# Patient Record
Sex: MALE | Race: BLACK OR AFRICAN AMERICAN | NOT HISPANIC OR LATINO | Employment: OTHER | URBAN - METROPOLITAN AREA
[De-identification: names, ages, dates, MRNs, and addresses within clinical notes are randomized per-mention and may not be internally consistent; named-entity substitution may affect disease eponyms.]

---

## 2017-01-03 ENCOUNTER — OFFICE VISIT (OUTPATIENT)
Dept: ORTHOPEDIC SURGERY | Facility: CLINIC | Age: 62
End: 2017-01-03

## 2017-01-03 DIAGNOSIS — Z98.1 S/P LUMBAR SPINAL FUSION: Primary | ICD-10-CM

## 2017-01-03 PROCEDURE — 72100 X-RAY EXAM L-S SPINE 2/3 VWS: CPT | Performed by: ORTHOPAEDIC SURGERY

## 2017-01-03 PROCEDURE — 99024 POSTOP FOLLOW-UP VISIT: CPT | Performed by: ORTHOPAEDIC SURGERY

## 2017-01-03 RX ORDER — CLOPIDOGREL BISULFATE 75 MG/1
75 TABLET ORAL EVERY MORNING
COMMUNITY
Start: 2017-01-02

## 2017-01-03 RX ORDER — OXYCODONE HYDROCHLORIDE 10 MG/1
10 TABLET ORAL EVERY 4 HOURS PRN
Qty: 70 TABLET | Refills: 0 | Status: SHIPPED | OUTPATIENT
Start: 2017-01-03 | End: 2017-01-13

## 2017-01-03 NOTE — MR AVS SNAPSHOT
Kian Mcdaniels   1/3/2017 9:35 AM   Office Visit    Dept Phone:  [phone]   Encounter #:  [id number]    Provider:  Rowdy Spencer MD   Department:  [facility]                Your Full Care Plan              Where to Get Your Medications      You can get these medications from any pharmacy     Bring a paper prescription for each of these medications     oxyCODONE 10 MG tablet            Your Updated Medication List          This list is accurate as of: 1/3/17 11:17 AM.  Always use your most recent med list.                acetone (urine) test strip       amLODIPine 10 MG tablet   Commonly known as:  NORVASC       aspirin 81 MG EC tablet       atorvastatin 40 MG tablet   Commonly known as:  LIPITOR       carvedilol 6.25 MG tablet   Commonly known as:  COREG       cetirizine 10 MG tablet   Commonly known as:  zyrTEC       clopidogrel 75 MG tablet   Commonly known as:  PLAVIX       docusate sodium 100 MG capsule   Commonly known as:  COLACE       FREESTYLE FREEDOM LITE W/DEVICE kit       freestyle lancets       FREESTYLE LITE test strip   Generic drug:  glucose blood       furosemide 40 MG tablet   Commonly known as:  LASIX       gabapentin 800 MG tablet   Commonly known as:  NEURONTIN       * HUMULIN R 500 UNIT/ML CONCENTRATED injection   Generic drug:  insulin regular       * insulin regular 500 UNIT/ML CONCENTRATED injection   Commonly known as:  humuLIN R       isosorbide mononitrate 60 MG 24 hr tablet   Commonly known as:  IMDUR       losartan 50 MG tablet   Commonly known as:  COZAAR       Multiple Vitamin tablet       oxyCODONE 10 MG tablet   Commonly known as:  ROXICODONE   Take 1 tablet by mouth Every 4 (Four) Hours As Needed for moderate pain (4-6) for up to 10 days.       pantoprazole 40 MG EC tablet   Commonly known as:  PROTONIX       potassium chloride 10 MEQ CR tablet   Commonly known as:  K-DUR       T:SLIM INSULIN  PUMP device       Testosterone Cypionate 200 MG/ML kit       tiZANidine 4 MG tablet   Commonly known as:  ZANAFLEX   Take 1 tablet by mouth Every Morning. And 1.5 tablet by mouth every evening       VENTOLIN  (90 BASE) MCG/ACT inhaler   Generic drug:  albuterol       VITAMIN C PO       vitamin D 61776 UNITS capsule capsule   Commonly known as:  ERGOCALCIFEROL       * Notice:  This list has 2 medication(s) that are the same as other medications prescribed for you. Read the directions carefully, and ask your doctor or other care provider to review them with you.            We Performed the Following     XR Spine Lumbar AP & Lateral       You Were Diagnosed With        Codes Comments    S/P lumbar spinal fusion    -  Primary ICD-10-CM: Z98.1  ICD-9-CM: V45.4       Instructions     None    Patient Instructions History      Upcoming Appointments     Visit Type Date Time Department    POST-OP 1/3/2017  9:35 AM [facility]    POST-OP 1/12/2017 12:00 PM [facility]    FOLLOW UP 2/14/2017  2:15 PM [facility]      BlazeM[employer] Signup     Our records indicate that you have an active [employer] account.    You can view your After Visit Summary by going to [employer] and logging in with your [employer]aze[employer] username and password.  If you don't have a [employer]azeM[employer] username and password but a parent or guardian has access to your record, the parent or guardian should login with their own [employer] username and password and access your record to view the After Visit Summary.    If you have questions, you can email [email or web address] or call [phone] to talk to our [employer]azeM[employer] staff.  Remember, BlazeMeter is NOT to be used for urgent needs.  For medical emergencies, dial 911.               Other Info from Your Visit           Your Appointments     Jan 12, 2017 12:00 PM EST   Post-Op with Lisa Banerjee PA-C   [facility] (--)    [address]  [place]  [place] [address]   [phone]            Feb 14, 2017  2:15 PM EST   Follow Up with Rowdy Spencer MD   [facility] (--)    [address] [id number] [phone]           Arrive 15 minutes prior to appointment.              Allergies     Erythromycin  Nausea Only    Metformin  Nausea And Vomiting      Reason for Visit     Lumbar Spine - Follow-up           Vital Signs     Smoking Status                   Former Smoker           Problems and Diagnoses Noted     Status post lumbar spinal fusion    -  Primary

## 2017-01-03 NOTE — PROGRESS NOTES
Postoperatively the patient expresses normal incisional pain.  There is little or no radiating pain.  Good strength on exam.  The wound is intact.  2 view x-rays of the lumbar spine obtained to evaluate hardware position and fusion bone show good position thereof and are unchanged from intraoperative images.  Instructions given.  We'll need lumbar x-rays on return visit.

## 2017-01-12 ENCOUNTER — OFFICE VISIT (OUTPATIENT)
Dept: NEUROSURGERY | Facility: CLINIC | Age: 62
End: 2017-01-12

## 2017-01-12 VITALS — HEART RATE: 66 BPM | SYSTOLIC BLOOD PRESSURE: 152 MMHG | DIASTOLIC BLOOD PRESSURE: 69 MMHG

## 2017-01-12 DIAGNOSIS — Z09 SURGERY FOLLOW-UP EXAMINATION: Primary | ICD-10-CM

## 2017-01-12 PROCEDURE — 99024 POSTOP FOLLOW-UP VISIT: CPT | Performed by: PHYSICIAN ASSISTANT

## 2017-01-12 NOTE — PROGRESS NOTES
Subjective   Patient ID: Kian Mcdaniels is a 61 y.o. male is here today for follow-up.  He is 1 month out from an repeat L4-5 laminectomy with fusion by Dr. Gutierres and Dr. Spencer 12/12/16.  He takes Gabapentin 800 mg TID, Tizanidine 4 mg BID and Oxycodone 10 mg QID for pain.     Back Pain   This is a chronic problem. The current episode started more than 1 year ago. The problem occurs intermittently. The problem is unchanged. The pain is present in the lumbar spine. The quality of the pain is described as aching and stabbing. The pain radiates to the left thigh and right thigh. The pain is at a severity of 8/10. The pain is the same all the time. Stiffness is present in the morning. Associated symptoms include abdominal pain, leg pain, numbness, paresthesias and tingling. Risk factors include obesity and sedentary lifestyle.       The following portions of the patient's history were reviewed and updated as appropriate: allergies, current medications, past family history, past medical history, past social history, past surgical history and problem list.    Review of Systems   Gastrointestinal: Positive for abdominal pain.   Musculoskeletal: Positive for back pain.   Neurological: Positive for tingling, numbness and paresthesias.       Objective   Physical Exam  Neurologic Exam    Assessment/Plan   Independent Review of Radiographic Studies:      Medical Decision Making:    Mr. Mcdaniels is doing well. He is one month out from the L4-5 rpt lami/fusion.  He has some leg pain but mostly LBP.  He admits that he is not walking much at all. His wound has healed well.  He is using a walker and using his brace. He is taking pain medication q 6 hrs.      I encouraged him to walk much more. He and his wife voiced understanding.     RTO 6wks.   Kian was seen today for post-op.    Diagnoses and all orders for this visit:    Surgery follow-up examination    Return in about 6 weeks (around 2/23/2017).

## 2017-01-12 NOTE — MR AVS SNAPSHOT
Kian Mcdaniels   1/12/2017 12:00 PM   Office Visit    Dept Phone:  [phone]   Encounter #:  [id number]    Provider:  Lisa Banerjee PA-C   Department:  [facility]                Your Full Care Plan              Your Updated Medication List          This list is accurate as of: 1/12/17 12:30 PM.  Always use your most recent med list.                acetone (urine) test strip       amLODIPine 10 MG tablet   Commonly known as:  NORVASC       aspirin 81 MG EC tablet       atorvastatin 40 MG tablet   Commonly known as:  LIPITOR       carvedilol 6.25 MG tablet   Commonly known as:  COREG       cetirizine 10 MG tablet   Commonly known as:  zyrTEC       clopidogrel 75 MG tablet   Commonly known as:  PLAVIX       docusate sodium 100 MG capsule   Commonly known as:  COLACE       FREESTYLE FREEDOM LITE W/DEVICE kit       freestyle lancets       FREESTYLE LITE test strip   Generic drug:  glucose blood       furosemide 40 MG tablet   Commonly known as:  LASIX       gabapentin 800 MG tablet   Commonly known as:  NEURONTIN       * HUMULIN R 500 UNIT/ML CONCENTRATED injection   Generic drug:  insulin regular       * insulin regular 500 UNIT/ML CONCENTRATED injection   Commonly known as:  humuLIN R       isosorbide mononitrate 60 MG 24 hr tablet   Commonly known as:  IMDUR       losartan 50 MG tablet   Commonly known as:  COZAAR       Multiple Vitamin tablet       oxyCODONE 10 MG tablet   Commonly known as:  ROXICODONE   Take 1 tablet by mouth Every 4 (Four) Hours As Needed for moderate pain (4-6) for up to 10 days.       pantoprazole 40 MG EC tablet   Commonly known as:  PROTONIX       potassium chloride 10 MEQ CR tablet   Commonly known as:  K-DUR       T:SLIM INSULIN PUMP device       Testosterone Cypionate 200 MG/ML kit       tiZANidine 4 MG tablet   Commonly known as:  ZANAFLEX   Take 1 tablet by mouth Every Morning. And 1.5 tablet by mouth every evening       VENTOLIN  (90 BASE) MCG/ACT inhaler   Generic drug:  albuterol       VITAMIN C PO       vitamin D 76396 UNITS capsule capsule   Commonly known as:  ERGOCALCIFEROL       * Notice:  This list has 2 medication(s) that are the same as other medications prescribed for you. Read the directions carefully, and ask your doctor or other care provider to review them with you.            Instructions     None    Patient Instructions History      Upcoming Appointments     Visit Type Date Time Department    POST-OP 1/12/2017 12:00 PM [facility]    FOLLOW UP 2/14/2017  2:15 PM [facility]    POST-OP 2/23/2017  4:00 PM [facility]      MyChart Signup     Our records indicate that you have an active [employer] account.    You can view your After Visit Summary by going to [employer] and logging in with your [employer] username and password.  If you don't have a [employer] username and password but a parent or guardian has access to your record, the parent or guardian should login with their own [employer] username and password and access your record to view the After Visit Summary.    If you have questions, you can email [email or web address] or call [phone] to talk to our [employer] staff.  Remember, [employer] is NOT to be used for urgent needs.  For medical emergencies, dial 911.               Other Info from Your Visit           Your Appointments     Feb 14, 2017  2:15 PM EST   Follow Up with Rowdy Spencer MD   [facility] (--)    [address] [facility] [address]  [place] [id number]   [phone]           Arrive 15 minutes prior to appointment.            Feb 23, 2017  4:00 PM EST   Post-Op with Tim Gutierres MD   [facility] (--)    [address]  [facility] [address] [phone]              Allergies     Erythromycin  Nausea Only    Lisinopril  Other (See Comments)    7/2016  possibly caused pancreatitis per Dr Quinonez    Metformin  Nausea And Vomiting      Reason for Visit     Post-op           Vital Signs     Blood Pressure Pulse Smoking Status             152/69 66 Former Smoker

## 2017-01-18 RX ORDER — OXYCODONE HYDROCHLORIDE 10 MG/1
10 TABLET ORAL EVERY 4 HOURS PRN
Qty: 40 TABLET | Refills: 0 | Status: SHIPPED | OUTPATIENT
Start: 2017-01-18 | End: 2017-01-30 | Stop reason: SDUPTHER

## 2017-01-31 RX ORDER — OXYCODONE HYDROCHLORIDE 10 MG/1
10 TABLET ORAL EVERY 4 HOURS PRN
Qty: 40 TABLET | Refills: 0 | Status: SHIPPED | OUTPATIENT
Start: 2017-01-31 | End: 2017-02-10 | Stop reason: SDUPTHER

## 2017-02-12 RX ORDER — OXYCODONE HYDROCHLORIDE 10 MG/1
10 TABLET ORAL EVERY 4 HOURS PRN
Qty: 40 TABLET | Refills: 0 | Status: SHIPPED | OUTPATIENT
Start: 2017-02-12 | End: 2017-02-14 | Stop reason: SDUPTHER

## 2017-02-14 ENCOUNTER — OFFICE VISIT (OUTPATIENT)
Dept: ORTHOPEDIC SURGERY | Facility: CLINIC | Age: 62
End: 2017-02-14

## 2017-02-14 VITALS — WEIGHT: 280 LBS | TEMPERATURE: 98.1 F | BODY MASS INDEX: 37.93 KG/M2 | HEIGHT: 72 IN

## 2017-02-14 DIAGNOSIS — Z98.1 S/P LUMBAR SPINAL FUSION: Primary | ICD-10-CM

## 2017-02-14 PROCEDURE — 72100 X-RAY EXAM L-S SPINE 2/3 VWS: CPT | Performed by: ORTHOPAEDIC SURGERY

## 2017-02-14 PROCEDURE — 99024 POSTOP FOLLOW-UP VISIT: CPT | Performed by: ORTHOPAEDIC SURGERY

## 2017-02-14 RX ORDER — OXYCODONE HYDROCHLORIDE 10 MG/1
10 TABLET ORAL EVERY 4 HOURS PRN
Qty: 60 TABLET | Refills: 0 | Status: SHIPPED | OUTPATIENT
Start: 2017-02-14 | End: 2017-02-27 | Stop reason: SDUPTHER

## 2017-02-23 ENCOUNTER — OFFICE VISIT (OUTPATIENT)
Dept: NEUROSURGERY | Facility: CLINIC | Age: 62
End: 2017-02-23

## 2017-02-23 VITALS
HEIGHT: 72 IN | WEIGHT: 280 LBS | SYSTOLIC BLOOD PRESSURE: 132 MMHG | BODY MASS INDEX: 37.93 KG/M2 | DIASTOLIC BLOOD PRESSURE: 76 MMHG | HEART RATE: 83 BPM

## 2017-02-23 DIAGNOSIS — Z09 SURGERY FOLLOW-UP EXAMINATION: Primary | ICD-10-CM

## 2017-02-23 PROCEDURE — 99024 POSTOP FOLLOW-UP VISIT: CPT | Performed by: NEUROLOGICAL SURGERY

## 2017-02-23 NOTE — PATIENT INSTRUCTIONS

## 2017-02-23 NOTE — PROGRESS NOTES
Subjective   Patient ID: Kian Mcdaniels is a 61 y.o. male is here today for follow-up. He is 2 months out from an repeat L4-5 laminectomy with fusion done 12/12/2016. He has low back pain.    History of Present Illness    This patient is doing very well.  He still has some pain in his lower back but it is not severe.  It is not nearly as bad as it was preoperatively.    The following portions of the patient's history were reviewed and updated as appropriate: allergies, current medications, past family history, past medical history, past social history, past surgical history and problem list.    Review of Systems   Respiratory: Negative for chest tightness and shortness of breath.    Cardiovascular: Negative for chest pain.   Musculoskeletal: Positive for back pain and gait problem.   Neurological: Positive for numbness.        Tingling- bilateral lower extremity's   All other systems reviewed and are negative.      Objective   Physical Exam   Constitutional: He appears well-developed and well-nourished.     Neurologic Exam    Assessment/Plan   Independent Review of Radiographic Studies:      Medical Decision Making:      I told the patient that he should stay on his exercise program.  He is to call me if any problems develop otherwise I'll check him again in a couple of months.    I counseled this patient with regard to weight loss.  We gave him a handout on exercise for weight loss and I told the patient to talk to their family doctor about a proper diet.    Kian was seen today for post-op, numbness and back pain.    Diagnoses and all orders for this visit:    Surgery follow-up examination    Return in about 2 months (around 4/23/2017).

## 2017-02-27 DIAGNOSIS — Z98.1 S/P LUMBAR SPINAL FUSION: ICD-10-CM

## 2017-02-28 RX ORDER — OXYCODONE HYDROCHLORIDE 10 MG/1
10 TABLET ORAL EVERY 4 HOURS PRN
Qty: 60 TABLET | Refills: 0 | Status: SHIPPED | OUTPATIENT
Start: 2017-02-28 | End: 2017-03-13 | Stop reason: SDUPTHER

## 2017-03-13 DIAGNOSIS — Z98.1 S/P LUMBAR SPINAL FUSION: ICD-10-CM

## 2017-03-13 RX ORDER — OXYCODONE HYDROCHLORIDE 10 MG/1
10 TABLET ORAL EVERY 4 HOURS PRN
Qty: 60 TABLET | Refills: 0 | Status: SHIPPED | OUTPATIENT
Start: 2017-03-13 | End: 2017-03-27 | Stop reason: SDUPTHER

## 2017-03-27 DIAGNOSIS — Z98.1 S/P LUMBAR SPINAL FUSION: ICD-10-CM

## 2017-03-27 RX ORDER — OXYCODONE HYDROCHLORIDE 10 MG/1
10 TABLET ORAL EVERY 4 HOURS PRN
Qty: 60 TABLET | Refills: 0 | Status: SHIPPED | OUTPATIENT
Start: 2017-03-27 | End: 2017-04-10 | Stop reason: SDUPTHER

## 2017-04-07 DIAGNOSIS — Z98.1 S/P LUMBAR SPINAL FUSION: ICD-10-CM

## 2017-04-10 RX ORDER — OXYCODONE HYDROCHLORIDE 10 MG/1
10 TABLET ORAL EVERY 4 HOURS PRN
Qty: 60 TABLET | Refills: 0 | OUTPATIENT
Start: 2017-04-10

## 2017-04-10 NOTE — TELEPHONE ENCOUNTER
LEFT DETAILED MESSAGE FOR PT ON CELL VM THAT HER WILL HAVE TO WAIT TILL [facility] COMES BACK IN OFFICE ON Tuesday. MG

## 2017-04-11 ENCOUNTER — PATIENT MESSAGE (OUTPATIENT)
Dept: ORTHOPEDIC SURGERY | Facility: CLINIC | Age: 62
End: 2017-04-11

## 2017-04-11 ENCOUNTER — TELEPHONE (OUTPATIENT)
Dept: ORTHOPEDIC SURGERY | Facility: CLINIC | Age: 62
End: 2017-04-11

## 2017-04-11 DIAGNOSIS — M51.36 BULGING LUMBAR DISC: Primary | ICD-10-CM

## 2017-04-11 RX ORDER — OXYCODONE HYDROCHLORIDE 10 MG/1
10 TABLET ORAL EVERY 4 HOURS PRN
Qty: 60 TABLET | Refills: 0 | Status: SHIPPED | OUTPATIENT
Start: 2017-04-11 | End: 2017-04-25 | Stop reason: SDUPTHER

## 2017-04-11 NOTE — TELEPHONE ENCOUNTER
----- Message from Breanna Sofia MA sent at 4/11/2017 11:45 AM EDT -----  Regarding: FW: Prescription Question  Contact: [phone]      ----- Message -----     From: Kian Mcdaniels     Sent: 4/11/2017   9:26 AM       To: [facility] Clinical Pool  Subject: Prescription Question                            MY CHART MESSAGE:    Dr. Spencer, I am still experiencing lower back pain in the affected area. I have a schedule appointment to see you, but it was moved until the 25th. I have always been in compliance with my medicine and still have a need for them. It was approved them denied. Is pain mgmt in my future again ?

## 2017-04-20 ENCOUNTER — OFFICE VISIT (OUTPATIENT)
Dept: NEUROSURGERY | Facility: CLINIC | Age: 62
End: 2017-04-20

## 2017-04-20 VITALS
HEIGHT: 72 IN | WEIGHT: 276 LBS | HEART RATE: 79 BPM | BODY MASS INDEX: 37.38 KG/M2 | DIASTOLIC BLOOD PRESSURE: 71 MMHG | SYSTOLIC BLOOD PRESSURE: 128 MMHG

## 2017-04-20 DIAGNOSIS — M54.2 NECK PAIN: Primary | ICD-10-CM

## 2017-04-20 PROCEDURE — 99213 OFFICE O/P EST LOW 20 MIN: CPT | Performed by: NEUROLOGICAL SURGERY

## 2017-04-20 RX ORDER — INSULIN GLARGINE 100 [IU]/ML
INJECTION, SOLUTION SUBCUTANEOUS
Refills: 3 | COMMUNITY
Start: 2017-02-02 | End: 2018-09-19

## 2017-04-20 RX ORDER — ATORVASTATIN CALCIUM 20 MG/1
TABLET, FILM COATED ORAL
COMMUNITY
Start: 2017-04-16 | End: 2017-04-25 | Stop reason: SDUPTHER

## 2017-04-20 RX ORDER — TESTOSTERONE CYPIONATE 200 MG/ML
INJECTION, SOLUTION INTRAMUSCULAR
Refills: 1 | Status: ON HOLD | COMMUNITY
Start: 2017-03-22 | End: 2018-09-24

## 2017-04-20 RX ORDER — OXYCODONE AND ACETAMINOPHEN 10; 325 MG/1; MG/1
TABLET ORAL
Refills: 0 | COMMUNITY
Start: 2017-02-10 | End: 2017-04-25

## 2017-04-20 RX ORDER — POTASSIUM CHLORIDE 750 MG/1
TABLET, EXTENDED RELEASE ORAL
Refills: 11 | COMMUNITY
Start: 2017-03-29 | End: 2018-09-19

## 2017-04-20 NOTE — PATIENT INSTRUCTIONS

## 2017-04-20 NOTE — PROGRESS NOTES
Subjective   Patient ID: Kian Mcdaniels is a 61 y.o. male is here today for follow-up. He is 4 1/2  months out from an repeat L4-5 laminectomy with fusion done 12/12/2016. He also has neck pain and low back pain. He also reports right knee pain.    History of Present Illness     This patient returns today.  He is clearly better than he was preoperatively.  He does still have a lot of back pain and a lot of pain in his leg but it is not nearly as bad as it was before surgery.  Recently he has been complaining of more pain in his neck with radiation into his right arm and numbness and tingling in his right hand.    The following portions of the patient's history were reviewed and updated as appropriate: allergies, current medications, past family history, past medical history, past social history, past surgical history and problem list.    Review of Systems   Constitutional: Positive for activity change.   Respiratory: Negative for chest tightness and shortness of breath.    Cardiovascular: Negative for chest pain.   Musculoskeletal: Positive for arthralgias (Right hip and knee), back pain and gait problem.   Neurological:        Tingling in BLE   All other systems reviewed and are negative.      Objective   Physical Exam   Constitutional: He is oriented to person, place, and time. He appears well-developed and well-nourished.   Neurological: He is oriented to person, place, and time.     Neurologic Exam     Mental Status   Oriented to person, place, and time.       Assessment/Plan   Independent Review of Radiographic Studies:      His preoperative MRI of the cervical spine did show some spondylitic change but nothing that appeared to be severe area    Medical Decision Making:      I told the patient that we should really go ahead and do an EMG of his right arm.  I will see him back in about 4-6 weeks with the results of the EMG.  In the meantime he plans to see Dr. Cintron and I asked him to bring copies of his  x-rays with him when he returns.    Kian was seen today for back pain and neck pain.    Diagnoses and all orders for this visit:    Neck pain  -     EMG Right Arm; Future  -     Nerve Conduction Test Right Arm; Future    Return in about 6 weeks (around 6/1/2017).

## 2017-04-25 ENCOUNTER — OFFICE VISIT (OUTPATIENT)
Dept: ORTHOPEDIC SURGERY | Facility: CLINIC | Age: 62
End: 2017-04-25

## 2017-04-25 VITALS — TEMPERATURE: 97.9 F | BODY MASS INDEX: 36.57 KG/M2 | HEIGHT: 72 IN | WEIGHT: 270 LBS

## 2017-04-25 DIAGNOSIS — Z98.1 S/P LUMBAR SPINAL FUSION: Primary | ICD-10-CM

## 2017-04-25 DIAGNOSIS — Z98.1 S/P LUMBAR FUSION: Primary | ICD-10-CM

## 2017-04-25 PROCEDURE — 99024 POSTOP FOLLOW-UP VISIT: CPT | Performed by: ORTHOPAEDIC SURGERY

## 2017-04-25 PROCEDURE — 72100 X-RAY EXAM L-S SPINE 2/3 VWS: CPT | Performed by: ORTHOPAEDIC SURGERY

## 2017-04-25 RX ORDER — OXYCODONE HYDROCHLORIDE 10 MG/1
10 TABLET ORAL EVERY 4 HOURS PRN
Qty: 60 TABLET | Refills: 0 | Status: SHIPPED | OUTPATIENT
Start: 2017-04-25 | End: 2017-05-15 | Stop reason: SDUPTHER

## 2017-04-25 NOTE — PROGRESS NOTES
Back pain is improved.  No significant leg pain.  Two-view x-rays of the lumbar spine obtained to evaluate hardware and  fusion bone suggest further healing since prior x-ray.  We will need an AP and lateral lumbar x-ray on return visit.  Instructions were given.  Answers for HPI/ROS submitted by the patient on 4/23/2017   Back pain  Chronicity: chronic  Onset: more than 1 year ago  Frequency: constantly  Progression since onset: waxing and waning  Pain location: sacro-iliac  Pain quality: aching, shooting, stabbing  Radiates to: right knee  Pain - numeric: 9/10  Pain is: the same all the time  Aggravated by: bending, sitting, standing  Stiffness is present: all day  abdominal pain: No  bladder incontinence: No  bowel incontinence: No  chest pain: No  dysuria: No  fever: No  headaches: No  leg pain: Yes  numbness: Yes  paresis: No  paresthesias: Yes  pelvic pain: No  perianal numbness: No  tingling: Yes  weakness: Yes  weight loss: No  Risk factors: lack of exercise, obesity, sedentary lifestyle

## 2017-05-10 DIAGNOSIS — Z98.1 S/P LUMBAR SPINAL FUSION: ICD-10-CM

## 2017-05-10 RX ORDER — OXYCODONE AND ACETAMINOPHEN 10; 325 MG/1; MG/1
1 TABLET ORAL EVERY 4 HOURS PRN
Qty: 60 TABLET | Refills: 0 | OUTPATIENT
Start: 2017-05-10

## 2017-05-15 ENCOUNTER — TELEPHONE (OUTPATIENT)
Dept: PAIN MEDICINE | Facility: CLINIC | Age: 62
End: 2017-05-15

## 2017-05-15 ENCOUNTER — OFFICE VISIT (OUTPATIENT)
Dept: PAIN MEDICINE | Facility: CLINIC | Age: 62
End: 2017-05-15

## 2017-05-15 VITALS
DIASTOLIC BLOOD PRESSURE: 73 MMHG | RESPIRATION RATE: 18 BRPM | OXYGEN SATURATION: 94 % | HEART RATE: 70 BPM | BODY MASS INDEX: 37.11 KG/M2 | TEMPERATURE: 97.8 F | SYSTOLIC BLOOD PRESSURE: 173 MMHG | HEIGHT: 72 IN | WEIGHT: 274 LBS

## 2017-05-15 DIAGNOSIS — M96.1 POSTLAMINECTOMY SYNDROME OF LUMBAR REGION: ICD-10-CM

## 2017-05-15 DIAGNOSIS — M47.816 OSTEOARTHRITIS OF LUMBAR SPINE, UNSPECIFIED SPINAL OSTEOARTHRITIS COMPLICATION STATUS: ICD-10-CM

## 2017-05-15 DIAGNOSIS — Z79.899 ENCOUNTER FOR LONG-TERM (CURRENT) USE OF HIGH-RISK MEDICATION: ICD-10-CM

## 2017-05-15 DIAGNOSIS — G89.29 OTHER CHRONIC PAIN: Primary | ICD-10-CM

## 2017-05-15 DIAGNOSIS — Z98.1 S/P LUMBAR SPINAL FUSION: ICD-10-CM

## 2017-05-15 DIAGNOSIS — M54.50 LOW BACK PAIN WITHOUT SCIATICA, UNSPECIFIED BACK PAIN LATERALITY, UNSPECIFIED CHRONICITY: ICD-10-CM

## 2017-05-15 DIAGNOSIS — Z98.890 HX OF DECOMPRESSIVE LUMBAR LAMINECTOMY: ICD-10-CM

## 2017-05-15 DIAGNOSIS — M54.2 NECK PAIN: ICD-10-CM

## 2017-05-15 PROCEDURE — 99214 OFFICE O/P EST MOD 30 MIN: CPT | Performed by: NURSE PRACTITIONER

## 2017-05-15 RX ORDER — OXYCODONE HYDROCHLORIDE 10 MG/1
10 TABLET ORAL EVERY 4 HOURS PRN
Qty: 180 TABLET | Refills: 0 | Status: SHIPPED | OUTPATIENT
Start: 2017-05-15 | End: 2017-06-08 | Stop reason: SDUPTHER

## 2017-06-08 ENCOUNTER — HOSPITAL ENCOUNTER (OUTPATIENT)
Dept: INFUSION THERAPY | Facility: HOSPITAL | Age: 62
Discharge: HOME | End: 2017-06-08
Attending: NEUROLOGICAL SURGERY | Admitting: PSYCHIATRY & NEUROLOGY

## 2017-06-08 DIAGNOSIS — M54.2 NECK PAIN: ICD-10-CM

## 2017-06-08 DIAGNOSIS — Z98.1 S/P LUMBAR SPINAL FUSION: ICD-10-CM

## 2017-06-08 PROCEDURE — 95886 MUSC TEST DONE W/N TEST COMP: CPT | Performed by: PSYCHIATRY & NEUROLOGY

## 2017-06-08 PROCEDURE — 95909 NRV CNDJ TST 5-6 STUDIES: CPT | Performed by: PSYCHIATRY & NEUROLOGY

## 2017-06-08 PROCEDURE — 95909 NRV CNDJ TST 5-6 STUDIES: CPT

## 2017-06-08 PROCEDURE — 95886 MUSC TEST DONE W/N TEST COMP: CPT

## 2017-06-08 RX ORDER — OXYCODONE HYDROCHLORIDE 10 MG/1
10 TABLET ORAL EVERY 4 HOURS PRN
Qty: 150 TABLET | Refills: 0 | Status: SHIPPED | OUTPATIENT
Start: 2017-06-08 | End: 2017-07-05 | Stop reason: SDUPTHER

## 2017-06-08 RX ORDER — OXYCODONE HYDROCHLORIDE 10 MG/1
10 TABLET ORAL EVERY 4 HOURS PRN
Qty: 180 TABLET | Refills: 0 | Status: SHIPPED | OUTPATIENT
Start: 2017-06-08 | End: 2017-06-08 | Stop reason: SDUPTHER

## 2017-06-09 ENCOUNTER — OFFICE VISIT (OUTPATIENT)
Dept: PAIN MEDICINE | Facility: CLINIC | Age: 62
End: 2017-06-09

## 2017-06-09 VITALS
RESPIRATION RATE: 16 BRPM | HEIGHT: 72 IN | TEMPERATURE: 97.8 F | WEIGHT: 275 LBS | SYSTOLIC BLOOD PRESSURE: 144 MMHG | HEART RATE: 81 BPM | DIASTOLIC BLOOD PRESSURE: 79 MMHG | BODY MASS INDEX: 37.25 KG/M2 | OXYGEN SATURATION: 95 %

## 2017-06-09 DIAGNOSIS — M96.1 POSTLAMINECTOMY SYNDROME OF LUMBAR REGION: ICD-10-CM

## 2017-06-09 DIAGNOSIS — Z98.890 HX OF DECOMPRESSIVE LUMBAR LAMINECTOMY: ICD-10-CM

## 2017-06-09 DIAGNOSIS — M47.816 OSTEOARTHRITIS OF LUMBAR SPINE, UNSPECIFIED SPINAL OSTEOARTHRITIS COMPLICATION STATUS: ICD-10-CM

## 2017-06-09 DIAGNOSIS — M54.50 LOW BACK PAIN WITHOUT SCIATICA, UNSPECIFIED BACK PAIN LATERALITY, UNSPECIFIED CHRONICITY: ICD-10-CM

## 2017-06-09 DIAGNOSIS — M54.2 NECK PAIN: ICD-10-CM

## 2017-06-09 DIAGNOSIS — G89.29 OTHER CHRONIC PAIN: Primary | ICD-10-CM

## 2017-06-09 DIAGNOSIS — G56.03 BILATERAL CARPAL TUNNEL SYNDROME: ICD-10-CM

## 2017-06-09 DIAGNOSIS — Z79.899 ENCOUNTER FOR LONG-TERM (CURRENT) USE OF HIGH-RISK MEDICATION: ICD-10-CM

## 2017-06-09 PROCEDURE — 99214 OFFICE O/P EST MOD 30 MIN: CPT | Performed by: NURSE PRACTITIONER

## 2017-06-09 NOTE — PROGRESS NOTES
CHIEF COMPLAINT  Since 5/15/17 office visit, Pt states LBP bilaterally extending into hips continues to be moderately controlled overall with current medicine regimen.  Pt also is having increased neck pain with tingling in R arm into fingers; Pt to see Dr Gutierres 6/27/17 (Pt had EMG done 6/8/17 per Dr Gutierres).    Subjective   Kian Mcdaniels is a 61 y.o. male  who presents to the office for follow-up.He has a history of low back pain and neck pain. He has had repeat L4-5 laminectomy with fusion by Dr. Gutierres and Dr. Spencer 12/12/16. Prior to surgery, he had been maintained on Hydrocodone 10/325 q6hrs PRN.     Complains of pain in his neck and back. Today his pain is 6/10VAS. Describes the pain as continuous. His back pain is unchanged since his last office visit. His neck pain is slightly worse since last office visit. Pain increases with movement, activity, ROM; pain decreases with medication, rest.  Continues with Oxycodone 10 mg q4hrs, gabapentin 800 mg 3/day and tizanidine 4mg 2-3/day. Denies any side effects from the regimen. The regimen helps decrease his pain by 50%. ADL's by self.     He is also having neck issues. Has been evaluated by Dr. Gutierres. Was sent for EMG. He states he noticed the pain starting a little bit before his back surgery. It has worsened.     Back Pain   This is a chronic problem. The current episode started more than 1 year ago. The problem occurs daily. The problem has been waxing and waning since onset. The pain is present in the sacro-iliac. The quality of the pain is described as aching, shooting and stabbing. The pain is at a severity of 6/10. The pain is the same all the time. The symptoms are aggravated by bending, sitting and standing. Stiffness is present all day. Associated symptoms include leg pain, numbness (bilat. legs), paresthesias, tingling and weakness (uses cane). Pertinent negatives include no chest pain, dysuria, fever or headaches. Risk factors include lack of  exercise, obesity and sedentary lifestyle.   Neck Pain    This is a chronic (started noticing right before most recent back surgery) problem. The current episode started more than 1 month ago. The problem occurs constantly. The problem has been gradually worsening. The pain is associated with nothing. The pain is present in the left side, right side and midline (also has tingling in right arm and hand). The quality of the pain is described as cramping. The pain is at a severity of 7/10. The pain is moderate. The symptoms are aggravated by sneezing and twisting. The pain is same all the time. Associated symptoms include leg pain, numbness (bilat. legs), tingling and weakness (uses cane). Pertinent negatives include no chest pain, fever or headaches. He has tried muscle relaxants, oral narcotics and bed rest for the symptoms. The treatment provided moderate relief.      EMG REPORT  6-8-17     Indication: Neck pain and left arm pain     Findings: Right median sensory latency was prolonged at 6.7 ms. Right ulnar sensory and motor study showed normal distal latencies and amplitudes. Right median motor study showed a prolonged dyslexia 7.9 ms with normal velocity and amplitude. Left median motor study showed a prolonged dyslexia 5.6 ms with normal velocity and amplitude.     Concentric needle EMG of the left deltoid, biceps, triceps, brachioradialis, extensor digitorum, and first dorsal interosseous muscles showed no abnormality.     Impression: Studies show evidence of bilateral median neuropathies. The abnormalities are severe on the right and moderate on the left. The abnormalities localized best at or near the carpal tunnel region. Needle EMG of the left upper extremity failed to show evidence of superimposed cervical radiculopathy.     Thank you for sending this patient for further evaluation.  Rahat Guillen M.D.    PEG Assessment   What number best describes your pain on average in the past week?8  What number  "best describes how, during the past week, pain has interfered with your enjoyment of life?8  What number best describes how, during the past week, pain has interfered with your general activity?  8    The following portions of the patient's history were reviewed and updated as appropriate: allergies, current medications, past family history, past medical history, past social history, past surgical history and problem list.    Review of Systems   Constitutional: Negative for activity change, appetite change, chills and fever.   Respiratory: Negative for apnea, chest tightness and shortness of breath.    Cardiovascular: Negative for chest pain.   Gastrointestinal: Negative for constipation, diarrhea, nausea and vomiting.   Genitourinary: Negative for difficulty urinating and dysuria.   Musculoskeletal: Positive for back pain and neck pain.   Neurological: Positive for tingling, weakness (uses cane), numbness (bilat. legs) and paresthesias. Negative for dizziness, light-headedness and headaches.   Psychiatric/Behavioral: Positive for sleep disturbance. Negative for confusion, hallucinations, self-injury and suicidal ideas. The patient is not nervous/anxious.        Vitals:    06/09/17 1058   BP: 144/79   Pulse: 81   Resp: 16   Temp: 97.8 °F (36.6 °C)   SpO2: 95%   Weight: 275 lb (125 kg)   Height: 71.5\" (181.6 cm)   PainSc: 6  Comment: LBP bilaterally ranges from 5-8/10   PainLoc: Back     Objective   Physical Exam   Constitutional: He is oriented to person, place, and time. Vital signs are normal. He appears well-developed and well-nourished. He is cooperative.   HENT:   Head: Normocephalic and atraumatic.   Nose: Nose normal.   Eyes: Conjunctivae and lids are normal.   Neck: Trachea normal. Neck supple. Muscular tenderness present. No spinous process tenderness present. Decreased range of motion present.   Negative bilateral occipital tenderness   Cardiovascular: Normal rate, regular rhythm and normal heart sounds.  "   Pulmonary/Chest: Effort normal and breath sounds normal. No respiratory distress.   Abdominal:   obese   Musculoskeletal:        Lumbar back: He exhibits tenderness and bony tenderness.   Extensive surgical scar of lumbar spine   Neurological: He is alert and oriented to person, place, and time. Gait (ambulates with aid of cane) abnormal.   Reflex Scores:       Bicep reflexes are 1+ on the right side and 1+ on the left side.       Brachioradialis reflexes are 1+ on the right side and 1+ on the left side.       Patellar reflexes are 1+ on the right side and 1+ on the left side.  Skin: Skin is warm, dry and intact.   Psychiatric: He has a normal mood and affect. His speech is normal and behavior is normal. Judgment and thought content normal. Cognition and memory are normal.   Nursing note and vitals reviewed.      Assessment/Plan   Kian was seen today for back pain.    Diagnoses and all orders for this visit:    Other chronic pain    Postlaminectomy syndrome of lumbar region    Hx of decompressive lumbar laminectomy    Osteoarthritis of lumbar spine, unspecified spinal osteoarthritis complication status    Low back pain without sciatica, unspecified back pain laterality, unspecified chronicity    Encounter for long-term (current) use of high-risk medication    Neck pain  -     XR Spine Cervical Complete With Flex Ext; Future  -     Ambulatory Referral to Physical Therapy Evaluate and treat    Bilateral carpal tunnel syndrome      --- The oral drug screen confirmation from 5-15-17 has been reviewed and the result is ABNORMAL(no presence of Oxycodone or metabolites but it is an oral drug screen) based on patient history and DAVID report  --- Repeat ODT at next office visit. Has been out of medication since 6-6-17.  --- refill Oxycodone at current regimen. Patient appears stable with current regimen. No adverse effects. Regarding continuation of opioids, there is no evidence of aberrant behavior or any red flags.   The patient continues with appropriate response to opioid therapy. ADL's remain intact by self.   --- reviewed EMG with patient. See above.  --- XR cervical. Would prefer MRI but patient has not had any recent imaging of cervical spine, nor recent PT.  --- Referral to PT for neck pain.  --- Discussed wearing braces at night for CTS.  --- Follow-up with DR. Gutierres as scheduled.    --- Follow-up 1 month or sooner if needed.       DAVID REPORT    As part of the patient's treatment plan, I am prescribing controlled substances. The patient has been made aware of appropriate use of such medications, including potential risk of somnolence, limited ability to drive and/or work safely, and the potential for dependence or overdose. It has also bee made clear that these medications are for use by this patient only, without concomitant use of alcohol or other substances unless prescribed.     Patient has completed prescribing agreement detailing terms of continued prescribing of controlled substances, including monitoring DAVID reports, urine drug screening, and pill counts if necessary. The patient is aware that inappropriate use will results in cessation of prescribing such medications.    DAVID report has been reviewed and scanned into the patient's chart.    Date of last DAVID : 6-7-17    History and physical exam exhibit continued safe and appropriate use of controlled substances.      EMR Dragon/Transcription disclaimer:   Much of this encounter note is an electronic transcription/translation of spoken language to printed text. The electronic translation of spoken language may permit erroneous, or at times, nonsensical words or phrases to be inadvertently transcribed; Although I have reviewed the note for such errors, some may still exist.

## 2017-06-20 ENCOUNTER — HOSPITAL ENCOUNTER (OUTPATIENT)
Dept: PHYSICAL THERAPY | Facility: HOSPITAL | Age: 62
Setting detail: THERAPIES SERIES
Discharge: HOME | End: 2017-06-20

## 2017-06-20 DIAGNOSIS — M54.2 CERVICAL PAIN: Primary | ICD-10-CM

## 2017-06-20 PROCEDURE — 97161 PT EVAL LOW COMPLEX 20 MIN: CPT

## 2017-06-22 ENCOUNTER — HOSPITAL ENCOUNTER (OUTPATIENT)
Dept: PHYSICAL THERAPY | Facility: HOSPITAL | Age: 62
Setting detail: THERAPIES SERIES
Discharge: HOME | End: 2017-06-22

## 2017-06-22 ENCOUNTER — TELEPHONE (OUTPATIENT)
Dept: PAIN MEDICINE | Facility: CLINIC | Age: 62
End: 2017-06-22

## 2017-06-22 DIAGNOSIS — M54.2 CERVICAL PAIN: Primary | ICD-10-CM

## 2017-06-22 PROCEDURE — 97110 THERAPEUTIC EXERCISES: CPT | Performed by: PHYSICAL THERAPIST

## 2017-06-22 PROCEDURE — 97140 MANUAL THERAPY 1/> REGIONS: CPT | Performed by: PHYSICAL THERAPIST

## 2017-06-22 NOTE — THERAPY TREATMENT NOTE
Outpatient Physical Therapy Ortho Treatment Note  [facility]     Patient Name: Kian Mcdaniels  : 1955  MRN: [id number]  Today's Date: 2017      Visit Date: 2017    Visit Dx:    ICD-10-CM ICD-9-CM   1. Cervical pain M54.2 723.1       Patient Active Problem List   Diagnosis   • Bulging lumbar disc   • Chronic pain   • Diabetic neuropathy   • Low back pain   • Degenerative arthritis of lumbar spine   • Neuropathy involving both lower extremities   • Encounter for long-term (current) use of high-risk medication   • Postlaminectomy syndrome of lumbar region   • Neck pain   • Lumbar pseudoarthrosis   • DM2 (diabetes mellitus, type 2)   • Insulin pump in place   • Hx of decompressive lumbar laminectomy   • Surgery follow-up examination   • Bilateral carpal tunnel syndrome        Past Medical History:   Diagnosis Date   • Abscess of scrotum    • Angina pectoris    • Arteriosclerotic cardiovascular disease    • COPD (chronic obstructive pulmonary disease)    • Coronary artery disease    • Diabetes mellitus    • Diabetic peripheral neuropathy    • ED (erectile dysfunction) of non-organic origin    • Hypertension    • Insulin pump in place    • Insulin pump in place    • Low back pain    • Myocardial infarction    • Neck pain    • Spinal stenosis    • TIA (transient ischemic attack)     LEFT EYE   • Type 2 diabetes mellitus    • Wears dentures     UPPER PLATE        Past Surgical History:   Procedure Laterality Date   • AMPUTATION FOOT / TOE Left     GREAT TOE   • BACK SURGERY  10/26/2015    Bilateral L4-L5 laminectomy -Dr. Gutierres   • CARDIAC CATHETERIZATION     • CARDIAC SURGERY      CABG X 6    • CHOLECYSTECTOMY     • CORONARY ARTERY BYPASS GRAFT      X 6   • EPIDURAL BLOCK     • EYE SURGERY     • HAND SURGERY  2016   • LUMBAR DISCECTOMY FUSION INSTRUMENTATION N/A 2016    Procedure: L 4-5 FUSION WITH INSTRUMENTATION WITH BMP, WITH REMOVAL OF INSTRUMENTATION ;  Surgeon: Rowdy NGUYEN  MD Tj;  Location: [facility] OR;  Service:    • LUMBAR LAMINECTOMY DISCECTOMY DECOMPRESSION N/A 12/12/2016    Procedure: L4-5 REPEAT LAMINECTOMY ;  Surgeon: Tim Gutierres MD;  Location: [facility] OR;  Service:    • LUMBAR LAMINECTOMY DISCECTOMY DECOMPRESSION N/A 12/14/2016    Procedure: EVACUATION OF LUMBAR HEMATOMA;  Surgeon: Rowdy Spencer MD;  Location: [facility] OR;  Service:    • MULTIPLE TOOTH EXTRACTIONS      UPPER TEETH EXTRACTED   • ORTHOPEDIC SURGERY     • PENIS SURGERY      RECONSTRUCTION   • SCROTUM EXPLORATION      scrotum surgery   • SPINE SURGERY               PT Ortho       06/20/17 1100    Posture/Observations    Alignment Options Forward head;Rounded shoulders  -CN    Forward Head Moderate  -CN    Rounded Shoulders Moderate  -CN    Sensation    Additional Comments Pt reports B numbness in hands; EMG study showing median nerve neuropathies  -CN    Sensory Screen for Light Touch- Upper Quarter Clearing    C4 (posterior shoulder) Bilateral:;Intact  -CN    C5 (lateral upper arm) Bilateral:;Intact  -CN    C6 (tip of thumb) Bilateral:;Intact  -CN    C7 (tip of 3rd finger) Right:;Diminished;Left:;Intact  -CN    C8 (tip of 5th finger) Bilateral:;Intact  -CN    T1 (medial lower arm) Bilateral:;Intact  -CN    Myotomal Screen- Upper Quarter Clearing    Shoulder flexion (C5) Bilateral:;4+ (Good +)  -CN    Elbow flexion/wrist extension (C6) Bilateral:;5 (Normal)  -CN    Elbow extension/wrist flexion (C7) Bilateral:;4 (Good)  -CN    Finger flexion/ (C8) Bilateral:;4+ (Good +)  -CN    Finger abduction (T1) Bilateral:;4+ (Good +)  -CN    Cervical/Shoulder ROM Screen    Cervical flexion Impaired   75% with pain  -CN    Cervical extension Impaired   25% with pain  -CN    Cervical lateral flexion Impaired   R=50%, L=25% B pain  -CN    Cervical rotation Impaired   R=100%, L=50% with pain  -CN    Cervical Palpation    Suboccipital Bilateral:;Tender;Guarded/taut;Trigger point  -CN    Levator Scapula  "Bilateral:;Tender;Guarded/taut;Trigger point  -CN    Upper Traps Bilateral:;Tender;Guarded/taut;Trigger point  -CN    Cervical Accessory Motions    Sideglide- C2 Hypomobile;Left pain  -CN    Sideglide- C3 Hypomobile;Left pain  -CN    Sideglide- C5 WNL  -CN    Upper Extremity Flexibility    Suboccipitals Mildly limited  -CN    Upper Trapezius Moderately limited  -CN    Levator Scapula Moderately limited  -CN    Pect Minor Moderately limited  -CN    Pect Major Moderately limited  -CN      User Key  (r) = Recorded By, (t) = Taken By, (c) = Cosigned By    Initials Name Provider Type    ASHLYN Chan, PT Physical Therapist                            PT Assessment/Plan       06/22/17 1036       PT Assessment    Assessment Comments Mr. Mcdaniels returns today for his first follow up session.  We initiated postural/scapular strengtheneing exercises, (pt. requires guarding with standing exercises secodary to decreased balance secondaryy to lumbar surgeries). We also initiated trial of DDN to L UT, pr. demonstrated frequent/strong LTR\"s L UT. He tolerated well without immediate adverse response.    -GJ     PT Plan    PT Plan Comments assess response to DDN.  repeat as appropriate.  Continue to work on postural/scapular girdle strengthening, ROM of C spine, review ergonomic considerations. Consider C spine traction if necessary.   -GJ       User Key  (r) = Recorded By, (t) = Taken By, (c) = Cosigned By    Initials Name Provider Type    MARCIE Rhodes, PT Physical Therapist                Modalities       06/22/17 0900          Ice    Ice Applied Yes  -GJ      Location bilateral UT, upper T spine, lower C spine, pt. prone with nose in nose hole  -GJ      Rx Minutes 10 mins  -GJ      Ice S/P Rx Yes  -GJ        User Key  (r) = Recorded By, (t) = Taken By, (c) = Cosigned By    Initials Name Provider Type    MARCIE Rhodes, PT Physical Therapist                Exercises       06/22/17 0900          Subjective " Comments    Subjective Comments I have that pain in my (L) UT and L head. I've been going to [facility] working with a .   -GJ      Subjective Pain    Able to rate subjective pain? yes  -GJ      Pre-Treatment Pain Level 8  -GJ      Exercise 1    Exercise Name 1 Chin tucks  -GJ      Cueing 1 Demo  -GJ      Reps 1 10  -GJ      Time (Seconds) 1 5  -GJ      Exercise 4    Exercise Name 4 UBE  -GJ      Cueing 4 Verbal  -GJ      Time (Minutes) 4 4  -GJ      Exercise 5    Exercise Name 5 shoulder ext  -GJ      Cueing 5 Demo  -GJ      Equipment 5 Theraband  -GJ      Resistance 5 Green  -GJ      Sets 5 2  -GJ      Reps 5 10  -GJ      Exercise 6    Exercise Name 6 scap rows  -GJ      Cueing 6 Demo  -GJ      Equipment 6 Theraband  -GJ      Resistance 6 Green  -GJ      Sets 6 2  -GJ      Reps 6 10  -GJ      Exercise 7    Exercise Name 7 standing shoulder ER bilateally,   -GJ      Cueing 7 Demo  -GJ      Equipment 7 Theraband  -GJ      Resistance 7 Red  -GJ      Sets 7 2  -GJ      Reps 7 10  -GJ        User Key  (r) = Recorded By, (t) = Taken By, (c) = Cosigned By    Initials Name Provider Type    GJ Young Rhodes, PT Physical Therapist                        Manual Rx (last 36 hours)      Manual Treatments       06/22/17 0900          Manual Rx 1    Manual Rx 1 Location intramuscular manual therapy  -GJ Assessed pt. for Dry Needling and intramuscular manual therapy. Discussed risks/benefits of Dry Needling with pt, including but not limited to muscle soreness, bruising, vasovagal response, pneumothorax, nerve injury. Patient signed written consent to proceed with treatment.    Patient position during treatment: prone, now in nose hole.  Muscles treated: L UT  Response to treatment: frequent and regular strong LTR's. Pt. Tolerated treatment without immediate adverse response.     palpation used before, during, and after to facilitate assessment Clean needle technique observed at all times, precautions for lung fields,  neurovascular structures observed.    DDN performed by Young Rhodes II, PT, DPT       User Key  (r) = Recorded By, (t) = Taken By, (c) = Cosigned By    Initials Name Provider Type    MARCIE Rhodes PT Physical Therapist                PT OP Goals       06/22/17 0900       PT Short Term Goals    STG Date to Achieve 07/11/17  -GJ     STG 1 Pt will report subjective pain at 4/10 or less to demonstrate symptom management with ADLs.  -GJ     STG 1 Progress Ongoing  -GJ     STG 2 Pt will be independent and compliant with HEP and symptom management in order to minimize stress on affected tissues.    -GJ     STG 2 Progress Ongoing  -GJ     Long Term Goals    LTG Date to Achieve 08/01/17  -GJ     LTG 1 Pt will increase cervical ROM to 75% and pain free in all planes in order to facilitate return to PLOF.  -GJ     LTG 1 Progress Ongoing  -GJ     LTG 2 Pt will improve NDI score to 30% for overall functional improvement.  -GJ     LTG 2 Progress Ongoing  -GJ     LTG 3 Pt will report 50% reduction in symptoms with L SBing and rotation.   -GJ     LTG 3 Progress Ongoing  -GJ     LTG 4 Pt will be educated on and be able to demonstrate proper posture, body mechanics and performance of HEP in order to decrease risk of future recurrence of symptoms.   -GJ     LTG 4 Progress Ongoing  -GJ       User Key  (r) = Recorded By, (t) = Taken By, (c) = Cosigned By    Initials Name Provider Type    MARCIE Rhodes PT Physical Therapist                Therapy Education       06/22/17 0956          Therapy Education    Given HEP;Symptoms/condition management;Pain management;Posture/body mechanics;Mobility training   discussed ergonomics with household/functional mobility, issued red back pain booklet  -GJ      Program New  -GJ      How Provided Verbal;Demonstration;Written  -GJ      Provided to Patient  -GJ      Level of Understanding Verbalized;Teach back education performed;Demonstrated  -GJ        User Key  (r) = Recorded By, (t) =  Taken By, (c) = Cosigned By    Initials Name Provider Type    GJ Young Rhodes, PT Physical Therapist                Outcome Measures       06/20/17 1400          Functional Assessment    Outcome Measure Options Neck Disability Index (NDI)   52% where 0% is no disability  -CN        User Key  (r) = Recorded By, (t) = Taken By, (c) = Cosigned By    Initials Name Provider Type    ASHLYN Chan, PT Physical Therapist            Time Calculation:   Start Time: 1000  Stop Time: 1048  Time Calculation (min): 48 min    Therapy Charges for Today     Code Description Service Date Service Provider Modifiers Qty    22029357230  PT MANUAL THERAPY EA 15 MIN 6/22/2017 Young Rhodes, PT GP 2    [id number] HC PT THER PROC EA 15 MIN 6/22/2017 Young Rhodes, PT GP 1                    Young Rhodes, PT  6/22/2017

## 2017-06-26 ENCOUNTER — HOSPITAL ENCOUNTER (OUTPATIENT)
Dept: GENERAL RADIOLOGY | Facility: HOSPITAL | Age: 62
Discharge: HOME | End: 2017-06-26
Admitting: NURSE PRACTITIONER

## 2017-06-26 DIAGNOSIS — M54.2 NECK PAIN: ICD-10-CM

## 2017-06-26 PROCEDURE — 72052 X-RAY EXAM NECK SPINE 6/>VWS: CPT

## 2017-06-27 ENCOUNTER — OFFICE VISIT (OUTPATIENT)
Dept: NEUROSURGERY | Facility: CLINIC | Age: 62
End: 2017-06-27

## 2017-06-27 ENCOUNTER — OFFICE VISIT (OUTPATIENT)
Dept: ORTHOPEDIC SURGERY | Facility: CLINIC | Age: 62
End: 2017-06-27

## 2017-06-27 VITALS
WEIGHT: 270 LBS | DIASTOLIC BLOOD PRESSURE: 67 MMHG | HEIGHT: 72 IN | HEART RATE: 83 BPM | BODY MASS INDEX: 36.57 KG/M2 | SYSTOLIC BLOOD PRESSURE: 126 MMHG

## 2017-06-27 DIAGNOSIS — M96.1 POSTLAMINECTOMY SYNDROME OF LUMBAR REGION: Primary | ICD-10-CM

## 2017-06-27 DIAGNOSIS — Z98.1 S/P LUMBAR FUSION: Primary | ICD-10-CM

## 2017-06-27 PROBLEM — Z09 SURGERY FOLLOW-UP EXAMINATION: Status: RESOLVED | Noted: 2017-01-12 | Resolved: 2017-06-27

## 2017-06-27 PROCEDURE — 99213 OFFICE O/P EST LOW 20 MIN: CPT | Performed by: NEUROLOGICAL SURGERY

## 2017-06-27 PROCEDURE — 72100 X-RAY EXAM L-S SPINE 2/3 VWS: CPT | Performed by: ORTHOPAEDIC SURGERY

## 2017-06-27 PROCEDURE — 99213 OFFICE O/P EST LOW 20 MIN: CPT | Performed by: ORTHOPAEDIC SURGERY

## 2017-06-27 NOTE — PROGRESS NOTES
4 months out from laminectomy and fusion with instrumentation and cage.  He is much better than preop and the is pleased with the results thus far.  Good strength on examination sensation grossly intact.  Two-view x-rays of the lumbar spine show good position of graft and implants and further healing since prior films overall doing well and I will see him back when necessary questions answered.

## 2017-06-27 NOTE — PROGRESS NOTES
Subjective   Patient ID: Kian Mcdaniels is a 61 y.o. male is here today for follow-up with new EMG. He is 6 1/2  months out from a repeat L4-5 laminectomy with fusion done 12/12/2016. He has neck and low back pain as well as right knee pain.     History of Present Illness     This patient continues with pain literally throughout his spine.  He still has numbness in his hands and pain in his knees.    The following portions of the patient's history were reviewed and updated as appropriate: allergies, current medications, past family history, past medical history, past social history, past surgical history and problem list.    Review of Systems   Constitutional: Positive for activity change.   Respiratory: Negative for chest tightness and shortness of breath.    Cardiovascular: Negative for chest pain.   Musculoskeletal: Positive for arthralgias (Right hip and knee pain), back pain, gait problem and neck pain.   Neurological:        Tingling in bilateral lower extremity's   All other systems reviewed and are negative.      Objective   Physical Exam   Constitutional: He is oriented to person, place, and time. He appears well-developed and well-nourished.   Neurological: He is oriented to person, place, and time.     Neurologic Exam     Mental Status   Oriented to person, place, and time.       Assessment/Plan   Independent Review of Radiographic Studies:      I reviewed his x-rays of the cervical spine which show no evidence of instability and minimal degenerative change.  I also reviewed his x-rays of the lumbar spine done in Dr. Spencer's office today.  These look okay to my eye.    His EMG shows a bilateral carpal tunnel syndrome.    Medical Decision Making:      My last note indicated.  The MRI of his cervical spine shows some spondylitic change but nothing dangerous.  I really cannot find where he has had a previous imaging of his cervical spine in the form of an MRI area nonetheless his symptoms are such that  I really would not push to do that at this point.  I think the numbness in his hands is explained by the carpal tunnel and the pain in his neck is not likely to be surgically correctable anyway.  He has not been anxious to have any further surgery.  I told him that we would check him again this fall and see how he is feeling.    Kian was seen today for back pain and neck pain.    Diagnoses and all orders for this visit:    Postlaminectomy syndrome of lumbar region    Return in about 4 months (around 10/27/2017).

## 2017-06-28 ENCOUNTER — HOSPITAL ENCOUNTER (OUTPATIENT)
Dept: PHYSICAL THERAPY | Facility: HOSPITAL | Age: 62
Setting detail: THERAPIES SERIES
Discharge: HOME | End: 2017-06-28

## 2017-06-28 DIAGNOSIS — M54.2 CERVICAL PAIN: Primary | ICD-10-CM

## 2017-06-28 PROCEDURE — 97110 THERAPEUTIC EXERCISES: CPT

## 2017-06-28 PROCEDURE — 97012 MECHANICAL TRACTION THERAPY: CPT

## 2017-06-28 NOTE — THERAPY TREATMENT NOTE
Outpatient Physical Therapy Ortho Treatment Note  [facility]     Patient Name: Kian Mcdaniels  : 1955  MRN: [id number]  Today's Date: 2017      Visit Date: 2017    Visit Dx:    ICD-10-CM ICD-9-CM   1. Cervical pain M54.2 723.1       Patient Active Problem List   Diagnosis   • Bulging lumbar disc   • Chronic pain   • Diabetic neuropathy   • Low back pain   • Degenerative arthritis of lumbar spine   • Neuropathy involving both lower extremities   • Encounter for long-term (current) use of high-risk medication   • Postlaminectomy syndrome of lumbar region   • Neck pain   • Lumbar pseudoarthrosis   • DM2 (diabetes mellitus, type 2)   • Insulin pump in place   • Hx of decompressive lumbar laminectomy   • Bilateral carpal tunnel syndrome        Past Medical History:   Diagnosis Date   • Abscess of scrotum    • Angina pectoris    • Arteriosclerotic cardiovascular disease    • COPD (chronic obstructive pulmonary disease)    • Coronary artery disease    • Diabetes mellitus    • Diabetic peripheral neuropathy    • ED (erectile dysfunction) of non-organic origin    • Hypertension    • Insulin pump in place    • Insulin pump in place    • Low back pain    • Myocardial infarction    • Neck pain    • Spinal stenosis    • TIA (transient ischemic attack)     LEFT EYE   • Type 2 diabetes mellitus    • Wears dentures     UPPER PLATE        Past Surgical History:   Procedure Laterality Date   • AMPUTATION FOOT / TOE Left     GREAT TOE   • BACK SURGERY  10/26/2015    Bilateral L4-L5 laminectomy -Dr. Gutierres   • CARDIAC CATHETERIZATION     • CARDIAC SURGERY      CABG X 6    • CHOLECYSTECTOMY     • CORONARY ARTERY BYPASS GRAFT      X 6   • EPIDURAL BLOCK     • EYE SURGERY     • HAND SURGERY  2016   • LUMBAR DISCECTOMY FUSION INSTRUMENTATION N/A 2016    Procedure: L 4-5 FUSION WITH INSTRUMENTATION WITH BMP, WITH REMOVAL OF INSTRUMENTATION ;  Surgeon: Rowdy Spencer MD;  Location: [facility]  OR;  Service:    • LUMBAR LAMINECTOMY DISCECTOMY DECOMPRESSION N/A 12/12/2016    Procedure: L4-5 REPEAT LAMINECTOMY ;  Surgeon: Tim Gutierres MD;  Location: [facility] OR;  Service:    • LUMBAR LAMINECTOMY DISCECTOMY DECOMPRESSION N/A 12/14/2016    Procedure: EVACUATION OF LUMBAR HEMATOMA;  Surgeon: Rowdy Spencer MD;  Location: [facility] OR;  Service:    • MULTIPLE TOOTH EXTRACTIONS      UPPER TEETH EXTRACTED   • ORTHOPEDIC SURGERY     • PENIS SURGERY      RECONSTRUCTION   • SCROTUM EXPLORATION      scrotum surgery   • SPINE SURGERY                               PT Assessment/Plan       06/28/17 1556       PT Assessment    Assessment Comments Pt reports increased symptoms into L UE after DDN last visit. Pt continues to demonstrate trigger points in UT/levator tissues, however able to perform cervical isometrics without increased symptoms today. Performed trial of cervical traction today without immediate adverse reaction.   -CN     PT Plan    PT Plan Comments Assess response to cervical traction, continue if beneficial. Consider horizontal abduction and SL arcs next visit if tolerated.   -CN       User Key  (r) = Recorded By, (t) = Taken By, (c) = Cosigned By    Initials Name Provider Type    ASHLYN Chan, PT Physical Therapist                Modalities       06/28/17 1500          Moist Heat    MH Applied Yes  -CN      Location upper thoracic spine with cervical traction  -CN      Rx Minutes 10 mins  -CN      Traction 62811    Traction Type Cervical  -CN      Rx Minutes 10 min  -CN      Duration Intermittent  -CN      Position Hook-lying  -CN      Weight 14   /7  -CN      Hold 40  -CN      Relax 10  -CN        User Key  (r) = Recorded By, (t) = Taken By, (c) = Cosigned By    Initials Name Provider Type    ASHLYN Chan, PT Physical Therapist                Exercises       06/28/17 1500          Subjective Comments    Subjective Comments It is ok today, but I have been taking my  pain meds. I don't know if the needling helped or not. I had pain going down my left after afterwards.   -CN      Subjective Pain    Able to rate subjective pain? yes  -CN      Pre-Treatment Pain Level 5  -CN      Post-Treatment Pain Level 5  -CN      Exercise 1    Exercise Name 1 Chin tucks  -CN      Cueing 1 Demo  -CN      Reps 1 15  -CN      Time (Seconds) 1 5  -CN      Exercise 4    Exercise Name 4 UBE  -CN      Cueing 4 Verbal  -CN      Time (Minutes) 4 4  -CN      Exercise 5    Exercise Name 5 shoulder ext  -CN      Cueing 5 Demo  -CN      Equipment 5 Theraband  -CN      Resistance 5 Green  -CN      Sets 5 2  -CN      Reps 5 10  -CN      Exercise 6    Exercise Name 6 scap rows  -CN      Cueing 6 Demo  -CN      Equipment 6 Theraband  -CN      Resistance 6 Green  -CN      Sets 6 2  -CN      Reps 6 10  -CN      Exercise 8    Exercise Name 8 Cervical isometrics (flex, ext, SBing, rot)  -CN      Cueing 8 Demo  -CN      Reps 8 10  -CN      Time (Seconds) 8 5  -CN        User Key  (r) = Recorded By, (t) = Taken By, (c) = Cosigned By    Initials Name Provider Type    ASHLYN Chan, PT Physical Therapist                               PT OP Goals       06/28/17 1500       PT Short Term Goals    STG Date to Achieve 07/11/17  -CN     STG 1 Pt will report subjective pain at 4/10 or less to demonstrate symptom management with ADLs.  -CN     STG 1 Progress Ongoing  -CN     STG 1 Progress Comments Pt reports pain rated 5/10 today.   -CN     STG 2 Pt will be independent and compliant with HEP and symptom management in order to minimize stress on affected tissues.    -CN     STG 2 Progress Ongoing  -CN     Long Term Goals    LTG Date to Achieve 08/01/17  -CN     LTG 1 Pt will increase cervical ROM to 75% and pain free in all planes in order to facilitate return to PLOF.  -CN     LTG 1 Progress Ongoing  -CN     LTG 2 Pt will improve NDI score to 30% for overall functional improvement.  -CN     LTG 2 Progress Ongoing   -CN     LTG 3 Pt will report 50% reduction in symptoms with L SBing and rotation.   -CN     LTG 3 Progress Ongoing  -CN     LTG 4 Pt will be educated on and be able to demonstrate proper posture, body mechanics and performance of HEP in order to decrease risk of future recurrence of symptoms.   -CN     LTG 4 Progress Ongoing  -CN       User Key  (r) = Recorded By, (t) = Taken By, (c) = Cosigned By    Initials Name Provider Type    ASHLYN Chan PT Physical Therapist                Therapy Education       06/28/17 1531          Therapy Education    Given HEP;Symptoms/condition management;Pain management;Posture/body mechanics;Mobility training  -CN      Program Progressed  -CN      How Provided Verbal;Demonstration;Written  -CN      Provided to Patient  -CN      Level of Understanding Verbalized;Teach back education performed;Demonstrated  -CN        User Key  (r) = Recorded By, (t) = Taken By, (c) = Cosigned By    Initials Name Provider Type    ASHLYN Chan PT Physical Therapist                Time Calculation:   Start Time: 1515  Stop Time: 1600  Time Calculation (min): 45 min    Therapy Charges for Today     Code Description Service Date Service Provider Modifiers Qty    82099220296  PT THER PROC EA 15 MIN 6/28/2017 Nidia Chan PT GP 2    46573002059 HC PT-TRACTION MECHANICAL 6/28/2017 Nidia Chan PT  1    [id number]  PT HOT OR COLD PACK TREAT MCARE 6/28/2017 Nidia Chan PT GP 1                    Nidia Chan PT  6/28/2017

## 2017-06-30 ENCOUNTER — HOSPITAL ENCOUNTER (OUTPATIENT)
Dept: PHYSICAL THERAPY | Facility: HOSPITAL | Age: 62
Setting detail: THERAPIES SERIES
Discharge: HOME | End: 2017-06-30

## 2017-06-30 DIAGNOSIS — G89.29 OTHER CHRONIC PAIN: ICD-10-CM

## 2017-06-30 DIAGNOSIS — E10.42 DIABETIC POLYNEUROPATHY ASSOCIATED WITH TYPE 1 DIABETES MELLITUS (HCC): ICD-10-CM

## 2017-06-30 DIAGNOSIS — M54.2 CERVICAL PAIN: Primary | ICD-10-CM

## 2017-06-30 PROCEDURE — 97110 THERAPEUTIC EXERCISES: CPT | Performed by: PHYSICAL THERAPIST

## 2017-06-30 PROCEDURE — 97012 MECHANICAL TRACTION THERAPY: CPT | Performed by: PHYSICAL THERAPIST

## 2017-07-05 ENCOUNTER — OFFICE VISIT (OUTPATIENT)
Dept: PAIN MEDICINE | Facility: CLINIC | Age: 62
End: 2017-07-05

## 2017-07-05 ENCOUNTER — HOSPITAL ENCOUNTER (OUTPATIENT)
Dept: PHYSICAL THERAPY | Facility: HOSPITAL | Age: 62
Setting detail: THERAPIES SERIES
Discharge: HOME | End: 2017-07-05

## 2017-07-05 VITALS
SYSTOLIC BLOOD PRESSURE: 166 MMHG | OXYGEN SATURATION: 96 % | TEMPERATURE: 97.3 F | HEART RATE: 89 BPM | WEIGHT: 276.2 LBS | DIASTOLIC BLOOD PRESSURE: 89 MMHG | BODY MASS INDEX: 37.41 KG/M2 | HEIGHT: 72 IN | RESPIRATION RATE: 18 BRPM

## 2017-07-05 DIAGNOSIS — G89.29 OTHER CHRONIC PAIN: Primary | ICD-10-CM

## 2017-07-05 DIAGNOSIS — M54.2 CERVICAL PAIN: Primary | ICD-10-CM

## 2017-07-05 DIAGNOSIS — Z98.1 S/P LUMBAR SPINAL FUSION: ICD-10-CM

## 2017-07-05 DIAGNOSIS — Z79.899 ENCOUNTER FOR LONG-TERM (CURRENT) USE OF HIGH-RISK MEDICATION: ICD-10-CM

## 2017-07-05 DIAGNOSIS — M96.1 POSTLAMINECTOMY SYNDROME OF LUMBAR REGION: ICD-10-CM

## 2017-07-05 DIAGNOSIS — M54.50 LOW BACK PAIN WITHOUT SCIATICA, UNSPECIFIED BACK PAIN LATERALITY, UNSPECIFIED CHRONICITY: ICD-10-CM

## 2017-07-05 DIAGNOSIS — G89.29 OTHER CHRONIC PAIN: ICD-10-CM

## 2017-07-05 DIAGNOSIS — M47.816 OSTEOARTHRITIS OF LUMBAR SPINE, UNSPECIFIED SPINAL OSTEOARTHRITIS COMPLICATION STATUS: ICD-10-CM

## 2017-07-05 LAB
POC AMPHETAMINES: NEGATIVE
POC BARBITURATES: NEGATIVE
POC BENZODIAZEPHINES: NEGATIVE
POC COCAINE: NEGATIVE
POC METHADONE: NEGATIVE
POC METHAMPHETAMINE SCREEN URINE: NEGATIVE
POC OPIATES: NEGATIVE
POC OXYCODONE: POSITIVE
POC PHENCYCLIDINE: NEGATIVE
POC PROPOXYPHENE: NEGATIVE
POC THC: NEGATIVE
POC TRICYCLIC ANTIDEPRESSANTS: NEGATIVE

## 2017-07-05 PROCEDURE — 97140 MANUAL THERAPY 1/> REGIONS: CPT | Performed by: PHYSICAL THERAPIST

## 2017-07-05 PROCEDURE — 97110 THERAPEUTIC EXERCISES: CPT | Performed by: PHYSICAL THERAPIST

## 2017-07-05 PROCEDURE — 99214 OFFICE O/P EST MOD 30 MIN: CPT | Performed by: NURSE PRACTITIONER

## 2017-07-05 PROCEDURE — 80305 DRUG TEST PRSMV DIR OPT OBS: CPT | Performed by: NURSE PRACTITIONER

## 2017-07-05 RX ORDER — OXYCODONE HYDROCHLORIDE 10 MG/1
10 TABLET ORAL EVERY 4 HOURS PRN
Qty: 150 TABLET | Refills: 0 | Status: SHIPPED | OUTPATIENT
Start: 2017-07-05 | End: 2017-08-08 | Stop reason: SDUPTHER

## 2017-07-05 NOTE — THERAPY TREATMENT NOTE
Outpatient Physical Therapy Ortho Treatment Note  [facility]     Patient Name: Kian Mcdaniels  : 1955  MRN: [id number]  Today's Date: 2017      Visit Date: 2017    Visit Dx:    ICD-10-CM ICD-9-CM   1. Cervical pain M54.2 723.1   2. Other chronic pain G89.29 338.29       Patient Active Problem List   Diagnosis   • Bulging lumbar disc   • Chronic pain   • Diabetic neuropathy   • Low back pain   • Degenerative arthritis of lumbar spine   • Neuropathy involving both lower extremities   • Encounter for long-term (current) use of high-risk medication   • Postlaminectomy syndrome of lumbar region   • Neck pain   • Lumbar pseudoarthrosis   • DM2 (diabetes mellitus, type 2)   • Insulin pump in place   • Hx of decompressive lumbar laminectomy   • Bilateral carpal tunnel syndrome        Past Medical History:   Diagnosis Date   • Abscess of scrotum    • Angina pectoris    • Arteriosclerotic cardiovascular disease    • COPD (chronic obstructive pulmonary disease)    • Coronary artery disease    • Diabetes mellitus    • Diabetic peripheral neuropathy    • ED (erectile dysfunction) of non-organic origin    • Hypertension    • Insulin pump in place    • Insulin pump in place    • Low back pain    • Myocardial infarction    • Neck pain    • Spinal stenosis    • TIA (transient ischemic attack)     LEFT EYE   • Type 2 diabetes mellitus    • Wears dentures     UPPER PLATE        Past Surgical History:   Procedure Laterality Date   • AMPUTATION FOOT / TOE Left     GREAT TOE   • BACK SURGERY  10/26/2015    Bilateral L4-L5 laminectomy -Dr. Gutierres   • CARDIAC CATHETERIZATION     • CARDIAC SURGERY      CABG X 6    • CHOLECYSTECTOMY     • CORONARY ARTERY BYPASS GRAFT      X 6   • EPIDURAL BLOCK     • EYE SURGERY     • HAND SURGERY  2016   • LUMBAR DISCECTOMY FUSION INSTRUMENTATION N/A 2016    Procedure: L 4-5 FUSION WITH INSTRUMENTATION WITH BMP, WITH REMOVAL OF INSTRUMENTATION ;  Surgeon: Rowdy NGUYEN  MD Tj;  Location: [facility] MAIN OR;  Service:    • LUMBAR LAMINECTOMY DISCECTOMY DECOMPRESSION N/A 12/12/2016    Procedure: L4-5 REPEAT LAMINECTOMY ;  Surgeon: Tim Gutierres MD;  Location: [facility] MAIN OR;  Service:    • LUMBAR LAMINECTOMY DISCECTOMY DECOMPRESSION N/A 12/14/2016    Procedure: EVACUATION OF LUMBAR HEMATOMA;  Surgeon: Rowdy Spencer MD;  Location: [facility] MAIN OR;  Service:    • MULTIPLE TOOTH EXTRACTIONS      UPPER TEETH EXTRACTED   • ORTHOPEDIC SURGERY     • PENIS SURGERY      RECONSTRUCTION   • SCROTUM EXPLORATION      scrotum surgery   • SPINE SURGERY                               PT Assessment/Plan       07/05/17 1601       PT Assessment    Assessment Comments Mr. Mcdaniels returns, reporting increased pain/HA with traction. Today, we decided to hold on c spine traction and instead attempted sub occipital release and manual stretching of bilateral UT tissues.  We reviwed ergonomics for reading/studying.    -GJ     PT Plan    PT Plan Comments possible DDN L>R UT tissue.  possible deep tissue work of L/R UT tissues, PA ombs to T spine.  continue to work on c spine ROM, postural strength.   -GJ       User Key  (r) = Recorded By, (t) = Taken By, (c) = Cosigned By    Initials Name Provider Type    MARCIE Rhodes, PT Physical Therapist                Modalities       07/05/17 1400          Traction 53063    Progression --   held on traction today to see if there is a difference  -        User Key  (r) = Recorded By, (t) = Taken By, (c) = Cosigned By    Initials Name Provider Type    MARCIE Rhodes, PT Physical Therapist                Exercises       07/05/17 1400          Subjective Comments    Subjective Comments not sure if the traction is helping, I feel like it gives me a HA for several days.   -GJ      Subjective Pain    Pre-Treatment Pain Level 6  -GJ      Exercise 1    Exercise Name 1 Chin tucks,seated  -GJ      Reps 1 15  -GJ      Time (Seconds) 1 5  -GJ      Exercise 2     Exercise Name 2 supine horizontal abduction  -GJ      Cueing 2 Demo  -GJ      Equipment 2 Theraband  -GJ      Resistance 2 Red  -GJ      Sets 2 2  -GJ      Reps 2 10  -GJ      Exercise 4    Exercise Name 4 UBE  -GJ      Time (Minutes) 4 4  -GJ      Exercise 5    Exercise Name 5 shoulder ext  -GJ      Cueing 5 Demo  -GJ      Equipment 5 Theraband  -GJ      Resistance 5 Green  -GJ      Sets 5 2  -GJ      Reps 5 10  -GJ      Exercise 6    Exercise Name 6 scap rows  -GJ      Cueing 6 Demo  -GJ      Equipment 6 Theraband  -GJ      Resistance 6 Green  -GJ      Sets 6 2  -GJ      Reps 6 10  -GJ      Exercise 7    Exercise Name 7 standing shoulder ER bilateally,   -GJ      Cueing 7 Demo  -GJ      Equipment 7 Theraband  -GJ      Resistance 7 Red  -GJ      Sets 7 2  -GJ      Reps 7 10  -GJ      Exercise 8    Exercise Name 8 Cervical isometrics (flex, ext, SBing, rot), seated  -GJ      Cueing 8 Tactile  -GJ      Reps 8 10  -GJ      Time (Seconds) 8 5  -GJ      Exercise 9    Exercise Name 9 shoulder rolls   -GJ      Cueing 9 Auditory  -GJ      Equipment 9 Dumbell  -GJ      Weights/Plates 9 2  -GJ      Reps 9 15  -GJ      Exercise 10    Exercise Name 10 supine alt arms over towel roll (for thoracic stretch/rotation)  -GJ      Reps 10 10  -GJ      Exercise 11    Exercise Name 11 supine cervical AROM rotation   -GJ      Cueing 11 Verbal  -GJ      Reps 11 10  -GJ      Time (Seconds) 11 5  -GJ        User Key  (r) = Recorded By, (t) = Taken By, (c) = Cosigned By    Initials Name Provider Type    GJ Young Rhodes PT Physical Therapist                        Manual Rx (last 36 hours)      Manual Treatments       07/05/17 1500          Manual Rx 2    Manual Rx 2 Location manual stretch to bilateral UT tissues, pt. supine (pressure at AC joints, no cervical lateral flexion  -GJ      Manual Rx 3    Manual Rx 3 Location PA mobs to C spine, pt. supine  -GJ      Manual Rx 3 Grade grade II/III  -GJ      Manual Rx 4    Manual Rx 4 Location  sub occipital release, pt .supine  -GJ        User Key  (r) = Recorded By, (t) = Taken By, (c) = Cosigned By    Initials Name Provider Type    MARCIE Rhodes PT Physical Therapist                PT OP Goals       07/05/17 1400       PT Short Term Goals    STG Date to Achieve 07/11/17  -GJ     STG 1 Pt will report subjective pain at 4/10 or less to demonstrate symptom management with ADLs.  -GJ     STG 1 Progress Ongoing  -GJ     STG 1 Progress Comments pt reporting 6/10 pain  -GJ     STG 2 Pt will be independent and compliant with HEP and symptom management in order to minimize stress on affected tissues.    -GJ     STG 2 Progress Partially Met  -GJ     Long Term Goals    LTG Date to Achieve 08/01/17  -GJ     LTG 1 Pt will increase cervical ROM to 75% and pain free in all planes in order to facilitate return to PLOF.  -GJ     LTG 1 Progress Ongoing  -GJ     LTG 2 Pt will improve NDI score to 30% for overall functional improvement.  -GJ     LTG 2 Progress Ongoing  -GJ     LTG 3 Pt will report 50% reduction in symptoms with L SBing and rotation.   -GJ     LTG 3 Progress Ongoing  -GJ     LTG 4 Pt will be educated on and be able to demonstrate proper posture, body mechanics and performance of HEP in order to decrease risk of future recurrence of symptoms.   -GJ     LTG 4 Progress Ongoing  -GJ       User Key  (r) = Recorded By, (t) = Taken By, (c) = Cosigned By    Initials Name Provider Type    MARCIE Rhodes PT Physical Therapist                Therapy Education       07/05/17 1430          Therapy Education    Given HEP;Symptoms/condition management;Pain management;Posture/body mechanics;Mobility training   pt to hold on upper body weight lifting at the gym for 2 weeks and only perform our strengthening every other day.  Discussed at length reading positions/postures, recomended putting book on eisel, proping up on pillow etc.    -GJ      Program Reinforced   demonstrated self sub occipital release with tennis  balls  -GJ      How Provided Verbal  -GJ      Provided to Patient  -GJ      Level of Understanding Verbalized  -GJ        User Key  (r) = Recorded By, (t) = Taken By, (c) = Cosigned By    Initials Name Provider Type    GJ Young Rhodes, PT Physical Therapist                Time Calculation:   Start Time: 1430  Stop Time: 1530  Time Calculation (min): 60 min    Therapy Charges for Today     Code Description Service Date Service Provider Modifiers Qty    19336000733  PT THER PROC EA 15 MIN 7/5/2017 Young Rhodes, PT GP 3    84021608523 HC PT MANUAL THERAPY EA 15 MIN 7/5/2017 Young Rhodes, PT GP 1                    Young Rhodes, PT  7/5/2017

## 2017-07-05 NOTE — PROGRESS NOTES
CHIEF COMPLAINT  Follow-up for back and neck pain. Mr. Mcdaniels states that his back pain is unchanged from his last appt and his neck pain has gotten worse..    Subjective   Kian Mcdaniels is a 61 y.o. male  who presents to the office for follow-up.He has a history of chronic neck and low back pain.  He has had repeat L4-5 laminectomy with fusion by Dr. Gutierres and Dr. Spencer 12/12/16. Still reporting a lot of back pain and also some increased neck pain.  Has been evaluated by Dr. Gutierres recently, nothing surgical needed at this time, EMG showed bilateral carpal tunnel.  He has had 3 PT sessions for neck pain so far.      Continues with Oxycodone 10 mg q4hrs, gabapentin 800 mg 3/day and tizanidine 4mg PRN. Denies any side effects from the regimen. The regimen helps decrease his pain by 50%. ADL's by self.     Back Pain   This is a chronic problem. The current episode started more than 1 year ago. The problem occurs daily. The problem has been waxing and waning since onset. The pain is present in the sacro-iliac. The quality of the pain is described as aching, shooting and stabbing. The pain is at a severity of 6/10. The pain is the same all the time. The symptoms are aggravated by bending, sitting and standing. Stiffness is present all day. Associated symptoms include headaches, leg pain, numbness, paresthesias, tingling and weakness. Pertinent negatives include no chest pain, dysuria or fever. Risk factors include lack of exercise, obesity and sedentary lifestyle.   Neck Pain    This is a chronic (started noticing right before most recent back surgery) problem. The current episode started more than 1 month ago. The problem occurs constantly. The problem has been gradually worsening. The pain is associated with nothing. The pain is present in the left side, right side and midline (also has tingling in right arm and hand). The quality of the pain is described as cramping. The pain is at a severity of 8/10. The  "pain is moderate. The symptoms are aggravated by sneezing and twisting. The pain is same all the time. Associated symptoms include headaches, leg pain, numbness, tingling and weakness. Pertinent negatives include no chest pain or fever. He has tried muscle relaxants, oral narcotics and bed rest for the symptoms. The treatment provided moderate relief.      PEG Assessment   What number best describes your pain on average in the past week?8  What number best describes how, during the past week, pain has interfered with your enjoyment of life?8  What number best describes how, during the past week, pain has interfered with your general activity?  8    The following portions of the patient's history were reviewed and updated as appropriate: allergies, current medications, past family history, past medical history, past social history, past surgical history and problem list.    Review of Systems   Constitutional: Negative for fatigue and fever.   HENT: Negative for congestion.    Eyes: Negative for visual disturbance.   Respiratory: Negative for cough, shortness of breath and wheezing.    Cardiovascular: Positive for leg swelling. Negative for chest pain and palpitations.   Gastrointestinal: Negative for constipation and diarrhea.   Genitourinary: Negative for difficulty urinating and dysuria.   Musculoskeletal: Positive for back pain and neck pain.   Neurological: Positive for tingling, weakness, numbness, headaches and paresthesias.   Psychiatric/Behavioral: Negative for sleep disturbance and suicidal ideas. The patient is not nervous/anxious.      Vitals:    07/05/17 0844   BP: 166/89   Pulse: 89   Resp: 18   Temp: 97.3 °F (36.3 °C)   SpO2: 96%   Weight: 276 lb 3.2 oz (125 kg)   Height: 71.5\" (181.6 cm)   PainSc: 8  Comment: neck pain 7/10   PainLoc: Back     Objective   Physical Exam   Constitutional: He is oriented to person, place, and time. Vital signs are normal. He appears well-developed and well-nourished. He " is cooperative.   HENT:   Head: Normocephalic and atraumatic.   Nose: Nose normal.   Eyes: Conjunctivae and lids are normal.   Neck: Trachea normal. Neck supple. Muscular tenderness present. No spinous process tenderness present. Decreased range of motion present.   Cardiovascular: Normal rate.    Pulmonary/Chest: Effort normal. No respiratory distress.   Abdominal:   obese   Musculoskeletal:        Cervical back: He exhibits tenderness.        Lumbar back: He exhibits tenderness and bony tenderness.   Neurological: He is alert and oriented to person, place, and time. Gait (ambulates with aid of cane ) abnormal.   Reflex Scores:       Bicep reflexes are 1+ on the right side and 1+ on the left side.       Brachioradialis reflexes are 1+ on the right side and 1+ on the left side.       Patellar reflexes are 1+ on the right side and 1+ on the left side.  Skin: Skin is warm, dry and intact.   Psychiatric: He has a normal mood and affect. His speech is normal and behavior is normal. Judgment and thought content normal. Cognition and memory are normal.   Nursing note and vitals reviewed.    Assessment/Plan   Kian was seen today for back pain.    Diagnoses and all orders for this visit:    Other chronic pain  -     POC Urine Drug Screen, Triage  -     Urine Drug Screen Confirmation    Postlaminectomy syndrome of lumbar region  -     POC Urine Drug Screen, Triage  -     Urine Drug Screen Confirmation    Low back pain without sciatica, unspecified back pain laterality, unspecified chronicity  -     POC Urine Drug Screen, Triage  -     Urine Drug Screen Confirmation    Osteoarthritis of lumbar spine, unspecified spinal osteoarthritis complication status  -     POC Urine Drug Screen, Triage  -     Urine Drug Screen Confirmation    Encounter for long-term (current) use of high-risk medication  -     POC Urine Drug Screen, Triage  -     Urine Drug Screen Confirmation    S/P lumbar spinal fusion  -     oxyCODONE (ROXICODONE) 10  MG tablet; Take 1 tablet by mouth Every 4 (Four) Hours As Needed for Moderate Pain (4-6) (max-5/day).  -     POC Urine Drug Screen, Triage  -     Urine Drug Screen Confirmation      --- Continue PT  --- Could consider CARIE in future, today his neck pain is no worse than his back pain, he will continue with therapy   --- Refill Roxicodone.  Reduce dose to 5/day, he is > 6 months post op Patient appears stable with current regimen. No adverse effects. Regarding continuation of opioids, there is no evidence of aberrant behavior or any red flags.  The patient continues with appropriate response to opioid therapy. ADL's remain intact by self.   --- Routine UDS in office today as part of monitoring requirements for controlled substances.  The specimen was viewed and the immunoassay result reviewed and is +OXY.  This specimen will be sent to [facility] laboratory for confirmation.     --- Follow-up 1 month          DAVID REPORT    As part of the patient's treatment plan, I am prescribing controlled substances. The patient has been made aware of appropriate use of such medications, including potential risk of somnolence, limited ability to drive and/or work safely, and the potential for dependence or overdose. It has also bee made clear that these medications are for use by this patient only, without concomitant use of alcohol or other substances unless prescribed.     Patient has completed prescribing agreement detailing terms of continued prescribing of controlled substances, including monitoring DAVID reports, urine drug screening, and pill counts if necessary. The patient is aware that inappropriate use will results in cessation of prescribing such medications.    DAVID report has been reviewed and scanned into the patient's chart.    Date of last DAVID : 7-3-17 (last fill 6-9-17)    History and physical exam exhibit continued safe and appropriate use of controlled substances.    EMR Dragon/Transcription disclaimer:    Much of this encounter note is an electronic transcription/translation of spoken language to printed text. The electronic translation of spoken language may permit erroneous, or at times, nonsensical words or phrases to be inadvertently transcribed; Although I have reviewed the note for such errors, some may still exist.

## 2017-07-07 ENCOUNTER — APPOINTMENT (OUTPATIENT)
Dept: PHYSICAL THERAPY | Facility: HOSPITAL | Age: 62
End: 2017-07-07

## 2017-07-10 ENCOUNTER — HOSPITAL ENCOUNTER (OUTPATIENT)
Dept: PHYSICAL THERAPY | Facility: HOSPITAL | Age: 62
Setting detail: THERAPIES SERIES
Discharge: HOME | End: 2017-07-10

## 2017-07-10 DIAGNOSIS — M54.2 CERVICAL PAIN: Primary | ICD-10-CM

## 2017-07-10 DIAGNOSIS — G89.29 OTHER CHRONIC PAIN: ICD-10-CM

## 2017-07-10 PROCEDURE — 97110 THERAPEUTIC EXERCISES: CPT

## 2017-07-10 PROCEDURE — 97140 MANUAL THERAPY 1/> REGIONS: CPT

## 2017-07-10 NOTE — THERAPY TREATMENT NOTE
Outpatient Physical Therapy Ortho Treatment Note  [facility]     Patient Name: Kian Mcdaniels  : 1955  MRN: [id number]  Today's Date: 7/10/2017      Visit Date: 07/10/2017    Visit Dx:    ICD-10-CM ICD-9-CM   1. Cervical pain M54.2 723.1   2. Other chronic pain G89.29 338.29       Patient Active Problem List   Diagnosis   • Bulging lumbar disc   • Chronic pain   • Diabetic neuropathy   • Low back pain   • Degenerative arthritis of lumbar spine   • Neuropathy involving both lower extremities   • Encounter for long-term (current) use of high-risk medication   • Postlaminectomy syndrome of lumbar region   • Neck pain   • Lumbar pseudoarthrosis   • DM2 (diabetes mellitus, type 2)   • Insulin pump in place   • Hx of decompressive lumbar laminectomy   • Bilateral carpal tunnel syndrome        Past Medical History:   Diagnosis Date   • Abscess of scrotum    • Angina pectoris    • Arteriosclerotic cardiovascular disease    • COPD (chronic obstructive pulmonary disease)    • Coronary artery disease    • Diabetes mellitus    • Diabetic peripheral neuropathy    • ED (erectile dysfunction) of non-organic origin    • Hypertension    • Insulin pump in place    • Insulin pump in place    • Low back pain    • Myocardial infarction    • Neck pain    • Spinal stenosis    • TIA (transient ischemic attack)     LEFT EYE   • Type 2 diabetes mellitus    • Wears dentures     UPPER PLATE        Past Surgical History:   Procedure Laterality Date   • AMPUTATION FOOT / TOE Left     GREAT TOE   • BACK SURGERY  10/26/2015    Bilateral L4-L5 laminectomy -Dr. Gutierres   • CARDIAC CATHETERIZATION     • CARDIAC SURGERY      CABG X 6    • CHOLECYSTECTOMY     • CORONARY ARTERY BYPASS GRAFT      X 6   • EPIDURAL BLOCK     • EYE SURGERY     • HAND SURGERY  2016   • LUMBAR DISCECTOMY FUSION INSTRUMENTATION N/A 2016    Procedure: L 4-5 FUSION WITH INSTRUMENTATION WITH BMP, WITH REMOVAL OF INSTRUMENTATION ;  Surgeon: Rowdy  WENDY Spencer MD;  Location: [facility] OR;  Service:    • LUMBAR LAMINECTOMY DISCECTOMY DECOMPRESSION N/A 12/12/2016    Procedure: L4-5 REPEAT LAMINECTOMY ;  Surgeon: Tim Gutierres MD;  Location: [facility] OR;  Service:    • LUMBAR LAMINECTOMY DISCECTOMY DECOMPRESSION N/A 12/14/2016    Procedure: EVACUATION OF LUMBAR HEMATOMA;  Surgeon: Rowdy Spencer MD;  Location: [facility] OR;  Service:    • MULTIPLE TOOTH EXTRACTIONS      UPPER TEETH EXTRACTED   • ORTHOPEDIC SURGERY     • PENIS SURGERY      RECONSTRUCTION   • SCROTUM EXPLORATION      scrotum surgery   • SPINE SURGERY                               PT Assessment/Plan       07/10/17 1612       PT Assessment    Assessment Comments Continued to hold on traction today as pt reports improvement in symptoms for a few days after stm/manual therapy last session. Continued with stm and PA mobilizations today, and pt reports improvement in cervical symptoms at end of session. Pt to continue with current HEP and will continue to progress in formal therapy sessions.   -CN     PT Plan    PT Plan Comments Continue with manual therapy if beneficial to decrease trigger points in UT/levator tissues and improve cervical spine mobility.   -CN       User Key  (r) = Recorded By, (t) = Taken By, (c) = Cosigned By    Initials Name Provider Type    ASHLYN Chan, PT Physical Therapist                Modalities       07/10/17 1100          Moist Heat    MH Applied Yes  -CN      Location upper thoracic spine with manual therapy  -CN      Rx Minutes 12 mins  -CN        User Key  (r) = Recorded By, (t) = Taken By, (c) = Cosigned By    Initials Name Provider Type    ASHLYN Chan, PT Physical Therapist                Exercises       07/10/17 1100          Subjective Comments    Subjective Comments It felt good for a few days after last time. My wife just got out of the hospital so I have been able to do more exercises.   -CN      Subjective Pain    Able to rate  subjective pain? yes  -CN      Pre-Treatment Pain Level 4  -CN      Post-Treatment Pain Level 2  -CN      Exercise 1    Exercise Name 1 Chin tucks,seated  -CN      Reps 1 15  -CN      Time (Seconds) 1 5  -CN      Exercise 4    Exercise Name 4 UBE  -CN      Time (Minutes) 4 4  -CN      Exercise 5    Exercise Name 5 shoulder ext  -CN      Cueing 5 Demo  -CN      Equipment 5 Theraband  -CN      Resistance 5 Green  -CN      Sets 5 2  -CN      Reps 5 10  -CN      Exercise 6    Exercise Name 6 scap rows  -CN      Cueing 6 Demo  -CN      Equipment 6 Theraband  -CN      Resistance 6 Green  -CN      Sets 6 2  -CN      Reps 6 10  -CN      Exercise 7    Exercise Name 7 standing shoulder ER bilateally,   -CN      Cueing 7 Demo  -CN      Equipment 7 Theraband  -CN      Resistance 7 Red  -CN      Sets 7 2  -CN      Reps 7 10  -CN      Exercise 8    Exercise Name 8 Cervical isometrics (flex, ext, SBing, rot), seated  -CN      Cueing 8 Tactile  -CN      Reps 8 10  -CN      Time (Seconds) 8 5  -CN      Exercise 9    Exercise Name 9 shoulder rolls   -CN      Cueing 9 Auditory  -CN      Equipment 9 Dumbell  -CN      Weights/Plates 9 2  -CN      Reps 9 15  -CN        User Key  (r) = Recorded By, (t) = Taken By, (c) = Cosigned By    Initials Name Provider Type    ASHLYN Chan, YOHAN Physical Therapist                        Manual Rx (last 36 hours)      Manual Treatments       07/10/17 1100          Manual Rx 2    Manual Rx 2 Location manual stretch to bilateral UT tissues, pt. supine (pressure at AC joints, no cervical lateral flexion  -CN      Manual Rx 3    Manual Rx 3 Location PA mobs to C spine, pt. supine  -CN      Manual Rx 3 Grade grade II/III  -CN      Manual Rx 4    Manual Rx 4 Location sub occipital release, pt .supine  -CN        User Key  (r) = Recorded By, (t) = Taken By, (c) = Cosigned By    Initials Name Provider Type    ASHLYN Chan PT Physical Therapist                PT OP Goals       07/10/17  1600       PT Short Term Goals    STG Date to Achieve 07/11/17  -CN     STG 1 Pt will report subjective pain at 4/10 or less to demonstrate symptom management with ADLs.  -CN     STG 1 Progress Ongoing  -CN     STG 1 Progress Comments Pt reports pain rated 4/10 today.   -CN     STG 2 Pt will be independent and compliant with HEP and symptom management in order to minimize stress on affected tissues.    -CN     STG 2 Progress Partially Met  -CN     Long Term Goals    LTG Date to Achieve 08/01/17  -CN     LTG 1 Pt will increase cervical ROM to 75% and pain free in all planes in order to facilitate return to PLOF.  -CN     LTG 1 Progress Ongoing  -CN     LTG 2 Pt will improve NDI score to 30% for overall functional improvement.  -CN     LTG 2 Progress Ongoing  -CN     LTG 3 Pt will report 50% reduction in symptoms with L SBing and rotation.   -CN     LTG 3 Progress Ongoing  -CN     LTG 3 Progress Comments Pt continues to report L sided neck pain with L SBing.   -CN     LTG 4 Pt will be educated on and be able to demonstrate proper posture, body mechanics and performance of HEP in order to decrease risk of future recurrence of symptoms.   -CN     LTG 4 Progress Ongoing  -CN       User Key  (r) = Recorded By, (t) = Taken By, (c) = Cosigned By    Initials Name Provider Type    ASHLYN Chan PT Physical Therapist                Therapy Education       07/10/17 1216          Therapy Education    Given HEP;Symptoms/condition management;Pain management;Posture/body mechanics;Mobility training  -CN      Program Reinforced  -CN      How Provided Verbal  -CN      Provided to Patient  -CN      Level of Understanding Verbalized  -CN        User Key  (r) = Recorded By, (t) = Taken By, (c) = Cosigned By    Initials Name Provider Type    ASHLYN Chan PT Physical Therapist                Time Calculation:   Start Time: 1145  Stop Time: 1232  Time Calculation (min): 47 min    Therapy Charges for Today      Code Description Service Date Service Provider Modifiers Qty    76538948461 HC PT THER PROC EA 15 MIN 7/10/2017 Nidia Chan, PT GP 2    54330845291 HC PT MANUAL THERAPY EA 15 MIN 7/10/2017 Nidia Chan, PT GP 1    [id number]  PT HOT OR COLD PACK TREAT MCARE 7/10/2017 Nidia Chan, PT GP 1                    Nidia Chan, PT  7/10/2017

## 2017-07-12 ENCOUNTER — HOSPITAL ENCOUNTER (OUTPATIENT)
Dept: PHYSICAL THERAPY | Facility: HOSPITAL | Age: 62
Setting detail: THERAPIES SERIES
Discharge: HOME | End: 2017-07-12

## 2017-07-12 DIAGNOSIS — E10.42 DIABETIC POLYNEUROPATHY ASSOCIATED WITH TYPE 1 DIABETES MELLITUS (HCC): ICD-10-CM

## 2017-07-12 DIAGNOSIS — G89.29 OTHER CHRONIC PAIN: ICD-10-CM

## 2017-07-12 DIAGNOSIS — M54.2 CERVICAL PAIN: Primary | ICD-10-CM

## 2017-07-12 PROCEDURE — 97110 THERAPEUTIC EXERCISES: CPT | Performed by: PHYSICAL THERAPIST

## 2017-07-12 PROCEDURE — 97140 MANUAL THERAPY 1/> REGIONS: CPT | Performed by: PHYSICAL THERAPIST

## 2017-07-12 NOTE — THERAPY TREATMENT NOTE
Outpatient Physical Therapy Ortho Treatment Note  [facility]     Patient Name: Kian Mcdaniels  : 1955  MRN: [id number]  Today's Date: 2017      Visit Date: 2017    Visit Dx:    ICD-10-CM ICD-9-CM   1. Cervical pain M54.2 723.1   2. Other chronic pain G89.29 338.29   3. Diabetic polyneuropathy associated with type 1 diabetes mellitus E10.42 250.61     357.2       Patient Active Problem List   Diagnosis   • Bulging lumbar disc   • Chronic pain   • Diabetic neuropathy   • Low back pain   • Degenerative arthritis of lumbar spine   • Neuropathy involving both lower extremities   • Encounter for long-term (current) use of high-risk medication   • Postlaminectomy syndrome of lumbar region   • Neck pain   • Lumbar pseudoarthrosis   • DM2 (diabetes mellitus, type 2)   • Insulin pump in place   • Hx of decompressive lumbar laminectomy   • Bilateral carpal tunnel syndrome        Past Medical History:   Diagnosis Date   • Abscess of scrotum    • Angina pectoris    • Arteriosclerotic cardiovascular disease    • COPD (chronic obstructive pulmonary disease)    • Coronary artery disease    • Diabetes mellitus    • Diabetic peripheral neuropathy    • ED (erectile dysfunction) of non-organic origin    • Hypertension    • Insulin pump in place    • Insulin pump in place    • Low back pain    • Myocardial infarction    • Neck pain    • Spinal stenosis    • TIA (transient ischemic attack)     LEFT EYE   • Type 2 diabetes mellitus    • Wears dentures     UPPER PLATE        Past Surgical History:   Procedure Laterality Date   • AMPUTATION FOOT / TOE Left     GREAT TOE   • BACK SURGERY  10/26/2015    Bilateral L4-L5 laminectomy -Dr. Gutierres   • CARDIAC CATHETERIZATION     • CARDIAC SURGERY      CABG X 6    • CHOLECYSTECTOMY     • CORONARY ARTERY BYPASS GRAFT      X 6   • EPIDURAL BLOCK     • EYE SURGERY     • HAND SURGERY  2016   • LUMBAR DISCECTOMY FUSION INSTRUMENTATION N/A 2016    Procedure: L  "4-5 FUSION WITH INSTRUMENTATION WITH BMP, WITH REMOVAL OF INSTRUMENTATION ;  Surgeon: Rowdy Spencer MD;  Location: [facility] MAIN OR;  Service:    • LUMBAR LAMINECTOMY DISCECTOMY DECOMPRESSION N/A 12/12/2016    Procedure: L4-5 REPEAT LAMINECTOMY ;  Surgeon: Tim Gutierres MD;  Location: [facility] MAIN OR;  Service:    • LUMBAR LAMINECTOMY DISCECTOMY DECOMPRESSION N/A 12/14/2016    Procedure: EVACUATION OF LUMBAR HEMATOMA;  Surgeon: Rowdy Spencer MD;  Location: [facility] OR;  Service:    • MULTIPLE TOOTH EXTRACTIONS      UPPER TEETH EXTRACTED   • ORTHOPEDIC SURGERY     • PENIS SURGERY      RECONSTRUCTION   • SCROTUM EXPLORATION      scrotum surgery   • SPINE SURGERY                               PT Assessment/Plan       07/12/17 1235       PT Assessment    Assessment Comments Mr. Mcdaniels reports continued soreness/stiffness first thing in the morning. He is nearing independence with his exercisis and talks about wanting to practice self management following next session. He demonstrates (+) trigger points L UT/L levator tissues (minimal activit L levator with DDN). He seems to respond well with L UT stretch.    -GJ     PT Plan    PT Plan Comments possible DC to HEP following next session.  Give seated UT stretch, sitting on hand. repeat manual C spine mobs  -GJ       User Key  (r) = Recorded By, (t) = Taken By, (c) = Cosigned By    Initials Name Provider Type    MARCIE Rhodes, PT Physical Therapist                Modalities       07/12/17 1100          Moist Heat    Patient denies application of MH Yes  -MARCIE        User Key  (r) = Recorded By, (t) = Taken By, (c) = Cosigned By    Initials Name Provider Type    MARCIE Rhodes, PT Physical Therapist                Exercises       07/12/17 1100          Subjective Comments    Subjective Comments I'm dong ok.  Still have that neck pain in the morning, probably how I sleep. I\"m using that pillow.   -GJ      Subjective Pain    Pre-Treatment Pain Level 5  -GJ   "    Exercise 1    Exercise Name 1 Chin tucks,seated  -GJ      Reps 1 15  -GJ      Time (Seconds) 1 5  -GJ      Exercise 4    Exercise Name 4 UBE  -GJ      Time (Minutes) 4 4  -GJ      Exercise 5    Exercise Name 5 shoulder ext  -GJ      Cueing 5 Demo  -GJ      Equipment 5 Theraband  -GJ      Resistance 5 Green  -GJ      Sets 5 2  -GJ      Reps 5 10  -GJ      Exercise 6    Exercise Name 6 scap rows  -GJ      Cueing 6 Demo  -GJ      Equipment 6 Theraband  -GJ      Resistance 6 Green  -GJ      Sets 6 2  -GJ      Reps 6 10  -GJ      Exercise 7    Exercise Name 7 standing shoulder ER bilateally,   -GJ      Cueing 7 Demo  -GJ      Equipment 7 Theraband  -GJ      Resistance 7 Green  -GJ      Sets 7 2  -GJ      Reps 7 10  -GJ      Exercise 8    Exercise Name 8 Cervical isometrics (flex, ext, SBing, rot), seated  -GJ      Cueing 8 Tactile  -GJ      Reps 8 10  -GJ      Time (Seconds) 8 5  -GJ      Exercise 9    Exercise Name 9 shoulder rolls   -GJ      Cueing 9 Auditory  -GJ      Equipment 9 Dumbell  -GJ      Weights/Plates 9 3  -GJ      Reps 9 15  -GJ        User Key  (r) = Recorded By, (t) = Taken By, (c) = Cosigned By    Initials Name Provider Type    GJ Young Rhodes, PT Physical Therapist                        Manual Rx (last 36 hours)      Manual Treatments       07/12/17 1100          Manual Rx 1    Manual Rx 1 Location intramuscular manual therapy  -GJ Assessed pt. for Dry Needling and intramuscular manual therapy. Discussed risks/benefits of Dry Needling with pt, including but not limited to muscle soreness, bruising, vasovagal response, pneumothorax, nerve injury. Patient signed written consent to proceed with treatment.    Patient position during treatment: L SL.  Muscles treated: L levator scapula.    Response: minimal LTR's noted in L levator tissues. Pt. Tolerated without immediate adverse response.     palpation used before, during, and after to facilitate assessment Clean needle technique observed at all  times, precautions for lung fields, neurovascular structures observed.    DDN performed by Young Rhodes II, PT, DPT     Manual Rx 2    Manual Rx 2 Location manual stretch to bilateral UT tissues, pt. supine (pressure at AC joints, no cervical lateral flexion  -GJ      Manual Rx 3    Manual Rx 3 Location PA mobs to C spine, pt. supine  -GJ      Manual Rx 3 Grade grade II/III  -GJ      Manual Rx 4    Manual Rx 4 Location sub occipital release, pt .supine  -GJ      Manual Rx 4 Duration total manual time 25 min  -GJ        User Key  (r) = Recorded By, (t) = Taken By, (c) = Cosigned By    Initials Name Provider Type    GJ Young Rhodes PT Physical Therapist                PT OP Goals       07/12/17 1100       PT Short Term Goals    STG Date to Achieve 07/11/17  -GJ     STG 1 Pt will report subjective pain at 4/10 or less to demonstrate symptom management with ADLs.  -GJ     STG 1 Progress Ongoing  -GJ     STG 2 Pt will be independent and compliant with HEP and symptom management in order to minimize stress on affected tissues.    -GJ     STG 2 Progress Partially Met  -GJ     Long Term Goals    LTG Date to Achieve 08/01/17  -GJ     LTG 1 Pt will increase cervical ROM to 75% and pain free in all planes in order to facilitate return to PLOF.  -GJ     LTG 1 Progress Ongoing  -GJ     LTG 2 Pt will improve NDI score to 30% for overall functional improvement.  -GJ     LTG 2 Progress Ongoing  -GJ     LTG 3 Pt will report 50% reduction in symptoms with L SBing and rotation.   -GJ     LTG 3 Progress Ongoing  -GJ     LTG 4 Pt will be educated on and be able to demonstrate proper posture, body mechanics and performance of HEP in order to decrease risk of future recurrence of symptoms.   -GJ     LTG 4 Progress Ongoing  -GJ       User Key  (r) = Recorded By, (t) = Taken By, (c) = Cosigned By    Initials Name Provider Type    GJ Young Rhodes PT Physical Therapist                Therapy Education       07/12/17 1117           Therapy Education    Given HEP;Symptoms/condition management;Pain management;Posture/body mechanics;Mobility training  -GJ      Program Reinforced  -GJ      How Provided Verbal  -GJ      Provided to Patient  -GJ      Level of Understanding Verbalized  -GJ        User Key  (r) = Recorded By, (t) = Taken By, (c) = Cosigned By    Initials Name Provider Type    GJ Young Rhodes, PT Physical Therapist                Time Calculation:   Start Time: 1115  Stop Time: 1200  Time Calculation (min): 45 min    Therapy Charges for Today     Code Description Service Date Service Provider Modifiers Qty    13219559845  PT MANUAL THERAPY EA 15 MIN 7/12/2017 Young Rhdoes, PT GP 2    17024891036 HC PT THER PROC EA 15 MIN 7/12/2017 Yonug Rhodes, PT GP 1                    Young Rhodes, PT  7/12/2017

## 2017-07-17 ENCOUNTER — HOSPITAL ENCOUNTER (OUTPATIENT)
Dept: PHYSICAL THERAPY | Facility: HOSPITAL | Age: 62
Setting detail: THERAPIES SERIES
Discharge: HOME | End: 2017-07-17

## 2017-07-17 DIAGNOSIS — E10.42 DIABETIC POLYNEUROPATHY ASSOCIATED WITH TYPE 1 DIABETES MELLITUS (HCC): ICD-10-CM

## 2017-07-17 DIAGNOSIS — G89.29 OTHER CHRONIC PAIN: ICD-10-CM

## 2017-07-17 DIAGNOSIS — M54.2 CERVICAL PAIN: Primary | ICD-10-CM

## 2017-07-17 PROCEDURE — 97110 THERAPEUTIC EXERCISES: CPT | Performed by: PHYSICAL THERAPIST

## 2017-07-17 PROCEDURE — 97140 MANUAL THERAPY 1/> REGIONS: CPT | Performed by: PHYSICAL THERAPIST

## 2017-07-17 NOTE — THERAPY TREATMENT NOTE
Outpatient Physical Therapy Ortho Treatment Note  [facility]     Patient Name: Kian Mcdaniels  : 1955  MRN: [id number]  Today's Date: 2017      Visit Date: 2017    Visit Dx:    ICD-10-CM ICD-9-CM   1. Cervical pain M54.2 723.1   2. Other chronic pain G89.29 338.29   3. Diabetic polyneuropathy associated with type 1 diabetes mellitus E10.42 250.61     357.2       Patient Active Problem List   Diagnosis   • Bulging lumbar disc   • Chronic pain   • Diabetic neuropathy   • Low back pain   • Degenerative arthritis of lumbar spine   • Neuropathy involving both lower extremities   • Encounter for long-term (current) use of high-risk medication   • Postlaminectomy syndrome of lumbar region   • Neck pain   • Lumbar pseudoarthrosis   • DM2 (diabetes mellitus, type 2)   • Insulin pump in place   • Hx of decompressive lumbar laminectomy   • Bilateral carpal tunnel syndrome        Past Medical History:   Diagnosis Date   • Abscess of scrotum    • Angina pectoris    • Arteriosclerotic cardiovascular disease    • COPD (chronic obstructive pulmonary disease)    • Coronary artery disease    • Diabetes mellitus    • Diabetic peripheral neuropathy    • ED (erectile dysfunction) of non-organic origin    • Hypertension    • Insulin pump in place    • Insulin pump in place    • Low back pain    • Myocardial infarction    • Neck pain    • Spinal stenosis    • TIA (transient ischemic attack)     LEFT EYE   • Type 2 diabetes mellitus    • Wears dentures     UPPER PLATE        Past Surgical History:   Procedure Laterality Date   • AMPUTATION FOOT / TOE Left     GREAT TOE   • BACK SURGERY  10/26/2015    Bilateral L4-L5 laminectomy -Dr. Gutierres   • CARDIAC CATHETERIZATION     • CARDIAC SURGERY      CABG X 6    • CHOLECYSTECTOMY     • CORONARY ARTERY BYPASS GRAFT      X 6   • EPIDURAL BLOCK     • EYE SURGERY     • HAND SURGERY  2016   • LUMBAR DISCECTOMY FUSION INSTRUMENTATION N/A 2016    Procedure: L  4-5 FUSION WITH INSTRUMENTATION WITH BMP, WITH REMOVAL OF INSTRUMENTATION ;  Surgeon: Rowdy Spencer MD;  Location: [facility] OR;  Service:    • LUMBAR LAMINECTOMY DISCECTOMY DECOMPRESSION N/A 12/12/2016    Procedure: L4-5 REPEAT LAMINECTOMY ;  Surgeon: Tim Gutierres MD;  Location: [facility] OR;  Service:    • LUMBAR LAMINECTOMY DISCECTOMY DECOMPRESSION N/A 12/14/2016    Procedure: EVACUATION OF LUMBAR HEMATOMA;  Surgeon: Rowdy Spencer MD;  Location: [facility] OR;  Service:    • MULTIPLE TOOTH EXTRACTIONS      UPPER TEETH EXTRACTED   • ORTHOPEDIC SURGERY     • PENIS SURGERY      RECONSTRUCTION   • SCROTUM EXPLORATION      scrotum surgery   • SPINE SURGERY               PT Ortho       07/17/17 1100    Cervical/Shoulder ROM Screen    Cervical rotation --   L 50 degrees  -      User Key  (r) = Recorded By, (t) = Taken By, (c) = Cosigned By    Initials Name Provider Type    MARCIE Rhodes PT Physical Therapist                            PT Assessment/Plan       07/17/17 1212       PT Assessment    Assessment Comments Mr. Mcdaniels reports that he wishes to attempt self management of his condition (wishes to save some visits in case he needs them).  He is independent with his HEP, verbalizes an adequate understanding of his condition.  He will attempt self management of his condition for one month, and we will DC chart if he does not call/return in one month.  He is encoruaged to call with questions.   -     PT Plan    PT Plan Comments pt to attempt self management of his condition for one month.  If he does not return, will DC his chart in one month. He is encouraged to call with any questions.   -       User Key  (r) = Recorded By, (t) = Taken By, (c) = Cosigned By    Initials Name Provider Type    MARCIE Rhodes PT Physical Therapist                    Exercises       07/17/17 1100          Subjective Comments    Subjective Comments I was sorer longer then I was the first time we needled.  Started feeling better yesterday.  Not sure if it helped.   -GJ      Subjective Pain    Pre-Treatment Pain Level 5  -GJ      Exercise 1    Exercise Name 1 Chin tucks,seated  -GJ      Reps 1 15  -GJ      Time (Seconds) 1 5  -GJ      Exercise 4    Exercise Name 4 UBE  -GJ      Time (Minutes) 4 4  -GJ      Exercise 5    Exercise Name 5 shoulder ext  -GJ      Cueing 5 Demo  -GJ      Equipment 5 Theraband  -GJ      Resistance 5 Green  -GJ      Sets 5 2  -GJ      Reps 5 10  -GJ      Exercise 6    Exercise Name 6 scap rows  -GJ      Cueing 6 Demo  -GJ      Equipment 6 Theraband  -GJ      Resistance 6 Green  -GJ      Sets 6 2  -GJ      Reps 6 10  -GJ      Exercise 7    Exercise Name 7 standing shoulder ER bilateally,   -GJ      Cueing 7 Demo  -GJ      Equipment 7 Theraband  -GJ      Resistance 7 Green  -GJ      Sets 7 2  -GJ      Reps 7 10  -GJ      Exercise 8    Exercise Name 8 Cervical isometrics (flex, ext, SBing, rot), seated  -GJ      Cueing 8 Tactile  -GJ      Reps 8 10  -GJ      Time (Seconds) 8 5  -GJ      Exercise 9    Exercise Name 9 shoulder rolls   -GJ      Cueing 9 Auditory  -GJ      Equipment 9 Dumbell  -GJ      Weights/Plates 9 3  -GJ      Reps 9 15  -GJ        User Key  (r) = Recorded By, (t) = Taken By, (c) = Cosigned By    Initials Name Provider Type    MARCIE Rhodes PT Physical Therapist                        Manual Rx (last 36 hours)      Manual Treatments       07/17/17 1100          Manual Rx 2    Manual Rx 2 Location manual stretch to bilateral UT tissues, pt. supine (pressure at AC joints, no cervical lateral flexion  -GJ      Manual Rx 3    Manual Rx 3 Location PA mobs/lateral glides to C spine, pt. supine  -GJ      Manual Rx 4    Manual Rx 4 Location sub occipital release, pt .supine  -GJ        User Key  (r) = Recorded By, (t) = Taken By, (c) = Cosigned By    Initials Name Provider Type    MARCIE Rhodes PT Physical Therapist                PT OP Goals       07/17/17 1100       PT Short  Term Goals    STG Date to Achieve 07/11/17  -GJ     STG 1 Pt will report subjective pain at 4/10 or less to demonstrate symptom management with ADLs.  -GJ     STG 1 Progress Ongoing  -GJ     STG 1 Progress Comments 5/10 pain today  -GJ     STG 2 Pt will be independent and compliant with HEP and symptom management in order to minimize stress on affected tissues.    -GJ     STG 2 Progress Met  -GJ     Long Term Goals    LTG Date to Achieve 08/01/17  -GJ     LTG 1 Pt will increase cervical ROM to 75% and pain free in all planes in order to facilitate return to PLOF.  -GJ     LTG 1 Progress Not Met  -GJ     LTG 1 Progress Comments 50 degrees of L cervical spine rotation actively  -GJ     LTG 2 Pt will improve NDI score to 30% for overall functional improvement.  -GJ     LTG 2 Progress Not Met  -GJ     LTG 2 Progress Comments 32%  -GJ     LTG 3 Pt will report 50% reduction in symptoms with L SBing and rotation.   -GJ     LTG 3 Progress Partially Met  -GJ     LTG 3 Progress Comments demonstrates L rotation 50 degrees  -GJ     LTG 4 Pt will be educated on and be able to demonstrate proper posture, body mechanics and performance of HEP in order to decrease risk of future recurrence of symptoms.   -GJ     LTG 4 Progress Met  -GJ       User Key  (r) = Recorded By, (t) = Taken By, (c) = Cosigned By    Initials Name Provider Type    MARCIE Rhodes PT Physical Therapist                Therapy Education       07/17/17 1120          Therapy Education    Given HEP;Symptoms/condition management;Pain management;Posture/body mechanics   call with questions  -GJ      Program Reinforced  -GJ      How Provided Verbal  -GJ      Provided to Patient  -GJ      Level of Understanding Verbalized  -GJ        User Key  (r) = Recorded By, (t) = Taken By, (c) = Cosigned By    Initials Name Provider Type    MARCIE Rhodes PT Physical Therapist                Outcome Measures       07/17/17 1300          Functional Assessment    Outcome  Measure Options Neck Disability Index (NDI)   32%  -GJ        User Key  (r) = Recorded By, (t) = Taken By, (c) = Cosigned By    Initials Name Provider Type    GJ Young Rhodes, PT Physical Therapist            Time Calculation:   Start Time: 1119  Stop Time: 1203  Time Calculation (min): 44 min    Therapy Charges for Today     Code Description Service Date Service Provider Modifiers Qty    89745118231 HC PT MANUAL THERAPY EA 15 MIN 7/17/2017 Young Rhodes, PT GP 2    19892982637 HC PT THER PROC EA 15 MIN 7/17/2017 Young Rhodes, PT GP 1          PT G-Codes  Outcome Measure Options: Neck Disability Index (NDI) (32%)         Young Rhodes, PT  7/17/2017

## 2017-08-08 ENCOUNTER — OFFICE VISIT (OUTPATIENT)
Dept: PAIN MEDICINE | Facility: CLINIC | Age: 62
End: 2017-08-08

## 2017-08-08 VITALS
BODY MASS INDEX: 37.98 KG/M2 | HEART RATE: 87 BPM | WEIGHT: 280.4 LBS | RESPIRATION RATE: 18 BRPM | OXYGEN SATURATION: 96 % | DIASTOLIC BLOOD PRESSURE: 83 MMHG | TEMPERATURE: 98.9 F | SYSTOLIC BLOOD PRESSURE: 171 MMHG | HEIGHT: 72 IN

## 2017-08-08 DIAGNOSIS — M50.30 DEGENERATIVE CERVICAL DISC: ICD-10-CM

## 2017-08-08 DIAGNOSIS — Z98.1 S/P LUMBAR SPINAL FUSION: ICD-10-CM

## 2017-08-08 DIAGNOSIS — M47.816 OSTEOARTHRITIS OF LUMBAR SPINE, UNSPECIFIED SPINAL OSTEOARTHRITIS COMPLICATION STATUS: ICD-10-CM

## 2017-08-08 DIAGNOSIS — Z79.899 ENCOUNTER FOR LONG-TERM (CURRENT) USE OF HIGH-RISK MEDICATION: ICD-10-CM

## 2017-08-08 DIAGNOSIS — M96.1 POSTLAMINECTOMY SYNDROME OF LUMBAR REGION: ICD-10-CM

## 2017-08-08 DIAGNOSIS — G89.29 OTHER CHRONIC PAIN: Primary | ICD-10-CM

## 2017-08-08 PROCEDURE — 99214 OFFICE O/P EST MOD 30 MIN: CPT | Performed by: NURSE PRACTITIONER

## 2017-08-08 RX ORDER — INSULIN DEGLUDEC INJECTION 100 U/ML
INJECTION, SOLUTION SUBCUTANEOUS
Refills: 0 | COMMUNITY
Start: 2017-07-15 | End: 2018-09-19

## 2017-08-08 RX ORDER — OXYCODONE HYDROCHLORIDE 10 MG/1
10 TABLET ORAL EVERY 4 HOURS PRN
Qty: 150 TABLET | Refills: 0 | Status: SHIPPED | OUTPATIENT
Start: 2017-08-08 | End: 2017-09-07 | Stop reason: SDUPTHER

## 2017-08-08 RX ORDER — ATORVASTATIN CALCIUM 20 MG/1
20 TABLET, FILM COATED ORAL NIGHTLY
COMMUNITY
Start: 2017-08-05 | End: 2018-12-05

## 2017-08-08 NOTE — PROGRESS NOTES
CHIEF COMPLAINT  Follow-up for back pain. Mr. Mcdaniels states that his back pain has increased about 5 or 6 days ago. He states that he just drove back from [place] 2 days ago.    Subjective   Kian Mcdaniels is a 61 y.o. male  who presents to the office for follow-up.He has a history of chronic low back pain. He reports that his pain is worse, he has been traveling, thinks that this may have aggravated things.      Neck pain persists, PT did not help.  Reporting neck pain with radiating symptoms down into the left upper arm.  The pain is constant, aggravated by twisting and position. Improved with medication.      He has had repeat L4-5 laminectomy with fusion by Dr. Gutierres and Dr. Spencer 12/12/16. Still reporting a lot of back pain and also some increased neck pain.  Has been evaluated by Dr. Gutierres and Dr. Spencer recently, nothing else surgical needed at this time, EMG showed bilateral carpal tunnel.    Continues with Oxycodone 10 mg 5/day (working on weaning down from surgery), gabapentin 800 mg 3/day and tizanidine 4mg PRN. Denies any side effects from the regimen. The regimen helps decrease his pain by 50%. ADL's by self.     Back Pain   This is a chronic problem. The current episode started more than 1 year ago. The problem occurs daily. The problem has been waxing and waning since onset. The pain is present in the sacro-iliac. The quality of the pain is described as aching, shooting and stabbing. The pain is at a severity of 9/10. The pain is the same all the time. The symptoms are aggravated by bending, sitting and standing. Stiffness is present all day. Associated symptoms include headaches, leg pain, numbness (bilateral feet), paresthesias and tingling. Pertinent negatives include no chest pain, dysuria, fever or weakness. Risk factors include lack of exercise, obesity and sedentary lifestyle. He has tried analgesics for the symptoms. The treatment provided moderate relief.   Neck Pain    This is a chronic  (started noticing right before most recent back surgery) problem. The current episode started more than 1 month ago. The problem occurs constantly. The problem has been gradually worsening. The pain is associated with nothing. The pain is present in the left side, right side and midline. The quality of the pain is described as cramping. The pain is at a severity of 8/10. The pain is moderate. The symptoms are aggravated by sneezing and twisting. The pain is same all the time. Associated symptoms include headaches, leg pain, numbness (bilateral feet) and tingling. Pertinent negatives include no chest pain, fever or weakness. He has tried muscle relaxants, oral narcotics and bed rest for the symptoms. The treatment provided moderate relief.      PEG Assessment   What number best describes your pain on average in the past week?8  What number best describes how, during the past week, pain has interfered with your enjoyment of life?8  What number best describes how, during the past week, pain has interfered with your general activity?  8    The following portions of the patient's history were reviewed and updated as appropriate: allergies, current medications, past family history, past medical history, past social history, past surgical history and problem list.    Review of Systems   Constitutional: Negative for fatigue and fever.   HENT: Negative for congestion.    Eyes: Negative for visual disturbance.   Respiratory: Negative for cough, shortness of breath and wheezing.    Cardiovascular: Positive for leg swelling. Negative for chest pain and palpitations.   Gastrointestinal: Negative for constipation and diarrhea.   Genitourinary: Negative for difficulty urinating and dysuria.   Musculoskeletal: Positive for back pain, joint swelling (bilateral ankles) and neck pain.   Neurological: Positive for tingling, numbness (bilateral feet), headaches and paresthesias. Negative for weakness.   Psychiatric/Behavioral: Negative  "for sleep disturbance and suicidal ideas. The patient is not nervous/anxious.      Vitals:    08/08/17 1029   BP: 171/83   Pulse: 87   Resp: 18   Temp: 98.9 °F (37.2 °C)   SpO2: 96%   Weight: 280 lb 6.4 oz (127 kg)   Height: 71.5\" (181.6 cm)   PainSc:   9   PainLoc: Back     Objective   Physical Exam   Constitutional: He is oriented to person, place, and time. Vital signs are normal. He appears well-developed and well-nourished. He is cooperative.   HENT:   Head: Normocephalic and atraumatic.   Nose: Nose normal.   Eyes: Conjunctivae and lids are normal.   Neck: Trachea normal. Neck supple. Muscular tenderness present. No spinous process tenderness present. Decreased range of motion present.   Cardiovascular: Normal rate.    Pulmonary/Chest: Effort normal. No respiratory distress.   Abdominal:   obese   Musculoskeletal:        Cervical back: He exhibits tenderness and pain. He exhibits no spasm.        Lumbar back: He exhibits tenderness and bony tenderness.   Neurological: He is alert and oriented to person, place, and time. Gait (ambulates with aid of cane ) abnormal.   Reflex Scores:       Tricep reflexes are 1+ on the right side and 1+ on the left side.       Bicep reflexes are 1+ on the right side and 1+ on the left side.       Brachioradialis reflexes are 1+ on the right side and 1+ on the left side.       Patellar reflexes are 1+ on the right side and 1+ on the left side.  Skin: Skin is warm, dry and intact.   Psychiatric: He has a normal mood and affect. His speech is normal and behavior is normal. Judgment and thought content normal. Cognition and memory are normal.   Nursing note and vitals reviewed.    Assessment/Plan   Kian was seen today for back pain.    Diagnoses and all orders for this visit:    Other chronic pain    Postlaminectomy syndrome of lumbar region    Osteoarthritis of lumbar spine, unspecified spinal osteoarthritis complication status    Encounter for long-term (current) use of high-risk " medication    Degenerative cervical disc  -     Case Request      --- CARIE X 2, 2-4 weeks apart.  Plavix will need to be held X 7 days prior to procedure.  Reviewed the procedure at length with the patient.  Included in the review was expectations, complications, risk and benefits.The procedure was described in detail and the risks, benefits and alternatives were discussed with the patient (including but not limited to: bleeding, infection, nerve damage, worsening of pain, inability to perform injection, paralysis, seizures, and death) who agreed to proceed.  Discussed the potential for sedation if warranted/wanted.  Questions were answered and in a way the patient could understand.  Patient verbalized understanding and wishes to proceed.  This intervention will be ordered.  --- Refill Oxycodone.  No change today, next visit the dose WILL be reduced to 4/day, he is > 6 months post operative.  Patient appears stable with current regimen. No adverse effects. Regarding continuation of opioids, there is no evidence of aberrant behavior or any red flags.  The patient continues with appropriate response to opioid therapy. ADL's remain intact by self.   --- The urine drug screen confirmation from 7/5/17 has been reviewed and the result is appropriate based on patient history and DAVID report  --- Follow-up 1 month          DAVID REPORT  As part of the patient's treatment plan, I am prescribing controlled substances. The patient has been made aware of appropriate use of such medications, including potential risk of somnolence, limited ability to drive and/or work safely, and the potential for dependence or overdose. It has also bee made clear that these medications are for use by this patient only, without concomitant use of alcohol or other substances unless prescribed.     Patient has completed prescribing agreement detailing terms of continued prescribing of controlled substances, including monitoring DAVID reports,  urine drug screening, and pill counts if necessary. The patient is aware that inappropriate use will results in cessation of prescribing such medications.    DAVID report has been reviewed and scanned into the patient's chart.    Date of last DAVID : 8/7/2017    History and physical exam exhibit continued safe and appropriate use of controlled substances.    EMR Dragon/Transcription disclaimer:   Much of this encounter note is an electronic transcription/translation of spoken language to printed text. The electronic translation of spoken language may permit erroneous, or at times, nonsensical words or phrases to be inadvertently transcribed; Although I have reviewed the note for such errors, some may still exist.

## 2017-08-14 ENCOUNTER — DOCUMENTATION (OUTPATIENT)
Dept: PHYSICAL THERAPY | Facility: HOSPITAL | Age: 62
End: 2017-08-14

## 2017-08-14 DIAGNOSIS — G89.29 OTHER CHRONIC PAIN: ICD-10-CM

## 2017-08-14 DIAGNOSIS — M54.2 CERVICAL PAIN: Primary | ICD-10-CM

## 2017-08-14 NOTE — THERAPY DISCHARGE NOTE
Outpatient Physical Therapy Discharge Summary         Patient Name: Kian Mcdaniels  : 1955  MRN: [id number]    Today's Date: 2017    Visit Dx:    ICD-10-CM ICD-9-CM   1. Cervical pain M54.2 723.1   2. Other chronic pain G89.29 338.29             PT OP Goals       17 1300       PT Short Term Goals    STG Date to Achieve 17  -GJ     STG 1 Pt will report subjective pain at 4/10 or less to demonstrate symptom management with ADLs.  -GJ     STG 1 Progress Not Met  -GJ     STG 2 Pt will be independent and compliant with HEP and symptom management in order to minimize stress on affected tissues.    -GJ     STG 2 Progress Met  -GJ     Long Term Goals    LTG Date to Achieve 17  -GJ     LTG 1 Pt will increase cervical ROM to 75% and pain free in all planes in order to facilitate return to PLOF.  -GJ     LTG 1 Progress Not Met  -GJ     LTG 2 Pt will improve NDI score to 30% for overall functional improvement.  -GJ     LTG 2 Progress Not Met  -GJ     LTG 3 Pt will report 50% reduction in symptoms with L SBing and rotation.   -GJ     LTG 3 Progress Partially Met  -GJ     LTG 4 Pt will be educated on and be able to demonstrate proper posture, body mechanics and performance of HEP in order to decrease risk of future recurrence of symptoms.   -GJ     LTG 4 Progress Met  -GJ       User Key  (r) = Recorded By, (t) = Taken By, (c) = Cosigned By    Initials Name Provider Type    MARCIE Rhodes, PT Physical Therapist          OP PT Discharge Summary  Date of Discharge: 17  Reason for Discharge: Independent  Outcomes Achieved: Patient able to partially acheive established goals  Discharge Destination: Home with home program  Discharge Instructions: Mr. Mcdaniels attended 8 sessions of physical therapy from 17-17 for his cervical spine pain.  He is independent with his HEP, verbalizes an adequate understanding of his condition and has partially met his goals.  Mr. Mcdaniels  wished to practice independent management of his condition for one month.  He has not called or return, therefore he is dischraged from outpatient physical therapy per our conversation on 7/17/17.  Mr. Mcdaniels is discharged from outpatient physical therapy to an independent program.       Time Calculation:                    Young Rhodes, PT  8/14/2017

## 2017-08-29 ENCOUNTER — OUTSIDE FACILITY SERVICE (OUTPATIENT)
Dept: PAIN MEDICINE | Facility: CLINIC | Age: 62
End: 2017-08-29

## 2017-08-29 ENCOUNTER — DOCUMENTATION (OUTPATIENT)
Dept: PAIN MEDICINE | Facility: CLINIC | Age: 62
End: 2017-08-29

## 2017-08-29 PROCEDURE — 62321 NJX INTERLAMINAR CRV/THRC: CPT | Performed by: ANESTHESIOLOGY

## 2017-08-30 NOTE — PROGRESS NOTES
Cervical Epidural Steroid Injection @ C4-C5  [facility]    PREOPERATIVE DIAGNOSIS:   Cervical degenerative disc disease, cervical radic, Plavix held 7 days prior to procedure.  Other dx includes h/o PLLS, OA-Lspine, LTOU, OCP.   POSTOPERATIVE DIAGNOSIS:  Same as preop diagnosis    PROCEDURE:   Cervical Epidural Steroid Injection, Therapeutic Translaminar Injection, with epidurogram, at  C4-C5 level    PRE-PROCEDURE DISCUSSION WITH PATIENT:    Risks and complications were discussed with the patient prior to starting the procedure and informed consent was obtained.  We discussed various topics including but not limited to bleeding, infection, injury, paralysis, nerve injury, dural puncture, coma, death, worsening of clinical picture, lack of pain relief, and postprocedural soreness.    SURGEON:  Mulugeta Guzman MD    REASON FOR PROCEDURE:    Degenerative changes are significant at this level and at other levels as well.  R>L radicular complaints.       SEDATION:  Versed 2mg & Fentanyl 100 mcg IV  ANESTHETIC:  Marcaine 0.25%  STEROID:   Methylprednisolone (DEPO MEDROL) 80mg/ml    DESCRIPTON OF PROCEDURE:    After obtaining informed consent, I.V. was started in the preop area.   The patient was taken to the operating room and placed in the prone position.  EKG, blood pressure, and pulse oximeter were monitored throughout, and sedation was provided as needed by the RN under my guidance. All pressure points were well padded.  The cervicothoracic spine area was prepped with Chloraprep and draped in a sterile fashion.  Under fluoroscopic guidance, the aforementioned interlaminar space was identified. Skin and subcutaneous tissues were anesthetized with 1% lidocaine in the middle of the space. A 20-gauge Tuohy needle was introduced through the skin and advanced to this interlaminar space and into the epidural space under fluoroscopic guidance and verified with loss-of-resistance technique to air.  After  confirming the position of the needle with the fluoroscope with all the views, and after aspiration was confirmed negative for blood and CSF, 1.5 mL of Omnipaque was injected.  After seeing appropriate epidurogram with lateral and PA views, a total of 3 cc solution was injected, consisting of 1cc of local anesthetic as above, with normal saline and injectable steroid as above.       ESTIMATED BLOOD LOSS:  <5 mL  SPECIMENS:  None    COMPLICATIONS:     No complications were noted. and There was no evidence of dural puncture.      TOLERANCE & DISCHARGE CONDITION:    The patient tolerated the procedure well.  The patient was transported to the recovery area without difficulties.  The patient was discharged to home under the care of family in stable and satisfactory condition.    PLAN OF CARE:  1. The patient was given our standard instruction sheet.  2. The patient will Repeat injection 3 wks.  May change levels if this proves to be diagnostically negative.  3. The patient will resume all medications as per the medication reconciliation sheet.

## 2017-09-07 ENCOUNTER — OFFICE VISIT (OUTPATIENT)
Dept: PAIN MEDICINE | Facility: CLINIC | Age: 62
End: 2017-09-07

## 2017-09-07 VITALS
BODY MASS INDEX: 37.38 KG/M2 | OXYGEN SATURATION: 95 % | HEIGHT: 72 IN | SYSTOLIC BLOOD PRESSURE: 156 MMHG | RESPIRATION RATE: 18 BRPM | TEMPERATURE: 98.1 F | HEART RATE: 95 BPM | DIASTOLIC BLOOD PRESSURE: 79 MMHG | WEIGHT: 276 LBS

## 2017-09-07 DIAGNOSIS — Z98.1 S/P LUMBAR SPINAL FUSION: ICD-10-CM

## 2017-09-07 DIAGNOSIS — M50.30 DEGENERATIVE CERVICAL DISC: ICD-10-CM

## 2017-09-07 DIAGNOSIS — M96.1 POSTLAMINECTOMY SYNDROME OF LUMBAR REGION: ICD-10-CM

## 2017-09-07 DIAGNOSIS — G89.29 OTHER CHRONIC PAIN: Primary | ICD-10-CM

## 2017-09-07 DIAGNOSIS — M47.816 OSTEOARTHRITIS OF LUMBAR SPINE, UNSPECIFIED SPINAL OSTEOARTHRITIS COMPLICATION STATUS: ICD-10-CM

## 2017-09-07 DIAGNOSIS — Z79.899 ENCOUNTER FOR LONG-TERM (CURRENT) USE OF HIGH-RISK MEDICATION: ICD-10-CM

## 2017-09-07 PROCEDURE — 99213 OFFICE O/P EST LOW 20 MIN: CPT | Performed by: NURSE PRACTITIONER

## 2017-09-07 RX ORDER — OXYCODONE HYDROCHLORIDE 10 MG/1
10 TABLET ORAL EVERY 4 HOURS PRN
Qty: 150 TABLET | Refills: 0 | Status: SHIPPED | OUTPATIENT
Start: 2017-09-07 | End: 2017-09-07 | Stop reason: SDUPTHER

## 2017-09-07 RX ORDER — OXYCODONE HYDROCHLORIDE 10 MG/1
10 TABLET ORAL EVERY 6 HOURS PRN
Qty: 120 TABLET | Refills: 0 | Status: SHIPPED | OUTPATIENT
Start: 2017-09-07 | End: 2017-10-05 | Stop reason: SDUPTHER

## 2017-09-07 NOTE — PROGRESS NOTES
CHIEF COMPLAINT  Patient is here today for a follow up in his neck pain. He states his pain is unchanged since last visit. He completed a Cervical Epidural Injection on 8/29/2017 and states he received 2-3 days of relief and then the pain returned    Subjective   Kian Mcdaniels is a 62 y.o. male  who presents to the office for follow-up of procedure.  He completed a Cervical Epidural Injection   on  8/29/2017 performed by Dr. Guzman for management of neck pain. Patient reports 100% relief from the procedure X 3 days and has slowly waned since that time. He is scheduled for a second CARIE next week (9/12/2017)    He has had repeat L4-5 laminectomy with fusion by Dr. Gutierres and Dr. Spencer 12/12/16. Still painful with activity.      Continues with Oxycodone 10 mg 5/day (working on weaning down following surgery), gabapentin 800 mg 3/day and tizanidine 4mg PRN. Denies any side effects from the regimen. The regimen helps decrease his pain by 50%. ADL's by self.     Went to ED, gout flare up in right wrist, treated with Cholcine and Prednisone and brace, still no improvement. He will be seeing Dr. Hoskins.      Back Pain   This is a chronic problem. The current episode started more than 1 year ago. The problem occurs daily. The problem has been waxing and waning since onset. The pain is present in the sacro-iliac. The quality of the pain is described as aching, shooting and stabbing. The pain is at a severity of 6/10. The pain is the same all the time. The symptoms are aggravated by bending, sitting and standing. Stiffness is present all day. Associated symptoms include leg pain, paresthesias, tingling and weakness. Pertinent negatives include no chest pain, dysuria, fever, headaches or numbness. Risk factors include lack of exercise, obesity and sedentary lifestyle. He has tried analgesics for the symptoms. The treatment provided moderate relief.   Neck Pain    This is a chronic (started noticing right before most recent  back surgery) problem. The current episode started more than 1 month ago. The problem occurs constantly. The problem has been gradually worsening. The pain is associated with nothing. The pain is present in the left side, right side and midline. The quality of the pain is described as cramping. The pain is at a severity of 6/10. The pain is moderate. The symptoms are aggravated by sneezing and twisting. The pain is same all the time. Associated symptoms include leg pain, tingling and weakness. Pertinent negatives include no chest pain, fever, headaches or numbness. He has tried muscle relaxants, oral narcotics and bed rest for the symptoms. The treatment provided moderate relief.      PEG Assessment   What number best describes your pain on average in the past week?7  What number best describes how, during the past week, pain has interfered with your enjoyment of life?7  What number best describes how, during the past week, pain has interfered with your general activity?  7    The following portions of the patient's history were reviewed and updated as appropriate: allergies, current medications, past family history, past medical history, past social history, past surgical history and problem list.    Review of Systems   Constitutional: Negative for chills and fever.   Respiratory: Negative for shortness of breath.    Cardiovascular: Negative for chest pain.   Gastrointestinal: Negative for constipation, diarrhea, nausea and vomiting.   Genitourinary: Negative for difficulty urinating and dysuria.   Musculoskeletal: Positive for back pain and neck pain.   Neurological: Positive for tingling, weakness and paresthesias. Negative for dizziness, light-headedness, numbness and headaches.   Psychiatric/Behavioral: Negative for confusion, hallucinations, self-injury, sleep disturbance and suicidal ideas. The patient is not nervous/anxious.      Vitals:    09/07/17 1011   BP: 156/79   Pulse: 95   Resp: 18   Temp: 98.1 °F  "(36.7 °C)   SpO2: 95%   Weight: 276 lb (125 kg)   Height: 71.5\" (181.6 cm)   PainSc:   6   PainLoc: Neck     Objective   Physical Exam   Constitutional: He is oriented to person, place, and time. Vital signs are normal. He appears well-developed and well-nourished. He is cooperative.   HENT:   Head: Normocephalic and atraumatic.   Nose: Nose normal.   Eyes: Conjunctivae and lids are normal.   Neck: Trachea normal. Neck supple. Muscular tenderness present. No spinous process tenderness present. Decreased range of motion present.   Cardiovascular: Normal rate.    Pulmonary/Chest: Effort normal. No respiratory distress.   Abdominal:   obese   Musculoskeletal:        Cervical back: He exhibits tenderness and pain. He exhibits no spasm.        Lumbar back: He exhibits tenderness and bony tenderness.   Neurological: He is alert and oriented to person, place, and time. Gait (ambulates with aid of cane ) abnormal.   Skin: Skin is warm, dry and intact.   Psychiatric: He has a normal mood and affect. His speech is normal and behavior is normal. Judgment and thought content normal. Cognition and memory are normal.   Nursing note and vitals reviewed.    Assessment/Plan   Kian was seen today for neck pain.    Diagnoses and all orders for this visit:    Other chronic pain    Postlaminectomy syndrome of lumbar region    Osteoarthritis of lumbar spine, unspecified spinal osteoarthritis complication status    Degenerative cervical disc    Encounter for long-term (current) use of high-risk medication    S/P lumbar spinal fusion  -     oxyCODONE (ROXICODONE) 10 MG tablet; Take 1 tablet by mouth Every 6 (Six) Hours As Needed for Severe Pain .      --- Proceed with secondCESI as scheduled   --- Refill Oxycodone, reduce to 4/day as previously discussed.   Patient appears stable with current regimen. No adverse effects. Regarding continuation of opioids, there is no evidence of aberrant behavior or any red flags.  The patient continues " with appropriate response to opioid therapy. ADL's remain intact by self.   --- The urine drug screen confirmation from 7/5/17 has been reviewed and the result is appropriate based on patient history and DAVID report  --- Follow-up 1 month          DAVID REPORT  As part of the patient's treatment plan, I am prescribing controlled substances. The patient has been made aware of appropriate use of such medications, including potential risk of somnolence, limited ability to drive and/or work safely, and the potential for dependence or overdose. It has also bee made clear that these medications are for use by this patient only, without concomitant use of alcohol or other substances unless prescribed.     Patient has completed prescribing agreement detailing terms of continued prescribing of controlled substances, including monitoring DAVID reports, urine drug screening, and pill counts if necessary. The patient is aware that inappropriate use will results in cessation of prescribing such medications.    DAVID report has been reviewed and scanned into the patient's chart.    Date of last DAVID : 9/6/2017    History and physical exam exhibit continued safe and appropriate use of controlled substances.     EMR Dragon/Transcription disclaimer:   Much of this encounter note is an electronic transcription/translation of spoken language to printed text. The electronic translation of spoken language may permit erroneous, or at times, nonsensical words or phrases to be inadvertently transcribed; Although I have reviewed the note for such errors, some may still exist.

## 2017-09-12 ENCOUNTER — OUTSIDE FACILITY SERVICE (OUTPATIENT)
Dept: PAIN MEDICINE | Facility: CLINIC | Age: 62
End: 2017-09-12

## 2017-09-12 ENCOUNTER — DOCUMENTATION (OUTPATIENT)
Dept: PAIN MEDICINE | Facility: CLINIC | Age: 62
End: 2017-09-12

## 2017-09-12 PROCEDURE — 62321 NJX INTERLAMINAR CRV/THRC: CPT | Performed by: ANESTHESIOLOGY

## 2017-10-05 ENCOUNTER — OFFICE VISIT (OUTPATIENT)
Dept: PAIN MEDICINE | Facility: CLINIC | Age: 62
End: 2017-10-05

## 2017-10-05 VITALS
RESPIRATION RATE: 18 BRPM | OXYGEN SATURATION: 96 % | DIASTOLIC BLOOD PRESSURE: 84 MMHG | BODY MASS INDEX: 37.44 KG/M2 | TEMPERATURE: 99.1 F | HEART RATE: 74 BPM | HEIGHT: 72 IN | WEIGHT: 276.4 LBS | SYSTOLIC BLOOD PRESSURE: 156 MMHG

## 2017-10-05 DIAGNOSIS — M50.30 DEGENERATIVE CERVICAL DISC: ICD-10-CM

## 2017-10-05 DIAGNOSIS — Z79.899 ENCOUNTER FOR LONG-TERM (CURRENT) USE OF HIGH-RISK MEDICATION: ICD-10-CM

## 2017-10-05 DIAGNOSIS — M96.1 POSTLAMINECTOMY SYNDROME OF LUMBAR REGION: ICD-10-CM

## 2017-10-05 DIAGNOSIS — Z98.1 S/P LUMBAR SPINAL FUSION: ICD-10-CM

## 2017-10-05 DIAGNOSIS — G89.29 OTHER CHRONIC PAIN: Primary | ICD-10-CM

## 2017-10-05 PROCEDURE — 99213 OFFICE O/P EST LOW 20 MIN: CPT | Performed by: NURSE PRACTITIONER

## 2017-10-05 RX ORDER — OXYCODONE HYDROCHLORIDE 10 MG/1
10 TABLET ORAL EVERY 6 HOURS PRN
Qty: 120 TABLET | Refills: 0 | Status: SHIPPED | OUTPATIENT
Start: 2017-10-05 | End: 2017-11-02 | Stop reason: SDUPTHER

## 2017-10-05 NOTE — PROGRESS NOTES
CHIEF COMPLAINT  Follow-up for neck pain.    Subjective   Kian Mcdaniels is a 62 y.o. male  who presents to the office for follow-up of procedure.  He completed a Cervical Epidural Steroid Injection @ C5-C6   on  9/12/17 performed by Dr. Guzman for management of neck pain. Patient reports no relief from the procedure. This was the second injection, the first did provide some significant benefit which was transient, unfortunately only lasting about 2-3 days.  He does report improvement in right sided radicular symptoms.      Had back surgery which was a repeat L4-5 laminectomy with fusion by Dr. Gutierres and Dr. Spencer 12/12/16. Back still painful with activity. Following surgery he went through PT as well as aquatic therapy.       Continues with Oxycodone 10 mg 4/day (decreased at the previous office visit), gabapentin 800 mg 3/day and tizanidine 4mg PRN. Denies any side effects from the regimen. The regimen helps decrease his pain by 50%. ADL's by self.     Back Pain   This is a chronic problem. The current episode started more than 1 year ago. The problem occurs daily. The problem has been waxing and waning since onset. The pain is present in the sacro-iliac. The quality of the pain is described as aching, shooting and stabbing. The pain is at a severity of 6/10. The pain is the same all the time. The symptoms are aggravated by bending, sitting and standing. Stiffness is present all day. Associated symptoms include leg pain, paresthesias and tingling. Pertinent negatives include no chest pain, dysuria, fever, headaches, numbness or weakness. Risk factors include lack of exercise, obesity and sedentary lifestyle. He has tried analgesics for the symptoms. The treatment provided moderate relief.   Neck Pain    This is a chronic (started noticing right before most recent back surgery) problem. The current episode started more than 1 month ago. The problem occurs constantly. The problem has been gradually worsening.  "The pain is associated with nothing. The pain is present in the left side, right side and midline. The quality of the pain is described as cramping. The pain is at a severity of 6/10. The pain is moderate. The symptoms are aggravated by sneezing and twisting. The pain is same all the time. Associated symptoms include leg pain and tingling. Pertinent negatives include no chest pain, fever, headaches, numbness or weakness. He has tried muscle relaxants, oral narcotics and bed rest for the symptoms. The treatment provided moderate relief.      PEG Assessment   What number best describes your pain on average in the past week?7  What number best describes how, during the past week, pain has interfered with your enjoyment of life?7  What number best describes how, during the past week, pain has interfered with your general activity?  7    The following portions of the patient's history were reviewed and updated as appropriate: allergies, current medications, past family history, past medical history, past social history, past surgical history and problem list.    Review of Systems   Constitutional: Negative for fatigue and fever.   HENT: Negative for congestion.    Eyes: Negative for visual disturbance.   Respiratory: Negative for cough, shortness of breath and wheezing.    Cardiovascular: Positive for leg swelling. Negative for chest pain and palpitations.   Gastrointestinal: Negative for constipation and diarrhea.   Genitourinary: Negative for difficulty urinating and dysuria.   Musculoskeletal: Positive for back pain and neck pain.   Neurological: Positive for tingling and paresthesias. Negative for weakness, numbness and headaches.   Psychiatric/Behavioral: Negative for sleep disturbance and suicidal ideas. The patient is not nervous/anxious.        Vitals:    10/05/17 1019   BP: 156/84   Pulse: 74   Resp: 18   Temp: 99.1 °F (37.3 °C)   SpO2: 96%   Weight: 276 lb 6.4 oz (125 kg)   Height: 71.5\" (181.6 cm)   PainSc:   " 6   PainLoc: Neck     Objective   Physical Exam   Constitutional: He is oriented to person, place, and time. Vital signs are normal. He appears well-developed and well-nourished. He is cooperative.   HENT:   Head: Normocephalic and atraumatic.   Nose: Nose normal.   Eyes: Conjunctivae and lids are normal.   Neck: Trachea normal. Neck supple. Muscular tenderness present. No spinous process tenderness present. Decreased range of motion present.   Cardiovascular: Normal rate.    Pulmonary/Chest: Effort normal. No respiratory distress.   Abdominal:   obese   Musculoskeletal:        Cervical back: He exhibits tenderness and pain. He exhibits no spasm.        Lumbar back: He exhibits tenderness and bony tenderness.   Neurological: He is alert and oriented to person, place, and time. Gait (ambulates with aid of cane ) abnormal.   Reflex Scores:       Tricep reflexes are 1+ on the right side and 1+ on the left side.       Bicep reflexes are 1+ on the right side and 1+ on the left side.       Brachioradialis reflexes are 1+ on the right side and 1+ on the left side.       Patellar reflexes are 1+ on the right side and 1+ on the left side.  Skin: Skin is warm, dry and intact.   Psychiatric: He has a normal mood and affect. His speech is normal and behavior is normal. Judgment and thought content normal. Cognition and memory are normal.   Nursing note and vitals reviewed.      Assessment/Plan   Kian was seen today for neck pain.    Diagnoses and all orders for this visit:    Other chronic pain    Postlaminectomy syndrome of lumbar region    Degenerative cervical disc  -     MRI Cervical Spine Without Contrast; Future    Encounter for long-term (current) use of high-risk medication      --- MRI Cervical Spine (no benefit with PT, Cervical IRMA's)  --- Refill Oxycodone.   Patient appears stable with current regimen. No adverse effects. Regarding continuation of opioids, there is no evidence of aberrant behavior or any red flags.   The patient continues with appropriate response to opioid therapy. ADL's remain intact by self.   --- The urine drug screen confirmation from 7/5/17 has been reviewed and the result is appropriate based on patient history and DAVID report  --- Follow-up 1 month          DAVID REPORT    As part of the patient's treatment plan, I am prescribing controlled substances. The patient has been made aware of appropriate use of such medications, including potential risk of somnolence, limited ability to drive and/or work safely, and the potential for dependence or overdose. It has also bee made clear that these medications are for use by this patient only, without concomitant use of alcohol or other substances unless prescribed.     Patient has completed prescribing agreement detailing terms of continued prescribing of controlled substances, including monitoring DAVID reports, urine drug screening, and pill counts if necessary. The patient is aware that inappropriate use will results in cessation of prescribing such medications.    DAVID report has been reviewed and scanned into the patient's chart.    Date of last DAVID : 10/4/2017    History and physical exam exhibit continued safe and appropriate use of controlled substances.     EMR Dragon/Transcription disclaimer:   Much of this encounter note is an electronic transcription/translation of spoken language to printed text. The electronic translation of spoken language may permit erroneous, or at times, nonsensical words or phrases to be inadvertently transcribed; Although I have reviewed the note for such errors, some may still exist.

## 2017-10-19 ENCOUNTER — HOSPITAL ENCOUNTER (OUTPATIENT)
Dept: MRI IMAGING | Facility: HOSPITAL | Age: 62
Discharge: HOME | End: 2017-10-19

## 2017-10-25 ENCOUNTER — OFFICE VISIT (OUTPATIENT)
Dept: NEUROSURGERY | Facility: CLINIC | Age: 62
End: 2017-10-25

## 2017-10-25 VITALS — DIASTOLIC BLOOD PRESSURE: 74 MMHG | SYSTOLIC BLOOD PRESSURE: 146 MMHG | HEART RATE: 82 BPM

## 2017-10-25 DIAGNOSIS — M50.30 DEGENERATIVE CERVICAL DISC: Primary | ICD-10-CM

## 2017-10-25 DIAGNOSIS — M54.50 LOW BACK PAIN WITHOUT SCIATICA, UNSPECIFIED BACK PAIN LATERALITY, UNSPECIFIED CHRONICITY: ICD-10-CM

## 2017-10-25 PROCEDURE — 99213 OFFICE O/P EST LOW 20 MIN: CPT | Performed by: NEUROLOGICAL SURGERY

## 2017-10-25 NOTE — PROGRESS NOTES
Subjective   Patient ID: Kian Mcdaniels is a 62 y.o. male is here today for follow-up. He is 11 months out from a repeat L4-5 laminectomy with fusion done 12/12/2016. He has neck and back pain as well as right knee pain. He has had 2 cervical IRMA with his pain management. He has done physical therapy. He has also done dry needling at physical therapy.    History of Present Illness    This patient continues with fairly severe pain in his lower back.  He has a lot of trouble just standing up.  He does feel that his lower back pain is improved over what it was prior to the fusion.  It is however still fairly painful.  Recently he has been having more pain in his neck with radiation into his right arm.  He has been treated with epidural blocks in the cervical spine, physical therapy, dry needling, and medications.  Nothing has really helped.    The following portions of the patient's history were reviewed and updated as appropriate: allergies, current medications, past family history, past medical history, past social history, past surgical history and problem list.    Review of Systems   Constitutional: Positive for activity change.   Respiratory: Negative for chest tightness and shortness of breath.    Cardiovascular: Negative for chest pain.   Musculoskeletal: Positive for arthralgias (Right knee), back pain, gait problem and neck pain.   Neurological: Positive for weakness and numbness (Right hand).        Tingling in lower extremity's   All other systems reviewed and are negative.      Objective   Physical Exam   Constitutional: He is oriented to person, place, and time. He appears well-developed and well-nourished.   Eyes: EOM are normal. Pupils are equal, round, and reactive to light.   Neurological: He is oriented to person, place, and time. He has a normal Finger-Nose-Finger Test and a normal Heel to Shin Test. Gait normal.   Reflex Scores:       Tricep reflexes are 2+ on the right side and 2+ on the left  side.       Bicep reflexes are 2+ on the right side and 2+ on the left side.       Brachioradialis reflexes are 2+ on the right side and 2+ on the left side.       Patellar reflexes are 2+ on the right side and 2+ on the left side.       Achilles reflexes are 2+ on the right side and 2+ on the left side.  Psychiatric: His speech is normal.     Neurologic Exam     Mental Status   Oriented to person, place, and time.   Registration of memory: Good recent and remote memory.   Attention: normal. Concentration: normal.   Speech: speech is normal   Level of consciousness: alert  Knowledge: consistent with education.     Cranial Nerves     CN II   Visual fields full to confrontation.   Visual acuity: normal    CN III, IV, VI   Pupils are equal, round, and reactive to light.  Extraocular motions are normal.     CN V   Facial sensation intact.   Right corneal reflex: normal  Left corneal reflex: normal    CN VII   Facial expression full, symmetric.   Right facial weakness: none  Left facial weakness: none    CN VIII   Hearing: intact    CN IX, X   Palate: symmetric    CN XI   Right sternocleidomastoid strength: normal  Left sternocleidomastoid strength: normal    CN XII   Tongue: not atrophic  Tongue deviation: none    Motor Exam   Muscle bulk: normal  Right arm tone: normal  Left arm tone: normal  Right leg tone: normal  Left leg tone: normal    Strength   Strength 5/5 except as noted.     Sensory Exam   Light touch normal.     Gait, Coordination, and Reflexes     Gait  Gait: normal    Coordination   Finger to nose coordination: normal  Heel to shin coordination: normal    Reflexes   Right brachioradialis: 2+  Left brachioradialis: 2+  Right biceps: 2+  Left biceps: 2+  Right triceps: 2+  Left triceps: 2+  Right patellar: 2+  Left patellar: 2+  Right achilles: 2+  Left achilles: 2+  Right : 2+  Left : 2+      Assessment/Plan   Independent Review of Radiographic Studies:      I reviewed his previous imaging.  It does  not look like we have never really looked at his cervical spine.  I did review some plain x-rays of his cervical spine in the show multilevel degenerative disease but no evidence of subluxation.    Medical Decision Making:      I told the patient that there may not be much else to do for his lower back as I think it is mostly arthritis.  I did suggest that we check an MRI of the cervical spine at this point.  We'll see him back once that has been done.    Kian was seen today for back pain, neck pain, numbness and tingling.    Diagnoses and all orders for this visit:    Degenerative cervical disc  -     XR Spine Cervical Complete With Flex Ext; Future  -     MRI Cervical Spine Without Contrast; Future    Low back pain without sciatica, unspecified back pain laterality, unspecified chronicity  -     XR Spine Lumbar Complete With Flex & Ext; Future    Return for After radiology test.

## 2017-11-01 ENCOUNTER — HOSPITAL ENCOUNTER (OUTPATIENT)
Dept: GENERAL RADIOLOGY | Facility: HOSPITAL | Age: 62
Discharge: HOME | End: 2017-11-01
Attending: NEUROLOGICAL SURGERY

## 2017-11-01 ENCOUNTER — HOSPITAL ENCOUNTER (OUTPATIENT)
Dept: MRI IMAGING | Facility: HOSPITAL | Age: 62
Discharge: HOME | End: 2017-11-01
Attending: NEUROLOGICAL SURGERY | Admitting: NEUROLOGICAL SURGERY

## 2017-11-01 DIAGNOSIS — M50.30 DEGENERATIVE CERVICAL DISC: ICD-10-CM

## 2017-11-01 DIAGNOSIS — M54.6 ACUTE BILATERAL THORACIC BACK PAIN: Primary | ICD-10-CM

## 2017-11-01 DIAGNOSIS — M54.50 LOW BACK PAIN WITHOUT SCIATICA, UNSPECIFIED BACK PAIN LATERALITY, UNSPECIFIED CHRONICITY: ICD-10-CM

## 2017-11-01 LAB — CREAT BLDA-MCNC: 1.3 MG/DL (ref 0.6–1.3)

## 2017-11-01 PROCEDURE — 72156 MRI NECK SPINE W/O & W/DYE: CPT

## 2017-11-01 PROCEDURE — 72052 X-RAY EXAM NECK SPINE 6/>VWS: CPT

## 2017-11-01 PROCEDURE — A9577 INJ MULTIHANCE: HCPCS | Performed by: NEUROLOGICAL SURGERY

## 2017-11-01 PROCEDURE — 72114 X-RAY EXAM L-S SPINE BENDING: CPT

## 2017-11-01 PROCEDURE — 0 GADOBENATE DIMEGLUMINE 529 MG/ML SOLUTION: Performed by: NEUROLOGICAL SURGERY

## 2017-11-01 PROCEDURE — 82565 ASSAY OF CREATININE: CPT

## 2017-11-01 RX ADMIN — GADOBENATE DIMEGLUMINE 20 ML: 529 INJECTION, SOLUTION INTRAVENOUS at 17:00

## 2017-11-02 ENCOUNTER — OFFICE VISIT (OUTPATIENT)
Dept: PAIN MEDICINE | Facility: CLINIC | Age: 62
End: 2017-11-02

## 2017-11-02 VITALS
DIASTOLIC BLOOD PRESSURE: 73 MMHG | OXYGEN SATURATION: 96 % | HEIGHT: 72 IN | BODY MASS INDEX: 38.17 KG/M2 | HEART RATE: 71 BPM | WEIGHT: 281.8 LBS | SYSTOLIC BLOOD PRESSURE: 129 MMHG | TEMPERATURE: 97.9 F | RESPIRATION RATE: 16 BRPM

## 2017-11-02 DIAGNOSIS — M50.30 DEGENERATIVE CERVICAL DISC: ICD-10-CM

## 2017-11-02 DIAGNOSIS — Z98.1 S/P LUMBAR SPINAL FUSION: ICD-10-CM

## 2017-11-02 DIAGNOSIS — Z79.899 ENCOUNTER FOR LONG-TERM (CURRENT) USE OF HIGH-RISK MEDICATION: ICD-10-CM

## 2017-11-02 DIAGNOSIS — M96.1 POSTLAMINECTOMY SYNDROME OF LUMBAR REGION: ICD-10-CM

## 2017-11-02 DIAGNOSIS — G89.29 OTHER CHRONIC PAIN: Primary | ICD-10-CM

## 2017-11-02 PROCEDURE — 99213 OFFICE O/P EST LOW 20 MIN: CPT | Performed by: NURSE PRACTITIONER

## 2017-11-02 RX ORDER — OXYCODONE HYDROCHLORIDE 10 MG/1
10 TABLET ORAL EVERY 6 HOURS PRN
Qty: 120 TABLET | Refills: 0 | Status: SHIPPED | OUTPATIENT
Start: 2017-11-02 | End: 2017-11-29 | Stop reason: SDUPTHER

## 2017-11-02 NOTE — PROGRESS NOTES
CHIEF COMPLAINT  F/U neck pain. Had cervical MRI done 11-01-7 and a thoracic MRI has been ordered. Pain is unchanged since last visit. He states he is scheduled to have surgery on trigger fingers that is going down into his wrist-on 11-13-17, Dr. Kian [facility].    Subjective   Kian Mcdaniels is a 62 y.o. male  who presents to the office for follow-up.He has a history of chronic neck and low back pain.    Overall pain is unchanged, not well controlled.      Patient was evaluated by Dr. Gutierres 10/25/2017.  Nothing more needed from a surgical standpoint in terms of the low back, he is almost a year out now from L4-5 laminectomy with fusion by Dr. Gutierres and Dr. Spencer 12/12/16.  In terms of the neck, an MRI and XRay of the cervical spine were ordered.  There is syrinx extending into the cervicothoracic cord and the patient is now awaiting MRI of the thoracic spine (scheduled 11/11/17), he will follow up with Dr. Gutierres next week.      He is also awaiting surgery for De Quervain's tenosynovitis with Dr. Hoskins, this is scheduled 11/13/2017.      Continues with Oxycodone 10 mg 4/day (regimen prior to surgery last year was hydrocodone 10/325 4/day,he has been weaned down slowly since that time), gabapentin 800 mg 3/day and tizanidine 4mg PRN. Denies any side effects from the regimen. The regimen helps decrease his pain by 50%. ADL's by self.     Back Pain   This is a chronic problem. The current episode started more than 1 year ago. The problem occurs daily. The problem has been waxing and waning since onset. The pain is present in the sacro-iliac. The quality of the pain is described as aching, shooting and stabbing. The pain is the same all the time. The symptoms are aggravated by bending, sitting and standing. Stiffness is present all day. Associated symptoms include leg pain, numbness (legs and feet), paresthesias, tingling and weakness (legs). Pertinent negatives include no chest pain, dysuria,  fever or headaches. Risk factors include lack of exercise, obesity and sedentary lifestyle. He has tried analgesics for the symptoms. The treatment provided moderate relief.   Neck Pain    This is a chronic (started noticing right before most recent back surgery) problem. The current episode started more than 1 month ago. The problem occurs constantly. The problem has been gradually worsening. The pain is associated with nothing. The pain is present in the left side, right side and midline. The quality of the pain is described as cramping. The pain is at a severity of 7/10. The pain is moderate. The symptoms are aggravated by sneezing and twisting. The pain is same all the time. Associated symptoms include leg pain, numbness (legs and feet), tingling and weakness (legs). Pertinent negatives include no chest pain, fever or headaches. He has tried muscle relaxants, oral narcotics and bed rest for the symptoms. The treatment provided moderate relief.      PEG Assessment   What number best describes your pain on average in the past week?7  What number best describes how, during the past week, pain has interfered with your enjoyment of life?7  What number best describes how, during the past week, pain has interfered with your general activity?  7    The following portions of the patient's history were reviewed and updated as appropriate: allergies, current medications, past family history, past medical history, past social history, past surgical history and problem list.    Review of Systems   Constitutional: Negative for chills, fatigue and fever.   HENT: Negative for congestion.    Eyes: Negative for visual disturbance.   Respiratory: Negative for cough, shortness of breath and wheezing.    Cardiovascular: Positive for leg swelling. Negative for chest pain and palpitations.   Gastrointestinal: Negative for constipation and diarrhea.   Genitourinary: Negative for difficulty urinating and dysuria.   Musculoskeletal:  "Positive for back pain and neck pain.   Neurological: Positive for tingling, weakness (legs), numbness (legs and feet) and paresthesias. Negative for dizziness, light-headedness and headaches.   Psychiatric/Behavioral: Negative for sleep disturbance and suicidal ideas. The patient is not nervous/anxious.      Vitals:    11/02/17 1056   BP: 129/73   Pulse: 71   Resp: 16   Temp: 97.9 °F (36.6 °C)   SpO2: 96%   Weight: 281 lb 12.8 oz (128 kg)   Height: 71.5\" (181.6 cm)   PainSc:   7   PainLoc: Neck     Objective   Physical Exam   Constitutional: He is oriented to person, place, and time. Vital signs are normal. He appears well-developed and well-nourished. He is cooperative.   HENT:   Head: Normocephalic and atraumatic.   Nose: Nose normal.   Eyes: Conjunctivae and lids are normal.   Neck: Trachea normal. Neck supple. Muscular tenderness present. No spinous process tenderness present. Decreased range of motion present.   Cardiovascular: Normal rate.    Pulmonary/Chest: Effort normal. No respiratory distress.   Abdominal:   obese   Musculoskeletal:        Cervical back: He exhibits tenderness and pain. He exhibits no spasm.        Lumbar back: He exhibits tenderness and bony tenderness.   Neurological: He is alert and oriented to person, place, and time. Gait (ambulates with aid of cane ) abnormal.   Skin: Skin is warm, dry and intact.   Psychiatric: He has a normal mood and affect. His speech is normal and behavior is normal. Judgment and thought content normal. Cognition and memory are normal.   Nursing note and vitals reviewed.    Assessment/Plan   Kian was seen today for neck pain.    Diagnoses and all orders for this visit:    Other chronic pain    Postlaminectomy syndrome of lumbar region    Degenerative cervical disc    Encounter for long-term (current) use of high-risk medication      --- The urine drug screen confirmation from 7/5/2017 has been reviewed and the result is appropriate based on patient history " and DAVID report  --- Refill Oxycodone. Patient appears stable with current regimen. No adverse effects. Regarding continuation of opioids, there is no evidence of aberrant behavior or any red flags.  The patient continues with appropriate response to opioid therapy. ADL's remain intact by self.   --- Ok for acute post operative pain management per Dr. Hoskins if necessary following upcoming surgery  --- Could consider SCS therapy for long term management of lumbar postlaminectomy pain. He has other more acute issues to address currently.    --- Follow-up 1 month          DAVID REPORT  As part of the patient's treatment plan, I am prescribing controlled substances. The patient has been made aware of appropriate use of such medications, including potential risk of somnolence, limited ability to drive and/or work safely, and the potential for dependence or overdose. It has also bee made clear that these medications are for use by this patient only, without concomitant use of alcohol or other substances unless prescribed.     Patient has completed prescribing agreement detailing terms of continued prescribing of controlled substances, including monitoring DAVID reports, urine drug screening, and pill counts if necessary. The patient is aware that inappropriate use will results in cessation of prescribing such medications.    DAVID report has been reviewed and scanned into the patient's chart.    Date of last DAVID : 11/1/2017    History and physical exam exhibit continued safe and appropriate use of controlled substances.    EMR Dragon/Transcription disclaimer:   Much of this encounter note is an electronic transcription/translation of spoken language to printed text. The electronic translation of spoken language may permit erroneous, or at times, nonsensical words or phrases to be inadvertently transcribed; Although I have reviewed the note for such errors, some may still exist.

## 2017-11-03 ENCOUNTER — TELEPHONE (OUTPATIENT)
Dept: NEUROSURGERY | Facility: CLINIC | Age: 62
End: 2017-11-03

## 2017-11-03 NOTE — TELEPHONE ENCOUNTER
Can we please call this pt's PCP and make sure that they get copy of MRI report  We need to make sure a RN or MA is given info about possible thyroid mass and that they discuss this with their doctor to determine what workup is indicated

## 2017-11-08 ENCOUNTER — APPOINTMENT (OUTPATIENT)
Dept: GENERAL RADIOLOGY | Facility: HOSPITAL | Age: 62
End: 2017-11-08

## 2017-11-08 ENCOUNTER — HOSPITAL ENCOUNTER (EMERGENCY)
Facility: HOSPITAL | Age: 62
Discharge: HOME | End: 2017-11-08
Attending: EMERGENCY MEDICINE | Admitting: EMERGENCY MEDICINE

## 2017-11-08 VITALS
DIASTOLIC BLOOD PRESSURE: 82 MMHG | HEIGHT: 71 IN | TEMPERATURE: 97.8 F | SYSTOLIC BLOOD PRESSURE: 149 MMHG | RESPIRATION RATE: 16 BRPM | HEART RATE: 81 BPM | WEIGHT: 270 LBS | OXYGEN SATURATION: 95 % | BODY MASS INDEX: 37.8 KG/M2

## 2017-11-08 DIAGNOSIS — M25.561 ARTHRALGIA OF RIGHT KNEE: Primary | ICD-10-CM

## 2017-11-08 PROCEDURE — 73560 X-RAY EXAM OF KNEE 1 OR 2: CPT

## 2017-11-08 PROCEDURE — 63710000001 PREDNISONE PER 1 MG: Performed by: PHYSICIAN ASSISTANT

## 2017-11-08 PROCEDURE — 99284 EMERGENCY DEPT VISIT MOD MDM: CPT

## 2017-11-08 RX ORDER — PREDNISONE 20 MG/1
60 TABLET ORAL ONCE
Status: COMPLETED | OUTPATIENT
Start: 2017-11-08 | End: 2017-11-08

## 2017-11-08 RX ORDER — OXYCODONE HYDROCHLORIDE AND ACETAMINOPHEN 5; 325 MG/1; MG/1
2 TABLET ORAL ONCE
Status: COMPLETED | OUTPATIENT
Start: 2017-11-08 | End: 2017-11-08

## 2017-11-08 RX ORDER — METHYLPREDNISOLONE 4 MG/1
TABLET ORAL
Qty: 1 EACH | Refills: 0 | Status: SHIPPED | OUTPATIENT
Start: 2017-11-08 | End: 2017-11-29

## 2017-11-08 RX ADMIN — OXYCODONE HYDROCHLORIDE AND ACETAMINOPHEN 2 TABLET: 5; 325 TABLET ORAL at 21:48

## 2017-11-08 RX ADMIN — PREDNISONE 60 MG: 20 TABLET ORAL at 21:47

## 2017-11-09 NOTE — DISCHARGE INSTRUCTIONS
Home, rest, home medicine as prescribed, follow up with your orthopedist or PCP for recheck. Return to care with further concerns.

## 2017-11-09 NOTE — ED PROVIDER NOTES
Pt presents to the ED complaining of R knee pain for the last 4 days. Pt's R knee XR shows pan compartmental osteoarthritis with bone-on-bone laterally. On exam, the pt has R knee tenderness without deformity. I agree with the plan to discharge the pt home with a prescription for steroids.     I supervised care provided by the midlevel provider.    We have discussed this patient's history, physical exam, and treatment plan.   I have reviewed the note and personally saw and examined the patient and agree with the plan of care.    Documentation assistance provided by meghan Forbes for Dr. Macias.  Information recorded by the meghan was done at my direction and has been verified and validated by me.       Cindy Forbes  11/08/17 4323       Go Macias MD  11/09/17 8009

## 2017-11-09 NOTE — ED PROVIDER NOTES
EMERGENCY DEPARTMENT ENCOUNTER    CHIEF COMPLAINT  Chief Complaint: R knee pain  History given by: pt   History limited by: none  Room Number: 35/35  PMD: Wyatt Harmon MD      HPI:  Pt is a 62 y.o. male who presents complaining of R knee pain for the past 4 days. Pt states that he cannot bear weight on his R knee. Pt denies recent injury or strain. Pt denies L knee pain, CP, or SOA. Pt states a Hx of back surgeries, and that he had been favoring his knee after his surgery. Pt states a Hx of knee pain, and prior R knee surgery. Pt states that he has been taking oxycodone for his back pain, and that it has not helped his knee pain.    Duration:  For 4 days   Onset: gradual   Timing: constant   Location: R knee   Radiation: none stated   Quality: pain   Intensity/Severity: moderate   Progression: increasing   Associated Symptoms: none  Aggravating Factors: weight bearing   Alleviating Factors: none stated   Previous Episodes: none   Treatment before arrival: oxycodone    PAST MEDICAL HISTORY  Active Ambulatory Problems     Diagnosis Date Noted   • Bulging lumbar disc 02/11/2016   • Chronic pain 02/11/2016   • Diabetic neuropathy 02/11/2016   • Low back pain 02/11/2016   • Degenerative arthritis of lumbar spine 02/11/2016   • Neuropathy involving both lower extremities 02/11/2016   • Encounter for long-term (current) use of high-risk medication 09/07/2016   • Postlaminectomy syndrome of lumbar region 09/07/2016   • Neck pain 11/29/2016   • Lumbar pseudoarthrosis 12/12/2016   • DM2 (diabetes mellitus, type 2) 12/12/2016   • Insulin pump in place    • Hx of decompressive lumbar laminectomy 12/18/2016   • Bilateral carpal tunnel syndrome 06/09/2017   • Degenerative cervical disc 09/07/2017     Resolved Ambulatory Problems     Diagnosis Date Noted   • Lumbar radiculopathy 02/11/2016   • Surgery follow-up examination 01/12/2017     Past Medical History:   Diagnosis Date   • Abscess of scrotum 2000   • Angina pectoris     • Arteriosclerotic cardiovascular disease    • COPD (chronic obstructive pulmonary disease)    • Coronary artery disease    • Diabetes mellitus    • Diabetic peripheral neuropathy    • ED (erectile dysfunction) of non-organic origin    • Hypertension    • Insulin pump in place    • Insulin pump in place    • Low back pain    • Myocardial infarction    • Neck pain    • Spinal stenosis    • TIA (transient ischemic attack)    • Type 2 diabetes mellitus    • Wears dentures        PAST SURGICAL HISTORY  Past Surgical History:   Procedure Laterality Date   • AMPUTATION FOOT / TOE Left     GREAT TOE   • BACK SURGERY  10/26/2015    Bilateral L4-L5 laminectomy -Dr. Gutierres   • CARDIAC CATHETERIZATION     • CARDIAC SURGERY      CABG X 6    • CHOLECYSTECTOMY     • CORONARY ARTERY BYPASS GRAFT      X 6   • EPIDURAL BLOCK     • EYE SURGERY     • HAND SURGERY  04/28/2016   • LUMBAR DISCECTOMY FUSION INSTRUMENTATION N/A 12/12/2016    Procedure: L 4-5 FUSION WITH INSTRUMENTATION WITH BMP, WITH REMOVAL OF INSTRUMENTATION ;  Surgeon: Rowdy Spencer MD;  Location: [facility] OR;  Service:    • LUMBAR LAMINECTOMY DISCECTOMY DECOMPRESSION N/A 12/12/2016    Procedure: L4-5 REPEAT LAMINECTOMY ;  Surgeon: Tim Gutierres MD;  Location: [facility] OR;  Service:    • LUMBAR LAMINECTOMY DISCECTOMY DECOMPRESSION N/A 12/14/2016    Procedure: EVACUATION OF LUMBAR HEMATOMA;  Surgeon: Rowdy Spencer MD;  Location: [facility] OR;  Service:    • MULTIPLE TOOTH EXTRACTIONS      UPPER TEETH EXTRACTED   • ORTHOPEDIC SURGERY     • PENIS SURGERY      RECONSTRUCTION   • SCROTUM EXPLORATION      scrotum surgery   • SPINE SURGERY         FAMILY HISTORY  Family History   Problem Relation Age of Onset   • Diabetes Other    • Heart disease Other    • Hypertension Other    • Kidney disease Other      renal failure       SOCIAL HISTORY  Social History     Social History   • Marital status:      Spouse name: N/A   • Number of children: N/A   •  Years of education: N/A     Occupational History   • Not on file.     Social History Main Topics   • Smoking status: Former Smoker     Packs/day: 1.00     Years: 25.00     Types: Cigarettes     Quit date: 2000   • Smokeless tobacco: Not on file   • Alcohol use No   • Drug use: Yes     Special: Hydrocodone   • Sexual activity: Defer     Other Topics Concern   • Not on file     Social History Narrative       ALLERGIES  Erythromycin; Lisinopril; and Metformin    REVIEW OF SYSTEMS  Review of Systems   Constitutional: Negative for activity change, appetite change and fever.   HENT: Negative for congestion and sore throat.    Eyes: Negative.    Respiratory: Negative for cough and shortness of breath.    Cardiovascular: Negative for chest pain and leg swelling.   Gastrointestinal: Negative for abdominal pain, diarrhea and vomiting.   Endocrine: Negative.    Genitourinary: Negative for decreased urine volume and dysuria.   Musculoskeletal: Positive for arthralgias (R knee). Negative for neck pain.   Skin: Negative for rash and wound.   Allergic/Immunologic: Negative.    Neurological: Negative for weakness, numbness and headaches.   Hematological: Negative.    Psychiatric/Behavioral: Negative.    All other systems reviewed and are negative.      PHYSICAL EXAM  ED Triage Vitals   Temp Heart Rate Resp BP SpO2   11/08/17 1709 11/08/17 1709 11/08/17 1709 11/08/17 1716 11/08/17 1709   97.8 °F (36.6 °C) 91 16 139/80 92 %      Temp src Heart Rate Source Patient Position BP Location FiO2 (%)   11/08/17 1709 11/08/17 1709 11/08/17 1716 11/08/17 1716 --   Tympanic Monitor Sitting Right arm        Physical Exam   Constitutional: No distress.   HENT:   Head: Normocephalic and atraumatic.   Eyes: EOM are normal.   Neck: Normal range of motion.   Cardiovascular: Normal rate, regular rhythm and normal heart sounds.    Pulmonary/Chest: No respiratory distress.   Abdominal: There is no tenderness.   Musculoskeletal: He exhibits tenderness  (mild, R knee). He exhibits no edema.   No warmth or erythema.   Neurological: He is alert.   Neurovascularly intact.    Skin: Skin is warm and dry.   No warmth or effusion   Nursing note and vitals reviewed.      LAB RESULTS  Lab Results (last 24 hours)     ** No results found for the last 24 hours. **          I ordered the above labs and reviewed the results    RADIOLOGY  XR Knee 1 or 2 View Right   Final Result   1. Pan compartmental osteoarthritis with bone-on-bone laterally.  2. Vascular calcification.  3. There is a fabella representing normal variation.  4. No acute osseous abnormality.  5. Orthopedic consultation suggested due to severity of arthritis.        I ordered the above noted radiological studies. Interpreted by radiologist. Reviewed by me in PACS.       PROCEDURES  Procedures      PROGRESS AND CONSULTS  ED Course   2110  Discussed the pt's XR results that show degenerative changes with bone-on-bone contact. Plan to d/c the pt with a f/u with an orthopedist. Pt understands and agrees with the plan, and all questions were answered.   2119  Discussed the pt's case with Dr. Macias. After a bedside examination, Dr. Macias agrees with the plan of care.   2137  Ordered prednisone for pain. Dr. Macias has discussed the risks of elevated blood sugar while on steroids with the pt.     MEDICAL DECISION MAKING  Results were reviewed/discussed with the patient and they were also made aware of online access. Pt also made aware that some labs, such as cultures, will not be resulted during ER visit and follow up with PMD is necessary.     MDM  Number of Diagnoses or Management Options     Amount and/or Complexity of Data Reviewed  Tests in the radiology section of CPT®: reviewed and ordered (XR R knee: degenerative changes with bone-on-bone contact. No acute fracture. )    Patient Progress  Patient progress: stable         DIAGNOSIS  Final diagnoses:   Arthralgia of right knee       DISPOSITION  DISCHARGE    Patient  discharged in stable condition.    Reviewed implications of results, diagnosis, meds, responsibility to follow up, warning signs and symptoms of possible worsening, potential complications and reasons to return to ER.    Patient/Family voiced understanding of above instructions.    Discussed plan for discharge, as there is no emergent indication for admission.  Pt/family is agreeable and understands need for follow up and repeat testing.  Pt is aware that discharge does not mean that nothing is wrong but it indicates no emergency is present that requires admission and they must continue care with follow-up as given below or physician of their choice.     FOLLOW-UP  Rowdy Spencer MD  [facility] [address]  [phone]    Schedule an appointment as soon as possible for a visit           Medication List      New Prescriptions          MethylPREDNISolone 4 MG tablet   Commonly known as:  MEDROL (JENNIFER)   Take as directed on package instructions.             Latest Documented Vital Signs:  As of 10:52 PM  BP- 149/82 HR- 81 Temp- 97.8 °F (36.6 °C) (Tympanic) O2 sat- 95%    --  Documentation assistance provided by meghan Medina for EDISON Bradford.  Information recorded by the meghan was done at my direction and has been verified and validated by me.       Irvin Medina  11/08/17 6745       EDISON Rivera  11/08/17 0044

## 2017-11-11 ENCOUNTER — HOSPITAL ENCOUNTER (OUTPATIENT)
Dept: MRI IMAGING | Facility: HOSPITAL | Age: 62
Discharge: HOME | End: 2017-11-11
Admitting: PHYSICIAN ASSISTANT

## 2017-11-11 DIAGNOSIS — M54.6 ACUTE BILATERAL THORACIC BACK PAIN: ICD-10-CM

## 2017-11-11 PROCEDURE — A9577 INJ MULTIHANCE: HCPCS | Performed by: PHYSICIAN ASSISTANT

## 2017-11-11 PROCEDURE — 0 GADOBENATE DIMEGLUMINE 529 MG/ML SOLUTION: Performed by: PHYSICIAN ASSISTANT

## 2017-11-11 PROCEDURE — 72157 MRI CHEST SPINE W/O & W/DYE: CPT

## 2017-11-11 RX ADMIN — GADOBENATE DIMEGLUMINE 20 ML: 529 INJECTION, SOLUTION INTRAVENOUS at 10:07

## 2017-11-14 ENCOUNTER — OFFICE VISIT (OUTPATIENT)
Dept: NEUROSURGERY | Facility: CLINIC | Age: 62
End: 2017-11-14

## 2017-11-14 VITALS — SYSTOLIC BLOOD PRESSURE: 139 MMHG | DIASTOLIC BLOOD PRESSURE: 71 MMHG | HEART RATE: 82 BPM

## 2017-11-14 DIAGNOSIS — G95.0 SYRINGOMYELIA AND SYRINGOBULBIA (HCC): Primary | ICD-10-CM

## 2017-11-14 PROCEDURE — 99213 OFFICE O/P EST LOW 20 MIN: CPT | Performed by: NEUROLOGICAL SURGERY

## 2017-11-14 RX ORDER — ACETAMINOPHEN AND CODEINE PHOSPHATE 300; 30 MG/1; MG/1
TABLET ORAL
COMMUNITY
Start: 2017-11-13 | End: 2017-11-27

## 2017-11-14 RX ORDER — CARVEDILOL 12.5 MG/1
TABLET ORAL
Refills: 3 | COMMUNITY
Start: 2017-11-09 | End: 2018-09-19

## 2017-11-27 ENCOUNTER — OFFICE VISIT (OUTPATIENT)
Dept: ORTHOPEDIC SURGERY | Facility: CLINIC | Age: 62
End: 2017-11-27

## 2017-11-27 VITALS — BODY MASS INDEX: 37.25 KG/M2 | WEIGHT: 275 LBS | HEIGHT: 72 IN | TEMPERATURE: 97.4 F

## 2017-11-27 DIAGNOSIS — M17.11 PRIMARY LOCALIZED OSTEOARTHROSIS OF RIGHT LOWER LEG: ICD-10-CM

## 2017-11-27 DIAGNOSIS — M25.561 ACUTE PAIN OF RIGHT KNEE: Primary | ICD-10-CM

## 2017-11-27 PROCEDURE — 99214 OFFICE O/P EST MOD 30 MIN: CPT | Performed by: ORTHOPAEDIC SURGERY

## 2017-11-27 PROCEDURE — 20610 DRAIN/INJ JOINT/BURSA W/O US: CPT | Performed by: ORTHOPAEDIC SURGERY

## 2017-11-27 RX ORDER — METHYLPREDNISOLONE ACETATE 80 MG/ML
80 INJECTION, SUSPENSION INTRA-ARTICULAR; INTRALESIONAL; INTRAMUSCULAR; SOFT TISSUE
Status: COMPLETED | OUTPATIENT
Start: 2017-11-27 | End: 2017-11-27

## 2017-11-27 RX ORDER — BUPIVACAINE HYDROCHLORIDE 5 MG/ML
4 INJECTION, SOLUTION EPIDURAL; INTRACAUDAL
Status: COMPLETED | OUTPATIENT
Start: 2017-11-27 | End: 2017-11-27

## 2017-11-27 RX ADMIN — METHYLPREDNISOLONE ACETATE 80 MG: 80 INJECTION, SUSPENSION INTRA-ARTICULAR; INTRALESIONAL; INTRAMUSCULAR; SOFT TISSUE at 17:25

## 2017-11-27 RX ADMIN — BUPIVACAINE HYDROCHLORIDE 4 ML: 5 INJECTION, SOLUTION EPIDURAL; INTRACAUDAL at 17:25

## 2017-11-27 NOTE — PROGRESS NOTES
New Patient Complaint      Patient: Kian Mcdaniels  YOB: 1955 62 y.o. male  Medical Record Number: [id number]    Chief Complaints: My knee hurts    History of Present Illness:   Patient reports a 3 week history constant grinding stabbing aching pain with uncomfortable popping in the right knee worse with standing and walking.  He does not recall any specific injury.  Prior to this he had some intermittent moderate aching pain and popping to the right knee.    He was seen in the emergency room on 11/8/17 where x-rays were made.  Since then pain has improved to some degree with use of a cane.       HPI    Allergies:   Allergies   Allergen Reactions   • Erythromycin Nausea Only   • Lisinopril Other (See Comments)     7/2016 possibly caused pancreatitis per Dr Quinonez   • Metformin Nausea And Vomiting       Medications:   Current Outpatient Prescriptions on File Prior to Visit   Medication Sig   • albuterol (VENTOLIN HFA) 108 (90 BASE) MCG/ACT inhaler Inhale 1-2 puffs Every 6 (Six) Hours As Needed for wheezing (RESCUE INHALER).   • amLODIPine (NORVASC) 10 MG tablet Take 10 mg by mouth Every Morning.   • Ascorbic Acid (VITAMIN C PO) Take 1,000 mg by mouth Daily. HOLD PRIOR TO SURGERY 12-   • aspirin 81 MG EC tablet Take 81 mg by mouth Daily. HOLD PRIOR TO SURGERY 12-   • atorvastatin (LIPITOR) 20 MG tablet    • Blood Glucose Monitoring Suppl (FREESTYLE FREEDOM LITE) W/DEVICE kit    • carvedilol (COREG) 12.5 MG tablet TK 1 T PO  BID WITH MEALS   • cetirizine (ZyrTEC) 10 MG tablet Take 10 mg by mouth daily.     • clopidogrel (PLAVIX) 75 MG tablet    • docusate sodium (COLACE) 100 MG capsule Take 100 mg by mouth 2 (Two) Times a Day.   • furosemide (LASIX) 40 MG tablet Take 40 mg by mouth 2 (Two) Times a Day.   • gabapentin (NEURONTIN) 800 MG tablet Take 800 mg by mouth 3 (Three) Times a Day.   • glucose blood (FREESTYLE LITE) test strip Test 4 times per day   • HUMULIN R 500 UNIT/ML  CONCENTRATED injection Inject 500 Units under the skin Continuous. Via insulin pump basal rate 0.68units/hr from 0000 to 1000  1000 to 1600 is 0.7units/hr  1600 to 0000 0.75units/hr  Plus sliding scale based on carb intake   • Insulin Glargine (Insulin Glargine) 100 UNIT/ML injection pen INJECT 30 UNITS INTO THE SKIN D   • Insulin Infusion Pump (T:SLIM INSULIN PUMP) device    • isosorbide mononitrate (IMDUR) 60 MG 24 hr tablet Take 60 mg by mouth Every Morning.   • Lancets (FREESTYLE) lancets Test 4 times per day   • losartan (COZAAR) 50 MG tablet Take 50 mg by mouth Every Morning.   • MethylPREDNISolone (MEDROL, JENNIFER,) 4 MG tablet Take as directed on package instructions.   • Multiple Vitamin tablet Take 1 tablet by mouth daily.   • oxyCODONE (ROXICODONE) 10 MG tablet Take 1 tablet by mouth Every 6 (Six) Hours As Needed for Severe Pain .   • pantoprazole (PROTONIX) 40 MG EC tablet Take 40 mg by mouth Every Morning.   • potassium chloride (K-DUR,KLOR-CON) 10 MEQ CR tablet TK 1 T PO  QD   • Testosterone Cypionate (DEPOTESTOTERONE CYPIONATE) 200 MG/ML injection INJECT 1ML INTO THE MUSCLE EVERY OTHER WEEK   • tiZANidine (ZANAFLEX) 4 MG tablet Take 1 tablet by mouth Every Morning. And 1.5 tablet by mouth every evening   • TRESIBA FLEXTOUCH 100 UNIT/ML solution pen-injector injection INJECT 35 UNITS UNDER THE SKIN D   • vitamin D (ERGOCALCIFEROL) 35738 UNITS capsule capsule Take 50,000 Units by mouth Every 7 (Seven) Days. thursdays   • [DISCONTINUED] acetaminophen-codeine (TYLENOL #3) 300-30 MG per tablet      No current facility-administered medications on file prior to visit.        Past Medical History:   Diagnosis Date   • Abscess of scrotum 2000   • Angina pectoris    • Arteriosclerotic cardiovascular disease    • COPD (chronic obstructive pulmonary disease)    • Coronary artery disease    • Diabetes mellitus    • Diabetic peripheral neuropathy    • ED (erectile dysfunction) of non-organic origin    • Hypertension     • Insulin pump in place    • Insulin pump in place    • Low back pain    • Myocardial infarction    • Neck pain    • Spinal stenosis    • TIA (transient ischemic attack)     LEFT EYE   • Type 2 diabetes mellitus    • Wears dentures     UPPER PLATE     Past Surgical History:   Procedure Laterality Date   • AMPUTATION FOOT / TOE Left     GREAT TOE   • BACK SURGERY  10/26/2015    Bilateral L4-L5 laminectomy -Dr. Gutierres   • CARDIAC CATHETERIZATION     • CARDIAC SURGERY      CABG X 6    • CHOLECYSTECTOMY     • CORONARY ARTERY BYPASS GRAFT      X 6   • EPIDURAL BLOCK     • EYE SURGERY     • HAND SURGERY  04/28/2016   • LUMBAR DISCECTOMY FUSION INSTRUMENTATION N/A 12/12/2016    Procedure: L 4-5 FUSION WITH INSTRUMENTATION WITH BMP, WITH REMOVAL OF INSTRUMENTATION ;  Surgeon: Rowdy Spencer MD;  Location: [facility];  Service:    • LUMBAR LAMINECTOMY DISCECTOMY DECOMPRESSION N/A 12/12/2016    Procedure: L4-5 REPEAT LAMINECTOMY ;  Surgeon: Tim Gutierres MD;  Location: [facility] OR;  Service:    • LUMBAR LAMINECTOMY DISCECTOMY DECOMPRESSION N/A 12/14/2016    Procedure: EVACUATION OF LUMBAR HEMATOMA;  Surgeon: Rowdy Spencer MD;  Location: [facility];  Service:    • MULTIPLE TOOTH EXTRACTIONS      UPPER TEETH EXTRACTED   • ORTHOPEDIC SURGERY     • PENIS SURGERY      RECONSTRUCTION   • SCROTUM EXPLORATION      scrotum surgery   • SPINE SURGERY       Social History     Occupational History   • Not on file.     Social History Main Topics   • Smoking status: Former Smoker     Packs/day: 1.00     Years: 25.00     Types: Cigarettes     Quit date: 2000   • Smokeless tobacco: Not on file   • Alcohol use No   • Drug use: Yes     Special: Hydrocodone   • Sexual activity: Defer      Social History     Social History Narrative     Family History   Problem Relation Age of Onset   • Diabetes Other    • Heart disease Other    • Hypertension Other    • Kidney disease Other      renal failure       Review of Systems: 14 point  "review of systems performed, positive pertinent findings identified in HPI. All remaining systems negativeExcept numbness and tingling related to chronic back and neck issues , night sweats    Review of Systems      Physical Exam:   Vitals:    11/27/17 1027   Temp: 97.4 °F (36.3 °C)   Weight: 275 lb (125 kg)   Height: 71.5\" (181.6 cm)     Physical Exam   Constitutional: pleasant, well developed   Eyes: sclera non icteric  Hearing : adequate for exam  Cardiovascular: palpable pulses in right foot, right calf/ thigh NT without sign of DVT  Respiratoy: breathing unlabored   Neurological: grossly sensate to LT throughout right LE  Psychiatric: oriented with normal mood and affect.   Lymphatic: No palpable popliteal lymphadenopathy right LE  Skin: intact throughout right leg/foot  Musculoskeletal: Slightly antalgic gait.  Right knee shows mild effusion no warmth or erythema.  He is able to actively fully extend the knee and flexes to about 110°.  There is moderate discomfort over the lateral more so the medial joint line exacerbated with Ru's as well as discomfort with patellofemoral compression but no instability.  Physical Exam  Ortho Exam    Radiology: 2 views of the right knee reviewed on the [employer] system from 11/8/17 show severe arthritic change to the right knee especially the lateral compartment    Assessment/Plan: Acute exacerbation of chronic right knee pain.    I worry that may have developed a stress fracture with exacerbation of the arthritis.    I recommended use of a walker which he already has for protected ambulation and encouraged range of motion exercises to the right knee.  We discussed other treatment options and after verbal consent and sterile preparation discussion of risks which can include infection right knee was injected with steroid.    He was also fitted with a knee sleeve.  We'll get an MRI of his right knee evaluate for stress fracture.    He understands to call to schedule " follow-up appointment once MRI has been scheduled.    I recommend that he see his PCP for non-orthopedic related issues outlined in his review of symptoms including night sweats    Large Joint Arthrocentesis  Date/Time: 11/27/2017 5:25 PM  Consent given by: patient  Site marked: site marked  Timeout: Immediately prior to procedure a time out was called to verify the correct patient, procedure, equipment, support staff and site/side marked as required   Supporting Documentation  Indications: pain   Procedure Details  Location: knee - R knee  Preparation: Patient was prepped and draped in the usual sterile fashion  Needle size: 22 G  Approach: anteromedial  Medications administered: 80 mg methylPREDNISolone acetate 80 MG/ML; 4 mL bupivacaine (PF) 0.5 %  Patient tolerance: patient tolerated the procedure well with no immediate complications

## 2017-11-29 ENCOUNTER — OFFICE VISIT (OUTPATIENT)
Dept: PAIN MEDICINE | Facility: CLINIC | Age: 62
End: 2017-11-29

## 2017-11-29 VITALS
RESPIRATION RATE: 16 BRPM | HEART RATE: 75 BPM | BODY MASS INDEX: 37.93 KG/M2 | HEIGHT: 72 IN | DIASTOLIC BLOOD PRESSURE: 78 MMHG | TEMPERATURE: 97.4 F | OXYGEN SATURATION: 96 % | WEIGHT: 280 LBS | SYSTOLIC BLOOD PRESSURE: 148 MMHG

## 2017-11-29 DIAGNOSIS — M17.11 ARTHRITIS OF RIGHT KNEE: ICD-10-CM

## 2017-11-29 DIAGNOSIS — M96.1 POSTLAMINECTOMY SYNDROME OF LUMBAR REGION: ICD-10-CM

## 2017-11-29 DIAGNOSIS — M50.30 DEGENERATIVE CERVICAL DISC: ICD-10-CM

## 2017-11-29 DIAGNOSIS — G57.93 NEUROPATHY INVOLVING BOTH LOWER EXTREMITIES: ICD-10-CM

## 2017-11-29 DIAGNOSIS — Z98.1 S/P LUMBAR SPINAL FUSION: ICD-10-CM

## 2017-11-29 DIAGNOSIS — G89.29 OTHER CHRONIC PAIN: Primary | ICD-10-CM

## 2017-11-29 DIAGNOSIS — M47.16 OSTEOARTHRITIS OF LUMBAR SPINE WITH MYELOPATHY: ICD-10-CM

## 2017-11-29 DIAGNOSIS — Z79.899 ENCOUNTER FOR LONG-TERM (CURRENT) USE OF HIGH-RISK MEDICATION: ICD-10-CM

## 2017-11-29 PROCEDURE — 99213 OFFICE O/P EST LOW 20 MIN: CPT | Performed by: ANESTHESIOLOGY

## 2017-11-29 RX ORDER — OXYCODONE HYDROCHLORIDE 10 MG/1
10 TABLET ORAL EVERY 6 HOURS PRN
Qty: 120 TABLET | Refills: 0 | Status: SHIPPED | OUTPATIENT
Start: 2017-11-29 | End: 2017-12-28 | Stop reason: SDUPTHER

## 2017-12-09 ENCOUNTER — HOSPITAL ENCOUNTER (OUTPATIENT)
Dept: MRI IMAGING | Facility: HOSPITAL | Age: 62
Discharge: HOME | End: 2017-12-09
Attending: ORTHOPAEDIC SURGERY | Admitting: ORTHOPAEDIC SURGERY

## 2017-12-09 DIAGNOSIS — M25.561 ACUTE PAIN OF RIGHT KNEE: ICD-10-CM

## 2017-12-09 DIAGNOSIS — M17.11 PRIMARY LOCALIZED OSTEOARTHROSIS OF RIGHT LOWER LEG: ICD-10-CM

## 2017-12-09 PROCEDURE — 73721 MRI JNT OF LWR EXTRE W/O DYE: CPT

## 2017-12-12 ENCOUNTER — TELEPHONE (OUTPATIENT)
Dept: ORTHOPEDIC SURGERY | Facility: CLINIC | Age: 62
End: 2017-12-12

## 2017-12-28 DIAGNOSIS — Z98.1 S/P LUMBAR SPINAL FUSION: ICD-10-CM

## 2017-12-28 RX ORDER — OXYCODONE HYDROCHLORIDE 10 MG/1
10 TABLET ORAL EVERY 6 HOURS PRN
Qty: 120 TABLET | Refills: 0 | Status: SHIPPED | OUTPATIENT
Start: 2017-12-28 | End: 2018-02-01 | Stop reason: SDUPTHER

## 2018-01-02 ENCOUNTER — OFFICE VISIT (OUTPATIENT)
Dept: PAIN MEDICINE | Facility: CLINIC | Age: 63
End: 2018-01-02

## 2018-01-02 VITALS
DIASTOLIC BLOOD PRESSURE: 88 MMHG | WEIGHT: 283 LBS | OXYGEN SATURATION: 94 % | HEIGHT: 72 IN | HEART RATE: 70 BPM | TEMPERATURE: 98.1 F | SYSTOLIC BLOOD PRESSURE: 155 MMHG | RESPIRATION RATE: 18 BRPM | BODY MASS INDEX: 38.33 KG/M2

## 2018-01-02 DIAGNOSIS — M96.1 POSTLAMINECTOMY SYNDROME OF LUMBAR REGION: ICD-10-CM

## 2018-01-02 DIAGNOSIS — M47.16 OSTEOARTHRITIS OF LUMBAR SPINE WITH MYELOPATHY: ICD-10-CM

## 2018-01-02 DIAGNOSIS — G89.29 OTHER CHRONIC PAIN: Primary | ICD-10-CM

## 2018-01-02 DIAGNOSIS — Z79.899 ENCOUNTER FOR LONG-TERM (CURRENT) USE OF HIGH-RISK MEDICATION: ICD-10-CM

## 2018-01-02 DIAGNOSIS — M51.36 BULGING LUMBAR DISC: ICD-10-CM

## 2018-01-02 PROCEDURE — 99213 OFFICE O/P EST LOW 20 MIN: CPT | Performed by: NURSE PRACTITIONER

## 2018-01-02 RX ORDER — RANITIDINE 300 MG/1
300 TABLET ORAL NIGHTLY
COMMUNITY
End: 2019-11-25

## 2018-01-02 NOTE — PROGRESS NOTES
CHIEF COMPLAINT  Back pain has increased since last visit.    Subjective   Kian Mcdaniels is a 62 y.o. male  who presents to the office for follow-up.He has a history of back pain with surgery, neck pain and right knee pain. He is under the care of Dr. Prasad for his right knee pain. Had a recent injection which he did notice improvement. No surgeries planned at this time.    Complains of pain in his back and neck. Today his pain is 9/10VAS(able to talk without tears). Has difficulty with distinguishing his primary complaint between back and neck. Describes his pain as continuous and worse. However, he has been without medication x2 days(appropriate based on DAVID). Continues with Oxycodone 10 mg 4/day, gabapentin 800 mg 3/day and tizanidine 4mg PRN. Denies any side effects from the regimen. The regimen helps decrease his pain by 50%. Notes improvement in function and activity. Is having difficulty with exercise due to right knee problems. Has a torn meniscus and needs TKR (per patient report). ADL's by self.     Has an appt to see Dr. Gutierres in approximately 2 months.  He states Dr. Gutierres told him he needs another cervical MRI.    Has insulin pump. Most recent A1C was near 9 (previously 11).     Back Pain   This is a chronic problem. The current episode started more than 1 year ago. The problem occurs constantly. The problem has been waxing and waning (worse today due to being without medication for 2 days) since onset. The pain is present in the sacro-iliac. The quality of the pain is described as aching, shooting and stabbing. The pain is at a severity of 9/10. The pain is the same all the time. The symptoms are aggravated by bending, sitting and standing. Stiffness is present all day. Associated symptoms include headaches, leg pain, paresthesias and tingling. Pertinent negatives include no chest pain, dysuria, fever, numbness or weakness. Risk factors include lack of exercise, obesity and sedentary  "lifestyle. He has tried analgesics for the symptoms. The treatment provided moderate relief.   Neck Pain    This is a chronic (started noticing right before most recent back surgery) problem. The current episode started more than 1 month ago. The problem occurs constantly. The problem has been gradually worsening. The pain is associated with nothing. The pain is present in the left side, right side and midline. The quality of the pain is described as cramping. The pain is at a severity of 8/10. The pain is moderate. The symptoms are aggravated by sneezing and twisting. The pain is same all the time. Associated symptoms include headaches, leg pain and tingling. Pertinent negatives include no chest pain, fever, numbness or weakness. He has tried muscle relaxants, oral narcotics and bed rest for the symptoms. The treatment provided moderate relief.     The following portions of the patient's history were reviewed and updated as appropriate: allergies, current medications, past family history, past medical history, past social history, past surgical history and problem list.    Review of Systems   Constitutional: Negative for chills and fever.   Respiratory: Negative for shortness of breath.    Cardiovascular: Negative for chest pain.   Gastrointestinal: Negative for constipation, diarrhea, nausea and vomiting.   Genitourinary: Negative for difficulty urinating, dysuria and enuresis.   Musculoskeletal: Positive for back pain and neck pain.   Neurological: Positive for tingling, headaches and paresthesias. Negative for dizziness, weakness, light-headedness and numbness.   Psychiatric/Behavioral: Negative for confusion, hallucinations, self-injury, sleep disturbance and suicidal ideas. The patient is not nervous/anxious.        Vitals:    01/02/18 1411   BP: 155/88   Pulse: 70   Resp: 18   Temp: 98.1 °F (36.7 °C)   SpO2: 94%   Weight: 128 kg (283 lb)   Height: 181.6 cm (71.5\")   PainSc:   9   PainLoc: Back "         Objective   Physical Exam   Constitutional: He is oriented to person, place, and time. Vital signs are normal. He appears well-developed and well-nourished. He is cooperative.   HENT:   Head: Normocephalic and atraumatic.   Nose: Nose normal.   Eyes: Conjunctivae and lids are normal.   Neck: Trachea normal. Neck supple. Muscular tenderness present. No spinous process tenderness present. Decreased range of motion present.   Cardiovascular: Normal rate, regular rhythm and normal heart sounds.    Pulmonary/Chest: Effort normal and breath sounds normal. No respiratory distress.   Abdominal:   obese   Musculoskeletal:        Cervical back: He exhibits tenderness and pain. He exhibits no spasm.        Lumbar back: He exhibits tenderness and bony tenderness.   Neurological: He is alert and oriented to person, place, and time. Gait (limping of RLE) abnormal.   Skin: Skin is warm, dry and intact.   Psychiatric: He has a normal mood and affect. His speech is normal and behavior is normal. Judgment and thought content normal. Cognition and memory are normal.   Nursing note and vitals reviewed.    Assessment/Plan   Kian was seen today for back pain.    Diagnoses and all orders for this visit:    Other chronic pain    Osteoarthritis of lumbar spine with myelopathy    Bulging lumbar disc    Postlaminectomy syndrome of lumbar region    Encounter for long-term (current) use of high-risk medication      --- The urine drug screen confirmation from 7-5-17 has been reviewed and the result is appropriate based on patient history and DAVID report  --- repeat UDS at next office visit.  --- Refill Roxicodone.Patient appears stable with current regimen. No adverse effects. Regarding continuation of opioids, there is no evidence of aberrant behavior or any red flags.  The patient continues with appropriate response to opioid therapy. ADL's remain intact by self.   --- Follow-up 1 month or sooner if needed.         DAVID  REPORT    As part of the patient's treatment plan, I am prescribing controlled substances. The patient has been made aware of appropriate use of such medications, including potential risk of somnolence, limited ability to drive and/or work safely, and the potential for dependence or overdose. It has also bee made clear that these medications are for use by this patient only, without concomitant use of alcohol or other substances unless prescribed.     Patient has completed prescribing agreement detailing terms of continued prescribing of controlled substances, including monitoring DAVID reports, urine drug screening, and pill counts if necessary. The patient is aware that inappropriate use will results in cessation of prescribing such medications.    DAVID report has been reviewed and scanned into the patient's chart.    Date of last DAVID : 1-2-18    History and physical exam exhibit continued safe and appropriate use of controlled substances.      EMR Dragon/Transcription disclaimer:   Much of this encounter note is an electronic transcription/translation of spoken language to printed text. The electronic translation of spoken language may permit erroneous, or at times, nonsensical words or phrases to be inadvertently transcribed; Although I have reviewed the note for such errors, some may still exist.

## 2018-02-01 ENCOUNTER — OFFICE VISIT (OUTPATIENT)
Dept: PAIN MEDICINE | Facility: CLINIC | Age: 63
End: 2018-02-01

## 2018-02-01 VITALS
TEMPERATURE: 97.2 F | BODY MASS INDEX: 39.76 KG/M2 | RESPIRATION RATE: 18 BRPM | SYSTOLIC BLOOD PRESSURE: 136 MMHG | DIASTOLIC BLOOD PRESSURE: 82 MMHG | WEIGHT: 284 LBS | HEART RATE: 75 BPM | HEIGHT: 71 IN | OXYGEN SATURATION: 97 %

## 2018-02-01 DIAGNOSIS — M53.3 SACROILIAC JOINT DYSFUNCTION: ICD-10-CM

## 2018-02-01 DIAGNOSIS — M47.16 OSTEOARTHRITIS OF LUMBAR SPINE WITH MYELOPATHY: ICD-10-CM

## 2018-02-01 DIAGNOSIS — Z79.899 ENCOUNTER FOR LONG-TERM (CURRENT) USE OF HIGH-RISK MEDICATION: ICD-10-CM

## 2018-02-01 DIAGNOSIS — M46.1 SACROILIAC INFLAMMATION (HCC): ICD-10-CM

## 2018-02-01 DIAGNOSIS — M96.1 POSTLAMINECTOMY SYNDROME OF LUMBAR REGION: ICD-10-CM

## 2018-02-01 DIAGNOSIS — Z98.1 S/P LUMBAR SPINAL FUSION: ICD-10-CM

## 2018-02-01 DIAGNOSIS — G89.29 OTHER CHRONIC PAIN: Primary | ICD-10-CM

## 2018-02-01 PROCEDURE — 99214 OFFICE O/P EST MOD 30 MIN: CPT | Performed by: NURSE PRACTITIONER

## 2018-02-01 RX ORDER — OXYCODONE HYDROCHLORIDE 10 MG/1
10 TABLET ORAL EVERY 6 HOURS PRN
Qty: 120 TABLET | Refills: 0 | Status: SHIPPED | OUTPATIENT
Start: 2018-02-01 | End: 2018-03-01 | Stop reason: SDUPTHER

## 2018-02-01 NOTE — PROGRESS NOTES
"CHIEF COMPLAINT  F/U back pain. States he is having MRI cervical spine on Monday ordered by . Back pain is not remarkably worse. Pt states he will have to see Dr. Spencer again. Medication takes the edge off his pain but states he is not walking any better. Plans to have right knee surgery soon.     Subjective   Kian Mcdaniels is a 62 y.o. male  who presents to the office for follow-up.He has a history of chronic back pain, with a history of surgery. Is planning on seeing Dr. Gutierres again soon. Also has been having increased neck and right knee pain.     Complains of pain in his neck, back and right knee. Today his pain is 8/10VAS. Describes the pain as continuous and unchanged(admits it is a little better since he was out of medication at last office visit). Pain increases with standing, walking; pain decreases with medication and rest.  Continues with Oxycodone 10 mg 4/day, gabapentin 800 mg 3/day and tizanidine 4mg 1-2/night. Denies any side effects from the regimen. The regimen helps decrease his pain by 60%. Notes improvement in function and activity. ADL's by self.     Is having cervical MRI and will follow-up with Dr. Gutierres for this.  He is under the care of Dr. Prasad. Has a \"torn meniscus and bone on bone\" on right knee.  Anticipates needing a right TKR. Is waiting on referral to \"knee replacement team.\"     Back Pain   This is a chronic problem. The current episode started more than 1 year ago. The problem occurs constantly. The problem has been waxing and waning (unchanged from last office visit) since onset. The pain is present in the sacro-iliac. The quality of the pain is described as aching, shooting and stabbing. The pain is at a severity of 8/10. The pain is the same all the time. The symptoms are aggravated by bending, sitting and standing. Stiffness is present all day. Associated symptoms include headaches (across up back of his head), leg pain, paresthesias and tingling. Pertinent " negatives include no chest pain, dysuria, fever, numbness or weakness. Risk factors include lack of exercise, obesity and sedentary lifestyle. He has tried analgesics for the symptoms. The treatment provided moderate relief.   Neck Pain    This is a chronic (started noticing right before most recent back surgery) problem. The current episode started more than 1 month ago. The problem occurs constantly. The problem has been gradually worsening. The pain is associated with nothing. The pain is present in the left side, right side and midline. The quality of the pain is described as cramping. The pain is at a severity of 8/10. The pain is moderate. The symptoms are aggravated by sneezing and twisting. The pain is same all the time. Associated symptoms include headaches (across up back of his head), leg pain and tingling. Pertinent negatives include no chest pain, fever, numbness or weakness. He has tried muscle relaxants, oral narcotics and bed rest for the symptoms. The treatment provided moderate relief.      PEG Assessment   What number best describes your pain on average in the past week?8  What number best describes how, during the past week, pain has interfered with your enjoyment of life?8  What number best describes how, during the past week, pain has interfered with your general activity?  8    The following portions of the patient's history were reviewed and updated as appropriate: allergies, current medications, past family history, past medical history, past social history, past surgical history and problem list.    Review of Systems   Constitutional: Positive for activity change (decrease). Negative for chills and fever.   HENT: Positive for ear pain.    Respiratory: Negative for shortness of breath.    Cardiovascular: Negative for chest pain.   Gastrointestinal: Negative for constipation, diarrhea, nausea and vomiting.   Genitourinary: Positive for frequency (pt on lasix). Negative for difficulty  "urinating, dysuria and enuresis.   Musculoskeletal: Positive for back pain and neck pain.   Neurological: Positive for tingling, headaches (across up back of his head) and paresthesias. Negative for dizziness, weakness, light-headedness and numbness.   Psychiatric/Behavioral: Negative for agitation, confusion, hallucinations, self-injury, sleep disturbance and suicidal ideas. The patient is not nervous/anxious.        Vitals:    02/01/18 1419   BP: 136/82   Pulse: 75   Resp: 18   Temp: 97.2 °F (36.2 °C)   SpO2: 97%   Weight: 129 kg (284 lb)   Height: 181.6 cm (71.5\")   PainSc:   8   PainLoc: Back     Objective   Physical Exam   Constitutional: He is oriented to person, place, and time. Vital signs are normal. He appears well-developed and well-nourished. He is cooperative.   HENT:   Head: Normocephalic and atraumatic.   Nose: Nose normal.   Eyes: Conjunctivae and lids are normal.   Neck: Trachea normal. Neck supple. Muscular tenderness present. No spinous process tenderness present. Decreased range of motion present.   Cardiovascular: Normal rate.    Pulmonary/Chest: Effort normal.   Abdominal:   obese   Musculoskeletal:        Right knee: He exhibits decreased range of motion. Tenderness found.        Cervical back: He exhibits tenderness and pain. He exhibits no spasm.        Lumbar back: He exhibits tenderness and bony tenderness.   Surgical scar of lumbar spine  Exquisite tenderness of bilateral SI joints    Minimal lumbar facet tenderness    Negative SLR bilaterally    + FATMATA bilaterally (with dififculty due to right knee pain)     Neurological: He is alert and oriented to person, place, and time. Gait (limping of RLE, aid of cane) abnormal.   Reflex Scores:       Patellar reflexes are 1+ on the right side and 1+ on the left side.  Skin: Skin is warm, dry and intact.   Psychiatric: He has a normal mood and affect. His speech is normal and behavior is normal. Judgment and thought content normal. Cognition and " memory are normal.   Nursing note and vitals reviewed.        Assessment/Plan   Kian was seen today for back pain.    Diagnoses and all orders for this visit:    Other chronic pain    Postlaminectomy syndrome of lumbar region    Osteoarthritis of lumbar spine with myelopathy    Encounter for long-term (current) use of high-risk medication    Sacroiliac inflammation  -     Case Request    Sacroiliac joint dysfunction  -     Case Request      --- Routine oral drug screen in office today as part of monitoring requirements for controlled substances. Patient was unable to void.  This specimen will be sent to [facility] for confirmation.     --- Refill Oxycodone. Patient appears stable with current regimen. No adverse effects. Regarding continuation of opioids, there is no evidence of aberrant behavior or any red flags.  The patient continues with appropriate response to opioid therapy. ADL's remain intact by self.   --- bilateral SI joint injections. No blood thinners. Reviewed the procedure at length with the patient.  Included in the review was expectations, complications, risk and benefits.The procedure was described in detail and the risks, benefits and alternatives were discussed with the patient (including but not limited to: bleeding, infection, nerve damage, worsening of pain, inability to perform injection, paralysis, seizures, and death) who agreed to proceed.  Discussed the potential for sedation if warranted/wanted.  Questions were answered and in a way the patient could understand.  Patient verbalized understanding and wishes to proceed.  This intervention will be ordered.  --- Continue with other specialists as scheduled.   --- Follow-up 1 month or sooner if needed.    ----------  Education about Sacroiliac joint injections:  This Sacroiliac joint injection (blockade) we have suggested is intended for diagnostic purposes, with the intent of offering the patient Radiofrequency thermal rhizotomy  (RF) of the sensory branches to the joint if the block is diagnostically effective.  The diagnostic blockade is necessary to determine the likelihood that RF therapy could be efficacious in providing long term relief to the patient.    In this procedure, the sacroiliac joint is aligned with imaging, and under image guidance a needle is placed with the needle tip into the joint.  The needle position is confirmed to be appropriately in the joint before injection of medication into the joint.  When xray fluoroscopy is used, contrast dye is used to confirm a proper arthrogram (i.e., outline of the joint).  When ultrasound is used, IV fluid (normal saline) is injected to see the flow of the fluid into the joint.  Once confirmed, then the medication can be injected into the joint.  Oftentimes this medication is a combination of local anesthetics (for diagnostic purposes) and also a steroid (to decrease irritation & inflammation in the joint, also known as sacroilitis).      Medically, a successful RF procedure should provide a patient with 50% pain relief or more for at least 6 months.  Clinical experience suggests that successful patients receive relief more in the range of 12 months on average.  We also discussed that many patients receive therapeutic success from the intraarticular joint injection, and may not require RF ablation.  If a patient receives more than 8 weeks of relief from joint injection, then occasional repeat joint blocks for therapeutic purposes is a very reasonable alternative therapy.  This course of therapy is consistent with our LCDs according to our CMS  in the area, and therefore other insurance providers should follow accordingly.  We will monitor our patients to screen for these therapeutic responders and will offer RF therapy only when necessary.      We discussed that joint injections & also RF procedures are not without risks.  Best practices regarding anticoagulant use &  neuraxial procedures will be respected.  Oftentimes a patient on an anticoagulant can be offered a joint injection safely, but again this is not risk-free, and such patients give consent with regards to this increased bleeding risk, which could cause problems including but not limited to worsening of pain, nerve damage, or muscle damage.  Patients that are ill or otherwise may be at risk for sepsis will not have their spines accessed by neuraxial injections of any type.  This patient will not be offered these therapies if there is an increased risk.   We discussed that there is a risk of postprocedural pain and also a risk of worsening of clinical picture with these procedures as with any neuraxial procedure.    ----------       DAVID REPORT    As part of the patient's treatment plan, I am prescribing controlled substances. The patient has been made aware of appropriate use of such medications, including potential risk of somnolence, limited ability to drive and/or work safely, and the potential for dependence or overdose. It has also bee made clear that these medications are for use by this patient only, without concomitant use of alcohol or other substances unless prescribed.     Patient has completed prescribing agreement detailing terms of continued prescribing of controlled substances, including monitoring DAVID reports, urine drug screening, and pill counts if necessary. The patient is aware that inappropriate use will results in cessation of prescribing such medications.    DAVID report has been reviewed and scanned into the patient's chart.    Date of last DAVID : 1-30-18    History and physical exam exhibit continued safe and appropriate use of controlled substances.      EMR Dragon/Transcription disclaimer:   Much of this encounter note is an electronic transcription/translation of spoken language to printed text. The electronic translation of spoken language may permit erroneous, or at times,  nonsensical words or phrases to be inadvertently transcribed; Although I have reviewed the note for such errors, some may still exist.

## 2018-02-05 ENCOUNTER — HOSPITAL ENCOUNTER (OUTPATIENT)
Dept: MRI IMAGING | Facility: HOSPITAL | Age: 63
Discharge: HOME | End: 2018-02-05
Attending: NEUROLOGICAL SURGERY | Admitting: NEUROLOGICAL SURGERY

## 2018-02-05 ENCOUNTER — HOSPITAL ENCOUNTER (OUTPATIENT)
Dept: MRI IMAGING | Facility: HOSPITAL | Age: 63
Discharge: HOME | End: 2018-02-05
Attending: NEUROLOGICAL SURGERY

## 2018-02-05 DIAGNOSIS — G95.0 SYRINGOMYELIA AND SYRINGOBULBIA (HCC): ICD-10-CM

## 2018-02-05 LAB — CREAT BLDA-MCNC: 1.4 MG/DL (ref 0.6–1.3)

## 2018-02-05 PROCEDURE — 72156 MRI NECK SPINE W/O & W/DYE: CPT

## 2018-02-05 PROCEDURE — 0 GADOBENATE DIMEGLUMINE 529 MG/ML SOLUTION: Performed by: NEUROLOGICAL SURGERY

## 2018-02-05 PROCEDURE — 72157 MRI CHEST SPINE W/O & W/DYE: CPT

## 2018-02-05 PROCEDURE — 82565 ASSAY OF CREATININE: CPT

## 2018-02-05 PROCEDURE — A9577 INJ MULTIHANCE: HCPCS | Performed by: NEUROLOGICAL SURGERY

## 2018-02-05 RX ADMIN — GADOBENATE DIMEGLUMINE 20 ML: 529 INJECTION, SOLUTION INTRAVENOUS at 17:20

## 2018-02-15 ENCOUNTER — OUTSIDE FACILITY SERVICE (OUTPATIENT)
Dept: PAIN MEDICINE | Facility: CLINIC | Age: 63
End: 2018-02-15

## 2018-02-15 ENCOUNTER — DOCUMENTATION (OUTPATIENT)
Dept: PAIN MEDICINE | Facility: CLINIC | Age: 63
End: 2018-02-15

## 2018-02-15 PROCEDURE — 27096 INJECT SACROILIAC JOINT: CPT | Performed by: ANESTHESIOLOGY

## 2018-02-20 ENCOUNTER — OFFICE VISIT (OUTPATIENT)
Dept: NEUROSURGERY | Facility: CLINIC | Age: 63
End: 2018-02-20

## 2018-02-20 VITALS — HEART RATE: 64 BPM | SYSTOLIC BLOOD PRESSURE: 141 MMHG | DIASTOLIC BLOOD PRESSURE: 64 MMHG

## 2018-02-20 DIAGNOSIS — G95.0 SYRINGOMYELIA AND SYRINGOBULBIA (HCC): Primary | ICD-10-CM

## 2018-02-20 PROCEDURE — 99213 OFFICE O/P EST LOW 20 MIN: CPT | Performed by: NEUROLOGICAL SURGERY

## 2018-02-20 NOTE — PROGRESS NOTES
Bilateral Sacroiliac Joint Injection  [facility]      PREOPERATIVE DIAGNOSIS:   Sacroiliac joint dysfunction, bilateral    POSTOPERATIVE DIAGNOSIS:  Sacroiliac joint dysfunction, bilateral    PROCEDURE:  Sacroiliac Joint Injection, Bilaterally, with fluoroscopic guidance    PRE-PROCEDURE DISCUSSION WITH PATIENT:    Risks and complications were discussed with the patient prior to starting the procedure and informed consent was obtained.  We discussed various topics including but not limited to bleeding, infection, injury, postprocedural site soreness, painful flareup, worsening of clinical picture, paralysis, coma, and death.     SURGEON:  Mulugeta Guzman MD    REASON FOR PROCEDURE:    Patient has pain consistent with SI pathology on history and physical exam. Patient has other lumbrosacral pathology that makes the injection diagnostically necessary. Positive Tho Test.    SEDATION:  Versed 2mg & Fentanyl 100 mcg IV  ANESTHETIC AGENT:  Marcaine 0.5%  STEROID AGENT:  80mg DepoMedrol    DESCRIPTON OF PROCEDURE:  After obtaining informed consent, IV access was obtained in the preoperative area.  The patient was transported to the operative suite and placed in the prone position with a pillow under the pelvic area. EKG, blood pressure, and pulse oximeter were monitored. The lumbosacral area was prepped with Chloraprep and draped in a sterile fashion. Under fluoroscopic guidance the inferior most portion of the right SI joint was identified. The overlying skin and subcutaneous tissue was anesthetized with 1% lidocaine. A 22-gauge spinal needle was introduced from the inferior most portion of the joint into the RIGHT SI joint under fluoroscopic guidance in the AP dimension with slight oblique rotation to the contralateral side.  Aspiration was negative.  After confirming the position of the needle with fluoroscopy, 1 mL of Omnipaque was injected and after seeing appropriate spread into the joint a  total of 2mL of 0.5% Marcaine, with approximately 40 mg of DepoMedrol, was injected very slowly.  Needle was removed intact.  A similar procedure was performed on the LEFT side.   Vital signs remained stable.  The onset of analgesia was noted.      ESTIMATED BLOOD LOSS:  minimal  SPECIMENS:  None  COMPLICATIONS:  No complications were noted. and There was no indication of vascular uptake on live injection of contrast dye.    TOLERANCE & DISCHARGE CONDITION:    The patient tolerated the procedure well.  The patient was transported to the recovery area without difficulties.  The patient was discharged to home under the care of family in stable and satisfactory condition.    PLAN OF CARE:  1. The patient was given our standard instruction sheet and will resume all medications as per the medication reconciliation sheet.  2. The patient will Return to clinic 2 wks.  3. The patient is instructed to keep a pain log hourly for 8 hours after the procedure.

## 2018-02-20 NOTE — PROGRESS NOTES
Subjective   Patient ID: Kian Mcdaniels is a 62 y.o. male is here today for follow-up with a new Cervical and Thoracic MRI ordered for neck and back pain.     History of Present Illness    This patient returns today.  He is doing fairly well overall.  He still has some back pain and some knee pain but he recently had a shot in his lower back which helped his lower back pain a fair amount.  He has on and off pain in his neck but nothing on a regular basis.  The patient says he has had a thorough workup of his thyroid including a biopsy which was okay.    The following portions of the patient's history were reviewed and updated as appropriate: allergies, current medications, past family history, past medical history, past social history, past surgical history and problem list.    Review of Systems   Respiratory: Negative for chest tightness and shortness of breath.    Cardiovascular: Negative for chest pain.   Musculoskeletal: Positive for arthralgias ( Right knee), back pain and neck pain.   All other systems reviewed and are negative.      Objective   Physical Exam   Constitutional: He is oriented to person, place, and time. He appears well-developed and well-nourished.   Neurological: He is oriented to person, place, and time.     Neurologic Exam     Mental Status   Oriented to person, place, and time.       Assessment/Plan   Independent Review of Radiographic Studies:      I reviewed his MRI of the thoracic and the cervical spine.  This shows a syrinx which is unchanged in size.  There is no evidence of enhancement.  There is also the thyroid nodule which we had previously acknowledged.    Medical Decision Making:      I told the patient about the MRI.  I really do not think that the steroids is causing much trouble if any at all.  I told him I would not recommend doing any surgery but we do need to follow it.  I suggested that we go ahead with a follow-up scan in about 6 months.    Kian was seen today for  back pain and neck pain.    Diagnoses and all orders for this visit:    Syringomyelia and syringobulbia  -     MRI Thoracic Spine With & Without Contrast; Future  -     MRI Cervical Spine With & Without Contrast; Future    Return in about 6 months (around 8/20/2018).

## 2018-02-22 ENCOUNTER — OFFICE VISIT (OUTPATIENT)
Dept: ORTHOPEDIC SURGERY | Facility: CLINIC | Age: 63
End: 2018-02-22

## 2018-02-22 VITALS — WEIGHT: 284 LBS | HEIGHT: 72 IN | TEMPERATURE: 98 F | BODY MASS INDEX: 38.47 KG/M2

## 2018-02-22 DIAGNOSIS — S83.281D TEAR OF LATERAL MENISCUS OF RIGHT KNEE, CURRENT, UNSPECIFIED TEAR TYPE, SUBSEQUENT ENCOUNTER: ICD-10-CM

## 2018-02-22 DIAGNOSIS — M17.11 ARTHRITIS OF RIGHT KNEE: Primary | ICD-10-CM

## 2018-02-22 PROCEDURE — 99213 OFFICE O/P EST LOW 20 MIN: CPT | Performed by: ORTHOPAEDIC SURGERY

## 2018-02-22 NOTE — PROGRESS NOTES
"Knee Exam      Patient: Kian Mcdaniels    YOB: 1955 62 y.o. male    Chief Complaints: knee hurts    History of Present Illness: Follows up now with a several month history of constant stabbing grinding aching pain in the right knee worse with standing and walking.  He's had arthroscopic treatment done elsewhere several times in the past.    He was last seen on 11/27/17 and injected which he said did help somewhat transiently but has now recurred.  HPI    ROS: knee pain  Past Medical History:   Diagnosis Date   • Abscess of scrotum 2000   • Angina pectoris    • Arteriosclerotic cardiovascular disease    • COPD (chronic obstructive pulmonary disease)    • Coronary artery disease    • Diabetes mellitus    • Diabetic peripheral neuropathy    • ED (erectile dysfunction) of non-organic origin    • Hypertension    • Insulin pump in place    • Insulin pump in place    • Low back pain    • Myocardial infarction    • Neck pain    • Spinal stenosis    • TIA (transient ischemic attack)     LEFT EYE   • Type 2 diabetes mellitus    • Upper back pain    • Wears dentures     UPPER PLATE       Physical Exam:   Vitals:    02/22/18 1354   Temp: 98 °F (36.7 °C)   TempSrc: Temporal Artery    Weight: 129 kg (284 lb)   Height: 181.6 cm (71.5\")     Well developed with normal mood.Slightly antalgic gait.  Slight valgus alignment to the right knee no warmth or erythema mild effusion.  Full extension about 110° of flexion.  Discomfort over the lateral more so the medial joint line exacerbated with Ru's.      Radiology: MRI films and report of the right knee dated 12/9/17 reviewed which show advanced lateral compartment arthritis with complex lateral meniscal tear but no evidence of stress fracture or insufficiency fracture      Assessment/Plan:  Knee arthritis with complex displaced lateral meniscal tear.    We discussed treatment options and I really do not feel that repeat arthroscopy would be of benefit to him the " severity of his arthritic change.    Lachman continue use of his cane and a knee sleeve.    We'll have him begin physical therapy over [facility] where he is done it in the past and had a good experience.    I will set up to see one of my partners who does knee replacements as I feel this would be the best definitive treatment for him and he is in agreement with this.    We had a pleasant visit I will see him back as needed

## 2018-03-01 ENCOUNTER — OFFICE VISIT (OUTPATIENT)
Dept: PAIN MEDICINE | Facility: CLINIC | Age: 63
End: 2018-03-01

## 2018-03-01 VITALS
HEART RATE: 67 BPM | SYSTOLIC BLOOD PRESSURE: 155 MMHG | HEIGHT: 72 IN | RESPIRATION RATE: 18 BRPM | BODY MASS INDEX: 37.6 KG/M2 | DIASTOLIC BLOOD PRESSURE: 79 MMHG | OXYGEN SATURATION: 97 % | WEIGHT: 277.6 LBS | TEMPERATURE: 98.2 F

## 2018-03-01 DIAGNOSIS — Z98.1 S/P LUMBAR SPINAL FUSION: ICD-10-CM

## 2018-03-01 DIAGNOSIS — G89.29 OTHER CHRONIC PAIN: Primary | ICD-10-CM

## 2018-03-01 DIAGNOSIS — M53.3 SACROILIAC JOINT DYSFUNCTION: ICD-10-CM

## 2018-03-01 DIAGNOSIS — M96.1 POSTLAMINECTOMY SYNDROME OF LUMBAR REGION: ICD-10-CM

## 2018-03-01 DIAGNOSIS — M46.1 SACROILIAC INFLAMMATION (HCC): ICD-10-CM

## 2018-03-01 DIAGNOSIS — Z98.890 HX OF DECOMPRESSIVE LUMBAR LAMINECTOMY: ICD-10-CM

## 2018-03-01 DIAGNOSIS — M50.30 DEGENERATIVE CERVICAL DISC: ICD-10-CM

## 2018-03-01 DIAGNOSIS — Z79.899 ENCOUNTER FOR LONG-TERM (CURRENT) USE OF HIGH-RISK MEDICATION: ICD-10-CM

## 2018-03-01 PROCEDURE — 99214 OFFICE O/P EST MOD 30 MIN: CPT | Performed by: NURSE PRACTITIONER

## 2018-03-01 RX ORDER — GABAPENTIN 400 MG/1
800 CAPSULE ORAL 3 TIMES DAILY
COMMUNITY
Start: 2018-02-28 | End: 2019-01-12 | Stop reason: HOSPADM

## 2018-03-01 RX ORDER — OXYCODONE HYDROCHLORIDE 10 MG/1
10 TABLET ORAL EVERY 6 HOURS PRN
Qty: 120 TABLET | Refills: 0 | Status: SHIPPED | OUTPATIENT
Start: 2018-03-01 | End: 2018-03-29 | Stop reason: SDUPTHER

## 2018-03-01 NOTE — PROGRESS NOTES
CHIEF COMPLAINT  Follow-up for back pain.    Subjective   iKan Mcdaneils is a 62 y.o. male  who presents to the office for follow-up of procedure.  He completed a Bilateral Sacroiliac Joint Injection on  2/15/18 performed by Dr. Guzman for management of low back pain. Patient reports 50% relief from the procedure for 5 days.  He was very pleased with the results. He noticed improved walking and activity.    Complains of pain in his neck, back and right knee. Today his pain is 8/10VAS.  Continues with Oxycodone 10 mg 4/day, gabapentin 800 mg 3/day and tizanidine 4mg 1-2/night. Denies any side effects from the regimen. The regimen helps decrease his pain by 60%. Notes improvement in function and activity. ADL's by self.     Since last office visit the patient has been seen by Dr. Holden.  He was seen by Dr. Gutierres on 2/20/18.  Reviewed Cervical MRI which shows a syrinx which is unchanged in size.  No recommendations for surgery.  Repeat scan in 6 months.    Also see by Dr. Prasad on 2-22-18.  Patient follows up for right knee pain.  This has continued to worsen.  Diagnosis of knee arthritis with complex displaced lateral meniscal tear.  Patricio that repeat arthroscopy would not benefit the patient.  Plan is to having to go to physical therapy.  He'll be set up with my partner for knee replacement. Patient is wanting to proceed with TKR. Seeing Dr. Perez next week.    He has had repeat L4-5 laminectomy with fusion by Dr. Gutierres and Dr. Spencer 12/12/16.    Back Pain   This is a chronic problem. The current episode started more than 1 year ago. The problem occurs constantly. The problem has been waxing and waning since onset. The pain is present in the sacro-iliac. The quality of the pain is described as aching, shooting and stabbing. The pain is at a severity of 8/10. The pain is the same all the time. The symptoms are aggravated by bending, sitting and standing. Stiffness is present all day. Associated symptoms  include headaches (across up back of his head), leg pain, numbness (bilateral feet), paresthesias and tingling. Pertinent negatives include no chest pain, dysuria, fever or weakness. Risk factors include lack of exercise, obesity and sedentary lifestyle. He has tried analgesics for the symptoms. The treatment provided moderate relief.   Neck Pain    This is a chronic (started noticing right before most recent back surgery) problem. The current episode started more than 1 month ago. The problem occurs constantly. The problem has been gradually worsening. The pain is associated with nothing. The pain is present in the left side, right side and midline. The quality of the pain is described as cramping. The pain is at a severity of 8/10. The pain is moderate. The symptoms are aggravated by sneezing and twisting. The pain is same all the time. Associated symptoms include headaches (across up back of his head), leg pain, numbness (bilateral feet) and tingling. Pertinent negatives include no chest pain, fever or weakness. He has tried muscle relaxants, oral narcotics and bed rest for the symptoms. The treatment provided moderate relief.      Procedure List  --2-15-18-- bilateral SI  --9-12-17-- CARIE  -- 8-29-17-- CARIE    PEG Assessment   What number best describes your pain on average in the past week?8  What number best describes how, during the past week, pain has interfered with your enjoyment of life?8  What number best describes how, during the past week, pain has interfered with your general activity?  8    The following portions of the patient's history were reviewed and updated as appropriate: allergies, current medications, past family history, past medical history, past social history, past surgical history and problem list.    Review of Systems   Constitutional: Negative for fatigue and fever.   HENT: Positive for congestion.    Eyes: Negative for visual disturbance.   Respiratory: Negative for cough, shortness  "of breath and wheezing.    Cardiovascular: Negative.  Negative for chest pain.   Gastrointestinal: Negative for constipation and diarrhea.   Genitourinary: Negative for difficulty urinating and dysuria.   Musculoskeletal: Positive for arthralgias (right knee), back pain and neck pain.   Neurological: Positive for tingling, numbness (bilateral feet), headaches (across up back of his head) and paresthesias. Negative for weakness.   Psychiatric/Behavioral: Negative for sleep disturbance and suicidal ideas. The patient is not nervous/anxious.        Vitals:    03/01/18 1357   BP: 155/79   Pulse: 67   Resp: 18   Temp: 98.2 °F (36.8 °C)   SpO2: 97%   Weight: 126 kg (277 lb 9.6 oz)   Height: 181.6 cm (71.5\")   PainSc:   8   PainLoc: Back     Objective   Physical Exam   Constitutional: He is oriented to person, place, and time. Vital signs are normal. He appears well-developed and well-nourished. He is cooperative.   HENT:   Head: Normocephalic and atraumatic.   Nose: Nose normal.   Eyes: Conjunctivae and lids are normal.   Neck: Trachea normal. Neck supple. Muscular tenderness present. No spinous process tenderness present. Decreased range of motion present.   Cardiovascular: Normal rate.    Pulmonary/Chest: Effort normal.   Abdominal:   obese   Musculoskeletal:        Right knee: He exhibits decreased range of motion. Tenderness found.        Cervical back: He exhibits tenderness and pain. He exhibits no spasm.        Lumbar back: He exhibits tenderness and bony tenderness.   Surgical scar of lumbar spine  Exquisite tenderness of bilateral SI joints    Minimal lumbar facet tenderness       Neurological: He is alert and oriented to person, place, and time. Gait (limping of RLE, aid of cane) abnormal.   Reflex Scores:       Patellar reflexes are 1+ on the right side and 1+ on the left side.  Skin: Skin is warm, dry and intact.   Psychiatric: He has a normal mood and affect. His speech is normal and behavior is normal. " Judgment and thought content normal. Cognition and memory are normal.   Nursing note and vitals reviewed.      Assessment/Plan   Kian was seen today for back pain.    Diagnoses and all orders for this visit:    Other chronic pain    Postlaminectomy syndrome of lumbar region    Hx of decompressive lumbar laminectomy    Sacroiliac inflammation  -     Case Request    Sacroiliac joint dysfunction  -     Case Request    Degenerative cervical disc    Encounter for long-term (current) use of high-risk medication      --- The oral drug screen confirmation from 2-1-18 has been reviewed and the result is appropriate based on patient history and DAVID report  --- Refill Oxycodone. Patient appears stable with current regimen. No adverse effects. Regarding continuation of opioids, there is no evidence of aberrant behavior or any red flags.  The patient continues with appropriate response to opioid therapy. ADL's remain intact by self.  he does have a significant history of chronic back and neck pain and demonstrates moderate improvement with multimodal therapy including opioid therapy, which allows him to engage in ADLs and family activities. The continuation of opioid therapy is therefore not contraindicated. We will however respect the March 2016 CDC Guidelines and will plan to avoid overexposure to opioids and avoid dose escalation.  --- repeat Bilateral SI joint injection. Stop Plavix 7 days prior. Reviewed the procedure at length with the patient.  Included in the review was expectations, complications, risk and benefits.The procedure was described in detail and the risks, benefits and alternatives were discussed with the patient (including but not limited to: bleeding, infection, nerve damage, worsening of pain, inability to perform injection, paralysis, seizures, and death) who agreed to proceed.  Discussed the potential for sedation if warranted/wanted.  Questions were answered and in a way the patient could  understand.  Patient verbalized understanding and wishes to proceed.  This intervention will be ordered.  --- Follow-up 1 month or sooner if needed.       DAVID REPORT    As part of the patient's treatment plan, I am prescribing controlled substances. The patient has been made aware of appropriate use of such medications, including potential risk of somnolence, limited ability to drive and/or work safely, and the potential for dependence or overdose. It has also bee made clear that these medications are for use by this patient only, without concomitant use of alcohol or other substances unless prescribed.     Patient has completed prescribing agreement detailing terms of continued prescribing of controlled substances, including monitoring DAVID reports, urine drug screening, and pill counts if necessary. The patient is aware that inappropriate use will results in cessation of prescribing such medications.    DAVID report has been reviewed and scanned into the patient's chart.    Date of last DAVID : 2-28-18    History and physical exam exhibit continued safe and appropriate use of controlled substances.       EMR Dragon/Transcription disclaimer:   Much of this encounter note is an electronic transcription/translation of spoken language to printed text. The electronic translation of spoken language may permit erroneous, or at times, nonsensical words or phrases to be inadvertently transcribed; Although I have reviewed the note for such errors, some may still exist.

## 2018-03-05 ENCOUNTER — HOSPITAL ENCOUNTER (OUTPATIENT)
Dept: PHYSICAL THERAPY | Facility: HOSPITAL | Age: 63
Setting detail: THERAPIES SERIES
Discharge: HOME | End: 2018-03-05
Attending: ORTHOPAEDIC SURGERY

## 2018-03-05 DIAGNOSIS — M25.561 CHRONIC PAIN OF RIGHT KNEE: Primary | ICD-10-CM

## 2018-03-05 DIAGNOSIS — M17.11 PRIMARY OSTEOARTHRITIS OF RIGHT KNEE: ICD-10-CM

## 2018-03-05 DIAGNOSIS — G89.29 CHRONIC PAIN OF RIGHT KNEE: Primary | ICD-10-CM

## 2018-03-05 PROCEDURE — 97162 PT EVAL MOD COMPLEX 30 MIN: CPT | Performed by: PHYSICAL THERAPIST

## 2018-03-05 PROCEDURE — 97110 THERAPEUTIC EXERCISES: CPT | Performed by: PHYSICAL THERAPIST

## 2018-03-06 NOTE — THERAPY EVALUATION
Outpatient Physical Therapy Ortho Initial Evaluation  [facility]     Patient Name: Kian Mcdaniels  : 1955  MRN: [id number]  Today's Date: 3/6/2018      Visit Date: 2018    Patient Active Problem List   Diagnosis   • Bulging lumbar disc   • Chronic pain   • Diabetic neuropathy   • Low back pain   • Degenerative arthritis of lumbar spine   • Neuropathy involving both lower extremities   • Encounter for long-term (current) use of high-risk medication   • Postlaminectomy syndrome of lumbar region   • Syringomyelia and syringobulbia   • Lumbar pseudoarthrosis   • DM2 (diabetes mellitus, type 2)   • Insulin pump in place   • Hx of decompressive lumbar laminectomy   • Bilateral carpal tunnel syndrome   • Degenerative cervical disc   • Acute pain of right knee   • Primary localized osteoarthrosis of right lower leg   • Arthritis of right knee   • Sacroiliac inflammation   • Sacroiliac joint dysfunction        Past Medical History:   Diagnosis Date   • Abscess of scrotum    • Angina pectoris    • Arteriosclerotic cardiovascular disease    • COPD (chronic obstructive pulmonary disease)    • Coronary artery disease    • Diabetes mellitus    • Diabetic peripheral neuropathy    • ED (erectile dysfunction) of non-organic origin    • Hypertension    • Insulin pump in place    • Insulin pump in place    • Low back pain    • Myocardial infarction    • Neck pain    • Spinal stenosis    • TIA (transient ischemic attack)     LEFT EYE   • Type 2 diabetes mellitus    • Upper back pain    • Wears dentures     UPPER PLATE        Past Surgical History:   Procedure Laterality Date   • AMPUTATION FOOT / TOE Left     GREAT TOE   • BACK SURGERY  10/26/2015    Bilateral L4-L5 laminectomy -Dr. Gutierres   • CARDIAC CATHETERIZATION     • CARDIAC SURGERY      CABG X 6    • CHOLECYSTECTOMY     • CORONARY ARTERY BYPASS GRAFT      X 6   • EPIDURAL BLOCK     • EYE SURGERY     • HAND SURGERY  2016   • LUMBAR DISCECTOMY  FUSION INSTRUMENTATION N/A 12/12/2016    Procedure: L 4-5 FUSION WITH INSTRUMENTATION WITH BMP, WITH REMOVAL OF INSTRUMENTATION ;  Surgeon: Rowdy Spencer MD;  Location: [facility];  Service:    • LUMBAR LAMINECTOMY DISCECTOMY DECOMPRESSION N/A 12/12/2016    Procedure: L4-5 REPEAT LAMINECTOMY ;  Surgeon: Tim Gutierres MD;  Location: [facility] OR;  Service:    • LUMBAR LAMINECTOMY DISCECTOMY DECOMPRESSION N/A 12/14/2016    Procedure: EVACUATION OF LUMBAR HEMATOMA;  Surgeon: Rowdy Spencer MD;  Location: [facility] OR;  Service:    • MULTIPLE TOOTH EXTRACTIONS      UPPER TEETH EXTRACTED   • ORTHOPEDIC SURGERY     • PENIS SURGERY      RECONSTRUCTION   • SCROTUM EXPLORATION      scrotum surgery   • SPINE SURGERY         Visit Dx:     ICD-10-CM ICD-9-CM   1. Chronic pain of right knee M25.561 719.46    G89.29 338.29   2. Primary osteoarthritis of right knee M17.11 715.16             Patient History       03/05/18 1200          History    Chief Complaint Pain  -KJ      Type of Pain Knee pain  -KJ      Date Current Problem(s) Began 09/05/17  -KJ      Brief Description of Current Complaint R knee pain worsening about six months, end stage OA, have had injection but Shanice doesn't think another injection or surgery would help. Referred to Chris to consider TKA. History of back issues and spinal surgery also, R foot drop and leg weakness. Loved the water therapy in previous session. Pt. actually thought he was being referred to PT for neck issues, didn't realize Shanice was sending for PT.   -KJ      Patient/Caregiver Goals Relieve pain;Return to prior level of function  -KJ      Fall Risk Assessment    Any falls in the past year: No  -KJ        User Key  (r) = Recorded By, (t) = Taken By, (c) = Cosigned By    Initials Name Provider Type    ANGY Alva, PT Physical Therapist                PT Ortho       03/05/18 1200    Posture/Observations    Posture/Observations Comments R sleeve brace outside  jeans. R knee crepitus noted with open chain AROM  -KJ    Quarter Clearing    Quarter Clearing Lower Quarter Clearing  -KJ    Myotomal Screen- Lower Quarter Clearing    Hip flexion (L2) Right:;4- (Good -)  -KJ    Knee extension (L3) Right:;4- (Good -)  -KJ    Ankle DF (L4) Right:;4 (Good)  -KJ    Ankle PF (S1) Right:;4+ (Good +)   tested in sitting  -KJ    Knee flexion (S2) Left:;4 (Good)  -KJ    ROM (Range of Motion)    General ROM Detail R knee AROM  deg with mild pain; PROM  deg  -KJ    Lower Extremity Flexibility    Hamstrings Right:;Moderately limited  -KJ    Hip External Rotators Right:;Moderately limited  -KJ    Hip Internal Rotators Right:;Moderately limited  -KJ    Gait Assessment/Treatment    Gait, Comment Ambulates with STC in R hand, appears to short. Switched to L at correct height, improved gait. Intermittent catching of R toe in swing phase.   -KJ      User Key  (r) = Recorded By, (t) = Taken By, (c) = Cosigned By    Initials Name Provider Type    ANGY Alva, PT Physical Therapist                      Therapy Education  Education Details: Evaluation findings discussed, discussed the orders in the chart and Josias's note, benefits of strengthening prior to TKA if that's what Chris recommends.   Given: HEP, Symptoms/condition management, Pain management, Posture/body mechanics  Program: New  How Provided: Verbal, Demonstration, Written  Provided to: Patient  Level of Understanding: Verbalized, Teach back education performed, Demonstrated           PT OP Goals       03/05/18 1948       PT Short Term Goals    STG Date to Achieve 03/20/18  -KJ     STG 1 Pt. will be independent and compliant with initial home exercise program.  -KJ     STG 1 Progress New  -KJ     STG 2 Pt. will perform 15-20 repetitions of LE PRE's with proper form.  -KJ     STG 2 Progress New  -KJ     STG 3 Pt. will ambulate with AD with optimal form to decrease risk of falls.  -KJ     STG 3 Progress New  -KJ      Long Term Goals    LTG Date to Achieve 04/04/18  -KJ     LTG 1 Pt. will be independent and compliant with advanced home exercise program.  -KJ     LTG 1 Progress New  -KJ     LTG 2 Pt. will verbalize plan to continue HEP after discharge for optimal prognosis.   -KJ     LTG 2 Progress New  -KJ     Time Calculation    PT Goal Re-Cert Due Date 04/04/18  -KJ       User Key  (r) = Recorded By, (t) = Taken By, (c) = Cosigned By    Initials Name Provider Type    ANGY Alva PT Physical Therapist                PT Assessment/Plan       03/06/18 0848       PT Assessment    Functional Limitations Limitations in community activities;Limitations in functional capacity and performance;Performance in leisure activities;Limitation in home management;Performance in self-care ADL;Decreased safety during functional activities  -KJ     Impairments Impaired flexibility;Muscle strength;Pain;Poor body mechanics;Posture;Range of motion;Joint mobility;Balance;Locomotion;Motor function;Gait;Joint integrity  -KJ     Assessment Comments Pt. is a 62 year old male with history of lumbar surgery and cervical syrinx referred to outpatient therapy for six month history of worsening R knee pain. Pt. has R foot drop and weakness from previous back issues. Pt. is seeing Dr. Perez this week to discuss TKA. Pt. has had a scope in the past and imaging shows severe OA and displaced meniscus. Pt. presents with decreased and painful R knee AROM and PROM, R knee crepitus, gait deficits, decreased R LE and core strength, and KOOS score of 16% where 100% represents no perceived functional disability. Pt. will benefit from skilled physical therapy to address these issues.   -KJ     Please refer to paper survey for additional self-reported information Yes  -KJ     Rehab Potential Good  -KJ     Patient/caregiver participated in establishment of treatment plan and goals Yes  -KJ     Patient would benefit from skilled therapy intervention Yes  -KJ      PT Plan    PT Frequency 2x/week  -KJ     Predicted Duration of Therapy Intervention (days/wks) 6 weeks   -KJ     Planned CPT's? PT EVAL MOD COMPLELITY: 65498;PT MANUAL THERAPY EA 15 MIN: 54443;PT AQUATIC THERAPY EA 15 MIN: 65063;PT ULTRASOUND EA 15 MIN: 92320;PT NEUROMUSC RE-EDUCATION EA 15 MIN: 71436;PT HOT OR COLD PACK TREAT MCARE;PT GAIT TRAINING EA 15 MIN: 52600;PT ELECTRICAL STIM UNATTEND: ;PT THER PROC EA 15 MIN: 37839  -KJ     PT Plan Comments Depending on what Chris recommends at his appt Th, see for core and LE strengthening for joint protection and safety.   -KJ       User Key  (r) = Recorded By, (t) = Taken By, (c) = Cosigned By    Initials Name Provider Type    ANGY Alva PT Physical Therapist                  Exercises       03/06/18 0800          Exercise 1    Exercise Name 1 Quad set  -KJ      Reps 1 10  -KJ      Time (Seconds) 1 3  -KJ      Exercise 2    Exercise Name 2 SAQ  -KJ      Reps 2 10  -KJ      Time (Seconds) 2 3  -KJ        User Key  (r) = Recorded By, (t) = Taken By, (c) = Cosigned By    Initials Name Provider Type    ANGY Alva PT Physical Therapist                        Outcome Measure Options: Knee Outcome Score- ADL  Knee Outcome Score  Knee Outcome Score Comments: 16%      Time Calculation:   Start Time: 1200  Stop Time: 1240  Time Calculation (min): 40 min     Therapy Charges for Today     Code Description Service Date Service Provider Modifiers Qty    79872991534 HC PT EVAL MOD COMPLEXITY 2 3/5/2018 Bianca Alva, PT GP 1    29053437911 HC PT THER PROC EA 15 MIN 3/5/2018 Bianca Alva, PT GP 1          PT G-Codes  Outcome Measure Options: Knee Outcome Score- ADL         Bianca Alva PT  3/6/2018

## 2018-03-08 ENCOUNTER — HOSPITAL ENCOUNTER (OUTPATIENT)
Dept: PHYSICAL THERAPY | Facility: HOSPITAL | Age: 63
Setting detail: THERAPIES SERIES
End: 2018-03-08

## 2018-03-08 ENCOUNTER — CONSULT (OUTPATIENT)
Dept: ORTHOPEDIC SURGERY | Facility: CLINIC | Age: 63
End: 2018-03-08

## 2018-03-08 VITALS — HEIGHT: 71 IN | TEMPERATURE: 98.4 F | WEIGHT: 278.8 LBS | BODY MASS INDEX: 39.03 KG/M2

## 2018-03-08 DIAGNOSIS — M17.11 TRICOMPARTMENT OSTEOARTHRITIS OF RIGHT KNEE: Primary | ICD-10-CM

## 2018-03-08 DIAGNOSIS — E66.01 SEVERE OBESITY (BMI 35.0-39.9): ICD-10-CM

## 2018-03-08 DIAGNOSIS — G89.29 CHRONIC PAIN OF RIGHT KNEE: ICD-10-CM

## 2018-03-08 DIAGNOSIS — M25.561 CHRONIC PAIN OF RIGHT KNEE: ICD-10-CM

## 2018-03-08 DIAGNOSIS — M17.12 TRICOMPARTMENT OSTEOARTHRITIS OF LEFT KNEE: ICD-10-CM

## 2018-03-08 PROCEDURE — 99214 OFFICE O/P EST MOD 30 MIN: CPT | Performed by: ORTHOPAEDIC SURGERY

## 2018-03-08 PROCEDURE — 73564 X-RAY EXAM KNEE 4 OR MORE: CPT | Performed by: ORTHOPAEDIC SURGERY

## 2018-03-08 NOTE — PROGRESS NOTES
Patient:  Kian Mcdaniels is a 62 y.o. male    Chief Complaint/ Reason for Visit:    Chief Complaint   Patient presents with   • Right Knee - Establish Care, Pain       HPI:  This pleasant gentleman has been sent over by Dr. Prasad for evaluation and management of progressively worsening moderate to episodically severe aching occasionally sharp pain that its present nearly constantly in his right knee.  He has grinding sensations and clicking and popping in the knee as well.  He requires a cane 100% of the time for gait due to the pain in his knee.  He says that it has been bothering him now for more than 3 months.  He has started physical therapy for the arthritis in his knee as prescribed by Dr. Prasad.  He uses a soft brace and that helps his symptoms somewhat.  He does have some mild intermittent aching pain in his left knee.  He suspects he has early arthritis as well.    The patient cannot take nonsteroidal medications due to the fact that when he was on them, he experienced a decrease in his renal function and was instructed never to take them again.    The patient also has a history of long-term lumbar spine problems and has been treated by Dr. Spencer and Dr. Gutierres for this.      PMH:    Past Medical History:   Diagnosis Date   • Abscess of scrotum 2000   • Angina pectoris    • Arteriosclerotic cardiovascular disease    • COPD (chronic obstructive pulmonary disease)    • Coronary artery disease    • Diabetes mellitus    • Diabetic peripheral neuropathy    • ED (erectile dysfunction) of non-organic origin    • Hypertension    • Insulin pump in place    • Insulin pump in place    • Low back pain    • Myocardial infarction    • Neck pain    • Spinal stenosis    • TIA (transient ischemic attack)     LEFT EYE   • Type 2 diabetes mellitus    • Upper back pain    • Wears dentures     UPPER PLATE       PSH:    Past Surgical History:   Procedure Laterality Date   • AMPUTATION FOOT / TOE Left     GREAT TOE    • BACK SURGERY  10/26/2015    Bilateral L4-L5 laminectomy -Dr. Gutierres   • CARDIAC CATHETERIZATION     • CARDIAC SURGERY      CABG X 6    • CHOLECYSTECTOMY     • CORONARY ARTERY BYPASS GRAFT      X 6   • EPIDURAL BLOCK     • EYE SURGERY     • HAND SURGERY  04/28/2016   • LUMBAR DISCECTOMY FUSION INSTRUMENTATION N/A 12/12/2016    Procedure: L 4-5 FUSION WITH INSTRUMENTATION WITH BMP, WITH REMOVAL OF INSTRUMENTATION ;  Surgeon: Rowdy Spencer MD;  Location: [facility] MAIN OR;  Service:    • LUMBAR LAMINECTOMY DISCECTOMY DECOMPRESSION N/A 12/12/2016    Procedure: L4-5 REPEAT LAMINECTOMY ;  Surgeon: Tim Gutierres MD;  Location: [facility] OR;  Service:    • LUMBAR LAMINECTOMY DISCECTOMY DECOMPRESSION N/A 12/14/2016    Procedure: EVACUATION OF LUMBAR HEMATOMA;  Surgeon: Rowdy Spencer MD;  Location: [facility] OR;  Service:    • MULTIPLE TOOTH EXTRACTIONS      UPPER TEETH EXTRACTED   • ORTHOPEDIC SURGERY     • PENIS SURGERY      RECONSTRUCTION   • SCROTUM EXPLORATION      scrotum surgery   • SPINE SURGERY         Social Hx:    Social History     Social History   • Marital status:      Spouse name: N/A   • Number of children: N/A   • Years of education: N/A     Occupational History   • Not on file.     Social History Main Topics   • Smoking status: Former Smoker     Packs/day: 1.00     Years: 25.00     Types: Cigarettes     Quit date: 2000   • Smokeless tobacco: Never Used   • Alcohol use No   • Drug use: Yes     Special: Hydrocodone   • Sexual activity: Defer     Other Topics Concern   • Not on file     Social History Narrative       Family Hx:    Family History   Problem Relation Age of Onset   • Diabetes Other    • Heart disease Other    • Hypertension Other    • Kidney disease Other      renal failure   • Heart disease Maternal Grandmother    • Hypertension Maternal Grandmother        Meds:    Current Outpatient Prescriptions:   •  amLODIPine (NORVASC) 10 MG tablet, Take 10 mg by mouth Every Morning.,  Disp: , Rfl:   •  Ascorbic Acid (VITAMIN C PO), Take 1,000 mg by mouth Daily. HOLD PRIOR TO SURGERY 12-, Disp: , Rfl:   •  aspirin 81 MG EC tablet, Take 81 mg by mouth Daily. HOLD PRIOR TO SURGERY 12-, Disp: , Rfl:   •  atorvastatin (LIPITOR) 20 MG tablet, , Disp: , Rfl:   •  carvedilol (COREG) 12.5 MG tablet, TK 1 T PO  BID WITH MEALS, Disp: , Rfl: 3  •  cetirizine (ZyrTEC) 10 MG tablet, Take 10 mg by mouth daily.  , Disp: , Rfl:   •  clopidogrel (PLAVIX) 75 MG tablet, , Disp: , Rfl:   •  furosemide (LASIX) 40 MG tablet, Take 40 mg by mouth 2 (Two) Times a Day., Disp: , Rfl:   •  gabapentin (NEURONTIN) 400 MG capsule, , Disp: , Rfl:   •  HUMULIN R 500 UNIT/ML CONCENTRATED injection, Inject 500 Units under the skin Continuous. Via insulin pump basal rate 0.68units/hr from 0000 to 1000 1000 to 1600 is 0.7units/hr 1600 to 0000 0.75units/hr Plus sliding scale based on carb intake, Disp: , Rfl:   •  isosorbide mononitrate (IMDUR) 60 MG 24 hr tablet, Take 60 mg by mouth Every Morning., Disp: , Rfl:   •  losartan (COZAAR) 50 MG tablet, Take 50 mg by mouth Every Morning., Disp: , Rfl:   •  Multiple Vitamin tablet, Take 1 tablet by mouth daily., Disp: , Rfl:   •  oxyCODONE (ROXICODONE) 10 MG tablet, Take 1 tablet by mouth Every 6 (Six) Hours As Needed for Severe Pain ., Disp: 120 tablet, Rfl: 0  •  pantoprazole (PROTONIX) 40 MG EC tablet, Take 40 mg by mouth Every Morning., Disp: , Rfl:   •  potassium chloride (K-DUR,KLOR-CON) 10 MEQ CR tablet, TK 1 T PO  QD, Disp: , Rfl: 11  •  Testosterone Cypionate (DEPOTESTOTERONE CYPIONATE) 200 MG/ML injection, INJECT 1ML INTO THE MUSCLE EVERY OTHER WEEK, Disp: , Rfl: 1  •  tiZANidine (ZANAFLEX) 4 MG tablet, Take 1 tablet by mouth Every Morning. And 1.5 tablet by mouth every evening, Disp: 75 tablet, Rfl: 0  •  TRESIBA FLEXTOUCH 100 UNIT/ML solution pen-injector injection, INJECT 35 UNITS UNDER THE SKIN D, Disp: , Rfl: 0  •  vitamin D (ERGOCALCIFEROL) 50576 UNITS capsule  "capsule, Take 50,000 Units by mouth Every 7 (Seven) Days. thursdays, Disp: , Rfl:   •  albuterol (VENTOLIN HFA) 108 (90 BASE) MCG/ACT inhaler, Inhale 1-2 puffs Every 6 (Six) Hours As Needed for wheezing (RESCUE INHALER)., Disp: , Rfl:   •  Blood Glucose Monitoring Suppl (FREESTYLE FREEDOM LITE) W/DEVICE kit, , Disp: , Rfl:   •  docusate sodium (COLACE) 100 MG capsule, Take 100 mg by mouth 2 (Two) Times a Day., Disp: , Rfl:   •  glucose blood (FREESTYLE LITE) test strip, Test 4 times per day, Disp: , Rfl:   •  Insulin Glargine (Insulin Glargine) 100 UNIT/ML injection pen, INJECT 30 UNITS INTO THE SKIN D, Disp: , Rfl: 3  •  Insulin Infusion Pump (T:SLIM INSULIN PUMP) device, , Disp: , Rfl:   •  Lancets (FREESTYLE) lancets, Test 4 times per day, Disp: , Rfl:   •  raNITIdine (ZANTAC) 300 MG tablet, Take 300 mg by mouth., Disp: , Rfl:     Allergies:    Allergies   Allergen Reactions   • Erythromycin Nausea Only   • Lisinopril Other (See Comments)     7/2016 possibly caused pancreatitis per Dr Quinonez   • Metformin Nausea And Vomiting       ROS:  Review of Systems   Musculoskeletal: Positive for arthralgias and joint swelling.   Neurological: Positive for numbness (both feet).   All other systems reviewed and are negative.      Vitals:    03/08/18 0947   Temp: 98.4 °F (36.9 °C)   TempSrc: Temporal Artery    Weight: 126 kg (278 lb 12.8 oz)   Height: 180.3 cm (71\")     Body mass index is 38.88 kg/(m^2).    Physical Exam    The patient is awake, alert, and oriented ×3.  The patient is in no acute distress.  Breathing is regular and unlabored with a respiratory rate of 14/m.  Extraocular movements and pupillary responses are symmetrically intact. Sclerae are anicteric.   Hearing is within normal limits.  Speech is within normal limits.  There is no jugular venous distention.    The patient has a lumbering, uncomfortable appearing gait with antalgia from the right.  He has symmetrically present edema in both lower extremities " that I would rate at 2+ up to the proximal mid leg symmetrically.  He does wear compression stockings accordingly.  He has valgus malalignment of the right knee.    Right knee: Range of motion of the right knee is 15° to 114° of flexion.  He has lateral instability on varus stressing.  His right calf is soft and nontender.  He has no abnormal warmth, erythema or bruising in the right knee but there is a mild effusion.  There is pronounced crepitus on passive and active ranges of motion any as global diffuse joint line tenderness.    Left knee: Range of motion is 3° to 112°.  There is pronounced crepitus.  There is no pronounced instability.  There is mild effusion.  There is mild joint line tenderness medially.  There is no abnormal warmth nor is there any discoloration.    The patient has palpable regular 1+ dorsalis pedis pulses present symmetrically in both feet with a current heart rate of about 78 bpm.  He has no acute integument changes.  His sensory exam, however, is profoundly decreased to light touch in both feet due to chronic diabetic peripheral neuropathy.  Proprioceptive exam is also poor.        Radiology:X-rays: A 4 view arthritis series of the right knee, with incidental views the left knee, was ordered and reviewed today to assess the patient's complaints of progressively severe right knee pain and stiffness.  The patient has tricompartmental osteoarthritis with bone-on-bone changes in the lateral compartment osteophyte formation around all 3 compartments, and nearly bone-on-bone changes in the patellofemoral.  Incidental note is made of moderate degenerative change involving all 3 compartments of the left knee.    I did review right knee images the patient had in the ER at [facility] in November in comparison.  In comparison to those images the right knee images today have not changed tremendously.    MRI right knee: This was ordered by Dr. Prasad in December.  I also reviewed the radiologist's  report and these images.  Most importantly, the patient has no evidence of stress fracture or stress reaction.        Assessment:     Diagnosis Plan   1. Tricompartment osteoarthritis of right knee  XR Knee 4+ View Right   2. Severe obesity (BMI 35.0-39.9)     3. Chronic pain of right knee     4. Tricompartment osteoarthritis of left knee  Ambulatory Referral to Physical Therapy Evaluate and treat, Ortho           Plan:  I had a long discussion with this patient and his wife today.  I spent more than 30 minutes in counseling and discussion alone during this visit.      I counseled the patient regarding weight loss, and explained that his target weight would be 230 pounds.  We discussed the reasons for this, and we discussed the overall benefits to his health, including his diabetes, his cardiovascular status, and also the specific benefits to his osteoarthritic knees and the recovery from a knee replacement.  We also discussed that given his other medical problems including diabetes, diabetic neuropathy, and cardiovascular disease, his risk would already be elevated for intraoperative and perioperative complications.  I explained that his excessive weight would push these complications do not run acceptably high risk level.  He voiced understanding.    I urged the patient to view his weight loss as an absolute necessity.  I urged him to contact his primary care physician and a dietitian to come up with a plan for safe permanent sustainable weight loss.  The patient says he is committed to doing so.    We discussed other treatments including injections versus topical medications.  He feels like the injections helped somewhat, but is understandably concerned about the effects that injectable steroids can have on his blood sugar.    Prescription medication management: I wrote the patient a prescription for an anti-inflammatory/analgesic preparation of ketoprofen, lidocaine, and bupivacaine that he will obtain from a  local compounding pharmacy.  I instructed the patient in detail in the proper application and safe use of this substance.  Appropriate precautions were reviewed.  The patient voiced understanding.    We will we will see him back in 3 months for checkup to see how he is doing with his weight loss.    He is going to continue his cane, his bracing, and his physical therapy in the meantime.  I am also going to order physical therapy in a preventative mode for his left knee.      Orders Placed This Encounter   Procedures   • XR Knee 4+ View Right     Order Specific Question:   Reason for Exam:     Answer:   ihc per mwm rt. knee   • Ambulatory Referral to Physical Therapy Evaluate and treat, Ortho     Referral Priority:   Routine     Referral Type:   Therapy     Referral Reason:   Specialty Services Required     Requested Specialty:   Physical Therapy     Number of Visits Requested:   1

## 2018-03-12 ENCOUNTER — APPOINTMENT (OUTPATIENT)
Dept: PHYSICAL THERAPY | Facility: HOSPITAL | Age: 63
End: 2018-03-12

## 2018-03-15 ENCOUNTER — HOSPITAL ENCOUNTER (OUTPATIENT)
Dept: PHYSICAL THERAPY | Facility: HOSPITAL | Age: 63
Setting detail: THERAPIES SERIES
Discharge: HOME | End: 2018-03-15

## 2018-03-15 DIAGNOSIS — M17.11 PRIMARY OSTEOARTHRITIS OF RIGHT KNEE: ICD-10-CM

## 2018-03-15 DIAGNOSIS — G89.29 CHRONIC PAIN OF RIGHT KNEE: Primary | ICD-10-CM

## 2018-03-15 DIAGNOSIS — M25.561 CHRONIC PAIN OF RIGHT KNEE: Primary | ICD-10-CM

## 2018-03-15 PROCEDURE — 97110 THERAPEUTIC EXERCISES: CPT

## 2018-03-15 NOTE — THERAPY TREATMENT NOTE
Outpatient Physical Therapy Ortho Treatment Note  [facility]     Patient Name: Kian Mcdaniels  : 1955  MRN: [id number]  Today's Date: 3/15/2018      Visit Date: 03/15/2018    Visit Dx:    ICD-10-CM ICD-9-CM   1. Chronic pain of right knee M25.561 719.46    G89.29 338.29   2. Primary osteoarthritis of right knee M17.11 715.16       Patient Active Problem List   Diagnosis   • Bulging lumbar disc   • Chronic pain   • Diabetic neuropathy   • Low back pain   • Degenerative arthritis of lumbar spine   • Neuropathy involving both lower extremities   • Encounter for long-term (current) use of high-risk medication   • Postlaminectomy syndrome of lumbar region   • Syringomyelia and syringobulbia   • Lumbar pseudoarthrosis   • DM2 (diabetes mellitus, type 2)   • Insulin pump in place   • Hx of decompressive lumbar laminectomy   • Bilateral carpal tunnel syndrome   • Degenerative cervical disc   • Acute pain of right knee   • Primary localized osteoarthrosis of right lower leg   • Arthritis of right knee   • Sacroiliac inflammation   • Sacroiliac joint dysfunction   • Severe obesity (BMI 35.0-39.9)   • Chronic pain of right knee   • Tricompartment osteoarthritis of left knee        Past Medical History:   Diagnosis Date   • Abscess of scrotum    • Angina pectoris    • Arteriosclerotic cardiovascular disease    • COPD (chronic obstructive pulmonary disease)    • Coronary artery disease    • Diabetes mellitus    • Diabetic peripheral neuropathy    • ED (erectile dysfunction) of non-organic origin    • Hypertension    • Insulin pump in place    • Insulin pump in place    • Low back pain    • Myocardial infarction    • Neck pain    • Spinal stenosis    • TIA (transient ischemic attack)     LEFT EYE   • Type 2 diabetes mellitus    • Upper back pain    • Wears dentures     UPPER PLATE        Past Surgical History:   Procedure Laterality Date   • AMPUTATION FOOT / TOE Left     GREAT TOE   • BACK SURGERY   10/26/2015    Bilateral L4-L5 laminectomy -Dr. Gutierres   • CARDIAC CATHETERIZATION     • CARDIAC SURGERY      CABG X 6    • CHOLECYSTECTOMY     • CORONARY ARTERY BYPASS GRAFT      X 6   • EPIDURAL BLOCK     • EYE SURGERY     • HAND SURGERY  04/28/2016   • LUMBAR DISCECTOMY FUSION INSTRUMENTATION N/A 12/12/2016    Procedure: L 4-5 FUSION WITH INSTRUMENTATION WITH BMP, WITH REMOVAL OF INSTRUMENTATION ;  Surgeon: Rowdy Spencer MD;  Location: [facility] OR;  Service:    • LUMBAR LAMINECTOMY DISCECTOMY DECOMPRESSION N/A 12/12/2016    Procedure: L4-5 REPEAT LAMINECTOMY ;  Surgeon: Tim Gutierres MD;  Location: [facility] OR;  Service:    • LUMBAR LAMINECTOMY DISCECTOMY DECOMPRESSION N/A 12/14/2016    Procedure: EVACUATION OF LUMBAR HEMATOMA;  Surgeon: Rowdy Spencer MD;  Location: [facility] OR;  Service:    • MULTIPLE TOOTH EXTRACTIONS      UPPER TEETH EXTRACTED   • ORTHOPEDIC SURGERY     • PENIS SURGERY      RECONSTRUCTION   • SCROTUM EXPLORATION      scrotum surgery   • SPINE SURGERY                               PT Assessment/Plan     Row Name 03/15/18 1116          PT Assessment    Assessment Comments Added several exercises to HEP. Patient prefers to perform exercises minerva next session. Increase reps per tolerance  -WS       User Key  (r) = Recorded By, (t) = Taken By, (c) = Cosigned By    Initials Name Provider Type    ALEJANDRO Becerra PTA Physical Therapy Assistant                    Exercises     Row Name 03/15/18 1100             Subjective Comments    Subjective Comments Knee buckled today. Made knee prety sore  -WS         Subjective Pain    Able to rate subjective pain? yes  -WS      Pre-Treatment Pain Level 5  -WS         Exercise 1    Exercise Name 1 Quad set  -WS      Reps 1 10  -WS      Reps 1 10  -WS      Time (Seconds) 1 3  -WS         Exercise 2    Exercise Name 2 SAQ  -WS      Reps 2 10  -WS      Reps 2 10  -WS      Time (Seconds) 2 3  -WS         Exercise 3    Exercise Name 3 Calf raise   -WS      Reps 3 10  -WS         Exercise 4    Exercise Name 4 Right hip abd  -WS      Reps 4 10  -WS         Exercise 5    Exercise Name 5 HS curl  -WS      Reps 5 10  -WS         Exercise 6    Exercise Name 6 LAQ  -WS      Reps 6 10  -WS        User Key  (r) = Recorded By, (t) = Taken By, (c) = Cosigned By    Initials Name Provider Type    ALEJANDRO Becerra PTA Physical Therapy Assistant                               PT OP Goals     Row Name 03/15/18 1100          PT Short Term Goals    STG Date to Achieve 03/20/18  -WS     STG 1 Pt. will be independent and compliant with initial home exercise program.  -WS     STG 1 Progress Ongoing  -WS     STG 2 Pt. will perform 15-20 repetitions of LE PRE's with proper form.  -WS     STG 2 Progress Ongoing  -WS     STG 3 Pt. will ambulate with AD with optimal form to decrease risk of falls.  -WS     STG 3 Progress Ongoing  -WS        Long Term Goals    LTG Date to Achieve 04/04/18  -WS     LTG 1 Pt. will be independent and compliant with advanced home exercise program.  -WS     LTG 1 Progress Ongoing  -     LTG 2 Pt. will verbalize plan to continue HEP after discharge for optimal prognosis.   -WS     LTG 2 Progress Ongoing  -       User Key  (r) = Recorded By, (t) = Taken By, (c) = Cosigned By    Initials Name Provider Type     Jayson Becerra PTA Physical Therapy Assistant          Therapy Education  Given: HEP, Symptoms/condition management  Program: Reinforced  How Provided: Verbal  Provided to: Patient  Level of Understanding: Teach back education performed              Time Calculation:   Start Time: 1100  Stop Time: 1120  Time Calculation (min): 20 min    Therapy Charges for Today     Code Description Service Date Service Provider Modifiers Qty    [id number] HC PT THER PROC EA 15 MIN 3/15/2018 Jayson Becerra PTA GP 1                    Jayson Becerra PTA  3/15/2018

## 2018-03-19 ENCOUNTER — APPOINTMENT (OUTPATIENT)
Dept: PHYSICAL THERAPY | Facility: HOSPITAL | Age: 63
End: 2018-03-19

## 2018-03-22 ENCOUNTER — HOSPITAL ENCOUNTER (OUTPATIENT)
Dept: PHYSICAL THERAPY | Facility: HOSPITAL | Age: 63
Setting detail: THERAPIES SERIES
Discharge: HOME | End: 2018-03-22

## 2018-03-22 DIAGNOSIS — M17.11 PRIMARY OSTEOARTHRITIS OF RIGHT KNEE: ICD-10-CM

## 2018-03-22 DIAGNOSIS — M25.561 CHRONIC PAIN OF RIGHT KNEE: Primary | ICD-10-CM

## 2018-03-22 DIAGNOSIS — G89.29 CHRONIC PAIN OF RIGHT KNEE: Primary | ICD-10-CM

## 2018-03-22 PROCEDURE — 97110 THERAPEUTIC EXERCISES: CPT

## 2018-03-22 NOTE — THERAPY TREATMENT NOTE
Outpatient Physical Therapy Ortho Treatment Note  [facility]     Patient Name: Kian Mcdaniels  : 1955  MRN: [id number]  Today's Date: 3/22/2018      Visit Date: 2018    Visit Dx:    ICD-10-CM ICD-9-CM   1. Chronic pain of right knee M25.561 719.46    G89.29 338.29   2. Primary osteoarthritis of right knee M17.11 715.16       Patient Active Problem List   Diagnosis   • Bulging lumbar disc   • Chronic pain   • Diabetic neuropathy   • Low back pain   • Degenerative arthritis of lumbar spine   • Neuropathy involving both lower extremities   • Encounter for long-term (current) use of high-risk medication   • Postlaminectomy syndrome of lumbar region   • Syringomyelia and syringobulbia   • Lumbar pseudoarthrosis   • DM2 (diabetes mellitus, type 2)   • Insulin pump in place   • Hx of decompressive lumbar laminectomy   • Bilateral carpal tunnel syndrome   • Degenerative cervical disc   • Acute pain of right knee   • Primary localized osteoarthrosis of right lower leg   • Arthritis of right knee   • Sacroiliac inflammation   • Sacroiliac joint dysfunction   • Severe obesity (BMI 35.0-39.9)   • Chronic pain of right knee   • Tricompartment osteoarthritis of left knee        Past Medical History:   Diagnosis Date   • Abscess of scrotum    • Angina pectoris    • Arteriosclerotic cardiovascular disease    • COPD (chronic obstructive pulmonary disease)    • Coronary artery disease    • Diabetes mellitus    • Diabetic peripheral neuropathy    • ED (erectile dysfunction) of non-organic origin    • Hypertension    • Insulin pump in place    • Insulin pump in place    • Low back pain    • Myocardial infarction    • Neck pain    • Spinal stenosis    • TIA (transient ischemic attack)     LEFT EYE   • Type 2 diabetes mellitus    • Upper back pain    • Wears dentures     UPPER PLATE        Past Surgical History:   Procedure Laterality Date   • AMPUTATION FOOT / TOE Left     GREAT TOE   • BACK SURGERY   10/26/2015    Bilateral L4-L5 laminectomy -Dr. Gutierres   • CARDIAC CATHETERIZATION     • CARDIAC SURGERY      CABG X 6    • CHOLECYSTECTOMY     • CORONARY ARTERY BYPASS GRAFT      X 6   • EPIDURAL BLOCK     • EYE SURGERY     • HAND SURGERY  04/28/2016   • LUMBAR DISCECTOMY FUSION INSTRUMENTATION N/A 12/12/2016    Procedure: L 4-5 FUSION WITH INSTRUMENTATION WITH BMP, WITH REMOVAL OF INSTRUMENTATION ;  Surgeon: Rowdy Spencer MD;  Location: [facility] MAIN OR;  Service:    • LUMBAR LAMINECTOMY DISCECTOMY DECOMPRESSION N/A 12/12/2016    Procedure: L4-5 REPEAT LAMINECTOMY ;  Surgeon: Tim Gutierres MD;  Location: [facility] MAIN OR;  Service:    • LUMBAR LAMINECTOMY DISCECTOMY DECOMPRESSION N/A 12/14/2016    Procedure: EVACUATION OF LUMBAR HEMATOMA;  Surgeon: Rowdy Spencer MD;  Location: [facility] MAIN OR;  Service:    • MULTIPLE TOOTH EXTRACTIONS      UPPER TEETH EXTRACTED   • ORTHOPEDIC SURGERY     • PENIS SURGERY      RECONSTRUCTION   • SCROTUM EXPLORATION      scrotum surgery   • SPINE SURGERY                               PT Assessment/Plan     Row Name 03/22/18 1133          PT Assessment    Assessment Comments Increased reps and added 2#. Patient reports perfroming HEP. Trial of ice after exercises today  -       User Key  (r) = Recorded By, (t) = Taken By, (c) = Cosigned By    Initials Name Provider Type    ALEJANDRO Becerra PTA Physical Therapy Assistant                Modalities     Row Name 03/22/18 1100             Ice    Ice Applied Yes  -WS      Location right knee on 2 pillows  -      Rx Minutes 10 mins  -WS        User Key  (r) = Recorded By, (t) = Taken By, (c) = Cosigned By    Initials Name Provider Type    ALEJANDRO Becerra PTA Physical Therapy Assistant                Exercises     Row Name 03/22/18 1100             Subjective Comments    Subjective Comments Knee pain with pain meds   -WS         Subjective Pain    Able to rate subjective pain? yes  -WS      Pre-Treatment Pain Level 5  -WS          Exercise 1    Exercise Name 1 Quad set  -WS      Reps 1 15  -WS      Time (Seconds) 1 3  -WS         Exercise 2    Exercise Name 2 minerva SAQ  -WS      Reps 2 15  -WS      Additional Comments 2#  -WS      Time (Seconds) 2 3  -WS         Exercise 3    Exercise Name 3 Calf raise  -WS      Cueing 3 Demo;Verbal  -WS      Reps 3 15  -WS         Exercise 4    Exercise Name 4 minerva hip abd  -WS      Reps 4 15  -WS      Additional Comments 2#  -WS         Exercise 5    Exercise Name 5 minerva HS curl  -WS      Reps 5 15  -WS      Additional Comments 2#  -WS         Exercise 6    Exercise Name 6  minerva LAQ  -WS      Reps 6 15  -WS      Additional Comments 2#  -WS         Exercise 7    Exercise Name 7 Nustep  -WS      Time 7 5 min  -WS      Additional Comments UE/LE  -WS         Exercise 8    Exercise Name 8 seated HS stretch minerva  -WS      Reps 8 3  -WS      Time 8 20 sec  -WS        User Key  (r) = Recorded By, (t) = Taken By, (c) = Cosigned By    Initials Name Provider Type    ALEJANDRO Becerra PTA Physical Therapy Assistant                               PT OP Goals     Row Name 03/22/18 1100          PT Short Term Goals    STG Date to Achieve 03/20/18  -WS     STG 1 Pt. will be independent and compliant with initial home exercise program.  -WS     STG 1 Progress Met  -     STG 2 Pt. will perform 15-20 repetitions of LE PRE's with proper form.  -WS     STG 2 Progress Ongoing  -     STG 3 Pt. will ambulate with AD with optimal form to decrease risk of falls.  -     STG 3 Progress Ongoing  -        Long Term Goals    LTG Date to Achieve 04/04/18  -     LTG 1 Pt. will be independent and compliant with advanced home exercise program.  -WS     LTG 1 Progress Ongoing  -     LTG 2 Pt. will verbalize plan to continue HEP after discharge for optimal prognosis.   -     LTG 2 Progress Ongoing  -       User Key  (r) = Recorded By, (t) = Taken By, (c) = Cosigned By    Initials Name Provider Type    ALEJANDRO Becerra PTA  Physical Therapy Assistant          Therapy Education  Given: HEP, Symptoms/condition management, Pain management  Program: Reinforced  How Provided: Verbal  Provided to: Patient              Time Calculation:   Start Time: 1100  Stop Time: 1145  Time Calculation (min): 45 min    Therapy Charges for Today     Code Description Service Date Service Provider Modifiers Qty    56835817882 HC PT THER PROC EA 15 MIN 3/22/2018 Jayson Becerra PTA GP 2    [id number]  PT HOT OR COLD PACK TREAT MCARE 3/22/2018 Jayson Becerra PTA GP 1                    Jayson Becerra PTA  3/22/2018

## 2018-03-26 ENCOUNTER — HOSPITAL ENCOUNTER (OUTPATIENT)
Dept: PHYSICAL THERAPY | Facility: HOSPITAL | Age: 63
Setting detail: THERAPIES SERIES
Discharge: HOME | End: 2018-03-26

## 2018-03-26 DIAGNOSIS — M17.11 PRIMARY OSTEOARTHRITIS OF RIGHT KNEE: ICD-10-CM

## 2018-03-26 DIAGNOSIS — M25.561 CHRONIC PAIN OF RIGHT KNEE: Primary | ICD-10-CM

## 2018-03-26 DIAGNOSIS — G89.29 CHRONIC PAIN OF RIGHT KNEE: Primary | ICD-10-CM

## 2018-03-26 PROCEDURE — 97110 THERAPEUTIC EXERCISES: CPT

## 2018-03-26 NOTE — THERAPY TREATMENT NOTE
Outpatient Physical Therapy Ortho Treatment Note  [facility]     Patient Name: Kian Mcdaniels  : 1955  MRN: [id number]  Today's Date: 3/26/2018      Visit Date: 2018    Visit Dx:    ICD-10-CM ICD-9-CM   1. Chronic pain of right knee M25.561 719.46    G89.29 338.29   2. Primary osteoarthritis of right knee M17.11 715.16       Patient Active Problem List   Diagnosis   • Bulging lumbar disc   • Chronic pain   • Diabetic neuropathy   • Low back pain   • Degenerative arthritis of lumbar spine   • Neuropathy involving both lower extremities   • Encounter for long-term (current) use of high-risk medication   • Postlaminectomy syndrome of lumbar region   • Syringomyelia and syringobulbia   • Lumbar pseudoarthrosis   • DM2 (diabetes mellitus, type 2)   • Insulin pump in place   • Hx of decompressive lumbar laminectomy   • Bilateral carpal tunnel syndrome   • Degenerative cervical disc   • Acute pain of right knee   • Primary localized osteoarthrosis of right lower leg   • Arthritis of right knee   • Sacroiliac inflammation   • Sacroiliac joint dysfunction   • Severe obesity (BMI 35.0-39.9)   • Chronic pain of right knee   • Tricompartment osteoarthritis of left knee        Past Medical History:   Diagnosis Date   • Abscess of scrotum    • Angina pectoris    • Arteriosclerotic cardiovascular disease    • COPD (chronic obstructive pulmonary disease)    • Coronary artery disease    • Diabetes mellitus    • Diabetic peripheral neuropathy    • ED (erectile dysfunction) of non-organic origin    • Hypertension    • Insulin pump in place    • Insulin pump in place    • Low back pain    • Myocardial infarction    • Neck pain    • Spinal stenosis    • TIA (transient ischemic attack)     LEFT EYE   • Type 2 diabetes mellitus    • Upper back pain    • Wears dentures     UPPER PLATE        Past Surgical History:   Procedure Laterality Date   • AMPUTATION FOOT / TOE Left     GREAT TOE   • BACK SURGERY   10/26/2015    Bilateral L4-L5 laminectomy -Dr. Gutierres   • CARDIAC CATHETERIZATION     • CARDIAC SURGERY      CABG X 6    • CHOLECYSTECTOMY     • CORONARY ARTERY BYPASS GRAFT      X 6   • EPIDURAL BLOCK     • EYE SURGERY     • HAND SURGERY  04/28/2016   • LUMBAR DISCECTOMY FUSION INSTRUMENTATION N/A 12/12/2016    Procedure: L 4-5 FUSION WITH INSTRUMENTATION WITH BMP, WITH REMOVAL OF INSTRUMENTATION ;  Surgeon: Rowdy Spencer MD;  Location: [facility] OR;  Service:    • LUMBAR LAMINECTOMY DISCECTOMY DECOMPRESSION N/A 12/12/2016    Procedure: L4-5 REPEAT LAMINECTOMY ;  Surgeon: Tim Gutierres MD;  Location: [facility] MAIN OR;  Service:    • LUMBAR LAMINECTOMY DISCECTOMY DECOMPRESSION N/A 12/14/2016    Procedure: EVACUATION OF LUMBAR HEMATOMA;  Surgeon: Rowdy Spencer MD;  Location: [facility] OR;  Service:    • MULTIPLE TOOTH EXTRACTIONS      UPPER TEETH EXTRACTED   • ORTHOPEDIC SURGERY     • PENIS SURGERY      RECONSTRUCTION   • SCROTUM EXPLORATION      scrotum surgery   • SPINE SURGERY                               PT Assessment/Plan     Row Name 03/26/18 1137          PT Assessment    Assessment Comments Tolerated 3 # with open chain exercises. Will receive eipdural Wednesday for back pain.   -WS       User Key  (r) = Recorded By, (t) = Taken By, (c) = Cosigned By    Initials Name Provider Type     Jayson Becerra PTA Physical Therapy Assistant                Modalities     Row Name 03/26/18 1100             Ice    Ice Applied Yes  -WS      Location right knee on 2 pillows  -      Rx Minutes 10 mins  -WS        User Key  (r) = Recorded By, (t) = Taken By, (c) = Cosigned By    Initials Name Provider Type     Jayson Becerra PTA Physical Therapy Assistant                Exercises     Row Name 03/26/18 1100             Subjective Comments    Subjective Comments Put the cream on the knee  -         Subjective Pain    Able to rate subjective pain? yes  -WS      Pre-Treatment Pain Level 4  -WS          Exercise 1    Exercise Name 1 Quad set  -WS      Reps 1 15  -WS      Time (Seconds) 1 3  -WS         Exercise 2    Exercise Name 2 minerva SAQ  -WS      Reps 2 15  -WS      Additional Comments 3#  -WS      Time (Seconds) 2 3  -WS         Exercise 3    Exercise Name 3 Calf raise  -WS      Cueing 3 Demo;Verbal  -WS      Reps 3 15  -WS         Exercise 4    Exercise Name 4 minerva hip abd  -WS      Reps 4 15  -WS      Additional Comments 3#  -WS         Exercise 5    Exercise Name 5 minerva HS curl  -WS      Reps 5 15  -WS      Additional Comments 3#  -WS         Exercise 6    Exercise Name 6  minerva LAQ  -WS      Reps 6 15  -WS      Additional Comments 3#  -WS         Exercise 7    Exercise Name 7 Nustep  -WS      Time 7 5 min  -WS      Additional Comments UE/LE  -WS         Exercise 8    Exercise Name 8 seated HS stretch minerva  -WS      Reps 8 3  -WS      Time 8 20 sec  -WS        User Key  (r) = Recorded By, (t) = Taken By, (c) = Cosigned By    Initials Name Provider Type    ALEJANDRO Becerra PTA Physical Therapy Assistant                               PT OP Goals     Row Name 03/26/18 1100          PT Short Term Goals    STG Date to Achieve 03/20/18  -     STG 1 Pt. will be independent and compliant with initial home exercise program.  -WS     STG 1 Progress Met  -     STG 2 Pt. will perform 15-20 repetitions of LE PRE's with proper form.  -     STG 2 Progress Met  -     STG 3 Pt. will ambulate with AD with optimal form to decrease risk of falls.  -     STG 3 Progress Ongoing  -     STG 3 Progress Comments amb with SC  -        Long Term Goals    LTG Date to Achieve 04/04/18  -     LTG 1 Pt. will be independent and compliant with advanced home exercise program.  -     LTG 1 Progress Ongoing  -     LTG 2 Pt. will verbalize plan to continue HEP after discharge for optimal prognosis.   -     LTG 2 Progress Ongoing  -       User Key  (r) = Recorded By, (t) = Taken By, (c) = Cosigned By    Initials Name  Provider Type    WS Jayson Becerra PTA Physical Therapy Assistant          Therapy Education  Given: HEP, Symptoms/condition management, Pain management, Posture/body mechanics, Edema management  Program: Reinforced  How Provided: Verbal  Provided to: Patient              Time Calculation:   Start Time: 1100  Stop Time: 1150  Time Calculation (min): 50 min    Therapy Charges for Today     Code Description Service Date Service Provider Modifiers Qty    80702441909 HC PT THER PROC EA 15 MIN 3/26/2018 Jayson Becerra PTA GP 3    06005528341 HC PT HOT OR COLD PACK TREAT MCARE 3/26/2018 Jayson Becerra PTA GP 1                    Jayson Becerra PTA  3/26/2018

## 2018-03-27 ENCOUNTER — OUTSIDE FACILITY SERVICE (OUTPATIENT)
Dept: PAIN MEDICINE | Facility: CLINIC | Age: 63
End: 2018-03-27

## 2018-03-27 ENCOUNTER — DOCUMENTATION (OUTPATIENT)
Dept: PAIN MEDICINE | Facility: CLINIC | Age: 63
End: 2018-03-27

## 2018-03-27 PROCEDURE — 27096 INJECT SACROILIAC JOINT: CPT | Performed by: ANESTHESIOLOGY

## 2018-03-27 NOTE — PROGRESS NOTES
Bilateral Sacroiliac Joint Injection  [facility]      PREOPERATIVE DIAGNOSIS:   Sacroiliac joint dysfunction, bilateral    POSTOPERATIVE DIAGNOSIS:  Sacroiliac joint dysfunction, bilateral    PROCEDURE:  Sacroiliac Joint Injection, Bilaterally, with fluoroscopic guidance    PRE-PROCEDURE DISCUSSION WITH PATIENT:    Risks and complications were discussed with the patient prior to starting the procedure and informed consent was obtained.  We discussed various topics including but not limited to bleeding, infection, injury, postprocedural site soreness, painful flareup, worsening of clinical picture, paralysis, coma, and death.     SURGEON:  Mulugeta Guzman MD    REASON FOR PROCEDURE:    Patient has pain consistent with SI pathology on history and physical exam. Patient has other lumbrosacral pathology that makes the injection diagnostically necessary. Of note he held Plavix for 7d prior to procedure and is instructed to restart it today after waiting 8 hours.    SEDATION:  Versed 2mg & Fentanyl 100 mcg IV  ANESTHETIC AGENT:  Marcaine 0.5%  STEROID AGENT:  80mg DepoMedrol    DESCRIPTON OF PROCEDURE:  After obtaining informed consent, IV access was obtained in the preoperative area.  The patient was transported to the operative suite and placed in the prone position with a pillow under the pelvic area. EKG, blood pressure, and pulse oximeter were monitored. The lumbosacral area was prepped with Chloraprep and draped in a sterile fashion. Under fluoroscopic guidance the inferior most portion of the right SI joint was identified. The overlying skin and subcutaneous tissue was anesthetized with 1% lidocaine. A 22-gauge spinal needle was introduced from the inferior most portion of the joint into the RIGHT SI joint under fluoroscopic guidance in the AP dimension with slight oblique rotation to the contralateral side.  Aspiration was negative.  After confirming the position of the needle with  fluoroscopy, 1 mL of Omnipaque was injected and after seeing appropriate spread into the joint a total of 2mL of 0.5% Marcaine, with approximately 40 mg of DepoMedrol, was injected very slowly.  Needle was removed intact.  A similar procedure was performed on the LEFT side.   Vital signs remained stable.  The onset of analgesia was noted.      ESTIMATED BLOOD LOSS:  minimal  SPECIMENS:  None  COMPLICATIONS:  No complications were noted. and There was no indication of vascular uptake on live injection of contrast dye.    TOLERANCE & DISCHARGE CONDITION:    The patient tolerated the procedure well.  The patient was transported to the recovery area without difficulties.  The patient was discharged to home under the care of family in stable and satisfactory condition.    PLAN OF CARE:  1. The patient was given our standard instruction sheet and will resume all medications as per the medication reconciliation sheet.  2. The patient will Plan for previously scheduled clinic follow up in 2 days with MARGE WANG.  3. The patient is instructed to keep a pain log hourly for 8 hours after the procedure.

## 2018-03-29 ENCOUNTER — OFFICE VISIT (OUTPATIENT)
Dept: PAIN MEDICINE | Facility: CLINIC | Age: 63
End: 2018-03-29

## 2018-03-29 ENCOUNTER — HOSPITAL ENCOUNTER (OUTPATIENT)
Dept: PHYSICAL THERAPY | Facility: HOSPITAL | Age: 63
Setting detail: THERAPIES SERIES
Discharge: HOME | End: 2018-03-29

## 2018-03-29 VITALS
RESPIRATION RATE: 16 BRPM | WEIGHT: 275.8 LBS | OXYGEN SATURATION: 94 % | DIASTOLIC BLOOD PRESSURE: 84 MMHG | BODY MASS INDEX: 38.61 KG/M2 | HEART RATE: 68 BPM | HEIGHT: 71 IN | TEMPERATURE: 97.2 F | SYSTOLIC BLOOD PRESSURE: 176 MMHG

## 2018-03-29 DIAGNOSIS — M53.3 SACROILIAC JOINT DYSFUNCTION: ICD-10-CM

## 2018-03-29 DIAGNOSIS — Z79.899 ENCOUNTER FOR LONG-TERM (CURRENT) USE OF HIGH-RISK MEDICATION: ICD-10-CM

## 2018-03-29 DIAGNOSIS — M50.30 DEGENERATIVE CERVICAL DISC: ICD-10-CM

## 2018-03-29 DIAGNOSIS — M96.1 POSTLAMINECTOMY SYNDROME OF LUMBAR REGION: ICD-10-CM

## 2018-03-29 DIAGNOSIS — G89.29 OTHER CHRONIC PAIN: Primary | ICD-10-CM

## 2018-03-29 DIAGNOSIS — G89.29 CHRONIC PAIN OF RIGHT KNEE: Primary | ICD-10-CM

## 2018-03-29 DIAGNOSIS — M25.561 CHRONIC PAIN OF RIGHT KNEE: Primary | ICD-10-CM

## 2018-03-29 DIAGNOSIS — M17.11 PRIMARY OSTEOARTHRITIS OF RIGHT KNEE: ICD-10-CM

## 2018-03-29 DIAGNOSIS — Z98.1 S/P LUMBAR SPINAL FUSION: ICD-10-CM

## 2018-03-29 PROCEDURE — 99214 OFFICE O/P EST MOD 30 MIN: CPT | Performed by: NURSE PRACTITIONER

## 2018-03-29 PROCEDURE — 97110 THERAPEUTIC EXERCISES: CPT

## 2018-03-29 RX ORDER — OXYCODONE HYDROCHLORIDE 10 MG/1
10 TABLET ORAL EVERY 6 HOURS PRN
Qty: 120 TABLET | Refills: 0 | Status: SHIPPED | OUTPATIENT
Start: 2018-03-29 | End: 2018-04-18 | Stop reason: SDUPTHER

## 2018-03-29 NOTE — THERAPY TREATMENT NOTE
Outpatient Physical Therapy Ortho Treatment Note  [facility]     Patient Name: Kian Mcdaniels  : 1955  MRN: [id number]  Today's Date: 3/29/2018      Visit Date: 2018    Visit Dx:    ICD-10-CM ICD-9-CM   1. Chronic pain of right knee M25.561 719.46    G89.29 338.29   2. Primary osteoarthritis of right knee M17.11 715.16       Patient Active Problem List   Diagnosis   • Bulging lumbar disc   • Chronic pain   • Diabetic neuropathy   • Low back pain   • Degenerative arthritis of lumbar spine   • Neuropathy involving both lower extremities   • Encounter for long-term (current) use of high-risk medication   • Postlaminectomy syndrome of lumbar region   • Syringomyelia and syringobulbia   • Lumbar pseudoarthrosis   • DM2 (diabetes mellitus, type 2)   • Insulin pump in place   • Hx of decompressive lumbar laminectomy   • Bilateral carpal tunnel syndrome   • Degenerative cervical disc   • Acute pain of right knee   • Primary localized osteoarthrosis of right lower leg   • Arthritis of right knee   • Sacroiliac inflammation   • Sacroiliac joint dysfunction   • Severe obesity (BMI 35.0-39.9)   • Chronic pain of right knee   • Tricompartment osteoarthritis of left knee        Past Medical History:   Diagnosis Date   • Abscess of scrotum    • Angina pectoris    • Arteriosclerotic cardiovascular disease    • COPD (chronic obstructive pulmonary disease)    • Coronary artery disease    • Diabetes mellitus    • Diabetic peripheral neuropathy    • ED (erectile dysfunction) of non-organic origin    • Hypertension    • Insulin pump in place    • Insulin pump in place    • Low back pain    • Myocardial infarction    • Neck pain    • Spinal stenosis    • TIA (transient ischemic attack)     LEFT EYE   • Type 2 diabetes mellitus    • Upper back pain    • Wears dentures     UPPER PLATE        Past Surgical History:   Procedure Laterality Date   • AMPUTATION FOOT / TOE Left     GREAT TOE   • BACK SURGERY   10/26/2015    Bilateral L4-L5 laminectomy -Dr. Gutierres   • CARDIAC CATHETERIZATION     • CARDIAC SURGERY      CABG X 6    • CHOLECYSTECTOMY     • CORONARY ARTERY BYPASS GRAFT      X 6   • EPIDURAL BLOCK     • EYE SURGERY     • HAND SURGERY  04/28/2016   • LUMBAR DISCECTOMY FUSION INSTRUMENTATION N/A 12/12/2016    Procedure: L 4-5 FUSION WITH INSTRUMENTATION WITH BMP, WITH REMOVAL OF INSTRUMENTATION ;  Surgeon: Rowdy Spencer MD;  Location: [facility] MAIN OR;  Service:    • LUMBAR LAMINECTOMY DISCECTOMY DECOMPRESSION N/A 12/12/2016    Procedure: L4-5 REPEAT LAMINECTOMY ;  Surgeon: Tim Gutierres MD;  Location: [facility] MAIN OR;  Service:    • LUMBAR LAMINECTOMY DISCECTOMY DECOMPRESSION N/A 12/14/2016    Procedure: EVACUATION OF LUMBAR HEMATOMA;  Surgeon: Rowdy Spencer MD;  Location: [facility] MAIN OR;  Service:    • MULTIPLE TOOTH EXTRACTIONS      UPPER TEETH EXTRACTED   • ORTHOPEDIC SURGERY     • PENIS SURGERY      RECONSTRUCTION   • SCROTUM EXPLORATION      scrotum surgery   • SPINE SURGERY                               PT Assessment/Plan     Row Name 03/29/18 1306          PT Assessment    Assessment Comments Increased weightwith LAQ/SAQ. Patient ambulating with SC. No change in right knee pain.   -WS       User Key  (r) = Recorded By, (t) = Taken By, (c) = Cosigned By    Initials Name Provider Type     Jayson Becerra PTA Physical Therapy Assistant                Modalities     Row Name 03/29/18 1229             Ice    Ice Applied Yes  -WS      Location right knee on 2 pillows  -      Rx Minutes 10 mins  -WS        User Key  (r) = Recorded By, (t) = Taken By, (c) = Cosigned By    Initials Name Provider Type     Jayson Becerra PTA Physical Therapy Assistant                Exercises     Row Name 03/29/18 1229             Subjective Comments    Subjective Comments Knee pain is about the same  -         Subjective Pain    Able to rate subjective pain? yes  -WS      Pre-Treatment Pain Level 5  -WS          Exercise 1    Exercise Name 1 Quad set  -WS      Reps 1 15  -WS      Time (Seconds) 1 3  -WS         Exercise 2    Exercise Name 2 minerva SAQ  -WS      Reps 2 15  -WS      Additional Comments 4#  -WS      Time (Seconds) 2 3  -WS         Exercise 3    Exercise Name 3 Calf raise  -WS      Cueing 3 Demo;Verbal  -WS         Exercise 4    Exercise Name 4 minerva hip abd  -WS      Reps 4 15  -WS      Additional Comments 3#  -WS         Exercise 5    Exercise Name 5 minerva HS curl  -WS      Reps 5 15  -WS      Additional Comments 3#  -WS         Exercise 6    Exercise Name 6  minerva LAQ  -WS      Reps 6 15  -WS      Additional Comments 4#  -WS         Exercise 7    Exercise Name 7 Nustep  -WS      Time 7 5 min  -WS      Additional Comments UE/LE  -WS         Exercise 8    Exercise Name 8 seated HS stretch minerva  -WS      Reps 8 3  -WS      Time 8 20 sec  -WS        User Key  (r) = Recorded By, (t) = Taken By, (c) = Cosigned By    Initials Name Provider Type    ALEJANRDO Becerra PTA Physical Therapy Assistant                               PT OP Goals     Row Name 03/29/18 1300          PT Short Term Goals    STG Date to Achieve 03/20/18  -WS     STG 1 Pt. will be independent and compliant with initial home exercise program.  -WS     STG 1 Progress Met  -WS     STG 2 Pt. will perform 15-20 repetitions of LE PRE's with proper form.  -WS     STG 2 Progress Met  -WS     STG 3 Pt. will ambulate with AD with optimal form to decrease risk of falls.  -WS     STG 3 Progress Ongoing  -        Long Term Goals    LTG Date to Achieve 04/04/18  -WS     LTG 1 Pt. will be independent and compliant with advanced home exercise program.  -WS     LTG 1 Progress Ongoing  -WS     LTG 2 Pt. will verbalize plan to continue HEP after discharge for optimal prognosis.   -WS     LTG 2 Progress Ongoing  -       User Key  (r) = Recorded By, (t) = Taken By, (c) = Cosigned By    Initials Name Provider Type    ALEJANDRO Becerra PTA Physical Therapy Assistant           Therapy Education  Given: HEP, Symptoms/condition management, Pain management  Program: Reinforced  How Provided: Verbal  Provided to: Patient              Time Calculation:   Start Time: 1229  Stop Time: 1309  Time Calculation (min): 40 min    Therapy Charges for Today     Code Description Service Date Service Provider Modifiers Qty    47566133435 HC PT THER PROC EA 15 MIN 3/29/2018 Jayson Becerra PTA GP 2    01904889809 HC PT HOT OR COLD PACK TREAT MCARE 3/29/2018 Jayson Becerra PTA GP 1                    Jayson Becerra PTA  3/29/2018

## 2018-03-29 NOTE — PROGRESS NOTES
CHIEF COMPLAINT  F/U back and knee pain. Pt states it is hard to say if he got any relief yet from SI joint injection. States the compound cream does help his knees-uses three times a day.    Subjective   Kian Mcdaniels is a 62 y.o. male  who presents to the office for follow-up of procedure.  He completed a bilateral SI joint injection   on  3-27-18 performed by Dr. Guzman for management of back pain. Patient reports moderate relief from the procedure so far.     Complains of pain in his neck, back and right knee. Today his pain is 8/10VAS.  Continues with Oxycodone 10 mg 4/day, gabapentin 800 mg 3/day and tizanidine 4mg 1-2/night. Denies any side effects from the regimen. The regimen helps decrease his pain by 60%. Notes improvement in function and activity. ADL's by self.      He was seen by Dr. Gutierres on 2/20/18.  Reviewed Cervical MRI which shows a syrinx which is unchanged in size.  No recommendations for surgery.  Repeat scan in 6 months.     Saw Dr. Perez 3/8/18, wants patient to lose 40 pounds before proceeding with right TKA, he was given a compounded  Pain cream (ketoprofen, lidocaine, bupivacaine), costing $65 for a tiny tub.  Also started PT.       He has had repeat L4-5 laminectomy with fusion by Dr. Gutierres and Dr. Spencer 12/12/16.    Back Pain   This is a chronic problem. The current episode started more than 1 year ago. The problem occurs constantly. The problem has been waxing and waning since onset. The pain is present in the sacro-iliac. The quality of the pain is described as aching, shooting and stabbing. The pain is at a severity of 6/10. The pain is the same all the time. The symptoms are aggravated by bending, sitting and standing. Stiffness is present all day. Associated symptoms include headaches (across up back of his head), leg pain, numbness (bilateral feet), paresthesias and tingling. Pertinent negatives include no chest pain, dysuria, fever or weakness. Risk factors include lack of  exercise, obesity and sedentary lifestyle. He has tried analgesics for the symptoms. The treatment provided moderate relief.   Neck Pain    This is a chronic (started noticing right before most recent back surgery) problem. The current episode started more than 1 month ago. The problem occurs constantly. The problem has been gradually worsening. The pain is associated with nothing. The pain is present in the left side, right side and midline. The quality of the pain is described as cramping. The pain is at a severity of 8/10. The pain is moderate. The symptoms are aggravated by sneezing and twisting. The pain is same all the time. Associated symptoms include headaches (across up back of his head), leg pain, numbness (bilateral feet) and tingling. Pertinent negatives include no chest pain, fever or weakness. He has tried muscle relaxants, oral narcotics and bed rest for the symptoms. The treatment provided moderate relief.      PEG Assessment   What number best describes your pain on average in the past week?7  What number best describes how, during the past week, pain has interfered with your enjoyment of life?7  What number best describes how, during the past week, pain has interfered with your general activity?  7    The following portions of the patient's history were reviewed and updated as appropriate: allergies, current medications, past family history, past medical history, past social history, past surgical history and problem list.    Review of Systems   Constitutional: Negative for fatigue and fever.   HENT: Negative for congestion.    Eyes: Negative for visual disturbance.   Respiratory: Negative for cough, shortness of breath and wheezing.    Cardiovascular: Negative.  Negative for chest pain.   Gastrointestinal: Negative for constipation and diarrhea.   Genitourinary: Negative for difficulty urinating and dysuria.   Musculoskeletal: Positive for arthralgias (right knee), back pain and neck pain.  "  Neurological: Positive for tingling, numbness (bilateral feet), headaches (across up back of his head) and paresthesias. Negative for weakness.   Psychiatric/Behavioral: Negative for agitation, sleep disturbance and suicidal ideas. The patient is not nervous/anxious.        Vitals:    03/29/18 1044   BP: 176/84   Pulse: 68   Resp: 16   Temp: 97.2 °F (36.2 °C)   SpO2: 94%   Weight: 125 kg (275 lb 12.8 oz)   Height: 180.3 cm (70.98\")   PainSc:   6   PainLoc: Back       Objective   Physical Exam   Constitutional: He is oriented to person, place, and time. Vital signs are normal. He appears well-developed and well-nourished. He is cooperative.   HENT:   Head: Normocephalic and atraumatic.   Nose: Nose normal.   Eyes: Conjunctivae and lids are normal.   Neck: Trachea normal. Neck supple. Muscular tenderness present. No spinous process tenderness present. Decreased range of motion present.   Cardiovascular: Normal rate.    Pulmonary/Chest: Effort normal.   Abdominal:   obese   Musculoskeletal:        Right knee: He exhibits decreased range of motion. Tenderness found.        Cervical back: He exhibits tenderness and pain. He exhibits no spasm.        Lumbar back: He exhibits tenderness and bony tenderness.   Right knee brace     Neurological: He is alert and oriented to person, place, and time. Gait (limping of RLE, aid of cane) abnormal.   Reflex Scores:       Patellar reflexes are 1+ on the right side and 1+ on the left side.  Skin: Skin is warm, dry and intact.   Psychiatric: He has a normal mood and affect. His speech is normal and behavior is normal. Judgment and thought content normal. Cognition and memory are normal.   Nursing note and vitals reviewed.      Assessment/Plan   Kian was seen today for back pain.    Diagnoses and all orders for this visit:    Other chronic pain    Postlaminectomy syndrome of lumbar region    Sacroiliac joint dysfunction    Degenerative cervical disc    Encounter for long-term " (current) use of high-risk medication    S/P lumbar spinal fusion  -     oxyCODONE (ROXICODONE) 10 MG tablet; Take 1 tablet by mouth Every 6 (Six) Hours As Needed for Severe Pain .      --- Refill Oxycodone. Patient appears stable with current regimen. No adverse effects. Regarding continuation of opioids, there is no evidence of aberrant behavior or any red flags.  The patient continues with appropriate response to opioid therapy. ADL's remain intact by self.   --- The urine drug screen confirmation from 2/1/18 has been reviewed and the result is appropriate based on patient history and DAVID report  --- Trial compounded pain cream  --- Follow-up 1 month           DAVID REPORT  As part of the patient's treatment plan, I am prescribing controlled substances. The patient has been made aware of appropriate use of such medications, including potential risk of somnolence, limited ability to drive and/or work safely, and the potential for dependence or overdose. It has also bee made clear that these medications are for use by this patient only, without concomitant use of alcohol or other substances unless prescribed.     Patient has completed prescribing agreement detailing terms of continued prescribing of controlled substances, including monitoring DAVID reports, urine drug screening, and pill counts if necessary. The patient is aware that inappropriate use will results in cessation of prescribing such medications.    DAVID report has been reviewed and scanned into the patient's chart.    Date of last DAVID : 3/28/2018    History and physical exam exhibit continued safe and appropriate use of controlled substances.     EMR Dragon/Transcription disclaimer:   Much of this encounter note is an electronic transcription/translation of spoken language to printed text. The electronic translation of spoken language may permit erroneous, or at times, nonsensical words or phrases to be inadvertently transcribed; Although I  have reviewed the note for such errors, some may still exist.

## 2018-04-02 ENCOUNTER — APPOINTMENT (OUTPATIENT)
Dept: PHYSICAL THERAPY | Facility: HOSPITAL | Age: 63
End: 2018-04-02

## 2018-04-05 ENCOUNTER — HOSPITAL ENCOUNTER (OUTPATIENT)
Dept: PHYSICAL THERAPY | Facility: HOSPITAL | Age: 63
Setting detail: THERAPIES SERIES
Discharge: HOME | End: 2018-04-05

## 2018-04-05 DIAGNOSIS — G89.29 CHRONIC PAIN OF RIGHT KNEE: Primary | ICD-10-CM

## 2018-04-05 DIAGNOSIS — M17.11 PRIMARY OSTEOARTHRITIS OF RIGHT KNEE: ICD-10-CM

## 2018-04-05 DIAGNOSIS — M25.561 CHRONIC PAIN OF RIGHT KNEE: Primary | ICD-10-CM

## 2018-04-05 PROCEDURE — 97110 THERAPEUTIC EXERCISES: CPT

## 2018-04-05 NOTE — THERAPY TREATMENT NOTE
Outpatient Physical Therapy Ortho Treatment Note  [facility]     Patient Name: Kian Mcdaniels  : 1955  MRN: [id number]  Today's Date: 2018      Visit Date: 2018    Visit Dx:    ICD-10-CM ICD-9-CM   1. Chronic pain of right knee M25.561 719.46    G89.29 338.29   2. Primary osteoarthritis of right knee M17.11 715.16       Patient Active Problem List   Diagnosis   • Bulging lumbar disc   • Chronic pain   • Diabetic neuropathy   • Low back pain   • Degenerative arthritis of lumbar spine   • Neuropathy involving both lower extremities   • Encounter for long-term (current) use of high-risk medication   • Postlaminectomy syndrome of lumbar region   • Syringomyelia and syringobulbia   • Lumbar pseudoarthrosis   • DM2 (diabetes mellitus, type 2)   • Insulin pump in place   • Hx of decompressive lumbar laminectomy   • Bilateral carpal tunnel syndrome   • Degenerative cervical disc   • Acute pain of right knee   • Primary localized osteoarthrosis of right lower leg   • Arthritis of right knee   • Sacroiliac inflammation   • Sacroiliac joint dysfunction   • Severe obesity (BMI 35.0-39.9)   • Chronic pain of right knee   • Tricompartment osteoarthritis of left knee        Past Medical History:   Diagnosis Date   • Abscess of scrotum    • Angina pectoris    • Arteriosclerotic cardiovascular disease    • COPD (chronic obstructive pulmonary disease)    • Coronary artery disease    • Diabetes mellitus    • Diabetic peripheral neuropathy    • ED (erectile dysfunction) of non-organic origin    • Hypertension    • Insulin pump in place    • Insulin pump in place    • Low back pain    • Myocardial infarction    • Neck pain    • Spinal stenosis    • TIA (transient ischemic attack)     LEFT EYE   • Type 2 diabetes mellitus    • Upper back pain    • Wears dentures     UPPER PLATE        Past Surgical History:   Procedure Laterality Date   • AMPUTATION FOOT / TOE Left     GREAT TOE   • BACK SURGERY   10/26/2015    Bilateral L4-L5 laminectomy -Dr. Gutierres   • CARDIAC CATHETERIZATION     • CARDIAC SURGERY      CABG X 6    • CHOLECYSTECTOMY     • CORONARY ARTERY BYPASS GRAFT      X 6   • EPIDURAL BLOCK     • EYE SURGERY     • HAND SURGERY  04/28/2016   • LUMBAR DISCECTOMY FUSION INSTRUMENTATION N/A 12/12/2016    Procedure: L 4-5 FUSION WITH INSTRUMENTATION WITH BMP, WITH REMOVAL OF INSTRUMENTATION ;  Surgeon: Rowdy Spencer MD;  Location: [facility] OR;  Service:    • LUMBAR LAMINECTOMY DISCECTOMY DECOMPRESSION N/A 12/12/2016    Procedure: L4-5 REPEAT LAMINECTOMY ;  Surgeon: Tim Gutierres MD;  Location: [facility] MAIN OR;  Service:    • LUMBAR LAMINECTOMY DISCECTOMY DECOMPRESSION N/A 12/14/2016    Procedure: EVACUATION OF LUMBAR HEMATOMA;  Surgeon: Rowdy Spencer MD;  Location: [facility] OR;  Service:    • MULTIPLE TOOTH EXTRACTIONS      UPPER TEETH EXTRACTED   • ORTHOPEDIC SURGERY     • PENIS SURGERY      RECONSTRUCTION   • SCROTUM EXPLORATION      scrotum surgery   • SPINE SURGERY                               PT Assessment/Plan     Row Name 04/05/18 1119          PT Assessment    Assessment Comments Continue to increase weight with exercises to increase minerva knee strength/stsbility to reduce buckling of right knee and improve functional mobity  -WS       User Key  (r) = Recorded By, (t) = Taken By, (c) = Cosigned By    Initials Name Provider Type    ALEJANDRO Becerra PTA Physical Therapy Assistant                Modalities     Row Name 04/05/18 1050             Ice    Ice Applied Yes  -WS      Location right knee on 2 pillows  -WS      Rx Minutes 10 mins  -WS        User Key  (r) = Recorded By, (t) = Taken By, (c) = Cosigned By    Initials Name Provider Type     Jayson Becerra PTA Physical Therapy Assistant                Exercises     Row Name 04/05/18 1050             Subjective Comments    Subjective Comments Right knee has been weak with buckling  -WS         Subjective Pain    Able to rate  subjective pain? yes  -WS      Pre-Treatment Pain Level 6  -WS         Exercise 1    Exercise Name 1 Quad set  -WS      Reps 1 20  -WS      Time (Seconds) 1 3  -WS         Exercise 2    Exercise Name 2 minerva SAQ  -WS      Cueing 2 Demo  -WS      Reps 2 20  -WS      Additional Comments 5#  -WS      Time (Seconds) 2 3  -WS         Exercise 3    Exercise Name 3 Calf raise  -WS      Reps 3 20  -WS         Exercise 4    Exercise Name 4 minerva hip abd  -WS      Reps 4 20  -WS      Additional Comments 4#  -WS         Exercise 5    Exercise Name 5 minerva HS curl  -WS      Reps 5 20  -WS      Additional Comments 4#  -WS         Exercise 6    Exercise Name 6  minerva LAQ  -WS      Reps 6 20  -WS      Additional Comments 5#  -WS         Exercise 7    Exercise Name 7 Nustep  -WS      Time 7 5 min  -WS      Additional Comments UE/LE  -WS         Exercise 8    Exercise Name 8 seated HS stretch minerva  -WS      Reps 8 3  -WS      Time 8 20 sec  -WS        User Key  (r) = Recorded By, (t) = Taken By, (c) = Cosigned By    Initials Name Provider Type    ALEJANDRO Becerra PTA Physical Therapy Assistant                               PT OP Goals     Row Name 04/05/18 1100          PT Short Term Goals    STG Date to Achieve 03/20/18  -     STG 1 Pt. will be independent and compliant with initial home exercise program.  -     STG 1 Progress Met  -     STG 2 Pt. will perform 15-20 repetitions of LE PRE's with proper form.  -     STG 2 Progress Met  -     STG 3 Pt. will ambulate with AD with optimal form to decrease risk of falls.  -     STG 3 Progress Ongoing  -        Long Term Goals    LTG Date to Achieve 04/04/18  -     LTG 1 Pt. will be independent and compliant with advanced home exercise program.  -     LTG 1 Progress Ongoing  -     LTG 2 Pt. will verbalize plan to continue HEP after discharge for optimal prognosis.   -     LTG 2 Progress Ongoing  -       User Key  (r) = Recorded By, (t) = Taken By, (c) = Cosigned By     Initials Name Provider Type    WS Jayson Becerra PTA Physical Therapy Assistant          Therapy Education  Given: HEP, Symptoms/condition management, Pain management  Program: Reinforced  How Provided: Verbal  Provided to: Patient              Time Calculation:   Start Time: 1050  Stop Time: 1130  Time Calculation (min): 40 min    Therapy Charges for Today     Code Description Service Date Service Provider Modifiers Qty    24017280110 HC PT THER PROC EA 15 MIN 4/5/2018 Jayson Becerra PTA GP 2    58741692174 HC PT HOT OR COLD PACK TREAT MCARE 4/5/2018 Jayson Becerra PTA GP 1                    Jayson Becerra PTA  4/5/2018

## 2018-04-10 ENCOUNTER — HOSPITAL ENCOUNTER (OUTPATIENT)
Dept: PHYSICAL THERAPY | Facility: HOSPITAL | Age: 63
Setting detail: THERAPIES SERIES
Discharge: HOME | End: 2018-04-10
Attending: ORTHOPAEDIC SURGERY

## 2018-04-10 DIAGNOSIS — M25.561 CHRONIC PAIN OF RIGHT KNEE: Primary | ICD-10-CM

## 2018-04-10 DIAGNOSIS — G89.29 CHRONIC PAIN OF RIGHT KNEE: Primary | ICD-10-CM

## 2018-04-10 DIAGNOSIS — M17.11 PRIMARY OSTEOARTHRITIS OF RIGHT KNEE: ICD-10-CM

## 2018-04-10 PROCEDURE — 97110 THERAPEUTIC EXERCISES: CPT | Performed by: PHYSICAL THERAPIST

## 2018-04-10 NOTE — THERAPY EVALUATION
Outpatient Physical Therapy Ortho Progress Note  [facility]     Patient Name: Kian Mcdaniels  : 1955  MRN: [id number]  Today's Date: 4/10/2018      Visit Date: 04/10/2018    Visit Dx:    ICD-10-CM ICD-9-CM   1. Chronic pain of right knee M25.561 719.46    G89.29 338.29   2. Primary osteoarthritis of right knee M17.11 715.16       Patient Active Problem List   Diagnosis   • Bulging lumbar disc   • Chronic pain   • Diabetic neuropathy   • Low back pain   • Degenerative arthritis of lumbar spine   • Neuropathy involving both lower extremities   • Encounter for long-term (current) use of high-risk medication   • Postlaminectomy syndrome of lumbar region   • Syringomyelia and syringobulbia   • Lumbar pseudoarthrosis   • DM2 (diabetes mellitus, type 2)   • Insulin pump in place   • Hx of decompressive lumbar laminectomy   • Bilateral carpal tunnel syndrome   • Degenerative cervical disc   • Acute pain of right knee   • Primary localized osteoarthrosis of right lower leg   • Arthritis of right knee   • Sacroiliac inflammation   • Sacroiliac joint dysfunction   • Severe obesity (BMI 35.0-39.9)   • Chronic pain of right knee   • Tricompartment osteoarthritis of left knee        Past Medical History:   Diagnosis Date   • Abscess of scrotum    • Angina pectoris    • Arteriosclerotic cardiovascular disease    • COPD (chronic obstructive pulmonary disease)    • Coronary artery disease    • Diabetes mellitus    • Diabetic peripheral neuropathy    • ED (erectile dysfunction) of non-organic origin    • Hypertension    • Insulin pump in place    • Insulin pump in place    • Low back pain    • Myocardial infarction    • Neck pain    • Spinal stenosis    • TIA (transient ischemic attack)     LEFT EYE   • Type 2 diabetes mellitus    • Upper back pain    • Wears dentures     UPPER PLATE        Past Surgical History:   Procedure Laterality Date   • AMPUTATION FOOT / TOE Left     GREAT TOE   • BACK SURGERY   10/26/2015    Bilateral L4-L5 laminectomy -Dr. Gutierres   • CARDIAC CATHETERIZATION     • CARDIAC SURGERY      CABG X 6    • CHOLECYSTECTOMY     • CORONARY ARTERY BYPASS GRAFT      X 6   • EPIDURAL BLOCK     • EYE SURGERY     • HAND SURGERY  04/28/2016   • LUMBAR DISCECTOMY FUSION INSTRUMENTATION N/A 12/12/2016    Procedure: L 4-5 FUSION WITH INSTRUMENTATION WITH BMP, WITH REMOVAL OF INSTRUMENTATION ;  Surgeon: Rowdy Spencer MD;  Location: [facility] OR;  Service:    • LUMBAR LAMINECTOMY DISCECTOMY DECOMPRESSION N/A 12/12/2016    Procedure: L4-5 REPEAT LAMINECTOMY ;  Surgeon: Tim Gutierres MD;  Location: [facility] MAIN OR;  Service:    • LUMBAR LAMINECTOMY DISCECTOMY DECOMPRESSION N/A 12/14/2016    Procedure: EVACUATION OF LUMBAR HEMATOMA;  Surgeon: Rowdy Spencer MD;  Location: [facility] OR;  Service:    • MULTIPLE TOOTH EXTRACTIONS      UPPER TEETH EXTRACTED   • ORTHOPEDIC SURGERY     • PENIS SURGERY      RECONSTRUCTION   • SCROTUM EXPLORATION      scrotum surgery   • SPINE SURGERY                               PT Assessment/Plan     Row Name 04/10/18 1434          PT Assessment    Functional Limitations Limitations in community activities;Limitations in functional capacity and performance;Performance in leisure activities;Limitation in home management;Performance in self-care ADL;Decreased safety during functional activities  -RA     Impairments Impaired flexibility;Muscle strength;Pain;Poor body mechanics;Posture;Range of motion;Joint mobility;Balance;Locomotion;Motor function;Gait;Joint integrity  -RA     Assessment Comments Patient has attended 10 physical therapy sessions for treatment of knee OA.  He is ambulating with SPC in L hand to reduce strain on R knee but continues to report occasional R knee buckling.  He has tolerated progression of strengthening without issue and reports using weights with exercises at home.  He continues to have knee pain rated 4-6/10 as well as back pain but states he does  "think his legs are getting stronger.  His KOS-ADL score has improved from 13/80 to 29/80 showing improvement in perceived level of functional disability.  Patient will continue to benefit from skilled therapy working toward independence with established HEP to facilitate self management of symptoms/condition.  -RA     Please refer to paper survey for additional self-reported information Yes  -RA     Rehab Potential Good  -RA     Patient/caregiver participated in establishment of treatment plan and goals Yes  -RA     Patient would benefit from skilled therapy intervention Yes  -RA        PT Plan    PT Frequency 2x/week  -RA     Predicted Duration of Therapy Intervention (OT Eval) additional 2-4 weeks  -RA     Planned CPT's? PT THER PROC EA 15 MIN: 66734;PT NEUROMUSC RE-EDUCATION EA 15 MIN: 32130;PT MANUAL THERAPY EA 15 MIN: 82964;PT GAIT TRAINING EA 15 MIN: 33446;PT SELF CARE/HOME MGMT/TRAIN EA 15: 94504;PT AQUATIC THERAPY EA 15 MIN: 67505;PT HOT OR COLD PACK TREAT MCARE;PT ULTRASOUND EA 15 MIN: 89696;PT ELECTRICAL STIM UNATTEND:   -RA     PT Plan Comments Continue skilled therapy for education on joint protection strategies and progression of core/LE strength/stabilization to facilitate independent self management.  -RA       User Key  (r) = Recorded By, (t) = Taken By, (c) = Cosigned By    Initials Name Provider Type    ROSARIO Serra, PT Physical Therapist                Modalities     Row Name 04/10/18 1400             Ice    Ice Applied Yes  -RA      Location right knee on 2 pillows  -RA      Rx Minutes 10 mins  -RA        User Key  (r) = Recorded By, (t) = Taken By, (c) = Cosigned By    Initials Name Provider Type    ROSARIO Serra, PT Physical Therapist                Exercises     Row Name 04/10/18 1400             Subjective Comments    Subjective Comments R knee is \"bone on bone.\"  Used compound cream and took medication before I came  -RA         Subjective Pain    Able to rate subjective " pain? yes  -RA      Pre-Treatment Pain Level 6  -RA         Exercise 1    Exercise Name 1 Quad set B   -RA      Reps 1 20  -RA      Time (Seconds) 1 3  -RA         Exercise 2    Exercise Name 2 minerva SAQ  -RA      Cueing 2 Demo  -RA      Reps 2 20  -RA      Time (Seconds) 2 3  -RA         Exercise 3    Exercise Name 3 Calf raise  -RA      Reps 3 20  -RA         Exercise 4    Exercise Name 4 minerva hip abd  -RA      Reps 4 20  -RA         Exercise 5    Exercise Name 5 minerva HS curl  -RA      Reps 5 20  -RA         Exercise 6    Exercise Name 6  minerva LAQ  -RA      Reps 6 20  -RA         Exercise 7    Exercise Name 7 Nustep  -RA      Time 7 5 min  -RA      Additional Comments UE/LE  -RA         Exercise 8    Exercise Name 8 seated HS stretch minerva  -RA      Reps 8 3  -RA      Time 8 20 sec  -RA        User Key  (r) = Recorded By, (t) = Taken By, (c) = Cosigned By    Initials Name Provider Type    RA Rajni Serra, PT Physical Therapist                               PT OP Goals     Row Name 04/10/18 1400          PT Short Term Goals    STG Date to Achieve 03/20/18  -RA     STG 1 Pt. will be independent and compliant with initial home exercise program.  -RA     STG 1 Progress Met  -RA     STG 2 Pt. will perform 15-20 repetitions of LE PRE's with proper form.  -RA     STG 2 Progress Met  -RA     STG 3 Pt. will ambulate with AD with optimal form to decrease risk of falls.  -RA     STG 3 Progress Ongoing  -RA     STG 3 Progress Comments Ambulating with SPC in L hand  -RA        Long Term Goals    LTG Date to Achieve 04/04/18  -RA     LTG 1 Pt. will be independent and compliant with advanced home exercise program.  -RA     LTG 1 Progress Progressing  -RA     LTG 1 Progress Comments advancing LE resistance for strength progression  -RA     LTG 2 Pt. will verbalize plan to continue HEP after discharge for optimal prognosis.   -RA     LTG 2 Progress Ongoing  -RA     LTG 2 Progress Comments Patient   -RA       User Key  (r) = Recorded  By, (t) = Taken By, (c) = Cosigned By    Initials Name Provider Type    RA Rajni Serra, PT Physical Therapist          Therapy Education  Given: HEP, Symptoms/condition management, Pain management  Program: Reinforced  How Provided: Verbal, Demonstration  Provided to: Patient  Level of Understanding: Verbalized    Outcome Measure Options: Knee Outcome Score- ADL  Knee Outcome Score  Knee Outcome Score Comments: score = 29/80 or 36.25%       Time Calculation:   Start Time: 1418  Stop Time: 1500  Time Calculation (min): 42 min    Therapy Charges for Today     Code Description Service Date Service Provider Modifiers Qty    09167283145  PT THER PROC EA 15 MIN 4/10/2018 Rajni Serra, PT GP 2    [id number]  PT HOT OR COLD PACK TREAT MCARE 4/10/2018 Rajni Serra, PT GP 1          PT G-Codes  Outcome Measure Options: Knee Outcome Score- ADL         Rajni Serra, PT  4/10/2018

## 2018-04-12 ENCOUNTER — HOSPITAL ENCOUNTER (OUTPATIENT)
Dept: PHYSICAL THERAPY | Facility: HOSPITAL | Age: 63
Setting detail: THERAPIES SERIES
Discharge: HOME | End: 2018-04-12
Attending: ORTHOPAEDIC SURGERY

## 2018-04-12 DIAGNOSIS — G89.29 CHRONIC PAIN OF RIGHT KNEE: Primary | ICD-10-CM

## 2018-04-12 DIAGNOSIS — M17.11 PRIMARY OSTEOARTHRITIS OF RIGHT KNEE: ICD-10-CM

## 2018-04-12 DIAGNOSIS — M25.561 CHRONIC PAIN OF RIGHT KNEE: Primary | ICD-10-CM

## 2018-04-12 PROCEDURE — 97110 THERAPEUTIC EXERCISES: CPT | Performed by: PHYSICAL THERAPIST

## 2018-04-12 NOTE — THERAPY TREATMENT NOTE
Outpatient Physical Therapy Ortho Treatment Note  [facility]     Patient Name: Kian Mcdaniels  : 1955  MRN: [id number]  Today's Date: 2018      Visit Date: 2018    Visit Dx:    ICD-10-CM ICD-9-CM   1. Chronic pain of right knee M25.561 719.46    G89.29 338.29   2. Primary osteoarthritis of right knee M17.11 715.16       Patient Active Problem List   Diagnosis   • Bulging lumbar disc   • Chronic pain   • Diabetic neuropathy   • Low back pain   • Degenerative arthritis of lumbar spine   • Neuropathy involving both lower extremities   • Encounter for long-term (current) use of high-risk medication   • Postlaminectomy syndrome of lumbar region   • Syringomyelia and syringobulbia   • Lumbar pseudoarthrosis   • DM2 (diabetes mellitus, type 2)   • Insulin pump in place   • Hx of decompressive lumbar laminectomy   • Bilateral carpal tunnel syndrome   • Degenerative cervical disc   • Acute pain of right knee   • Primary localized osteoarthrosis of right lower leg   • Arthritis of right knee   • Sacroiliac inflammation   • Sacroiliac joint dysfunction   • Severe obesity (BMI 35.0-39.9)   • Chronic pain of right knee   • Tricompartment osteoarthritis of left knee        Past Medical History:   Diagnosis Date   • Abscess of scrotum    • Angina pectoris    • Arteriosclerotic cardiovascular disease    • COPD (chronic obstructive pulmonary disease)    • Coronary artery disease    • Diabetes mellitus    • Diabetic peripheral neuropathy    • ED (erectile dysfunction) of non-organic origin    • Hypertension    • Insulin pump in place    • Insulin pump in place    • Low back pain    • Myocardial infarction    • Neck pain    • Spinal stenosis    • TIA (transient ischemic attack)     LEFT EYE   • Type 2 diabetes mellitus    • Upper back pain    • Wears dentures     UPPER PLATE        Past Surgical History:   Procedure Laterality Date   • AMPUTATION FOOT / TOE Left     GREAT TOE   • BACK SURGERY   10/26/2015    Bilateral L4-L5 laminectomy -Dr. Gutierres   • CARDIAC CATHETERIZATION     • CARDIAC SURGERY      CABG X 6    • CHOLECYSTECTOMY     • CORONARY ARTERY BYPASS GRAFT      X 6   • EPIDURAL BLOCK     • EYE SURGERY     • HAND SURGERY  2016   • LUMBAR DISCECTOMY FUSION INSTRUMENTATION N/A 2016    Procedure: L 4-5 FUSION WITH INSTRUMENTATION WITH BMP, WITH REMOVAL OF INSTRUMENTATION ;  Surgeon: Rowdy Spencer MD;  Location: [facility] MAIN OR;  Service:    • LUMBAR LAMINECTOMY DISCECTOMY DECOMPRESSION N/A 2016    Procedure: L4-5 REPEAT LAMINECTOMY ;  Surgeon: Tim Gutierres MD;  Location: [facility] MAIN OR;  Service:    • LUMBAR LAMINECTOMY DISCECTOMY DECOMPRESSION N/A 2016    Procedure: EVACUATION OF LUMBAR HEMATOMA;  Surgeon: Rowdy Spencer MD;  Location: [facility] MAIN OR;  Service:    • MULTIPLE TOOTH EXTRACTIONS      UPPER TEETH EXTRACTED   • ORTHOPEDIC SURGERY     • PENIS SURGERY      RECONSTRUCTION   • SCROTUM EXPLORATION      scrotum surgery   • SPINE SURGERY                               PT Assessment/Plan     Row Name 18 1226          PT Assessment    Assessment Comments Mr Mcdaniels returns today, requiring only minimal cues for HEP progression.  Added closed chain seated TKE to assisst in quad strengthening.  Discussed possibly returning to [facility]/pool to allow for most optimal setting to independently perform HEP prior to TKE (pt. use to be a member and allowed membership to ).  I believe he would be a good candidate to continue aqutic therapy independently in the coming weeks.   -        PT Plan    PT Plan Comments work on knee joint protection, strengthen as able. continue to encourage return to aquatic exercises independently  -       User Key  (r) = Recorded By, (t) = Taken By, (c) = Cosigned By    Initials Name Provider Type    MARCIE Rhodes, PT Physical Therapist                Modalities     Row Name 18 1100             Ice    Ice Applied Yes   -GJ      Location right knee on 2 pillows  -GJ      Rx Minutes 10 mins  -GJ      Ice S/P Rx Yes  -GJ        User Key  (r) = Recorded By, (t) = Taken By, (c) = Cosigned By    Initials Name Provider Type    MARCIE Rhodes, PT Physical Therapist                Exercises     Row Name 04/12/18 1100             Subjective Comments    Subjective Comments My knee is just bone on bone.    -GJ         Exercise 1    Exercise Name 1 Quad set B   -GJ      Cueing 1 Verbal  -GJ      Reps 1 15  -GJ      Time 1 3 sec  -GJ         Exercise 2    Exercise Name 2 minerva SAQ  -GJ      Cueing 2 Verbal  -GJ      Reps 2 20  -GJ      Additional Comments 5#  -GJ         Exercise 3    Exercise Name 3 Calf raise  -GJ      Reps 3 20  -GJ         Exercise 4    Exercise Name 4 minerva hip abd, standing   -GJ      Reps 4 20  -GJ      Additional Comments 4#  -GJ         Exercise 5    Exercise Name 5 minerva HS curl, standing   -GJ      Reps 5 20  -GJ      Additional Comments 4#  -GJ         Exercise 6    Exercise Name 6  minerva LAQ  -GJ      Cueing 6 Verbal  -GJ      Reps 6 20  -GJ      Additional Comments 5#  -GJ         Exercise 7    Exercise Name 7 Nustep  -GJ      Time 7 5 min  -GJ      Additional Comments UE LE  -GJ         Exercise 8    Exercise Name 8 seated HS stretch minerva  -GJ      Cueing 8 Verbal  -GJ      Reps 8 3  -GJ      Time 8 20  -GJ         Exercise 9    Exercise Name 9 seated TKE into ball  -GJ      Cueing 9 Demo  -GJ      Reps 9 15  -GJ      Time 9 5 seconds  -GJ        User Key  (r) = Recorded By, (t) = Taken By, (c) = Cosigned By    Initials Name Provider Type    MARCIE Rhodes, PT Physical Therapist                               PT OP Goals     Row Name 04/12/18 1100          PT Short Term Goals    STG Date to Achieve 03/20/18  -GJ     STG 1 Pt. will be independent and compliant with initial home exercise program.  -GJ     STG 1 Progress Met  -GJ     STG 2 Pt. will perform 15-20 repetitions of LE PRE's with proper form.  -GJ     STG 2  Progress Met  -GJ     STG 3 Pt. will ambulate with AD with optimal form to decrease risk of falls.  -GJ     STG 3 Progress Ongoing;Progressing  -GJ     STG 3 Progress Comments may be at baseline at this time, ambulating with SC, mild limp   -GJ        Long Term Goals    LTG Date to Achieve 04/04/18  -GJ     LTG 1 Pt. will be independent and compliant with advanced home exercise program.  -GJ     LTG 1 Progress Progressing  -GJ     LTG 2 Pt. will verbalize plan to continue HEP after discharge for optimal prognosis.   -GJ     LTG 2 Progress Ongoing  -GJ     LTG 2 Progress Comments discussed returning to the pool to perform exercises in an optimal environment to help lose weight preparing for TKA  -GJ       User Key  (r) = Recorded By, (t) = Taken By, (c) = Cosigned By    Initials Name Provider Type    MARCIE Rhodes PT Physical Therapist          Therapy Education  Education Details: discussed possibly returning to the pool to allow for exercise in most optimal setting, allowing for ease of weight loss prior to TKA  Given: HEP, Symptoms/condition management, Pain management, Posture/body mechanics, Mobility training  Program: New  How Provided: Verbal, Demonstration  Provided to: Patient  Level of Understanding: Teach back education performed, Verbalized, Demonstrated              Time Calculation:   Start Time: 1130  Stop Time: 1225  Time Calculation (min): 55 min    Therapy Charges for Today     Code Description Service Date Service Provider Modifiers Qty    03819704263  PT THER PROC EA 15 MIN 4/12/2018 Young Rhodes PT GP 3    [id number]  PT HOT OR COLD PACK TREAT MCARE 4/12/2018 Young Rhodes, PT GP 1                    Young Rhodes PT  4/12/2018

## 2018-04-17 ENCOUNTER — HOSPITAL ENCOUNTER (OUTPATIENT)
Dept: PHYSICAL THERAPY | Facility: HOSPITAL | Age: 63
Setting detail: THERAPIES SERIES
Discharge: HOME | End: 2018-04-17
Attending: ORTHOPAEDIC SURGERY

## 2018-04-17 DIAGNOSIS — M17.11 PRIMARY OSTEOARTHRITIS OF RIGHT KNEE: ICD-10-CM

## 2018-04-17 DIAGNOSIS — G89.29 CHRONIC PAIN OF RIGHT KNEE: Primary | ICD-10-CM

## 2018-04-17 DIAGNOSIS — M25.561 CHRONIC PAIN OF RIGHT KNEE: Primary | ICD-10-CM

## 2018-04-17 PROCEDURE — 97110 THERAPEUTIC EXERCISES: CPT | Performed by: PHYSICAL THERAPIST

## 2018-04-17 NOTE — THERAPY TREATMENT NOTE
Outpatient Physical Therapy Ortho Treatment Note  [facility]     Patient Name: Kian Mcdaniels  : 1955  MRN: [id number]  Today's Date: 2018      Visit Date: 2018    Visit Dx:    ICD-10-CM ICD-9-CM   1. Chronic pain of right knee M25.561 719.46    G89.29 338.29   2. Primary osteoarthritis of right knee M17.11 715.16       Patient Active Problem List   Diagnosis   • Bulging lumbar disc   • Chronic pain   • Diabetic neuropathy   • Low back pain   • Degenerative arthritis of lumbar spine   • Neuropathy involving both lower extremities   • Encounter for long-term (current) use of high-risk medication   • Postlaminectomy syndrome of lumbar region   • Syringomyelia and syringobulbia   • Lumbar pseudoarthrosis   • DM2 (diabetes mellitus, type 2)   • Insulin pump in place   • Hx of decompressive lumbar laminectomy   • Bilateral carpal tunnel syndrome   • Degenerative cervical disc   • Acute pain of right knee   • Primary localized osteoarthrosis of right lower leg   • Arthritis of right knee   • Sacroiliac inflammation   • Sacroiliac joint dysfunction   • Severe obesity (BMI 35.0-39.9)   • Chronic pain of right knee   • Tricompartment osteoarthritis of left knee        Past Medical History:   Diagnosis Date   • Abscess of scrotum    • Angina pectoris    • Arteriosclerotic cardiovascular disease    • COPD (chronic obstructive pulmonary disease)    • Coronary artery disease    • Diabetes mellitus    • Diabetic peripheral neuropathy    • ED (erectile dysfunction) of non-organic origin    • Hypertension    • Insulin pump in place    • Insulin pump in place    • Low back pain    • Myocardial infarction    • Neck pain    • Spinal stenosis    • TIA (transient ischemic attack)     LEFT EYE   • Type 2 diabetes mellitus    • Upper back pain    • Wears dentures     UPPER PLATE        Past Surgical History:   Procedure Laterality Date   • AMPUTATION FOOT / TOE Left     GREAT TOE   • BACK SURGERY   10/26/2015    Bilateral L4-L5 laminectomy -Dr. Gutierres   • CARDIAC CATHETERIZATION     • CARDIAC SURGERY      CABG X 6    • CHOLECYSTECTOMY     • CORONARY ARTERY BYPASS GRAFT      X 6   • EPIDURAL BLOCK     • EYE SURGERY     • HAND SURGERY  04/28/2016   • LUMBAR DISCECTOMY FUSION INSTRUMENTATION N/A 12/12/2016    Procedure: L 4-5 FUSION WITH INSTRUMENTATION WITH BMP, WITH REMOVAL OF INSTRUMENTATION ;  Surgeon: Rowdy Spencer MD;  Location: [facility] OR;  Service:    • LUMBAR LAMINECTOMY DISCECTOMY DECOMPRESSION N/A 12/12/2016    Procedure: L4-5 REPEAT LAMINECTOMY ;  Surgeon: Tim Gutierres MD;  Location: [facility] OR;  Service:    • LUMBAR LAMINECTOMY DISCECTOMY DECOMPRESSION N/A 12/14/2016    Procedure: EVACUATION OF LUMBAR HEMATOMA;  Surgeon: Rowdy Spencer MD;  Location: [facility] OR;  Service:    • MULTIPLE TOOTH EXTRACTIONS      UPPER TEETH EXTRACTED   • ORTHOPEDIC SURGERY     • PENIS SURGERY      RECONSTRUCTION   • SCROTUM EXPLORATION      scrotum surgery   • SPINE SURGERY                               PT Assessment/Plan     Row Name 04/17/18 1150          PT Assessment    Assessment Comments Patient doing well with progression of ther ex without increased symptoms.  He demonstrates increasing independence with current exercises.  Encouraged him to consider returning to aquatic environment for less stress to joints with WB activity and exercise.    -RA        PT Plan    PT Plan Comments Continue with joint protection and progress strengthening as able/tolerated working toward inependent home program.    -RA       User Key  (r) = Recorded By, (t) = Taken By, (c) = Cosigned By    Initials Name Provider Type    RA Rajni Serra, PT Physical Therapist                    Exercises     Row Name 04/17/18 1100             Subjective Comments    Subjective Comments Knee is pretty much the same.    -RA         Subjective Pain    Able to rate subjective pain? yes  -RA      Pre-Treatment Pain Level 6  -RA          Exercise 1    Exercise Name 1 Quad set B   -RA      Cueing 1 Verbal  -RA      Reps 1 15  -RA      Time 1 3 sec  -RA         Exercise 2    Exercise Name 2 minerva SAQ  -RA      Cueing 2 Verbal  -RA      Reps 2 20  -RA      Additional Comments 5#  -RA         Exercise 3    Exercise Name 3 Calf raise  -RA      Reps 3 20  -RA         Exercise 4    Exercise Name 4 minerva hip abd, standing   -RA      Reps 4 20  -RA      Additional Comments 4#  -RA         Exercise 5    Exercise Name 5 minerva HS curl, standing   -RA      Reps 5 20  -RA      Additional Comments 4#  -RA         Exercise 6    Exercise Name 6  minerva LAQ  -RA      Cueing 6 Verbal  -RA      Reps 6 20  -RA      Additional Comments 5#  -RA         Exercise 7    Exercise Name 7 Nustep  -RA      Time 7 5 min  -RA      Additional Comments UE/LE  -RA         Exercise 8    Exercise Name 8 seated HS stretch minerva  -RA      Cueing 8 Verbal  -RA      Reps 8 3  -RA      Time 8 20  -RA         Exercise 9    Exercise Name 9 seated TKE into ball  -RA      Cueing 9 Demo  -RA      Reps 9 15  -RA      Time 9 5 seconds  -RA        User Key  (r) = Recorded By, (t) = Taken By, (c) = Cosigned By    Initials Name Provider Type    RA Rajni Serra, PT Physical Therapist                             Therapy Education  Given: Symptoms/condition management, Pain management, Posture/body mechanics, Mobility training  Program: Reinforced  How Provided: Verbal, Demonstration  Provided to: Patient  Level of Understanding: Verbalized, Demonstrated              Time Calculation:   Start Time: 1104  Stop Time: 1155  Time Calculation (min): 51 min    Therapy Charges for Today     Code Description Service Date Service Provider Modifiers Qty    51257734154  PT HOT OR COLD PACK TREAT MCARE 4/17/2018 Rajni Serra, PT GP 1    [id number]  PT THER PROC EA 15 MIN 4/17/2018 Rajni Serra PT GP 3                    Rajni Serra, PT  4/17/2018

## 2018-04-18 DIAGNOSIS — Z98.1 S/P LUMBAR SPINAL FUSION: ICD-10-CM

## 2018-04-18 RX ORDER — OXYCODONE HYDROCHLORIDE 10 MG/1
10 TABLET ORAL EVERY 6 HOURS PRN
Qty: 120 TABLET | Refills: 0 | Status: SHIPPED | OUTPATIENT
Start: 2018-04-18 | End: 2018-05-15 | Stop reason: SDUPTHER

## 2018-04-19 ENCOUNTER — HOSPITAL ENCOUNTER (OUTPATIENT)
Dept: PHYSICAL THERAPY | Facility: HOSPITAL | Age: 63
Setting detail: THERAPIES SERIES
Discharge: HOME | End: 2018-04-19
Attending: ORTHOPAEDIC SURGERY

## 2018-04-19 DIAGNOSIS — M25.561 CHRONIC PAIN OF RIGHT KNEE: Primary | ICD-10-CM

## 2018-04-19 DIAGNOSIS — M17.11 PRIMARY OSTEOARTHRITIS OF RIGHT KNEE: ICD-10-CM

## 2018-04-19 DIAGNOSIS — G89.29 CHRONIC PAIN OF RIGHT KNEE: Primary | ICD-10-CM

## 2018-04-19 PROCEDURE — 97110 THERAPEUTIC EXERCISES: CPT

## 2018-04-19 NOTE — THERAPY TREATMENT NOTE
Outpatient Physical Therapy Ortho Treatment Note  [facility]     Patient Name: Kian Mcdaniels  : 1955  MRN: [id number]  Today's Date: 2018      Visit Date: 2018    Visit Dx:    ICD-10-CM ICD-9-CM   1. Chronic pain of right knee M25.561 719.46    G89.29 338.29   2. Primary osteoarthritis of right knee M17.11 715.16       Patient Active Problem List   Diagnosis   • Bulging lumbar disc   • Chronic pain   • Diabetic neuropathy   • Low back pain   • Degenerative arthritis of lumbar spine   • Neuropathy involving both lower extremities   • Encounter for long-term (current) use of high-risk medication   • Postlaminectomy syndrome of lumbar region   • Syringomyelia and syringobulbia   • Lumbar pseudoarthrosis   • DM2 (diabetes mellitus, type 2)   • Insulin pump in place   • Hx of decompressive lumbar laminectomy   • Bilateral carpal tunnel syndrome   • Degenerative cervical disc   • Acute pain of right knee   • Primary localized osteoarthrosis of right lower leg   • Arthritis of right knee   • Sacroiliac inflammation   • Sacroiliac joint dysfunction   • Severe obesity (BMI 35.0-39.9)   • Chronic pain of right knee   • Tricompartment osteoarthritis of left knee        Past Medical History:   Diagnosis Date   • Abscess of scrotum    • Angina pectoris    • Arteriosclerotic cardiovascular disease    • COPD (chronic obstructive pulmonary disease)    • Coronary artery disease    • Diabetes mellitus    • Diabetic peripheral neuropathy    • ED (erectile dysfunction) of non-organic origin    • Hypertension    • Insulin pump in place    • Insulin pump in place    • Low back pain    • Myocardial infarction    • Neck pain    • Spinal stenosis    • TIA (transient ischemic attack)     LEFT EYE   • Type 2 diabetes mellitus    • Upper back pain    • Wears dentures     UPPER PLATE        Past Surgical History:   Procedure Laterality Date   • AMPUTATION FOOT / TOE Left     GREAT TOE   • BACK SURGERY   10/26/2015    Bilateral L4-L5 laminectomy -Dr. Gutierres   • CARDIAC CATHETERIZATION     • CARDIAC SURGERY      CABG X 6    • CHOLECYSTECTOMY     • CORONARY ARTERY BYPASS GRAFT      X 6   • EPIDURAL BLOCK     • EYE SURGERY     • HAND SURGERY  04/28/2016   • LUMBAR DISCECTOMY FUSION INSTRUMENTATION N/A 12/12/2016    Procedure: L 4-5 FUSION WITH INSTRUMENTATION WITH BMP, WITH REMOVAL OF INSTRUMENTATION ;  Surgeon: Rowdy Spencer MD;  Location: [facility] OR;  Service:    • LUMBAR LAMINECTOMY DISCECTOMY DECOMPRESSION N/A 12/12/2016    Procedure: L4-5 REPEAT LAMINECTOMY ;  Surgeon: Tim Gutierres MD;  Location: [facility] MAIN OR;  Service:    • LUMBAR LAMINECTOMY DISCECTOMY DECOMPRESSION N/A 12/14/2016    Procedure: EVACUATION OF LUMBAR HEMATOMA;  Surgeon: Rowdy Spencer MD;  Location: [facility] OR;  Service:    • MULTIPLE TOOTH EXTRACTIONS      UPPER TEETH EXTRACTED   • ORTHOPEDIC SURGERY     • PENIS SURGERY      RECONSTRUCTION   • SCROTUM EXPLORATION      scrotum surgery   • SPINE SURGERY                               PT Assessment/Plan     Row Name 04/19/18 1100          PT Assessment    Assessment Comments Continue to progress strengthening. increased weight with open chain exercises  -WS       User Key  (r) = Recorded By, (t) = Taken By, (c) = Cosigned By    Initials Name Provider Type     Jayson Becerra PTA Physical Therapy Assistant                Modalities     Row Name 04/19/18 1025             Ice    Ice Applied Yes  -WS      Location right knee on 2 pillows  -WS      Rx Minutes 10 mins  -WS      Ice S/P Rx Yes  -WS        User Key  (r) = Recorded By, (t) = Taken By, (c) = Cosigned By    Initials Name Provider Type     Jayson Becerra PTA Physical Therapy Assistant                Exercises     Row Name 04/19/18 1025             Subjective Comments    Subjective Comments Knee pain. Feel stiff and sore  -         Subjective Pain    Able to rate subjective pain? yes  -WS      Pre-Treatment Pain Level  5  -WS         Exercise 1    Exercise Name 1 Quad set B   -WS      Cueing 1 Verbal  -WS      Reps 1 15  -WS      Time 1 3 sec  -WS         Exercise 2    Exercise Name 2 minerva SAQ  -WS      Cueing 2 Verbal  -WS      Reps 2 20  -WS      Additional Comments 7#  -WS         Exercise 3    Exercise Name 3 Calf raise  -WS      Reps 3 20  -WS         Exercise 4    Exercise Name 4 minerva hip abd, standing   -WS      Reps 4 20  -WS      Additional Comments 5#  -WS         Exercise 5    Exercise Name 5 minerva HS curl, standing   -WS      Reps 5 20  -WS      Additional Comments 5#  -WS         Exercise 6    Exercise Name 6  minerva LAQ  -WS      Reps 6 20  -WS      Additional Comments 7#  -WS         Exercise 7    Exercise Name 7 Nustep  -WS      Time 7 5 min  -WS      Additional Comments UE/LE  -WS         Exercise 8    Exercise Name 8 seated HS stretch minerva  -WS      Cueing 8 Verbal  -WS      Reps 8 3  -WS      Time 8 20  -WS         Exercise 9    Exercise Name 9 seated TKE into ball  -WS      Cueing 9 Demo  -WS      Reps 9 15  -WS      Time 9 5 seconds  -WS        User Key  (r) = Recorded By, (t) = Taken By, (c) = Cosigned By    Initials Name Provider Type    ALEJANDRO Becerra, PTA Physical Therapy Assistant                               PT OP Goals     Row Name 04/19/18 1100          PT Short Term Goals    STG Date to Achieve 03/20/18  -     STG 1 Pt. will be independent and compliant with initial home exercise program.  -WS     STG 1 Progress Met  -WS     STG 2 Pt. will perform 15-20 repetitions of LE PRE's with proper form.  -     STG 2 Progress Met  -     STG 3 Pt. will ambulate with AD with optimal form to decrease risk of falls.  -     STG 3 Progress Ongoing;Progressing  -        Long Term Goals    LTG Date to Achieve 04/04/18  -     LTG 1 Pt. will be independent and compliant with advanced home exercise program.  -     LTG 1 Progress Progressing  -     LTG 2 Pt. will verbalize plan to continue HEP after discharge  for optimal prognosis.   -     LTG 2 Progress Ongoing  -       User Key  (r) = Recorded By, (t) = Taken By, (c) = Cosigned By    Initials Name Provider Type    ALEJANDRO Becerra PTA Physical Therapy Assistant          Therapy Education  Given: HEP, Symptoms/condition management  Program: Reinforced  How Provided: Verbal  Provided to: Patient              Time Calculation:   Start Time: 1025  Stop Time: 1110  Time Calculation (min): 45 min    Therapy Charges for Today     Code Description Service Date Service Provider Modifiers Qty    07144797829 HC PT THER PROC EA 15 MIN 4/19/2018 Jayson Becerra PTA GP 2    75418001095 HC PT HOT OR COLD PACK TREAT MCARE 4/19/2018 Jayson Becerra PTA GP 1                    Jayson Becerra PTA  4/19/2018

## 2018-04-20 ENCOUNTER — OFFICE VISIT (OUTPATIENT)
Dept: PAIN MEDICINE | Facility: CLINIC | Age: 63
End: 2018-04-20

## 2018-04-20 VITALS
BODY MASS INDEX: 39.06 KG/M2 | HEART RATE: 87 BPM | RESPIRATION RATE: 16 BRPM | SYSTOLIC BLOOD PRESSURE: 177 MMHG | WEIGHT: 279 LBS | HEIGHT: 71 IN | TEMPERATURE: 98 F | OXYGEN SATURATION: 96 % | DIASTOLIC BLOOD PRESSURE: 99 MMHG

## 2018-04-20 DIAGNOSIS — Z79.899 ENCOUNTER FOR LONG-TERM (CURRENT) USE OF HIGH-RISK MEDICATION: ICD-10-CM

## 2018-04-20 DIAGNOSIS — M96.1 POSTLAMINECTOMY SYNDROME OF LUMBAR REGION: ICD-10-CM

## 2018-04-20 DIAGNOSIS — G89.29 OTHER CHRONIC PAIN: Primary | ICD-10-CM

## 2018-04-20 DIAGNOSIS — M50.30 DEGENERATIVE CERVICAL DISC: ICD-10-CM

## 2018-04-20 PROCEDURE — 99214 OFFICE O/P EST MOD 30 MIN: CPT | Performed by: NURSE PRACTITIONER

## 2018-04-20 RX ORDER — CYCLOBENZAPRINE HCL 10 MG
10 TABLET ORAL 2 TIMES DAILY PRN
Qty: 60 TABLET | Refills: 1 | Status: SHIPPED | OUTPATIENT
Start: 2018-04-20 | End: 2018-07-27 | Stop reason: SDUPTHER

## 2018-04-20 NOTE — PROGRESS NOTES
CHIEF COMPLAINT  Pt reportedly received 40 % relief following 3/27/18 Bilat. S. I. Joint injection    Subjective   Kian Mcdaniels is a 62 y.o. male  who presents to the office for follow-up of procedure.  He completed a bilateral SI joint injection   on  3/27/18 performed by Dr. Guzman for management of low back pain, bilateral SI joint injection. Patient reports 40% relief from the procedure which is ongoing. He is now reporting pain in the mid-back.  More muscular in nature.      Complains of pain in his neck, back and right knee. Today his pain is 8/10VAS.  Continues with Oxycodone 10 mg 4/day, gabapentin 800 mg 3/day and tizanidine 4mg 1-2/night. Denies any side effects from the regimen. The regimen helps decrease his pain by 60%. Notes improvement in function and activity. ADL's by self.     Compounded cream ordered last visit, has not heard anything about this.       He was seen by Dr. Gutierres on 2/20/18.  Reviewed Cervical MRI which shows a syrinx which is unchanged in size.  No recommendations for surgery.  Repeat scan in 6 months.     Saw Dr. Perez 3/8/18, wants patient to lose 40 pounds before proceeding with right TKA, he was given a compounded  Pain cream (ketoprofen, lidocaine, bupivacaine), costing $65 for a tiny tub.  Also started PT.  Has been going to PT twice a week.       He has had repeat L4-5 laminectomy with fusion by Dr. Guteirres and Dr. Spencer 12/12/16. Nothing else surgical recommended at this time.      Back Pain   This is a chronic problem. The current episode started more than 1 year ago. The problem occurs constantly. The problem has been waxing and waning since onset. The pain is present in the sacro-iliac. The quality of the pain is described as aching, shooting and stabbing. The pain is at a severity of 8/10. The pain is the same all the time. The symptoms are aggravated by bending, sitting and standing. Stiffness is present all day. Associated symptoms include headaches (across up  back of his head), leg pain, numbness (bilateral feet), paresthesias and tingling. Pertinent negatives include no chest pain, dysuria, fever or weakness. Risk factors include lack of exercise, obesity and sedentary lifestyle. He has tried analgesics for the symptoms. The treatment provided moderate relief.   Neck Pain    This is a chronic (started noticing right before most recent back surgery) problem. The current episode started more than 1 month ago. The problem occurs constantly. The problem has been gradually worsening. The pain is associated with nothing. The pain is present in the left side, right side and midline. The quality of the pain is described as cramping. The pain is at a severity of 8/10. The pain is moderate. The symptoms are aggravated by sneezing and twisting. The pain is same all the time. Associated symptoms include headaches (across up back of his head), leg pain, numbness (bilateral feet) and tingling. Pertinent negatives include no chest pain, fever or weakness. He has tried muscle relaxants, oral narcotics and bed rest for the symptoms. The treatment provided moderate relief.      PEG Assessment   What number best describes your pain on average in the past week?8  What number best describes how, during the past week, pain has interfered with your enjoyment of life?8  What number best describes how, during the past week, pain has interfered with your general activity?  8    The following portions of the patient's history were reviewed and updated as appropriate: allergies, current medications, past family history, past medical history, past social history, past surgical history and problem list.    Review of Systems   Constitutional: Positive for activity change (PT twice weekly for knees). Negative for appetite change, fatigue and fever.   HENT: Negative for congestion.    Eyes: Negative for visual disturbance.   Respiratory: Negative for cough, shortness of breath and wheezing.   "  Cardiovascular: Negative.  Negative for chest pain.   Gastrointestinal: Negative for constipation, diarrhea and nausea.   Genitourinary: Negative for difficulty urinating and dysuria.   Musculoskeletal: Positive for arthralgias (right knee), back pain and neck pain.   Neurological: Positive for tingling, numbness (bilateral feet), headaches (across up back of his head) and paresthesias. Negative for dizziness, weakness and light-headedness.   Psychiatric/Behavioral: Negative for agitation, sleep disturbance and suicidal ideas. The patient is not nervous/anxious.      Vitals:    04/20/18 1128   BP: 177/99   Pulse: 87   Resp: 16   Temp: 98 °F (36.7 °C)   SpO2: 96%   Weight: 127 kg (279 lb)   Height: 180.3 cm (70.98\")   PainSc: 8  Comment: LBP ranges from 6-8/10   PainLoc: Back     Objective   Physical Exam   Constitutional: He is oriented to person, place, and time. Vital signs are normal. He appears well-developed and well-nourished. He is cooperative.   HENT:   Head: Normocephalic and atraumatic.   Nose: Nose normal.   Eyes: Conjunctivae and lids are normal.   Neck: Trachea normal. Neck supple. Muscular tenderness present. No spinous process tenderness present. Decreased range of motion present.   Cardiovascular: Normal rate.    Pulmonary/Chest: Effort normal.   Abdominal:   obese   Musculoskeletal:        Right knee: He exhibits decreased range of motion. Tenderness found.        Cervical back: He exhibits tenderness and pain. He exhibits no spasm.        Lumbar back: He exhibits tenderness and bony tenderness.   Right knee brace     Neurological: He is alert and oriented to person, place, and time. Gait (limping of RLE, aid of cane) abnormal.   Skin: Skin is warm, dry and intact.   Psychiatric: He has a normal mood and affect. His speech is normal and behavior is normal. Judgment and thought content normal. Cognition and memory are normal.   Nursing note and vitals reviewed.    Assessment/Plan   Kian was seen " today for back pain.    Diagnoses and all orders for this visit:    Other chronic pain  -     TENS (Transcutaneous Electrical Nerve Stimulator)    Postlaminectomy syndrome of lumbar region  -     Ambulatory Referral to Physical Therapy Evaluate and treat  -     TENS (Transcutaneous Electrical Nerve Stimulator)    Degenerative cervical disc  -     Ambulatory Referral to Physical Therapy Evaluate and treat  -     TENS (Transcutaneous Electrical Nerve Stimulator)    Encounter for long-term (current) use of high-risk medication    Other orders  -     cyclobenzaprine (FLEXERIL) 10 MG tablet; Take 1 tablet by mouth 2 (Two) Times a Day As Needed for Muscle Spasms.      --- Compound Cream  --- TENS unit   --- Refill Oxycodone. Patient appears stable with current regimen. No adverse effects. Regarding continuation of opioids, there is no evidence of aberrant behavior or any red flags.  The patient continues with appropriate response to opioid therapy. ADL's remain intact by self.   --- The urine drug screen confirmation from 2/1/2018 has been reviewed and the result is appropriate based on patient history and DAVID report  --- Will have PT work with neck and back   --- Trial a different muscle relaxant. Discussed medication with the patient.  Included in this discussion was the potential for side effects and adverse events.  Patient verbalized understanding and wished to proceed.  Prescription will be sent to pharmacy (Flexeril).  --- Follow-up 1 month         DAVID REPORT  As part of the patient's treatment plan, I am prescribing controlled substances. The patient has been made aware of appropriate use of such medications, including potential risk of somnolence, limited ability to drive and/or work safely, and the potential for dependence or overdose. It has also bee made clear that these medications are for use by this patient only, without concomitant use of alcohol or other substances unless prescribed.     Patient has  completed prescribing agreement detailing terms of continued prescribing of controlled substances, including monitoring DAVID reports, urine drug screening, and pill counts if necessary. The patient is aware that inappropriate use will results in cessation of prescribing such medications.    DAVID report has been reviewed and scanned into the patient's chart.    Date of last DAVID : 4/19/2018    History and physical exam exhibit continued safe and appropriate use of controlled substances.     EMR Dragon/Transcription disclaimer:   Much of this encounter note is an electronic transcription/translation of spoken language to printed text. The electronic translation of spoken language may permit erroneous, or at times, nonsensical words or phrases to be inadvertently transcribed; Although I have reviewed the note for such errors, some may still exist.

## 2018-04-24 ENCOUNTER — HOSPITAL ENCOUNTER (OUTPATIENT)
Dept: PHYSICAL THERAPY | Facility: HOSPITAL | Age: 63
Setting detail: THERAPIES SERIES
Discharge: HOME | End: 2018-04-24
Attending: ORTHOPAEDIC SURGERY

## 2018-04-24 DIAGNOSIS — G89.29 OTHER CHRONIC PAIN: ICD-10-CM

## 2018-04-24 DIAGNOSIS — M25.561 CHRONIC PAIN OF RIGHT KNEE: Primary | ICD-10-CM

## 2018-04-24 DIAGNOSIS — G89.29 CHRONIC PAIN OF RIGHT KNEE: Primary | ICD-10-CM

## 2018-04-24 DIAGNOSIS — M47.816 OSTEOARTHRITIS OF LUMBAR SPINE, UNSPECIFIED SPINAL OSTEOARTHRITIS COMPLICATION STATUS: ICD-10-CM

## 2018-04-24 DIAGNOSIS — M17.11 PRIMARY OSTEOARTHRITIS OF RIGHT KNEE: ICD-10-CM

## 2018-04-24 DIAGNOSIS — G57.93 NEUROPATHY INVOLVING BOTH LOWER EXTREMITIES: ICD-10-CM

## 2018-04-24 DIAGNOSIS — M54.16 LUMBAR RADICULOPATHY: ICD-10-CM

## 2018-04-24 DIAGNOSIS — E10.42 DIABETIC POLYNEUROPATHY ASSOCIATED WITH TYPE 1 DIABETES MELLITUS (HCC): ICD-10-CM

## 2018-04-24 DIAGNOSIS — M51.36 BULGING LUMBAR DISC: ICD-10-CM

## 2018-04-24 DIAGNOSIS — M54.2 CERVICAL PAIN: ICD-10-CM

## 2018-04-24 PROCEDURE — 97110 THERAPEUTIC EXERCISES: CPT

## 2018-04-24 NOTE — THERAPY TREATMENT NOTE
Outpatient Physical Therapy Ortho Treatment Note  [facility]     Patient Name: Kian Mcdaniels  : 1955  MRN: [id number]  Today's Date: 2018      Visit Date: 2018    Visit Dx:    ICD-10-CM ICD-9-CM   1. Chronic pain of right knee M25.561 719.46    G89.29 338.29   2. Primary osteoarthritis of right knee M17.11 715.16   3. Cervical pain M54.2 723.1   4. Other chronic pain G89.29 338.29   5. Diabetic polyneuropathy associated with type 1 diabetes mellitus E10.42 250.61     357.2   6. Lumbar radiculopathy M54.16 724.4   7. Osteoarthritis of lumbar spine, unspecified spinal osteoarthritis complication status M47.816 721.3   8. Bulging lumbar disc M51.26 722.10   9. Neuropathy involving both lower extremities G57.93 356.9       Patient Active Problem List   Diagnosis   • Bulging lumbar disc   • Chronic pain   • Diabetic neuropathy   • Low back pain   • Degenerative arthritis of lumbar spine   • Neuropathy involving both lower extremities   • Encounter for long-term (current) use of high-risk medication   • Postlaminectomy syndrome of lumbar region   • Syringomyelia and syringobulbia   • Lumbar pseudoarthrosis   • DM2 (diabetes mellitus, type 2)   • Insulin pump in place   • Hx of decompressive lumbar laminectomy   • Bilateral carpal tunnel syndrome   • Degenerative cervical disc   • Acute pain of right knee   • Primary localized osteoarthrosis of right lower leg   • Arthritis of right knee   • Sacroiliac inflammation   • Sacroiliac joint dysfunction   • Severe obesity (BMI 35.0-39.9)   • Chronic pain of right knee   • Tricompartment osteoarthritis of left knee        Past Medical History:   Diagnosis Date   • Abscess of scrotum    • Angina pectoris    • Arteriosclerotic cardiovascular disease    • COPD (chronic obstructive pulmonary disease)    • Coronary artery disease    • Diabetes mellitus    • Diabetic peripheral neuropathy    • ED (erectile dysfunction) of non-organic origin    •  Hypertension    • Insulin pump in place    • Insulin pump in place    • Low back pain    • Myocardial infarction    • Neck pain    • Spinal stenosis    • TIA (transient ischemic attack)     LEFT EYE   • Type 2 diabetes mellitus    • Upper back pain    • Wears dentures     UPPER PLATE        Past Surgical History:   Procedure Laterality Date   • AMPUTATION FOOT / TOE Left     GREAT TOE   • BACK SURGERY  10/26/2015    Bilateral L4-L5 laminectomy -Dr. Gutierres   • CARDIAC CATHETERIZATION     • CARDIAC SURGERY      CABG X 6    • CHOLECYSTECTOMY     • CORONARY ARTERY BYPASS GRAFT      X 6   • EPIDURAL BLOCK     • EYE SURGERY     • HAND SURGERY  04/28/2016   • LUMBAR DISCECTOMY FUSION INSTRUMENTATION N/A 12/12/2016    Procedure: L 4-5 FUSION WITH INSTRUMENTATION WITH BMP, WITH REMOVAL OF INSTRUMENTATION ;  Surgeon: Rowdy Spencer MD;  Location: [facility] OR;  Service:    • LUMBAR LAMINECTOMY DISCECTOMY DECOMPRESSION N/A 12/12/2016    Procedure: L4-5 REPEAT LAMINECTOMY ;  Surgeon: Tim Gutierres MD;  Location: [facility] OR;  Service:    • LUMBAR LAMINECTOMY DISCECTOMY DECOMPRESSION N/A 12/14/2016    Procedure: EVACUATION OF LUMBAR HEMATOMA;  Surgeon: Rowdy Spencer MD;  Location: [facility] OR;  Service:    • MULTIPLE TOOTH EXTRACTIONS      UPPER TEETH EXTRACTED   • ORTHOPEDIC SURGERY     • PENIS SURGERY      RECONSTRUCTION   • SCROTUM EXPLORATION      scrotum surgery   • SPINE SURGERY                               PT Assessment/Plan     Row Name 04/24/18 1206          PT Assessment    Assessment Comments Mr Mcdaniels is progressing with his strengthening exercises, tolerating increased weight, without increase in pain.  -LP     Please refer to paper survey for additional self-reported information Yes  -LP     Rehab Potential Good  -LP     Patient/caregiver participated in establishment of treatment plan and goals Yes  -LP     Patient would benefit from skilled therapy intervention Yes  -LP        PT Plan    PT  Frequency 2x/week  -LP     Predicted Duration of Therapy Intervention (OT Eval) 3 weeks  -LP     PT Plan Comments Continue with current plan  -LP       User Key  (r) = Recorded By, (t) = Taken By, (c) = Cosigned By    Initials Name Provider Type    JERI Armendariz, PT Physical Therapist                    Exercises     Row Name 04/24/18 1100             Subjective Comments    Subjective Comments the weather has me feeling more sore today  -LP         Subjective Pain    Able to rate subjective pain? yes  -LP      Pre-Treatment Pain Level 6  -LP         Exercise 1    Exercise Name 1 Quad set B   -LP      Reps 1 15  -LP      Time 1 3sec  -LP         Exercise 2    Exercise Name 2 minerva SAQ  -LP      Reps 2 20  -LP      Additional Comments 7#  -LP         Exercise 3    Exercise Name 3 Calf raise  -LP      Reps 3 20  -LP         Exercise 4    Exercise Name 4 minerva hip abd, standing   -LP      Reps 4 20  -LP      Additional Comments 5#  -LP         Exercise 5    Exercise Name 5 minerva HS curl, standing   -LP      Reps 5 20  -LP      Additional Comments 5#  -LP         Exercise 6    Exercise Name 6  minerva LAQ  -LP      Reps 6 20  -LP      Additional Comments 7#  -LP         Exercise 7    Exercise Name 7 Nustep  -LP      Time 7 5 min  -LP         Exercise 9    Exercise Name 9 seated TKE into ball  -LP      Reps 9 15  -LP        User Key  (r) = Recorded By, (t) = Taken By, (c) = Cosigned By    Initials Name Provider Type    JERI Armendariz, PT Physical Therapist                               PT OP Goals     Row Name 04/24/18 1100          PT Short Term Goals    STG 3 Pt. will ambulate with AD with optimal form to decrease risk of falls.  -LP     STG 3 Progress Progressing  -LP     STG 3 Progress Comments worked on better weight shift with AD  -LP        Long Term Goals    LTG 1 Pt. will be independent and compliant with advanced home exercise program.  -LP     LTG 1 Progress Progressing  -LP     LTG 2 Pt. will verbalize plan  to continue HEP after discharge for optimal prognosis.   -LP     LTG 2 Progress Ongoing  -LP       User Key  (r) = Recorded By, (t) = Taken By, (c) = Cosigned By    Initials Name Provider Type    LP Nina Armendariz, PT Physical Therapist          Therapy Education  Given: HEP, Fall prevention and home safety  Program: Reinforced  How Provided: Verbal, Demonstration  Provided to: Patient  Level of Understanding: Teach back education performed              Time Calculation:   Start Time: 1130  Stop Time: 1215  Time Calculation (min): 45 min    Therapy Charges for Today     Code Description Service Date Service Provider Modifiers Qty    [id number]  PT THER PROC EA 15 MIN 4/24/2018 Nina Armendariz, PT GP 2                    Nina Armendariz PT  4/24/2018

## 2018-04-26 ENCOUNTER — APPOINTMENT (OUTPATIENT)
Dept: PHYSICAL THERAPY | Facility: HOSPITAL | Age: 63
End: 2018-04-26
Attending: ORTHOPAEDIC SURGERY

## 2018-04-30 ENCOUNTER — TELEPHONE (OUTPATIENT)
Dept: ORTHOPEDIC SURGERY | Facility: CLINIC | Age: 63
End: 2018-04-30

## 2018-05-15 DIAGNOSIS — Z98.1 S/P LUMBAR SPINAL FUSION: ICD-10-CM

## 2018-05-15 RX ORDER — OXYCODONE HYDROCHLORIDE 10 MG/1
10 TABLET ORAL EVERY 6 HOURS PRN
Qty: 120 TABLET | Refills: 0 | Status: SHIPPED | OUTPATIENT
Start: 2018-05-15 | End: 2018-06-19 | Stop reason: SDUPTHER

## 2018-05-18 ENCOUNTER — OFFICE VISIT (OUTPATIENT)
Dept: PAIN MEDICINE | Facility: CLINIC | Age: 63
End: 2018-05-18

## 2018-05-18 VITALS
HEIGHT: 71 IN | OXYGEN SATURATION: 95 % | HEART RATE: 82 BPM | TEMPERATURE: 96.9 F | WEIGHT: 278.4 LBS | RESPIRATION RATE: 16 BRPM | SYSTOLIC BLOOD PRESSURE: 119 MMHG | DIASTOLIC BLOOD PRESSURE: 71 MMHG | BODY MASS INDEX: 38.98 KG/M2

## 2018-05-18 DIAGNOSIS — Z79.899 ENCOUNTER FOR LONG-TERM (CURRENT) USE OF HIGH-RISK MEDICATION: ICD-10-CM

## 2018-05-18 DIAGNOSIS — M50.30 DEGENERATIVE CERVICAL DISC: ICD-10-CM

## 2018-05-18 DIAGNOSIS — G89.29 OTHER CHRONIC PAIN: Primary | ICD-10-CM

## 2018-05-18 DIAGNOSIS — Z98.890 HX OF DECOMPRESSIVE LUMBAR LAMINECTOMY: ICD-10-CM

## 2018-05-18 DIAGNOSIS — M47.16 OSTEOARTHRITIS OF LUMBAR SPINE WITH MYELOPATHY: ICD-10-CM

## 2018-05-18 DIAGNOSIS — M96.1 POSTLAMINECTOMY SYNDROME OF LUMBAR REGION: ICD-10-CM

## 2018-05-18 PROCEDURE — 99213 OFFICE O/P EST LOW 20 MIN: CPT | Performed by: NURSE PRACTITIONER

## 2018-05-18 NOTE — PROGRESS NOTES
"CHIEF COMPLAINT  F.U back pain. Pt states back and knees bothering him more, dmitri with the rain. Last visit 4-20-18. He has been using the flexeril at night. States he had a \"sharp\" pain in his neck yesterday for 10-15 minutes but it went away. He is going to have an MRI of thoracic and cervical spine done soon before he sees neurosurgeon in August. States he never heard from [facility] about the cream.    Subjective   Kian Mcdaniels is a 62 y.o. male  who presents to the office for follow-up.He has a history of chronic neck and back pain.    Complains of pain in his neck, back and right knee. Today his pain is 8/10VAS.  Continues with Oxycodone 10 mg 4/day, gabapentin 800 mg 3/day and tizanidine 4mg 1-2/night. Denies any side effects from the regimen. The regimen helps decrease his pain by 60%. Notes improvement in function and activity. ADL's by self.      He was seen by Dr. Gutierres on 2/20/18.  Reviewed Cervical MRI which shows a syrinx which is unchanged in size.  No recommendations for surgery.  Repeat scan in 6 months.     Saw Dr. Perez 3/8/18, wants patient to lose 40 pounds before proceeding with right TKA. Has been going to PT twice a week.       He has had repeat L4-5 laminectomy with fusion by Dr. Gutierres and Dr. Spencer 12/12/16. Nothing else surgical recommended at this time.      He reports about 30-40% relief from most recent SI joint injection.      Back Pain   This is a chronic problem. The current episode started more than 1 year ago. The problem occurs constantly. The problem has been waxing and waning since onset. The pain is present in the sacro-iliac. The quality of the pain is described as aching, shooting and stabbing. The pain is at a severity of 8/10. The pain is the same all the time. The symptoms are aggravated by bending, sitting and standing. Stiffness is present all day. Associated symptoms include headaches (across up back of his head), leg pain, numbness (bilateral feet), paresthesias and " tingling. Pertinent negatives include no chest pain, dysuria, fever or weakness. Risk factors include lack of exercise, obesity and sedentary lifestyle. He has tried analgesics for the symptoms. The treatment provided moderate relief.   Neck Pain    This is a chronic problem. The current episode started more than 1 month ago. The problem occurs constantly. The problem has been gradually worsening. The pain is associated with nothing. The pain is present in the left side, right side and midline. The quality of the pain is described as cramping. The pain is at a severity of 8/10. The pain is moderate. The symptoms are aggravated by sneezing and twisting. The pain is same all the time. Associated symptoms include headaches (across up back of his head), leg pain, numbness (bilateral feet) and tingling. Pertinent negatives include no chest pain, fever or weakness. He has tried muscle relaxants, oral narcotics and bed rest for the symptoms. The treatment provided moderate relief.      PEG Assessment   What number best describes your pain on average in the past week?8  What number best describes how, during the past week, pain has interfered with your enjoyment of life?8  What number best describes how, during the past week, pain has interfered with your general activity?  8    The following portions of the patient's history were reviewed and updated as appropriate: allergies, current medications, past family history, past medical history, past social history, past surgical history and problem list.    Review of Systems   Constitutional: Negative for activity change, appetite change, fatigue and fever.   HENT: Negative for congestion.    Eyes: Negative for visual disturbance.   Respiratory: Negative for cough, shortness of breath and wheezing.    Cardiovascular: Negative.  Negative for chest pain.   Gastrointestinal: Negative for constipation, diarrhea and nausea.   Genitourinary: Negative for difficulty urinating and  "dysuria.   Musculoskeletal: Positive for arthralgias (right knee), back pain and neck pain.   Neurological: Positive for tingling, numbness (bilateral feet), headaches (across up back of his head) and paresthesias. Negative for dizziness, weakness and light-headedness.   Psychiatric/Behavioral: Negative for agitation, sleep disturbance and suicidal ideas. The patient is not nervous/anxious.      Vitals:    05/18/18 1221   BP: 119/71   Pulse: 82   Resp: 16   Temp: 96.9 °F (36.1 °C)   SpO2: 95%   Weight: 126 kg (278 lb 6.4 oz)   Height: 180.3 cm (70.98\")   PainSc:   8   PainLoc: Back  Comment: and knees     Objective   Physical Exam   Constitutional: He is oriented to person, place, and time. Vital signs are normal. He appears well-developed and well-nourished. He is cooperative.   HENT:   Head: Normocephalic and atraumatic.   Nose: Nose normal.   Eyes: Conjunctivae and lids are normal.   Neck: Trachea normal. Neck supple. Muscular tenderness present. No spinous process tenderness present. Decreased range of motion present.   Cardiovascular: Normal rate.    Pulmonary/Chest: Effort normal.   Abdominal:   obese   Musculoskeletal:        Right knee: He exhibits decreased range of motion. Tenderness found.        Cervical back: He exhibits decreased range of motion, tenderness and pain. He exhibits no spasm.        Lumbar back: He exhibits decreased range of motion, tenderness, bony tenderness and pain.   Right knee brace     Neurological: He is alert and oriented to person, place, and time. Gait (limping of RLE, aid of cane) abnormal.   Skin: Skin is warm, dry and intact.   Psychiatric: He has a normal mood and affect. His speech is normal and behavior is normal. Judgment and thought content normal. Cognition and memory are normal.   Nursing note and vitals reviewed.    Assessment/Plan   Kian was seen today for back pain.    Diagnoses and all orders for this visit:    Other chronic pain    Osteoarthritis of lumbar spine " with myelopathy    Hx of decompressive lumbar laminectomy    Postlaminectomy syndrome of lumbar region    Degenerative cervical disc    Encounter for long-term (current) use of high-risk medication      --- Check on compounded pain cream  --- Refill Oxycodone. Patient appears stable with current regimen. No adverse effects. Regarding continuation of opioids, there is no evidence of aberrant behavior or any red flags.  The patient continues with appropriate response to opioid therapy. ADL's remain intact by self.   --- The ODT confirmation from 2/7/2018 has been reviewed and the result is appropriate based on patient history and DAVID report    ----------  Education about SCS therapy:    -  This was an extended office visit in which we entered into discussion about advanced pain relieving techniques, and discussed implantable pain therapies.  We discussed advanced neuromodulation in the form of Spinal Cord Stimulation.  This is a reasonable therapy for patients who have exhausted basic nonnarcotic options, basic modalities and physical therapies, and do not have any other reasonable surgical options.  This therapy as an alternative to long term high dose opioid therapy.    -  Risks include but are not limited to bleeding, infection, injury, paralysis, nerve injury, dural puncture, and risk for postprocedural pain.  Implanted equipment risks include but are not limited to lead migration, lead fracture, risk of loss of pain relieving stimulation, risk of electrical shock, and risk of system failure.    - We discussed the theory and basic science behind SCS therapy including but not limited to energy delivery and relevant anatomy, in terms that are easy to understand and also with use of illustrative devices.  Spinal Cord Stimulation therapies apply an electromagnetic field to a specific area on the spinal cord (Dorsal Column) to attempt to block transmission of painful signals from the peripheral nerves to the brain.     -  We discussed that prior to trialing, I request that patients review relevant materials and perform some research, and also have a follow up education session with a device specialist from the .  Also, insurance requires a presurgical psychological evaluation.  When these have been completed, and all the patient's questions have been answered to their satisfaction, then we will plan to request authorization for trialing.   - We discussed the trialing process (aka Phase 1)  that usually lasts a week, and the temporary nature of this trial.  Trial success will determine whether or not we proceed to implant.  We discussed reasonable expectations, and that I feel that consistent 50% pain relief is medically successful and is a reasonable expectation to justify moving forward to permanent implant.    -  Additional risks of Phase 1 include but are not limited to bleeding on insertion, bleeding on lead removal, and procedural site soreness.  - We discussed the percutaneous surgical implant, including postsurgical restrictions, risks, and alternatives.   For spinal cord stimulation implanted device (aka SCS Phase 2) there is usually a midline vertical incision for the spinally implanted leads, and also a horizontal incision in the posterior lumbar flank for implantation of the battery & computer (aka IPG).  The leads are tunneled from the midline incision to the medial aspect of the battery pocket incision.    -  Postoperative restrictions include limiting the following activity as much as possible for 90 days:  Lifting >10 lbs, bending at the waist, stretching/reaching overhead, and twisting.  ----------    --- Follow-up 1 month         DAVID IRVIN  As part of the patient's treatment plan, I am prescribing controlled substances. The patient has been made aware of appropriate use of such medications, including potential risk of somnolence, limited ability to drive and/or work safely, and the potential for  dependence or overdose. It has also bee made clear that these medications are for use by this patient only, without concomitant use of alcohol or other substances unless prescribed.     Patient has completed prescribing agreement detailing terms of continued prescribing of controlled substances, including monitoring DAVID reports, urine drug screening, and pill counts if necessary. The patient is aware that inappropriate use will results in cessation of prescribing such medications.    DAVID report has been reviewed and scanned into the patient's chart.    As the clinician, I personally reviewed the DAVID from 5/17/2018 while the patient was in the office today.    History and physical exam exhibit continued safe and appropriate use of controlled substances.    EMR Dragon/Transcription disclaimer:   Much of this encounter note is an electronic transcription/translation of spoken language to printed text. The electronic translation of spoken language may permit erroneous, or at times, nonsensical words or phrases to be inadvertently transcribed; Although I have reviewed the note for such errors, some may still exist.

## 2018-06-11 ENCOUNTER — OFFICE VISIT (OUTPATIENT)
Dept: ORTHOPEDIC SURGERY | Facility: CLINIC | Age: 63
End: 2018-06-11

## 2018-06-11 VITALS — WEIGHT: 279.8 LBS | BODY MASS INDEX: 39.17 KG/M2 | TEMPERATURE: 97.3 F | HEIGHT: 71 IN

## 2018-06-11 DIAGNOSIS — M17.11 ARTHRITIS OF RIGHT KNEE: Primary | ICD-10-CM

## 2018-06-11 DIAGNOSIS — E66.01 SEVERE OBESITY (BMI 35.0-39.9): ICD-10-CM

## 2018-06-11 PROCEDURE — 99212 OFFICE O/P EST SF 10 MIN: CPT | Performed by: ORTHOPAEDIC SURGERY

## 2018-06-11 NOTE — PROGRESS NOTES
Patient:  Kian Mcdaniels is a 62 y.o. male    Chief Complaint/ Reason for Visit:    Chief Complaint   Patient presents with   • Right Knee - Follow-up, Pain       HPI:  Patient returns today complaining of persistent aching pain of a moderate to episodically severe degree primarily in his right knee.  He says he has been doing his physical therapy and has been applying the therapy to both knees.  He says that that, however, seemed to aggravate his left knee.  The right knee is giving him more trouble.  He does feel like the physical therapy has improved his strength in his lower extremities, but there has been no improvement in the pain in his knees.    He still has moderate to episodically severe aching, occasionally sharp, pain in his right knee especially.  It's aggravated by weightbearing activities, and alleviated by rest.  He uses a cane 100% of the time for gait due to his knee pain.      PMH:    Past Medical History:   Diagnosis Date   • Abscess of scrotum 2000   • Angina pectoris    • Arteriosclerotic cardiovascular disease    • COPD (chronic obstructive pulmonary disease)    • Coronary artery disease    • Diabetes mellitus    • Diabetic peripheral neuropathy    • ED (erectile dysfunction) of non-organic origin    • Hypertension    • Insulin pump in place    • Insulin pump in place    • Low back pain    • Myocardial infarction    • Neck pain    • Spinal stenosis    • TIA (transient ischemic attack)     LEFT EYE   • Type 2 diabetes mellitus    • Upper back pain    • Wears dentures     UPPER PLATE       PSH:    Past Surgical History:   Procedure Laterality Date   • AMPUTATION FOOT / TOE Left     GREAT TOE   • BACK SURGERY  10/26/2015    Bilateral L4-L5 laminectomy -Dr. Gutierres   • CARDIAC CATHETERIZATION     • CARDIAC SURGERY      CABG X 6    • CHOLECYSTECTOMY     • CORONARY ARTERY BYPASS GRAFT      X 6   • EPIDURAL BLOCK     • EYE SURGERY     • HAND SURGERY  04/28/2016   • LUMBAR DISCECTOMY FUSION  INSTRUMENTATION N/A 12/12/2016    Procedure: L 4-5 FUSION WITH INSTRUMENTATION WITH BMP, WITH REMOVAL OF INSTRUMENTATION ;  Surgeon: Rowdy Spencer MD;  Location: [facility] OR;  Service:    • LUMBAR LAMINECTOMY DISCECTOMY DECOMPRESSION N/A 12/12/2016    Procedure: L4-5 REPEAT LAMINECTOMY ;  Surgeon: Tim Gutierres MD;  Location: [facility] OR;  Service:    • LUMBAR LAMINECTOMY DISCECTOMY DECOMPRESSION N/A 12/14/2016    Procedure: EVACUATION OF LUMBAR HEMATOMA;  Surgeon: Rowdy Spencer MD;  Location: [facility] OR;  Service:    • MULTIPLE TOOTH EXTRACTIONS      UPPER TEETH EXTRACTED   • ORTHOPEDIC SURGERY     • PENIS SURGERY      RECONSTRUCTION   • SCROTUM EXPLORATION      scrotum surgery   • SPINE SURGERY         Social Hx:    Social History     Social History   • Marital status:      Spouse name: N/A   • Number of children: N/A   • Years of education: N/A     Occupational History   • Not on file.     Social History Main Topics   • Smoking status: Former Smoker     Packs/day: 1.00     Years: 25.00     Types: Cigarettes     Quit date: 2000   • Smokeless tobacco: Never Used   • Alcohol use No   • Drug use: Yes     Types: Hydrocodone   • Sexual activity: Defer     Other Topics Concern   • Not on file     Social History Narrative   • No narrative on file       Family Hx:    Family History   Problem Relation Age of Onset   • Diabetes Other    • Heart disease Other    • Hypertension Other    • Kidney disease Other         renal failure   • Heart disease Maternal Grandmother    • Hypertension Maternal Grandmother        Meds:    Current Outpatient Prescriptions:   •  albuterol (VENTOLIN HFA) 108 (90 BASE) MCG/ACT inhaler, Inhale 1-2 puffs Every 6 (Six) Hours As Needed for wheezing (RESCUE INHALER)., Disp: , Rfl:   •  amLODIPine (NORVASC) 10 MG tablet, Take 10 mg by mouth Every Morning., Disp: , Rfl:   •  Ascorbic Acid (VITAMIN C PO), Take 1,000 mg by mouth Daily. HOLD PRIOR TO SURGERY 12-, Disp: ,  Rfl:   •  aspirin 81 MG EC tablet, Take 81 mg by mouth Daily. HOLD PRIOR TO SURGERY 12-, Disp: , Rfl:   •  atorvastatin (LIPITOR) 20 MG tablet, , Disp: , Rfl:   •  Blood Glucose Monitoring Suppl (FREESTYLE FREEDOM LITE) W/DEVICE kit, , Disp: , Rfl:   •  carvedilol (COREG) 12.5 MG tablet, TK 1 T PO  BID WITH MEALS, Disp: , Rfl: 3  •  cetirizine (ZyrTEC) 10 MG tablet, Take 10 mg by mouth daily.  , Disp: , Rfl:   •  clopidogrel (PLAVIX) 75 MG tablet, , Disp: , Rfl:   •  cyclobenzaprine (FLEXERIL) 10 MG tablet, Take 1 tablet by mouth 2 (Two) Times a Day As Needed for Muscle Spasms., Disp: 60 tablet, Rfl: 1  •  docusate sodium (COLACE) 100 MG capsule, Take 100 mg by mouth 2 (Two) Times a Day., Disp: , Rfl:   •  furosemide (LASIX) 40 MG tablet, Take 40 mg by mouth 2 (Two) Times a Day., Disp: , Rfl:   •  gabapentin (NEURONTIN) 400 MG capsule, , Disp: , Rfl:   •  glucose blood (FREESTYLE LITE) test strip, Test 4 times per day, Disp: , Rfl:   •  HUMULIN R 500 UNIT/ML CONCENTRATED injection, Inject 500 Units under the skin Continuous. Via insulin pump basal rate 0.68units/hr from 0000 to 1000 1000 to 1600 is 0.7units/hr 1600 to 0000 0.75units/hr Plus sliding scale based on carb intake, Disp: , Rfl:   •  Insulin Glargine (Insulin Glargine) 100 UNIT/ML injection pen, INJECT 30 UNITS INTO THE SKIN D, Disp: , Rfl: 3  •  Insulin Infusion Pump (T:SLIM INSULIN PUMP) device, , Disp: , Rfl:   •  isosorbide mononitrate (IMDUR) 60 MG 24 hr tablet, Take 60 mg by mouth Every Morning., Disp: , Rfl:   •  Lancets (FREESTYLE) lancets, Test 4 times per day, Disp: , Rfl:   •  losartan (COZAAR) 50 MG tablet, Take 50 mg by mouth Every Morning., Disp: , Rfl:   •  Multiple Vitamin tablet, Take 1 tablet by mouth daily., Disp: , Rfl:   •  oxyCODONE (ROXICODONE) 10 MG tablet, Take 1 tablet by mouth Every 6 (Six) Hours As Needed for Severe Pain ., Disp: 120 tablet, Rfl: 0  •  potassium chloride (K-DUR,KLOR-CON) 10 MEQ CR tablet, TK 1 T PO  QD,  "Disp: , Rfl: 11  •  raNITIdine (ZANTAC) 300 MG tablet, Take 300 mg by mouth Every Night., Disp: , Rfl:   •  Testosterone Cypionate (DEPOTESTOTERONE CYPIONATE) 200 MG/ML injection, INJECT 1ML INTO THE MUSCLE EVERY OTHER WEEK, Disp: , Rfl: 1  •  TRESIBA FLEXTOUCH 100 UNIT/ML solution pen-injector injection, INJECT 35 UNITS UNDER THE SKIN D, Disp: , Rfl: 0  •  vitamin D (ERGOCALCIFEROL) 87651 UNITS capsule capsule, Take 50,000 Units by mouth Every 7 (Seven) Days. thursdays, Disp: , Rfl:     Allergies:    Allergies   Allergen Reactions   • Erythromycin Nausea Only   • Lisinopril Other (See Comments)     7/2016 possibly caused pancreatitis per Dr Quinonez   • Metformin Nausea And Vomiting       ROS:  Review of Systems    Vitals:    06/11/18 0952   Temp: 97.3 °F (36.3 °C)   TempSrc: Temporal Artery    Weight: 127 kg (279 lb 12.8 oz)   Height: 179.1 cm (70.5\")     Body mass index is 39.58 kg/m².    Physical Exam    The patient is awake, alert, and oriented ×3.  The patient is in no acute distress.  Breathing is regular and unlabored with a respiratory rate of 14/m.  Extraocular movements and pupillary responses are symmetrically intact. Sclerae are anicteric.   Hearing is within normal limits.  Speech is within normal limits.  There is no jugular venous distention.    The patient has a lumbering, uncomfortable appearing gait with antalgia from the right.  He has symmetrically present edema in both lower extremities that I would rate at 2+ up to the proximal mid leg symmetrically.  He does wear compression stockings accordingly.  He has valgus malalignment of the right knee.     Right knee: Range of motion of the right knee is 13° to 114° of flexion.  He has lateral instability on varus stressing.  His right calf is soft and nontender.  He has no abnormal warmth, erythema or bruising in the right knee but there is a mild effusion.  There is pronounced crepitus on passive and active ranges of motion any as global diffuse joint " line tenderness.     Left knee: Range of motion is 3° to 115°.  There is pronounced crepitus.  There is no pronounced instability.  There is mild effusion.  There is mild joint line tenderness medially.  There is no abnormal warmth nor is there any discoloration.     The patient has palpable regular 1+ dorsalis pedis pulses present symmetrically in both feet with a current heart rate of about 78 bpm.  He has no acute integument changes.  His sensory exam, however, is profoundly decreased to light touch in both feet due to chronic diabetic peripheral neuropathy.  Proprioceptive exam is also poor.         Assessment:     Diagnosis Plan   1. Arthritis of right knee  Ambulatory Referral to Nutrition Services   2. Severe obesity (BMI 35.0-39.9)  Ambulatory Referral to Nutrition Services           Plan:  I discussed everything with the patient length.  We reviewed that he had actually gained a pound, and had not lost any weight since last he was here.  I encouraged them today, as I had before, to meet with his primary care physician and a dietitian regarding safe sustainable permanent weight loss options.  I also gave him the phone number for the [facility] dietary clinic.    Prescription medication management: The patient says that the external, topical medication prescribed at the last visit was too expensive.  He says that he thinks it may have given him a small amount of relief.  He took a second prescription to another facility and he said that that preparation didn't seem to help much and it was also too expensive.    For this reason, I gave him a sample of Pennsaid had, and instructed him in its proper use and appropriate precautions and its use were reviewed as well.  I advised him to try and to call the office such that we could call in a prescription for more of this medication if he found that it helped this pain.    For now, he is going to continue his physical therapy for the osteoarthritis in his  knees, continue use of his cane for comfort and safety, and work on weight loss.      Orders Placed This Encounter   Procedures   • Ambulatory Referral to Nutrition Services     Referral Priority:   Routine     Referral Type:   Health Education     Referral Reason:   Specialty Services Required     Requested Specialty:   Nutrition     Number of Visits Requested:   1

## 2018-06-19 ENCOUNTER — OFFICE VISIT (OUTPATIENT)
Dept: PAIN MEDICINE | Facility: CLINIC | Age: 63
End: 2018-06-19

## 2018-06-19 VITALS
TEMPERATURE: 98.3 F | OXYGEN SATURATION: 96 % | WEIGHT: 277 LBS | SYSTOLIC BLOOD PRESSURE: 146 MMHG | BODY MASS INDEX: 38.78 KG/M2 | HEIGHT: 71 IN | HEART RATE: 89 BPM | DIASTOLIC BLOOD PRESSURE: 89 MMHG | RESPIRATION RATE: 16 BRPM

## 2018-06-19 DIAGNOSIS — Z79.899 ENCOUNTER FOR LONG-TERM (CURRENT) USE OF HIGH-RISK MEDICATION: ICD-10-CM

## 2018-06-19 DIAGNOSIS — G89.29 OTHER CHRONIC PAIN: Primary | ICD-10-CM

## 2018-06-19 DIAGNOSIS — M50.30 DEGENERATIVE CERVICAL DISC: ICD-10-CM

## 2018-06-19 DIAGNOSIS — M96.1 POSTLAMINECTOMY SYNDROME OF LUMBAR REGION: ICD-10-CM

## 2018-06-19 DIAGNOSIS — Z98.1 S/P LUMBAR SPINAL FUSION: ICD-10-CM

## 2018-06-19 PROCEDURE — 99214 OFFICE O/P EST MOD 30 MIN: CPT | Performed by: NURSE PRACTITIONER

## 2018-06-19 RX ORDER — OXYCODONE HYDROCHLORIDE 10 MG/1
10 TABLET ORAL EVERY 6 HOURS PRN
Qty: 120 TABLET | Refills: 0 | Status: SHIPPED | OUTPATIENT
Start: 2018-06-19 | End: 2018-07-18 | Stop reason: SDUPTHER

## 2018-06-19 NOTE — PROGRESS NOTES
"CHIEF COMPLAINT  Pt states his LBP and bilateral knee pain (R>L) is basically unchanged. His current pain medicine relieves the back pain for about 4 hours on the average.  Pt reportedly noticed no relief from the samples of flector patches.His L sided neck pain is \"like a constant cramp\" as well.    Subjective   Kian Mcdaniels is a 62 y.o. male  who presents to the office for follow-up.He has a history of chronic neck and back pain.    Complains of pain in his neck, back and right knee. Today his pain is 6/10VAS.  Continues with Oxycodone 10 mg 4/day, gabapentin 800 mg 3/day and tizanidine 4mg 1-2/night. Denies any side effects from the regimen. The regimen helps decrease his pain by 50%. Notes improvement in function and activity. ADL's by self. Reports that he has never received compounded pain cream from [email or web address].      He was seen by Dr. Gutierres on 2/20/18.  Reviewed Cervical MRI which shows a syrinx which is unchanged in size.  No recommendations for surgery.  Repeat scan in 6 months.    Saw Dr. Perez 3/8/18, wants patient to lose 40 pounds before proceeding with right TKA. Has been going to PT twice a week.      He has had repeat L4-5 laminectomy with fusion by Dr. Gutierres and Dr. Spencer 12/12/16. Nothing else surgical recommended at this time.      He reports minimal relief from most recent SI joint injection.      He was introduced to SCS therapy at the previous office visit.  Helped about 40% at first     Back Pain   This is a chronic problem. The current episode started more than 1 year ago. The problem occurs constantly. The problem has been waxing and waning since onset. The pain is present in the sacro-iliac. The quality of the pain is described as aching, shooting and stabbing. The pain is at a severity of 6/10. The pain is the same all the time. The symptoms are aggravated by bending, sitting and standing. Stiffness is present all day. Associated symptoms include leg pain, numbness (bilateral feet), " "paresthesias and tingling. Pertinent negatives include no chest pain, dysuria, fever, headaches (\"frequent headaches\") or weakness. Risk factors include lack of exercise, obesity and sedentary lifestyle. He has tried analgesics for the symptoms. The treatment provided moderate relief.   Neck Pain    This is a chronic problem. The current episode started more than 1 month ago. The problem occurs constantly. The problem has been gradually worsening. The pain is associated with nothing. The pain is present in the left side, right side and midline. The quality of the pain is described as cramping. The pain is at a severity of 6/10. The symptoms are aggravated by sneezing and twisting. The pain is same all the time. Associated symptoms include leg pain, numbness (bilateral feet) and tingling. Pertinent negatives include no chest pain, fever, headaches (\"frequent headaches\") or weakness. He has tried muscle relaxants, oral narcotics and bed rest for the symptoms. The treatment provided moderate relief.      PEG Assessment   What number best describes your pain on average in the past week?8  What number best describes how, during the past week, pain has interfered with your enjoyment of life?8  What number best describes how, during the past week, pain has interfered with your general activity?  8    The following portions of the patient's history were reviewed and updated as appropriate: allergies, current medications, past family history, past medical history, past social history, past surgical history and problem list.    Review of Systems   Constitutional: Negative for activity change (\"cannot exercise\"), appetite change, fatigue and fever.   HENT: Negative for congestion.    Eyes: Negative for visual disturbance.   Respiratory: Negative for apnea, cough, shortness of breath and wheezing.    Cardiovascular: Negative.  Negative for chest pain.   Gastrointestinal: Negative for constipation, diarrhea and nausea. " "  Genitourinary: Negative for difficulty urinating and dysuria.   Musculoskeletal: Positive for arthralgias (right knee), back pain and neck pain.   Neurological: Positive for tingling, numbness (bilateral feet) and paresthesias. Negative for dizziness, weakness, light-headedness and headaches (\"frequent headaches\").   Psychiatric/Behavioral: Positive for sleep disturbance. Negative for agitation and suicidal ideas. The patient is not nervous/anxious.      Vitals:    06/19/18 1400   BP: 146/89   Pulse: 89   Resp: 16   Temp: 98.3 °F (36.8 °C)   SpO2: 96%   Weight: 126 kg (277 lb)   Height: 179.1 cm (70.51\")   PainSc: 6  Comment: bilateral LBP ranges from 6-8/10   PainLoc: Back     Objective   Physical Exam   Constitutional: He is oriented to person, place, and time. Vital signs are normal. He appears well-developed and well-nourished. He is cooperative.   HENT:   Head: Normocephalic and atraumatic.   Nose: Nose normal.   Eyes: Conjunctivae and lids are normal.   Neck: Trachea normal. Neck supple. Muscular tenderness present. No spinous process tenderness present. Decreased range of motion present.   Cardiovascular: Normal rate.    Pulmonary/Chest: Effort normal.   Abdominal:   obese   Musculoskeletal:        Right knee: He exhibits decreased range of motion. Tenderness found.        Cervical back: He exhibits decreased range of motion, tenderness and pain. He exhibits no spasm.        Lumbar back: He exhibits decreased range of motion, tenderness and pain.   Right knee brace     Neurological: He is alert and oriented to person, place, and time. Gait (ambulates with cane ) abnormal.   Reflex Scores:       Patellar reflexes are 0 on the right side and 0 on the left side.  Skin: Skin is warm, dry and intact.   Psychiatric: He has a normal mood and affect. His speech is normal and behavior is normal. Cognition and memory are normal.   Nursing note and vitals reviewed.    Assessment/Plan   Diagnoses and all orders for " this visit:    Other chronic pain  -     Ambulatory Referral to Psychology    Postlaminectomy syndrome of lumbar region  -     Ambulatory Referral to Psychology    Degenerative cervical disc  -     Ambulatory Referral to Psychology    Encounter for long-term (current) use of high-risk medication    Other orders  -     diclofenac (VOLTAREN) 1 % gel gel; Apply 4 g topically 4 (Four) Times a Day.      --- Refill Oxycodone. Patient appears stable with current regimen. No adverse effects. Regarding continuation of opioids, there is no evidence of aberrant behavior or any red flags.  The patient continues with appropriate response to opioid therapy. ADL's remain intact by self.   --- The urine drug screen confirmation from 2/7/2018 has been reviewed and the result is appropriate based on patient history and DAVID report  --- Education session with [employer]  --- SCS pre surgical eval with Dr. Lucero   --- Follow-up 1 month          DAVID REPORT  As part of the patient's treatment plan, I am prescribing controlled substances. The patient has been made aware of appropriate use of such medications, including potential risk of somnolence, limited ability to drive and/or work safely, and the potential for dependence or overdose. It has also bee made clear that these medications are for use by this patient only, without concomitant use of alcohol or other substances unless prescribed.     Patient has completed prescribing agreement detailing terms of continued prescribing of controlled substances, including monitoring DAVID reports, urine drug screening, and pill counts if necessary. The patient is aware that inappropriate use will results in cessation of prescribing such medications.    DAVID report has been reviewed and scanned into the patient's chart.    As the clinician, I personally reviewed the DAVID from 6/18/2018 while the patient was in the office today.    History and physical exam exhibit continued  safe and appropriate use of controlled substances.    EMR Dragon/Transcription disclaimer:   Much of this encounter note is an electronic transcription/translation of spoken language to printed text. The electronic translation of spoken language may permit erroneous, or at times, nonsensical words or phrases to be inadvertently transcribed; Although I have reviewed the note for such errors, some may still exist.

## 2018-07-18 ENCOUNTER — OFFICE VISIT (OUTPATIENT)
Dept: PAIN MEDICINE | Facility: CLINIC | Age: 63
End: 2018-07-18

## 2018-07-18 VITALS
OXYGEN SATURATION: 96 % | WEIGHT: 283.8 LBS | TEMPERATURE: 98.4 F | DIASTOLIC BLOOD PRESSURE: 86 MMHG | BODY MASS INDEX: 39.73 KG/M2 | RESPIRATION RATE: 18 BRPM | SYSTOLIC BLOOD PRESSURE: 157 MMHG | HEIGHT: 71 IN | HEART RATE: 91 BPM

## 2018-07-18 DIAGNOSIS — Z79.899 ENCOUNTER FOR LONG-TERM (CURRENT) USE OF HIGH-RISK MEDICATION: ICD-10-CM

## 2018-07-18 DIAGNOSIS — G89.29 OTHER CHRONIC PAIN: Primary | ICD-10-CM

## 2018-07-18 DIAGNOSIS — M96.1 POSTLAMINECTOMY SYNDROME OF LUMBAR REGION: ICD-10-CM

## 2018-07-18 DIAGNOSIS — Z98.1 S/P LUMBAR SPINAL FUSION: ICD-10-CM

## 2018-07-18 PROCEDURE — 99214 OFFICE O/P EST MOD 30 MIN: CPT | Performed by: ANESTHESIOLOGY

## 2018-07-18 RX ORDER — OXYCODONE HYDROCHLORIDE 10 MG/1
10 TABLET ORAL EVERY 6 HOURS PRN
Qty: 120 TABLET | Refills: 0 | Status: SHIPPED | OUTPATIENT
Start: 2018-07-18 | End: 2018-08-15 | Stop reason: SDUPTHER

## 2018-07-18 NOTE — PROGRESS NOTES
"CHIEF COMPLAINT  Follow-up for back and neck pain. Mr. Mcdaniels states that his neck pain has gotten worse since his last appt and his back pain is unchanged. He does state that he is still having incisional pain .    Subjective   Kian Mcdaniels is a 62 y.o. male  who presents to the office for follow-up.He has a history of neck and back pains.      Back Pain   This is a chronic problem. The current episode started more than 1 year ago. The problem occurs constantly. The problem is unchanged. The pain is present in the sacro-iliac and lumbar spine. The quality of the pain is described as aching, shooting and stabbing. The pain radiates to the left thigh and right thigh. The pain is moderate. The pain is the same all the time. The symptoms are aggravated by bending, sitting and standing. Stiffness is present all day. Associated symptoms include leg pain, numbness, paresthesias and tingling. Pertinent negatives include no chest pain, dysuria, fever, headaches (\"frequent headaches\") or weakness. Risk factors include lack of exercise, obesity and sedentary lifestyle. He has tried analgesics for the symptoms. The treatment provided mild relief.   Neck Pain    This is a chronic problem. The current episode started more than 1 month ago. The problem occurs constantly. The problem has been unchanged. The pain is associated with nothing. The pain is present in the left side, right side and midline. The quality of the pain is described as cramping. The pain is moderate. The symptoms are aggravated by sneezing and twisting. The pain is same all the time. Associated symptoms include leg pain, numbness and tingling. Pertinent negatives include no chest pain, fever, headaches (\"frequent headaches\") or weakness. He has tried muscle relaxants, oral narcotics and bed rest for the symptoms. The treatment provided mild relief.        PEG Assessment   What number best describes your pain on average in the past week?7  What number " "best describes how, during the past week, pain has interfered with your enjoyment of life?7  What number best describes how, during the past week, pain has interfered with your general activity?  7      The following portions of the patient's history were reviewed and updated as appropriate: allergies, current medications, past family history, past medical history, past social history, past surgical history and problem list.    Review of Systems   Constitutional: Negative for fatigue and fever.   HENT: Negative for congestion.    Eyes: Negative for visual disturbance.   Respiratory: Positive for cough. Negative for shortness of breath and wheezing.    Cardiovascular: Positive for leg swelling. Negative for chest pain and palpitations.   Gastrointestinal: Negative for constipation and diarrhea.   Genitourinary: Negative for difficulty urinating and dysuria.   Musculoskeletal: Positive for back pain, joint swelling (bilateral ankles) and neck pain. Arthralgias: right knee    Neurological: Positive for tingling, numbness and paresthesias. Negative for weakness and headaches (\"frequent headaches\").   Psychiatric/Behavioral: Negative for sleep disturbance and suicidal ideas. The patient is not nervous/anxious.            Vitals:    07/18/18 1411   BP: 157/86   Pulse: 91   Resp: 18   Temp: 98.4 °F (36.9 °C)   SpO2: 96%   Weight: 129 kg (283 lb 12.8 oz)   Height: 179.1 cm (70.5\")   PainSc: 7  Comment: neck pain 7/10   PainLoc: Back         Objective   Physical Exam   Constitutional: He is oriented to person, place, and time. Vital signs are normal. He appears well-developed and well-nourished.  Non-toxic appearance. No distress.   HENT:   Head: Normocephalic and atraumatic.   Right Ear: Hearing and external ear normal.   Left Ear: Hearing and external ear normal.   Nose: Nose normal.   Eyes: Pupils are equal, round, and reactive to light. Conjunctivae and lids are normal.   Pulmonary/Chest: Effort normal. No respiratory " distress.   Abdominal: Normal appearance.   Neurological: He is alert and oriented to person, place, and time. He displays atrophy. No cranial nerve deficit. He displays a negative Romberg sign. Gait abnormal. Coordination normal.   Reflex Scores:       Patellar reflexes are 1+ on the right side and 1+ on the left side.  Psychiatric: He has a normal mood and affect. His behavior is normal.   Nursing note and vitals reviewed.          Assessment/Plan   Kian was seen today for back pain and neck pain.    Diagnoses and all orders for this visit:    Other chronic pain    Postlaminectomy syndrome of lumbar region  -     Case Request    Encounter for long-term (current) use of high-risk medication    S/P lumbar spinal fusion  -     oxyCODONE (ROXICODONE) 10 MG tablet; Take 1 tablet by mouth Every 6 (Six) Hours As Needed for Severe Pain .        --- Follow-up 1 month    --- we are anticipating a report soon from Dr. Horowitz regarding SCS presurgical evaluation.  If ok then we will plan for SCS Phase One    Patient appears stable with current regimen. No adverse effects. Regarding continuation of opioids, there is no evidence of aberrant behavior or any red flags.  The patient continues with appropriate response to opioid therapy. ADL's remain intact by self.     he does have a significant history of spine pain and also knee and demonstrates moderate improvement with multimodal therapy including opioid therapy, which allows him to engage in ADLs and family activities. The continuation of opioid therapy is therefore not contraindicated. We will however respect the March 2016 CDC Guidelines and will plan to avoid overexposure to opioids and avoid dose escalation.  We continue to engage him in multimodal therapies and he expresses interest in obtaining both better pain control and also less need for opioid therapy.     ----------  Education about SCS therapy:    -  This was an extended office visit in which we entered into  discussion about advanced pain relieving techniques, and discussed implantable pain therapies.  We discussed advanced neuromodulation in the form of Spinal Cord Stimulation.  This is a reasonable therapy for patients who have exhausted basic nonnarcotic options, basic modalities and physical therapies, and do not have any other reasonable surgical options.  This therapy as an alternative to long term high dose opioid therapy.    -  Risks include but are not limited to bleeding, infection, injury, paralysis, nerve injury, dural puncture, and risk for postprocedural pain.  Implanted equipment risks include but are not limited to lead migration, lead fracture, risk of loss of pain relieving stimulation, risk of electrical shock, and risk of system failure.    - We discussed the theory and basic science behind SCS therapy including but not limited to energy delivery and relevant anatomy, in terms that are easy to understand and also with use of illustrative devices.  Spinal Cord Stimulation therapies apply an electromagnetic field to a specific area on the spinal cord (Dorsal Column) to attempt to block transmission of painful signals from the peripheral nerves to the brain.    -  We discussed that prior to trialing, I request that patients review relevant materials and perform some research, and also have a follow up education session with a device specialist from the .  Also, insurance requires a presurgical psychological evaluation.  When these have been completed, and all the patient's questions have been answered to their satisfaction, then we will plan to request authorization for trialing.   - We discussed the trialing process (aka Phase 1)  that usually lasts a week, and the temporary nature of this trial.  Trial success will determine whether or not we proceed to implant.  We discussed reasonable expectations, and that I feel that consistent 50% pain relief is medically successful and is a  reasonable expectation to justify moving forward to permanent implant.    -  Additional risks of Phase 1 include but are not limited to bleeding on insertion, bleeding on lead removal, and procedural site soreness.  - We discussed the percutaneous surgical implant, including postsurgical restrictions, risks, and alternatives.   For spinal cord stimulation implanted device (aka SCS Phase 2) there is usually a midline vertical incision for the spinally implanted leads, and also a horizontal incision in the posterior lumbar flank for implantation of the battery & computer (aka IPG).  The leads are tunneled from the midline incision to the medial aspect of the battery pocket incision.    -  Postoperative restrictions include limiting the following activity as much as possible for 90 days:  Lifting >10 lbs, bending at the waist, stretching/reaching overhead, and twisting.  ----------                   DAVID REPORT    As part of the patient's treatment plan, I am prescribing controlled substances. The patient has been made aware of appropriate use of such medications, including potential risk of somnolence, limited ability to drive and/or work safely, and the potential for dependence or overdose. It has also bee made clear that these medications are for use by this patient only, without concomitant use of alcohol or other substances unless prescribed.     Patient has completed prescribing agreement detailing terms of continued prescribing of controlled substances, including monitoring DAVID reports, urine drug screening, and pill counts if necessary. The patient is aware that inappropriate use will results in cessation of prescribing such medications.    DAVID report has been reviewed and scanned into the patient's chart.    As the clinician, I personally reviewed the DAVID from as above while the patient was in the office today.    History and physical exam exhibit continued safe and appropriate use of controlled  substances.      EMR Dragon/Transcription disclaimer:   Much of this encounter note is an electronic transcription/translation of spoken language to printed text. The electronic translation of spoken language may permit erroneous, or at times, nonsensical words or phrases to be inadvertently transcribed; Although I have reviewed the note for such errors, some may still exist.

## 2018-07-27 RX ORDER — CYCLOBENZAPRINE HCL 10 MG
TABLET ORAL
Qty: 60 TABLET | Refills: 0 | Status: SHIPPED | OUTPATIENT
Start: 2018-07-27 | End: 2018-09-13 | Stop reason: SDUPTHER

## 2018-07-31 DIAGNOSIS — M96.1 POSTLAMINECTOMY SYNDROME OF LUMBAR REGION: Primary | ICD-10-CM

## 2018-08-01 NOTE — PROGRESS NOTES
Subjective   Patient ID: Kian Mcdaniels is a 63 y.o. male is here today for follow-up with a new Cervical and Thoracic MRI ordered for neck and back pain.     History of Present Illness    This patient continues with pain in his neck as well as in his lower back.  The pain is dull and aching and at times quite severe.  There is not a lot of radiation.    The following portions of the patient's history were reviewed and updated as appropriate: allergies, current medications, past family history, past medical history, past social history, past surgical history and problem list.    Review of Systems   Respiratory: Negative for chest tightness and shortness of breath.    Cardiovascular: Negative for chest pain.   Musculoskeletal: Positive for back pain and neck pain.   All other systems reviewed and are negative.      Objective   Physical Exam   Constitutional: He is oriented to person, place, and time. He appears well-developed and well-nourished.   Neurological: He is oriented to person, place, and time.     Neurologic Exam     Mental Status   Oriented to person, place, and time.       Assessment/Plan   Independent Review of Radiographic Studies:      I reviewed his MRI of the cervical and the thoracic spine done on August 17 myself.  This does show the syrinx which goes from C5 down to about T3.  It is fairly large.  It is significantly expanding the spinal cord.  There is also a cyst in his thyroid and I told him about this and instructed him to touch base is with his family doctor about whether it needs further evaluation.  He will do that.  We did have to call him after he left to let him know but he agreed that he would follow-up with his PCP.    Medical Decision Making:      I told the patient about the findings on the MRI.  I don't see anything here that has changed and consequently I would still tend to leave the syrinx alone.  We will scan him again in a year area his pain management doctor has discussed  a spinal cord stimulator but I would be very careful about putting anything into the spinal canal in the area between C5 and T3.  We will check him again in a year.    Kian was seen today for back pain and neck pain.    Diagnoses and all orders for this visit:    Syringomyelia and syringobulbia (CMS/HCC)  -     MRI Cervical Spine Without Contrast; Future  -     MRI Thoracic Spine Without Contrast; Future      Return in about 1 year (around 8/21/2019).

## 2018-08-15 ENCOUNTER — RESULTS ENCOUNTER (OUTPATIENT)
Dept: PAIN MEDICINE | Facility: CLINIC | Age: 63
End: 2018-08-15

## 2018-08-15 ENCOUNTER — OFFICE VISIT (OUTPATIENT)
Dept: PAIN MEDICINE | Facility: CLINIC | Age: 63
End: 2018-08-15

## 2018-08-15 VITALS
BODY MASS INDEX: 39.65 KG/M2 | DIASTOLIC BLOOD PRESSURE: 74 MMHG | WEIGHT: 283.2 LBS | RESPIRATION RATE: 15 BRPM | HEART RATE: 89 BPM | TEMPERATURE: 96.7 F | HEIGHT: 71 IN | SYSTOLIC BLOOD PRESSURE: 130 MMHG | OXYGEN SATURATION: 97 %

## 2018-08-15 DIAGNOSIS — M96.1 POSTLAMINECTOMY SYNDROME OF LUMBAR REGION: ICD-10-CM

## 2018-08-15 DIAGNOSIS — M50.30 DEGENERATIVE CERVICAL DISC: ICD-10-CM

## 2018-08-15 DIAGNOSIS — G89.29 OTHER CHRONIC PAIN: ICD-10-CM

## 2018-08-15 DIAGNOSIS — Z79.899 ENCOUNTER FOR LONG-TERM (CURRENT) USE OF HIGH-RISK MEDICATION: ICD-10-CM

## 2018-08-15 DIAGNOSIS — M54.50 LOW BACK PAIN WITHOUT SCIATICA, UNSPECIFIED BACK PAIN LATERALITY, UNSPECIFIED CHRONICITY: ICD-10-CM

## 2018-08-15 DIAGNOSIS — Z98.1 S/P LUMBAR SPINAL FUSION: ICD-10-CM

## 2018-08-15 DIAGNOSIS — G89.29 OTHER CHRONIC PAIN: Primary | ICD-10-CM

## 2018-08-15 PROCEDURE — 99214 OFFICE O/P EST MOD 30 MIN: CPT | Performed by: NURSE PRACTITIONER

## 2018-08-15 RX ORDER — TIZANIDINE 4 MG/1
TABLET ORAL
Refills: 2 | COMMUNITY
Start: 2018-07-20 | End: 2018-09-19

## 2018-08-15 RX ORDER — NALOXONE HYDROCHLORIDE 4 MG/.1ML
SPRAY NASAL
Refills: 0 | COMMUNITY
Start: 2018-07-23

## 2018-08-15 RX ORDER — OXYCODONE HYDROCHLORIDE 10 MG/1
10 TABLET ORAL EVERY 6 HOURS PRN
Qty: 120 TABLET | Refills: 0 | Status: SHIPPED | OUTPATIENT
Start: 2018-08-15 | End: 2018-09-13 | Stop reason: SDUPTHER

## 2018-08-15 NOTE — PROGRESS NOTES
"CHIEF COMPLAINT  F/U back and neck pain. Pt states neck pain is becoming more constant while back pain is off and on.     Subjective   Kian Mcdaniels is a 63 y.o. male  who presents to the office for follow-up.He has a history of chronic back and neck pain.    Complains of pain in his neck, back and right knee. Today his pain is 6/10VAS.  Continues with Oxycodone 10 mg 4/day, gabapentin 800 mg 3/day and tizanidine 4mg 1-2/night. Denies any side effects from the regimen. The regimen helps decrease his pain by 50%. Notes improvement in function and activity. ADL's by self. Reports that he has never received compounded pain cream from [facility].       He was seen by Dr. Gutierres on 2/20/18.  Reviewed Cervical MRI which shows a syrinx which is unchanged in size.  No recommendations for surgery.  Repeat scan tomorrow and f/u with Dr. Gutierres next week.       Saw Dr. Perez 3/8/18, wants patient to lose 40 pounds before proceeding with right TKA. Has been going to PT twice a week.       He has had repeat L4-5 laminectomy with fusion by Dr. Gutierres and Dr. Spencer 12/12/16. Nothing else surgical recommended at this time.       Meeting with [facility] today for SCS education. Saw Dr. Heather Horowitz for pre surgical psychological evaluation 7/17/18.        Back Pain   This is a chronic problem. The current episode started more than 1 year ago. The problem occurs constantly. The problem has been waxing and waning since onset. The pain is present in the sacro-iliac. The quality of the pain is described as aching, shooting and stabbing. The pain is at a severity of 6/10. The pain is the same all the time. The symptoms are aggravated by bending, sitting and standing. Stiffness is present all day. Associated symptoms include leg pain, numbness, paresthesias and tingling. Pertinent negatives include no chest pain, dysuria, fever, headaches (\"frequent headaches\") or weakness. Risk factors include lack of exercise, obesity and " "sedentary lifestyle. He has tried analgesics for the symptoms. The treatment provided moderate relief.   Neck Pain    This is a chronic problem. The current episode started more than 1 month ago. The problem occurs constantly. The problem has been gradually worsening. The pain is associated with nothing. The pain is present in the left side, right side and midline. The quality of the pain is described as cramping. The pain is at a severity of 6/10. The symptoms are aggravated by sneezing and twisting. The pain is same all the time. Associated symptoms include leg pain, numbness and tingling. Pertinent negatives include no chest pain, fever, headaches (\"frequent headaches\") or weakness. He has tried muscle relaxants, oral narcotics and bed rest for the symptoms. The treatment provided moderate relief.      PEG Assessment   What number best describes your pain on average in the past week?7  What number best describes how, during the past week, pain has interfered with your enjoyment of life?7  What number best describes how, during the past week, pain has interfered with your general activity?  7      The following portions of the patient's history were reviewed and updated as appropriate: allergies, current medications, past family history, past medical history, past social history, past surgical history and problem list.    Review of Systems   Constitutional: Negative for fatigue and fever.   HENT: Negative for congestion.    Eyes: Negative for visual disturbance.   Respiratory: Positive for cough (seeing ENT doctor friday). Negative for apnea, shortness of breath and wheezing.    Cardiovascular: Positive for leg swelling. Negative for chest pain and palpitations.   Gastrointestinal: Negative for constipation and diarrhea.   Genitourinary: Negative for difficulty urinating and dysuria.   Musculoskeletal: Positive for back pain, joint swelling (bilateral ankles) and neck pain. Arthralgias: right knee    Neurological: " "Positive for tingling, numbness and paresthesias. Negative for weakness and headaches (\"frequent headaches\").   Psychiatric/Behavioral: Positive for sleep disturbance (\"little bit\"). Negative for suicidal ideas. The patient is not nervous/anxious.        Vitals:    08/15/18 1359   BP: 130/74   Pulse: 89   Resp: 15   Temp: 96.7 °F (35.9 °C)   SpO2: 97%   Weight: 128 kg (283 lb 3.2 oz)   Height: 179.1 cm (70.51\")   PainSc:   6   PainLoc: Neck         Objective   Physical Exam   Constitutional: He is oriented to person, place, and time. Vital signs are normal. He appears well-developed and well-nourished. He is cooperative.   HENT:   Head: Normocephalic and atraumatic.   Nose: Nose normal.   Eyes: Conjunctivae and lids are normal.   Neck: Trachea normal. Neck supple. Muscular tenderness present. No spinous process tenderness present. Decreased range of motion present.   Cardiovascular: Normal rate.    Pulmonary/Chest: Effort normal.   Abdominal:   obese   Musculoskeletal:        Cervical back: He exhibits decreased range of motion, tenderness and pain. He exhibits no spasm.        Lumbar back: He exhibits decreased range of motion, tenderness and pain.   Neurological: He is alert and oriented to person, place, and time. He has normal strength. Gait (ambulates with cane ) abnormal.   Skin: Skin is warm, dry and intact.   Psychiatric: He has a normal mood and affect. His speech is normal and behavior is normal. Cognition and memory are normal.   Nursing note and vitals reviewed.      Assessment/Plan   Kian was seen today for back pain and neck pain.    Diagnoses and all orders for this visit:    Other chronic pain  -     Oral Fluid Drug Screen - Saliva, Oral Cavity; Future    Low back pain without sciatica, unspecified back pain laterality, unspecified chronicity  -     Oral Fluid Drug Screen - Saliva, Oral Cavity; Future    Postlaminectomy syndrome of lumbar region  -     Oral Fluid Drug Screen - Saliva, Oral Cavity; " Future    Degenerative cervical disc  -     Oral Fluid Drug Screen - Saliva, Oral Cavity; Future    Encounter for long-term (current) use of high-risk medication  -     Oral Fluid Drug Screen - Saliva, Oral Cavity; Future    S/P lumbar spinal fusion  -     oxyCODONE (ROXICODONE) 10 MG tablet; Take 1 tablet by mouth Every 6 (Six) Hours As Needed for Severe Pain .  -     Oral Fluid Drug Screen - Saliva, Oral Cavity; Future      --- Proceed with SCS trial  -- I feel that this patient is an appropriate candidate for SCS Phase One Trialing.  We plan to use the [employer] system.    -- This patient does not have any evidence of sepsis nor coagulopathy.  -- Patient is not a clear candidate for neurosurgical intervention, and has no other reasonable options for basic interventions, injection therapy, and physical therapy.  -- We plan to use the SCS trial to find an appropriate location for lead placement and will plan to reproduce that same placement location in the spine when & if we elect to proceed to implantation SCS Phase 2.    -- Goals are to improve functionality and activity as well as decrease and/or eliminate the need for oral medication management    --- Refill Oxycodone. Patient appears stable with current regimen. No adverse effects. Regarding continuation of opioids, there is no evidence of aberrant behavior or any red flags.  The patient continues with appropriate response to opioid therapy. ADL's remain intact by self.   --- The urine drug screen confirmation from 2/7/18 has been reviewed and the result is appropriate based on patient history and DAVID report  --- Follow-up 1 month          DAVID REPORT  As part of the patient's treatment plan, I am prescribing controlled substances. The patient has been made aware of appropriate use of such medications, including potential risk of somnolence, limited ability to drive and/or work safely, and the potential for dependence or overdose. It has also bee  made clear that these medications are for use by this patient only, without concomitant use of alcohol or other substances unless prescribed.     Patient has completed prescribing agreement detailing terms of continued prescribing of controlled substances, including monitoring DAVID reports, urine drug screening, and pill counts if necessary. The patient is aware that inappropriate use will results in cessation of prescribing such medications.    DAVID report has been reviewed and scanned into the patient's chart.    As the clinician, I personally reviewed the DAVID from 8/14/2018 while the patient was in the office today.    History and physical exam exhibit continued safe and appropriate use of controlled substances.    EMR Dragon/Transcription disclaimer:   Much of this encounter note is an electronic transcription/translation of spoken language to printed text. The electronic translation of spoken language may permit erroneous, or at times, nonsensical words or phrases to be inadvertently transcribed; Although I have reviewed the note for such errors, some may still exist.

## 2018-08-16 ENCOUNTER — HOSPITAL ENCOUNTER (OUTPATIENT)
Dept: MRI IMAGING | Facility: HOSPITAL | Age: 63
Discharge: HOME | End: 2018-08-16
Attending: NEUROLOGICAL SURGERY | Admitting: NEUROLOGICAL SURGERY

## 2018-08-16 ENCOUNTER — HOSPITAL ENCOUNTER (OUTPATIENT)
Dept: MRI IMAGING | Facility: HOSPITAL | Age: 63
Discharge: HOME | End: 2018-08-16
Attending: NEUROLOGICAL SURGERY

## 2018-08-16 DIAGNOSIS — G95.0 SYRINGOMYELIA AND SYRINGOBULBIA (HCC): ICD-10-CM

## 2018-08-16 LAB — CREAT BLDA-MCNC: 1.2 MG/DL (ref 0.6–1.3)

## 2018-08-16 PROCEDURE — 0 GADOBENATE DIMEGLUMINE 529 MG/ML SOLUTION: Performed by: NEUROLOGICAL SURGERY

## 2018-08-16 PROCEDURE — 82565 ASSAY OF CREATININE: CPT

## 2018-08-16 PROCEDURE — A9577 INJ MULTIHANCE: HCPCS | Performed by: NEUROLOGICAL SURGERY

## 2018-08-16 PROCEDURE — 72156 MRI NECK SPINE W/O & W/DYE: CPT

## 2018-08-16 PROCEDURE — 72157 MRI CHEST SPINE W/O & W/DYE: CPT

## 2018-08-16 RX ADMIN — GADOBENATE DIMEGLUMINE 20 ML: 529 INJECTION, SOLUTION INTRAVENOUS at 10:36

## 2018-08-21 ENCOUNTER — OFFICE VISIT (OUTPATIENT)
Dept: NEUROSURGERY | Facility: CLINIC | Age: 63
End: 2018-08-21

## 2018-08-21 VITALS
SYSTOLIC BLOOD PRESSURE: 144 MMHG | BODY MASS INDEX: 39.48 KG/M2 | DIASTOLIC BLOOD PRESSURE: 74 MMHG | HEART RATE: 76 BPM | HEIGHT: 71 IN | WEIGHT: 282 LBS

## 2018-08-21 DIAGNOSIS — G95.0 SYRINGOMYELIA AND SYRINGOBULBIA (HCC): Primary | ICD-10-CM

## 2018-08-21 PROCEDURE — 99213 OFFICE O/P EST LOW 20 MIN: CPT | Performed by: NEUROLOGICAL SURGERY

## 2018-08-22 DIAGNOSIS — Z01.818 PRE-OP EVALUATION: Primary | ICD-10-CM

## 2018-08-27 ENCOUNTER — RESULTS ENCOUNTER (OUTPATIENT)
Dept: PAIN MEDICINE | Facility: CLINIC | Age: 63
End: 2018-08-27

## 2018-08-27 DIAGNOSIS — Z01.818 PRE-OP EVALUATION: ICD-10-CM

## 2018-09-10 ENCOUNTER — DOCUMENTATION (OUTPATIENT)
Dept: PHYSICAL THERAPY | Facility: HOSPITAL | Age: 63
End: 2018-09-10

## 2018-09-10 NOTE — THERAPY DISCHARGE NOTE
Outpatient Physical Therapy Ortho Treatment Note/Discharge Summary       Patient Name: Kian Mcdaniels  : 1955  MRN: [id number]  Today's Date: 9/10/2018      Visit Date: 09/10/2018    Visit Dx:  No diagnosis found.    Patient Active Problem List   Diagnosis   • Bulging lumbar disc   • Chronic pain   • Diabetic neuropathy (CMS/HCC)   • Low back pain   • Degenerative arthritis of lumbar spine   • Neuropathy involving both lower extremities   • Encounter for long-term (current) use of high-risk medication   • Postlaminectomy syndrome of lumbar region   • Syringomyelia and syringobulbia (CMS/HCC)   • Lumbar pseudoarthrosis   • DM2 (diabetes mellitus, type 2) (CMS/HCC)   • Insulin pump in place   • Hx of decompressive lumbar laminectomy   • Bilateral carpal tunnel syndrome   • Degenerative cervical disc   • Acute pain of right knee   • Primary localized osteoarthrosis of right lower leg   • Arthritis of right knee   • Sacroiliac inflammation (CMS/HCC)   • Sacroiliac joint dysfunction   • Severe obesity (BMI 35.0-39.9) (CMS/HCC)   • Chronic pain of right knee   • Tricompartment osteoarthritis of left knee        Past Medical History:   Diagnosis Date   • Abscess of scrotum    • Angina pectoris (CMS/HCC)    • Arteriosclerotic cardiovascular disease    • COPD (chronic obstructive pulmonary disease) (CMS/HCC)    • Coronary artery disease    • Diabetes mellitus (CMS/HCC)    • Diabetic peripheral neuropathy (CMS/HCC)    • ED (erectile dysfunction) of non-organic origin    • Hypertension    • Insulin pump in place    • Insulin pump in place    • Low back pain    • Myocardial infarction    • Neck pain    • Spinal stenosis    • TIA (transient ischemic attack)     LEFT EYE   • Type 2 diabetes mellitus (CMS/HCC)    • Upper back pain    • Wears dentures     UPPER PLATE        Past Surgical History:   Procedure Laterality Date   • AMPUTATION FOOT / TOE Left     GREAT TOE   • BACK SURGERY  10/26/2015    Bilateral  L4-L5 laminectomy -Dr. Gutierres   • CARDIAC CATHETERIZATION     • CARDIAC SURGERY      CABG X 6    • CHOLECYSTECTOMY     • CORONARY ARTERY BYPASS GRAFT      X 6   • EPIDURAL BLOCK     • EYE SURGERY     • HAND SURGERY  04/28/2016   • LUMBAR DISCECTOMY FUSION INSTRUMENTATION N/A 12/12/2016    Procedure: L 4-5 FUSION WITH INSTRUMENTATION WITH BMP, WITH REMOVAL OF INSTRUMENTATION ;  Surgeon: Rowdy Spencer MD;  Location: [facility] OR;  Service:    • LUMBAR LAMINECTOMY DISCECTOMY DECOMPRESSION N/A 12/12/2016    Procedure: L4-5 REPEAT LAMINECTOMY ;  Surgeon: Tim Gutierres MD;  Location: [facility] OR;  Service:    • LUMBAR LAMINECTOMY DISCECTOMY DECOMPRESSION N/A 12/14/2016    Procedure: EVACUATION OF LUMBAR HEMATOMA;  Surgeon: Rowdy Spencer MD;  Location: [facility] OR;  Service:    • MULTIPLE TOOTH EXTRACTIONS      UPPER TEETH EXTRACTED   • ORTHOPEDIC SURGERY     • PENIS SURGERY      RECONSTRUCTION   • SCROTUM EXPLORATION      scrotum surgery   • SPINE SURGERY                                                          PT OP Goals     Row Name 09/10/18 1200          PT Short Term Goals    STG 3 Pt. will ambulate with AD with optimal form to decrease risk of falls.  -     STG 3 Progress Met  -        Long Term Goals    LTG 1 Pt. will be independent and compliant with advanced home exercise program.  -     LTG 1 Progress Partially Met  -WS     LTG 2 Pt. will verbalize plan to continue HEP after discharge for optimal prognosis.   -     LTG 2 Progress Partially Met  -WS       User Key  (r) = Recorded By, (t) = Taken By, (c) = Cosigned By    Initials Name Provider Type    Jayson Lopez PTA Physical Therapy Assistant                         Time Calculation:      Therapy Suggested Charges     Code   Minutes Charges    None                     OP PT Discharge Summary  Date of Discharge: 09/10/18  Outcomes Achieved: Patient able to partially acheive established goals  Discharge Instructions/Additional  Comments: Patient seen for 11 visits from 3/5/18 to 4/24/18 consisting of therapeutic exercise, gait education and ice for right knee pain. Patient was last seen 4/24/18 and reported knee was stronger but continued to have pain 6/10. Patient did not reurn to therapy and was discharged until further notice.       Jayson Becerra, PTA  9/10/2018

## 2018-09-13 ENCOUNTER — OFFICE VISIT (OUTPATIENT)
Dept: PAIN MEDICINE | Facility: CLINIC | Age: 63
End: 2018-09-13

## 2018-09-13 VITALS
RESPIRATION RATE: 15 BRPM | WEIGHT: 276.6 LBS | BODY MASS INDEX: 38.72 KG/M2 | TEMPERATURE: 97.6 F | HEART RATE: 77 BPM | DIASTOLIC BLOOD PRESSURE: 85 MMHG | HEIGHT: 71 IN | OXYGEN SATURATION: 95 % | SYSTOLIC BLOOD PRESSURE: 148 MMHG

## 2018-09-13 DIAGNOSIS — M50.30 DEGENERATIVE CERVICAL DISC: ICD-10-CM

## 2018-09-13 DIAGNOSIS — M96.1 POSTLAMINECTOMY SYNDROME OF LUMBAR REGION: ICD-10-CM

## 2018-09-13 DIAGNOSIS — Z98.1 S/P LUMBAR SPINAL FUSION: ICD-10-CM

## 2018-09-13 DIAGNOSIS — Z79.899 ENCOUNTER FOR LONG-TERM (CURRENT) USE OF HIGH-RISK MEDICATION: ICD-10-CM

## 2018-09-13 DIAGNOSIS — G89.29 OTHER CHRONIC PAIN: Primary | ICD-10-CM

## 2018-09-13 PROCEDURE — 99214 OFFICE O/P EST MOD 30 MIN: CPT | Performed by: NURSE PRACTITIONER

## 2018-09-13 RX ORDER — PANTOPRAZOLE SODIUM 40 MG/1
40 TABLET, DELAYED RELEASE ORAL EVERY MORNING
COMMUNITY

## 2018-09-13 RX ORDER — CYCLOBENZAPRINE HCL 10 MG
10 TABLET ORAL 2 TIMES DAILY PRN
Qty: 60 TABLET | Refills: 2 | Status: SHIPPED | OUTPATIENT
Start: 2018-09-13 | End: 2019-06-05 | Stop reason: SDUPTHER

## 2018-09-13 RX ORDER — OXYCODONE HYDROCHLORIDE 10 MG/1
10 TABLET ORAL EVERY 6 HOURS PRN
Qty: 120 TABLET | Refills: 0 | Status: SHIPPED | OUTPATIENT
Start: 2018-09-13 | End: 2018-10-10 | Stop reason: SDUPTHER

## 2018-09-13 NOTE — PROGRESS NOTES
"CHIEF COMPLAINT  F/U neck and back pain. Unchanged tho worse at times, dmitri in the neck area. Needs refill on [employer]-med compound creme and flexeril.     Subjective   Kian Mcdaniels is a 63 y.o. male  who presents to the office for follow-up.He has a history of neck and back pain.    Scheduled for SCS trial 9/24/18 with Dr. Guzman.      Complains of pain in his neck, back and right knee. Today his pain is 7/10VAS.  Continues with Oxycodone 10 mg 4/day, gabapentin 800 mg 3/day and tizanidine 4mg 1-2/night. Denies any side effects from the regimen. The regimen helps decrease his pain by 50%. Notes improvement in function and activity. ADL's by self. Reports that he has never received compounded pain cream from [facility].      Prescribed Plavix for CAD.      Back Pain   This is a chronic problem. The current episode started more than 1 year ago. The problem occurs constantly. The problem has been waxing and waning since onset. The pain is present in the sacro-iliac. The quality of the pain is described as aching, shooting and stabbing. The pain is at a severity of 7/10. The pain is the same all the time. The symptoms are aggravated by bending, sitting and standing. Stiffness is present all day. Associated symptoms include leg pain, numbness, paresthesias and tingling. Pertinent negatives include no chest pain, dysuria, fever, headaches (\"frequent headaches\") or weakness. Risk factors include lack of exercise, obesity and sedentary lifestyle. He has tried analgesics for the symptoms. The treatment provided moderate relief.   Neck Pain    This is a chronic problem. The current episode started more than 1 month ago. The problem occurs constantly. The problem has been gradually worsening. The pain is associated with nothing. The pain is present in the left side, right side and midline. The quality of the pain is described as cramping. The pain is at a severity of 7/10. The symptoms are aggravated by sneezing and twisting. " "The pain is same all the time. Associated symptoms include leg pain, numbness and tingling. Pertinent negatives include no chest pain, fever, headaches (\"frequent headaches\") or weakness. He has tried muscle relaxants, oral narcotics and bed rest for the symptoms. The treatment provided moderate relief.      PEG Assessment   What number best describes your pain on average in the past week?7  What number best describes how, during the past week, pain has interfered with your enjoyment of life?7  What number best describes how, during the past week, pain has interfered with your general activity?  7    The following portions of the patient's history were reviewed and updated as appropriate: allergies, current medications, past family history, past medical history, past social history, past surgical history and problem list.    Review of Systems   Constitutional: Positive for activity change. Negative for chills, fatigue and fever.   HENT: Negative for congestion.    Eyes: Negative for visual disturbance.   Respiratory: Positive for cough (has seen ENT). Negative for apnea, shortness of breath and wheezing.    Cardiovascular: Positive for leg swelling. Negative for chest pain and palpitations.   Gastrointestinal: Negative for constipation and diarrhea.   Genitourinary: Negative for difficulty urinating and dysuria.   Musculoskeletal: Positive for back pain, joint swelling (bilateral ankles) and neck pain. Arthralgias: right knee    Neurological: Positive for tingling, numbness and paresthesias. Negative for dizziness, weakness, light-headedness and headaches (\"frequent headaches\").   Psychiatric/Behavioral: Positive for sleep disturbance (\"little bit\"). Negative for agitation, confusion, hallucinations and suicidal ideas. The patient is not nervous/anxious.        Vitals:    09/13/18 1435   BP: 148/85   Pulse: 77   Resp: 15   Temp: 97.6 °F (36.4 °C)   SpO2: 95%   Weight: 125 kg (276 lb 9.6 oz)   Height: 179.1 cm " "(70.51\")   PainSc:   7   PainLoc: Back  Comment: and neck     Objective   Physical Exam   Constitutional: He is oriented to person, place, and time. Vital signs are normal. He appears well-developed and well-nourished. He is cooperative. No distress.   HENT:   Head: Normocephalic and atraumatic.   Nose: Nose normal.   Eyes: Conjunctivae and lids are normal.   Neck: Trachea normal. Neck supple. Muscular tenderness present. No spinous process tenderness present. Decreased range of motion present.   Cardiovascular: Normal rate.    Pulmonary/Chest: Effort normal. No respiratory distress.   Abdominal:   obese   Musculoskeletal:        Cervical back: He exhibits decreased range of motion, tenderness and pain. He exhibits no spasm.        Lumbar back: He exhibits decreased range of motion, tenderness and pain.   Neurological: He is alert and oriented to person, place, and time. He has normal strength. Gait (ambulates with cane ) abnormal.   Skin: Skin is warm, dry and intact.   Psychiatric: He has a normal mood and affect. His speech is normal and behavior is normal. Cognition and memory are normal.   Nursing note and vitals reviewed.      Assessment/Plan   Kian was seen today for neck pain and back pain.    Diagnoses and all orders for this visit:    Other chronic pain    Postlaminectomy syndrome of lumbar region    Degenerative cervical disc    Encounter for long-term (current) use of high-risk medication    Other orders  -     cyclobenzaprine (FLEXERIL) 10 MG tablet; Take 1 tablet by mouth 2 (Two) Times a Day As Needed for Muscle Spasms.      --- Proceed with SCS trial as scheduled.  Hold Plavix X 7 days prior to the trial.  Will likely plan to pull leads after 3-4 days as the patient will need to be off Plavix prior to the trial and during the trial as well.   --- Refill Oxycodone.Patient appears stable with current regimen. No adverse effects. Regarding continuation of opioids, there is no evidence of aberrant " behavior or any red flags.  The patient continues with appropriate response to opioid therapy. ADL's remain intact by self.   --- The urine drug screen confirmation from 8/15/18 has been reviewed and the result is appropriate based on patient history and DAVID report  --- Follow-up 1 month     From Dr. Gutierres office encounter 8/21/18           DAVID REPORT  As part of the patient's treatment plan, I am prescribing controlled substances. The patient has been made aware of appropriate use of such medications, including potential risk of somnolence, limited ability to drive and/or work safely, and the potential for dependence or overdose. It has also bee made clear that these medications are for use by this patient only, without concomitant use of alcohol or other substances unless prescribed.     Patient has completed prescribing agreement detailing terms of continued prescribing of controlled substances, including monitoring DAVID reports, urine drug screening, and pill counts if necessary. The patient is aware that inappropriate use will results in cessation of prescribing such medications.    DAVID report has been reviewed and scanned into the patient's chart.    As the clinician, I personally reviewed the DAVID from 9/12/2018 while the patient was in the office today.    History and physical exam exhibit continued safe and appropriate use of controlled substances.    EMR Dragon/Transcription disclaimer:   Much of this encounter note is an electronic transcription/translation of spoken language to printed text. The electronic translation of spoken language may permit erroneous, or at times, nonsensical words or phrases to be inadvertently transcribed; Although I have reviewed the note for such errors, some may still exist.

## 2018-09-19 ENCOUNTER — HOSPITAL ENCOUNTER (OUTPATIENT)
Dept: GENERAL RADIOLOGY | Facility: HOSPITAL | Age: 63
Discharge: HOME | End: 2018-09-19
Admitting: ANESTHESIOLOGY

## 2018-09-19 ENCOUNTER — APPOINTMENT (OUTPATIENT)
Dept: PREADMISSION TESTING | Facility: HOSPITAL | Age: 63
End: 2018-09-19

## 2018-09-19 VITALS
TEMPERATURE: 98.6 F | HEIGHT: 71 IN | BODY MASS INDEX: 38.5 KG/M2 | HEART RATE: 82 BPM | WEIGHT: 275 LBS | SYSTOLIC BLOOD PRESSURE: 110 MMHG | RESPIRATION RATE: 20 BRPM | DIASTOLIC BLOOD PRESSURE: 73 MMHG | OXYGEN SATURATION: 96 %

## 2018-09-19 LAB
ANION GAP SERPL CALCULATED.3IONS-SCNC: 11 MMOL/L
BUN BLD-MCNC: 18 MG/DL (ref 8–23)
BUN/CREAT SERPL: 14.1 (ref 7–25)
CALCIUM SPEC-SCNC: 9.4 MG/DL (ref 8.6–10.5)
CHLORIDE SERPL-SCNC: 102 MMOL/L (ref 98–107)
CO2 SERPL-SCNC: 27 MMOL/L (ref 22–29)
CREAT BLD-MCNC: 1.28 MG/DL (ref 0.76–1.27)
DEPRECATED RDW RBC AUTO: 45 FL (ref 37–54)
ERYTHROCYTE [DISTWIDTH] IN BLOOD BY AUTOMATED COUNT: 15.7 % (ref 11.5–14.5)
GFR SERPL CREATININE-BSD FRML MDRD: 69 ML/MIN/1.73
GLUCOSE BLD-MCNC: 233 MG/DL (ref 65–99)
HCT VFR BLD AUTO: 50.9 % (ref 40.4–52.2)
HGB BLD-MCNC: 16.7 G/DL (ref 13.7–17.6)
MCH RBC QN AUTO: 25.9 PG (ref 27–32.7)
MCHC RBC AUTO-ENTMCNC: 32.8 G/DL (ref 32.6–36.4)
MCV RBC AUTO: 78.8 FL (ref 79.8–96.2)
PLATELET # BLD AUTO: 229 10*3/MM3 (ref 140–500)
PMV BLD AUTO: 10.4 FL (ref 6–12)
POTASSIUM BLD-SCNC: 4.5 MMOL/L (ref 3.5–5.2)
RBC # BLD AUTO: 6.46 10*6/MM3 (ref 4.6–6)
SODIUM BLD-SCNC: 140 MMOL/L (ref 136–145)
WBC NRBC COR # BLD: 7.09 10*3/MM3 (ref 4.5–10.7)

## 2018-09-19 PROCEDURE — 71046 X-RAY EXAM CHEST 2 VIEWS: CPT

## 2018-09-19 PROCEDURE — 80048 BASIC METABOLIC PNL TOTAL CA: CPT | Performed by: ANESTHESIOLOGY

## 2018-09-19 PROCEDURE — 36415 COLL VENOUS BLD VENIPUNCTURE: CPT

## 2018-09-19 PROCEDURE — 93010 ELECTROCARDIOGRAM REPORT: CPT | Performed by: INTERNAL MEDICINE

## 2018-09-19 PROCEDURE — 87081 CULTURE SCREEN ONLY: CPT | Performed by: ANESTHESIOLOGY

## 2018-09-19 PROCEDURE — 85027 COMPLETE CBC AUTOMATED: CPT | Performed by: ANESTHESIOLOGY

## 2018-09-19 PROCEDURE — 93005 ELECTROCARDIOGRAM TRACING: CPT

## 2018-09-19 RX ORDER — POTASSIUM CHLORIDE 750 MG/1
10 TABLET, FILM COATED, EXTENDED RELEASE ORAL DAILY
COMMUNITY
End: 2019-12-23

## 2018-09-19 RX ORDER — CARVEDILOL 12.5 MG/1
12.5 TABLET ORAL 2 TIMES DAILY WITH MEALS
COMMUNITY

## 2018-09-19 NOTE — DISCHARGE INSTRUCTIONS
Take the following medications the morning of surgery with a small sip of water:  AMLODIPINE, ISOSORBIDE, CARVEDILOL,GABAPENTIN, LOSARTAN, PANTOPRAZOLE        General Instructions:  • Do not eat solid food after midnight the night before surgery.  • You may drink clear liquids day of surgery but must stop at least one hour before your hospital arrival time.  • It is beneficial for you to have a clear drink that contains carbohydrates the day of surgery.  We suggest a 12 to 20 ounce bottle of Gatorade or Powerade for non-diabetic patients or a 12 to 20 ounce bottle of G2 or Powerade Zero for diabetic patients. (Pediatric patients, are not advised to drink a 12 to 20 ounce carbohydrate drink)    Clear liquids are liquids you can see through.  Nothing red in color.     Plain water                               Sports drinks  Sodas                                   Gelatin (Jell-O)  Fruit juices without pulp such as white grape juice and apple juice  Popsicles that contain no fruit or yogurt  Tea or coffee (no cream or milk added)  Gatorade / Powerade  G2 / Powerade Zero    • Infants may have breast milk up to four hours before surgery.  • Infants drinking formula may drink formula up to six hours before surgery.   • Patients who avoid smoking, chewing tobacco and alcohol for 4 weeks prior to surgery have a reduced risk of post-operative complications.  Quit smoking as many days before surgery as you can.  • Do not smoke, use chewing tobacco or drink alcohol the day of surgery.   • If applicable bring your C-PAP/ BI-PAP machine.  • Bring any papers given to you in the doctor’s office.  • Wear clean comfortable clothes and socks.  • Do not wear contact lenses or make-up.  Bring a case for your glasses.   • Bring crutches or walker if applicable.  • Remove all piercings.  Leave jewelry and any other valuables at home.  • Hair extensions with metal clips must be removed prior to surgery.  • The Pre-Admission Testing nurse  will instruct you to bring medications if unable to obtain an accurate list in Pre-Admission Testing.        If you were given a blood bank ID arm band remember to bring it with you the day of surgery.    Preventing a Surgical Site Infection:  • For 2 to 3 days before surgery, avoid shaving with a razor because the razor can irritate skin and make it easier to develop an infection.    • Any areas of open skin can increase the risk of a post-operative wound infection by allowing bacteria to enter and travel throughout the body.  Notify your surgeon if you have any skin wounds / rashes even if it is not near the expected surgical site.  The area will need assessed to determine if surgery should be delayed until it is healed.  • The night prior to surgery sleep in a clean bed with clean clothing.  Do not allow pets to sleep with you.  • Shower on the morning of surgery using a fresh bar of anti-bacterial soap (such as Dial) and clean washcloth.  Dry with a clean towel and dress in clean clothing.  • Ask your surgeon if you will be receiving antibiotics prior to surgery.  • Make sure you, your family, and all healthcare providers clean their hands with soap and water or an alcohol based hand  before caring for you or your wound.    Day of surgery:  Upon arrival, a Pre-op nurse and Anesthesiologist will review your health history, obtain vital signs, and answer questions you may have.  The only belongings needed at this time will be your home medications and if applicable your C-PAP/BI-PAP machine.  If you are staying overnight your family can leave the rest of your belongings in the car and bring them to your room later.  A Pre-op nurse will start an IV and you may receive medication in preparation for surgery, including something to help you relax.  Your family will be able to see you in the Pre-op area.  While you are in surgery your family should notify the waiting room  if they leave the waiting  room area and provide a contact phone number.    Please be aware that surgery does come with discomfort.  We want to make every effort to control your discomfort so please discuss any uncontrolled symptoms with your nurse.   Your doctor will most likely have prescribed pain medications.      If you are going home after surgery you will receive individualized written care instructions before being discharged.  A responsible adult must drive you to and from the hospital on the day of your surgery and stay with you for 24 hours.    If you are staying overnight following surgery, you will be transported to your hospital room following the recovery period.  [facility] has all private rooms.    You have received a list of surgical assistants for your reference.  If you have any questions please call Pre-Admission Testing at [phone].  Deductibles and co-payments are collected on the day of service. Please be prepared to pay the required co-pay, deductible or deposit on the day of service as defined by your plan.

## 2018-09-21 LAB — MRSA SPEC QL CULT: NORMAL

## 2018-09-23 RX ORDER — SODIUM CHLORIDE 0.9 % (FLUSH) 0.9 %
1-10 SYRINGE (ML) INJECTION AS NEEDED
Status: DISCONTINUED | OUTPATIENT
Start: 2018-09-23 | End: 2018-09-24 | Stop reason: HOSPADM

## 2018-09-23 RX ORDER — CEFAZOLIN SODIUM IN 0.9 % NACL 3 G/100 ML
3 INTRAVENOUS SOLUTION, PIGGYBACK (ML) INTRAVENOUS
Status: COMPLETED | OUTPATIENT
Start: 2018-09-24 | End: 2018-09-24

## 2018-09-24 ENCOUNTER — ANESTHESIA EVENT (OUTPATIENT)
Dept: PERIOP | Facility: HOSPITAL | Age: 63
End: 2018-09-24

## 2018-09-24 ENCOUNTER — APPOINTMENT (OUTPATIENT)
Dept: GENERAL RADIOLOGY | Facility: HOSPITAL | Age: 63
End: 2018-09-24

## 2018-09-24 ENCOUNTER — HOSPITAL ENCOUNTER (OUTPATIENT)
Facility: HOSPITAL | Age: 63
Setting detail: HOSPITAL OUTPATIENT SURGERY
Discharge: HOME | End: 2018-09-24
Attending: ANESTHESIOLOGY | Admitting: ANESTHESIOLOGY

## 2018-09-24 ENCOUNTER — ANESTHESIA (OUTPATIENT)
Dept: PERIOP | Facility: HOSPITAL | Age: 63
End: 2018-09-24

## 2018-09-24 VITALS
DIASTOLIC BLOOD PRESSURE: 84 MMHG | HEART RATE: 85 BPM | BODY MASS INDEX: 37.8 KG/M2 | HEIGHT: 72 IN | TEMPERATURE: 98.1 F | OXYGEN SATURATION: 95 % | RESPIRATION RATE: 18 BRPM | SYSTOLIC BLOOD PRESSURE: 145 MMHG | WEIGHT: 279.1 LBS

## 2018-09-24 LAB
GLUCOSE BLDC GLUCOMTR-MCNC: 131 MG/DL (ref 70–130)
GLUCOSE BLDC GLUCOMTR-MCNC: 77 MG/DL (ref 70–130)
GLUCOSE BLDC GLUCOMTR-MCNC: 90 MG/DL (ref 70–130)

## 2018-09-24 PROCEDURE — 82962 GLUCOSE BLOOD TEST: CPT

## 2018-09-24 PROCEDURE — 95972 ALYS CPLX SP/PN NPGT W/PRGRM: CPT | Performed by: ANESTHESIOLOGY

## 2018-09-24 PROCEDURE — 76000 FLUOROSCOPY <1 HR PHYS/QHP: CPT

## 2018-09-24 PROCEDURE — 63650 IMPLANT NEUROELECTRODES: CPT | Performed by: ANESTHESIOLOGY

## 2018-09-24 PROCEDURE — 72070 X-RAY EXAM THORAC SPINE 2VWS: CPT

## 2018-09-24 PROCEDURE — C1897 LEAD, NEUROSTIM TEST KIT: HCPCS | Performed by: ANESTHESIOLOGY

## 2018-09-24 PROCEDURE — 25010000002 PROPOFOL 10 MG/ML EMULSION: Performed by: NURSE ANESTHETIST, CERTIFIED REGISTERED

## 2018-09-24 DEVICE — 50CM 16 CONTACT TRIAL LEAD KIT
Type: IMPLANTABLE DEVICE | Site: BACK | Status: FUNCTIONAL
Brand: INFINION™  16

## 2018-09-24 RX ORDER — ONDANSETRON 2 MG/ML
4 INJECTION INTRAMUSCULAR; INTRAVENOUS ONCE AS NEEDED
Status: DISCONTINUED | OUTPATIENT
Start: 2018-09-24 | End: 2018-09-24 | Stop reason: HOSPADM

## 2018-09-24 RX ORDER — FAMOTIDINE 10 MG/ML
20 INJECTION, SOLUTION INTRAVENOUS ONCE
Status: COMPLETED | OUTPATIENT
Start: 2018-09-24 | End: 2018-09-24

## 2018-09-24 RX ORDER — PROMETHAZINE HYDROCHLORIDE 25 MG/ML
12.5 INJECTION, SOLUTION INTRAMUSCULAR; INTRAVENOUS ONCE AS NEEDED
Status: DISCONTINUED | OUTPATIENT
Start: 2018-09-24 | End: 2018-09-24 | Stop reason: HOSPADM

## 2018-09-24 RX ORDER — NALOXONE HCL 0.4 MG/ML
0.2 VIAL (ML) INJECTION AS NEEDED
Status: DISCONTINUED | OUTPATIENT
Start: 2018-09-24 | End: 2018-09-24 | Stop reason: HOSPADM

## 2018-09-24 RX ORDER — SODIUM CHLORIDE 0.9 % (FLUSH) 0.9 %
1-10 SYRINGE (ML) INJECTION AS NEEDED
Status: DISCONTINUED | OUTPATIENT
Start: 2018-09-24 | End: 2018-09-24 | Stop reason: HOSPADM

## 2018-09-24 RX ORDER — OXYCODONE AND ACETAMINOPHEN 10; 325 MG/1; MG/1
1 TABLET ORAL ONCE AS NEEDED
Status: DISCONTINUED | OUTPATIENT
Start: 2018-09-24 | End: 2018-09-24

## 2018-09-24 RX ORDER — CARVEDILOL 3.12 MG/1
12.5 TABLET ORAL ONCE
Status: COMPLETED | OUTPATIENT
Start: 2018-09-24 | End: 2018-09-24

## 2018-09-24 RX ORDER — PROMETHAZINE HYDROCHLORIDE 25 MG/1
12.5 TABLET ORAL ONCE AS NEEDED
Status: DISCONTINUED | OUTPATIENT
Start: 2018-09-24 | End: 2018-09-24 | Stop reason: HOSPADM

## 2018-09-24 RX ORDER — HYDROCODONE BITARTRATE AND ACETAMINOPHEN 7.5; 325 MG/1; MG/1
1 TABLET ORAL ONCE AS NEEDED
Status: DISCONTINUED | OUTPATIENT
Start: 2018-09-24 | End: 2018-09-24 | Stop reason: HOSPADM

## 2018-09-24 RX ORDER — EPHEDRINE SULFATE 50 MG/ML
5 INJECTION, SOLUTION INTRAVENOUS ONCE AS NEEDED
Status: DISCONTINUED | OUTPATIENT
Start: 2018-09-24 | End: 2018-09-24 | Stop reason: HOSPADM

## 2018-09-24 RX ORDER — LIDOCAINE HYDROCHLORIDE 10 MG/ML
0.5 INJECTION, SOLUTION EPIDURAL; INFILTRATION; INTRACAUDAL; PERINEURAL ONCE AS NEEDED
Status: DISCONTINUED | OUTPATIENT
Start: 2018-09-24 | End: 2018-09-24 | Stop reason: HOSPADM

## 2018-09-24 RX ORDER — DIPHENHYDRAMINE HYDROCHLORIDE 50 MG/ML
12.5 INJECTION INTRAMUSCULAR; INTRAVENOUS
Status: DISCONTINUED | OUTPATIENT
Start: 2018-09-24 | End: 2018-09-24 | Stop reason: HOSPADM

## 2018-09-24 RX ORDER — FLUMAZENIL 0.1 MG/ML
0.2 INJECTION INTRAVENOUS AS NEEDED
Status: DISCONTINUED | OUTPATIENT
Start: 2018-09-24 | End: 2018-09-24 | Stop reason: HOSPADM

## 2018-09-24 RX ORDER — PROPOFOL 10 MG/ML
VIAL (ML) INTRAVENOUS AS NEEDED
Status: DISCONTINUED | OUTPATIENT
Start: 2018-09-24 | End: 2018-09-24 | Stop reason: SURG

## 2018-09-24 RX ORDER — MIDAZOLAM HYDROCHLORIDE 1 MG/ML
1 INJECTION INTRAMUSCULAR; INTRAVENOUS
Status: DISCONTINUED | OUTPATIENT
Start: 2018-09-24 | End: 2018-09-24 | Stop reason: HOSPADM

## 2018-09-24 RX ORDER — OXYCODONE AND ACETAMINOPHEN 7.5; 325 MG/1; MG/1
1 TABLET ORAL ONCE AS NEEDED
Status: DISCONTINUED | OUTPATIENT
Start: 2018-09-24 | End: 2018-09-24 | Stop reason: HOSPADM

## 2018-09-24 RX ORDER — LIDOCAINE HYDROCHLORIDE 20 MG/ML
INJECTION, SOLUTION INFILTRATION; PERINEURAL AS NEEDED
Status: DISCONTINUED | OUTPATIENT
Start: 2018-09-24 | End: 2018-09-24 | Stop reason: SURG

## 2018-09-24 RX ORDER — CARVEDILOL 3.12 MG/1
12.5 TABLET ORAL ONCE
Status: DISCONTINUED | OUTPATIENT
Start: 2018-09-24 | End: 2018-09-24 | Stop reason: HOSPADM

## 2018-09-24 RX ORDER — FENTANYL CITRATE 50 UG/ML
50 INJECTION, SOLUTION INTRAMUSCULAR; INTRAVENOUS
Status: DISCONTINUED | OUTPATIENT
Start: 2018-09-24 | End: 2018-09-24 | Stop reason: HOSPADM

## 2018-09-24 RX ORDER — PROMETHAZINE HYDROCHLORIDE 25 MG/1
25 SUPPOSITORY RECTAL ONCE AS NEEDED
Status: DISCONTINUED | OUTPATIENT
Start: 2018-09-24 | End: 2018-09-24 | Stop reason: HOSPADM

## 2018-09-24 RX ORDER — LIDOCAINE HYDROCHLORIDE 10 MG/ML
INJECTION, SOLUTION EPIDURAL; INFILTRATION; INTRACAUDAL; PERINEURAL AS NEEDED
Status: DISCONTINUED | OUTPATIENT
Start: 2018-09-24 | End: 2018-09-24 | Stop reason: HOSPADM

## 2018-09-24 RX ORDER — PROPOFOL 10 MG/ML
VIAL (ML) INTRAVENOUS CONTINUOUS PRN
Status: DISCONTINUED | OUTPATIENT
Start: 2018-09-24 | End: 2018-09-24 | Stop reason: SURG

## 2018-09-24 RX ORDER — PROMETHAZINE HYDROCHLORIDE 25 MG/1
25 TABLET ORAL ONCE AS NEEDED
Status: DISCONTINUED | OUTPATIENT
Start: 2018-09-24 | End: 2018-09-24 | Stop reason: HOSPADM

## 2018-09-24 RX ORDER — MIDAZOLAM HYDROCHLORIDE 1 MG/ML
2 INJECTION INTRAMUSCULAR; INTRAVENOUS
Status: DISCONTINUED | OUTPATIENT
Start: 2018-09-24 | End: 2018-09-24 | Stop reason: HOSPADM

## 2018-09-24 RX ORDER — LABETALOL HYDROCHLORIDE 5 MG/ML
5 INJECTION, SOLUTION INTRAVENOUS
Status: DISCONTINUED | OUTPATIENT
Start: 2018-09-24 | End: 2018-09-24 | Stop reason: HOSPADM

## 2018-09-24 RX ORDER — GABAPENTIN 400 MG/1
400 CAPSULE ORAL ONCE
Status: COMPLETED | OUTPATIENT
Start: 2018-09-24 | End: 2018-09-24

## 2018-09-24 RX ORDER — SODIUM CHLORIDE, SODIUM LACTATE, POTASSIUM CHLORIDE, CALCIUM CHLORIDE 600; 310; 30; 20 MG/100ML; MG/100ML; MG/100ML; MG/100ML
9 INJECTION, SOLUTION INTRAVENOUS CONTINUOUS
Status: DISCONTINUED | OUTPATIENT
Start: 2018-09-24 | End: 2018-09-24 | Stop reason: HOSPADM

## 2018-09-24 RX ORDER — ASPIRIN 81 MG/1
81 TABLET ORAL EVERY MORNING
Start: 2018-09-24

## 2018-09-24 RX ORDER — OXYCODONE HYDROCHLORIDE 5 MG/1
10 TABLET ORAL ONCE
Status: COMPLETED | OUTPATIENT
Start: 2018-09-24 | End: 2018-09-24

## 2018-09-24 RX ORDER — MAGNESIUM HYDROXIDE 1200 MG/15ML
LIQUID ORAL AS NEEDED
Status: DISCONTINUED | OUTPATIENT
Start: 2018-09-24 | End: 2018-09-24 | Stop reason: HOSPADM

## 2018-09-24 RX ADMIN — CEFAZOLIN 3 G: 1 INJECTION, POWDER, FOR SOLUTION INTRAMUSCULAR; INTRAVENOUS; PARENTERAL at 07:34

## 2018-09-24 RX ADMIN — LIDOCAINE HYDROCHLORIDE 60 MG: 20 INJECTION, SOLUTION INFILTRATION; PERINEURAL at 07:33

## 2018-09-24 RX ADMIN — FAMOTIDINE 20 MG: 10 INJECTION, SOLUTION INTRAVENOUS at 07:14

## 2018-09-24 RX ADMIN — GABAPENTIN 400 MG: 400 CAPSULE ORAL at 07:11

## 2018-09-24 RX ADMIN — CARVEDILOL 12.5 MG: 12.5 TABLET, FILM COATED ORAL at 07:14

## 2018-09-24 RX ADMIN — PROPOFOL 50 MG: 10 INJECTION, EMULSION INTRAVENOUS at 07:33

## 2018-09-24 RX ADMIN — OXYCODONE HYDROCHLORIDE 10 MG: 5 TABLET ORAL at 07:10

## 2018-09-24 RX ADMIN — SODIUM CHLORIDE, POTASSIUM CHLORIDE, SODIUM LACTATE AND CALCIUM CHLORIDE 9 ML/HR: 600; 310; 30; 20 INJECTION, SOLUTION INTRAVENOUS at 06:37

## 2018-09-24 RX ADMIN — PROPOFOL 100 MCG/KG/MIN: 10 INJECTION, EMULSION INTRAVENOUS at 07:33

## 2018-09-24 NOTE — ANESTHESIA POSTPROCEDURE EVALUATION
"Patient: Kian Mcdaniels    Procedure Summary     Date:  09/24/18 Room / Location:  [facility] OR 02 / [facility] MAIN OR    Anesthesia Start:  0726 Anesthesia Stop:  0849    Procedure:  SPINAL CORD STIMULATOR Phase 1 - Care-n-Share (N/A Back) Diagnosis:       Postlaminectomy syndrome of lumbar region      (Postlaminectomy syndrome of lumbar region [M96.1])    Surgeon:  Mulugeta Guzman MD Provider:  Isaac Ramirez MD    Anesthesia Type:  MAC ASA Status:  3          Anesthesia Type: MAC  Last vitals  BP   175/99 (09/24/18 0847)   Temp   36.7 °C (98.1 °F) (09/24/18 0847)   Pulse   85 (09/24/18 0847)   Resp   22 (09/24/18 0605)     SpO2   96 % (09/24/18 0847)     Post Anesthesia Care and Evaluation    Patient location during evaluation: bedside  Patient participation: complete - patient participated  Level of consciousness: awake  Pain management: adequate  Airway patency: patent  Anesthetic complications: No anesthetic complications    Cardiovascular status: acceptable  Respiratory status: acceptable  Hydration status: acceptable    Comments: /99 (BP Location: Left arm, Patient Position: Lying)   Pulse 85   Temp 36.7 °C (98.1 °F) (Oral)   Resp 22   Ht 181.6 cm (71.5\")   Wt 127 kg (279 lb 1.6 oz)   SpO2 96%   BMI 38.38 kg/m²         "

## 2018-09-24 NOTE — ADDENDUM NOTE
Addendum  created 09/24/18 1526 by Asher Greenberg MD    Anesthesia Review and Sign - Ready for Procedure, Anesthesia Review and Sign - Signed

## 2018-09-24 NOTE — ANESTHESIA PREPROCEDURE EVALUATION
Anesthesia Evaluation     Patient summary reviewed   no history of anesthetic complications:  NPO Solid Status: > 8 hours  NPO Liquid Status: > 8 hours           Airway   Mallampati: III  Possible difficult intubation  Dental    (+) edentulous        Pulmonary - normal exam   (+) a smoker Former Abstained day of surgery, COPD,   Cardiovascular - normal exam  Exercise tolerance: poor (<4 METS)    ECG reviewed    (+) hypertension poorly controlled, past MI  >12 months, CAD, CABG >6 Months, hyperlipidemia,     ROS comment: Denies recent Chest or SOA    Neuro/Psych  (+) TIA, headaches, numbness, psychiatric history,     GI/Hepatic/Renal/Endo    (+)  GERD well controlled,  diabetes mellitus type 2 using insulin,     ROS Comment: Has insulin pump will keep it running    Musculoskeletal     (+) back pain, neck pain,   Abdominal    Substance History      OB/GYN          Other   (+) arthritis                   Anesthesia Plan    ASA 3     general     intravenous induction   Anesthetic plan, all risks, benefits, and alternatives have been provided, discussed and informed consent has been obtained with: patient.

## 2018-09-28 ENCOUNTER — OFFICE VISIT (OUTPATIENT)
Dept: PAIN MEDICINE | Facility: CLINIC | Age: 63
End: 2018-09-28

## 2018-09-28 ENCOUNTER — TELEPHONE (OUTPATIENT)
Dept: PAIN MEDICINE | Facility: CLINIC | Age: 63
End: 2018-09-28

## 2018-09-28 VITALS
WEIGHT: 278.6 LBS | BODY MASS INDEX: 39 KG/M2 | HEART RATE: 84 BPM | TEMPERATURE: 98.3 F | SYSTOLIC BLOOD PRESSURE: 117 MMHG | RESPIRATION RATE: 15 BRPM | DIASTOLIC BLOOD PRESSURE: 84 MMHG | HEIGHT: 71 IN | OXYGEN SATURATION: 96 %

## 2018-09-28 DIAGNOSIS — M96.1 POSTLAMINECTOMY SYNDROME OF LUMBAR REGION: ICD-10-CM

## 2018-09-28 DIAGNOSIS — G89.29 OTHER CHRONIC PAIN: Primary | ICD-10-CM

## 2018-09-28 PROCEDURE — 99024 POSTOP FOLLOW-UP VISIT: CPT | Performed by: NURSE PRACTITIONER

## 2018-09-28 NOTE — PROGRESS NOTES
"CHIEF COMPLAINT  F/U SCS BS Phase 1. Pt states hasn't had relief.    Subjective   Kian Mcdaniels is a 63 y.o. male  who presents to the office for follow-up of procedure.  He completed a SCS trial phase 1 with the [employer] System   on  9/24/18 performed by Dr. Guzman for management of back pain. Patient reports no relief from the procedure.     Patient also reporting a lot of abdominal pain. This is not improve with the simulator turned off and is unlikely related. He reports that he has had some medication changes recently and plans to follow up with his PCP next week.      History of Present Illness     PEG Assessment   What number best describes your pain on average in the past week?8  What number best describes how, during the past week, pain has interfered with your enjoyment of life?8  What number best describes how, during the past week, pain has interfered with your general activity?  9    The following portions of the patient's history were reviewed and updated as appropriate: allergies, current medications, past family history, past medical history, past social history, past surgical history and problem list.    Review of Systems   Constitutional: Positive for activity change. Negative for chills, fatigue and fever.   HENT: Negative for congestion.    Eyes: Negative for visual disturbance.   Respiratory: Positive for cough (has seen ENT) and shortness of breath. Negative for apnea and wheezing.    Cardiovascular: Positive for leg swelling. Negative for chest pain and palpitations.   Gastrointestinal: Positive for abdominal pain (\"sides\") and constipation (had a small BM this morning, prior to went three days without one). Negative for diarrhea.   Genitourinary: Negative for difficulty urinating and dysuria.   Musculoskeletal: Positive for back pain, joint swelling (bilateral ankles) and neck pain. Arthralgias: right knee    Neurological: Positive for weakness and numbness. Negative for " "dizziness, light-headedness and headaches (\"frequent headaches\").   Psychiatric/Behavioral: Positive for sleep disturbance (\"little bit\"). Negative for agitation, confusion, hallucinations and suicidal ideas. The patient is not nervous/anxious.      Vitals:    09/28/18 1438   BP: 117/84   Pulse: 84   Resp: 15   Temp: 98.3 °F (36.8 °C)   SpO2: 96%   Weight: 126 kg (278 lb 9.6 oz)   Height: 181.6 cm (71.5\")   PainSc:   7   PainLoc: Back     Objective   Physical Exam   Constitutional: He is oriented to person, place, and time. He appears well-developed and well-nourished. No distress.   HENT:   Head: Normocephalic and atraumatic.   Eyes: Conjunctivae and EOM are normal.   Neck: Neck supple.   Cardiovascular: Normal rate.    Pulmonary/Chest: Effort normal. No respiratory distress.   Neurological: He is alert and oriented to person, place, and time.   Skin: Skin is warm and dry.   Psychiatric: He has a normal mood and affect. His behavior is normal.   Nursing note and vitals reviewed.    Assessment/Plan   Kian was seen today for back pain.    Diagnoses and all orders for this visit:    Other chronic pain    Postlaminectomy syndrome of lumbar region      This patient presents today for in-office removal of spinal cord stimulation trialing leads.  The dressings were removed and the leads were exposed.  There was no indication of infection or irritation.  The leads were easily pulled from the epidural space and removed.  Lead tips were intact. No drainage or erythema.      --- He can re-start Plavix tomorrow  --- Follow-up with Dr. Megan HERNANDEZ REPORT    DAVID report has been reviewed and scanned into the patient's chart.    As the clinician, I personally reviewed the DAVID from 9/27/2018 while the patient was in the office today.    EMR Dragon/Transcription disclaimer:   Much of this encounter note is an electronic transcription/translation of spoken language to printed text. The electronic translation of " spoken language may permit erroneous, or at times, nonsensical words or phrases to be inadvertently transcribed; Although I have reviewed the note for such errors, some may still exist.

## 2018-09-28 NOTE — TELEPHONE ENCOUNTER
I spoke to the patient reminding him to resume his plavix tomorrow,9/29/18.  Pt voiced an understanding of this instruction.

## 2018-10-10 ENCOUNTER — OFFICE VISIT (OUTPATIENT)
Dept: PAIN MEDICINE | Facility: CLINIC | Age: 63
End: 2018-10-10

## 2018-10-10 VITALS
WEIGHT: 276.4 LBS | SYSTOLIC BLOOD PRESSURE: 126 MMHG | HEART RATE: 87 BPM | BODY MASS INDEX: 38.69 KG/M2 | OXYGEN SATURATION: 94 % | DIASTOLIC BLOOD PRESSURE: 76 MMHG | HEIGHT: 71 IN | RESPIRATION RATE: 15 BRPM | TEMPERATURE: 96.3 F

## 2018-10-10 DIAGNOSIS — M96.1 POSTLAMINECTOMY SYNDROME OF LUMBAR REGION: ICD-10-CM

## 2018-10-10 DIAGNOSIS — G89.4 CHRONIC PAIN SYNDROME: Primary | ICD-10-CM

## 2018-10-10 DIAGNOSIS — Z98.1 S/P LUMBAR SPINAL FUSION: ICD-10-CM

## 2018-10-10 DIAGNOSIS — Z79.899 ENCOUNTER FOR LONG-TERM (CURRENT) USE OF HIGH-RISK MEDICATION: ICD-10-CM

## 2018-10-10 DIAGNOSIS — M47.812 CERVICAL SPONDYLOSIS WITHOUT MYELOPATHY: ICD-10-CM

## 2018-10-10 PROCEDURE — 99214 OFFICE O/P EST MOD 30 MIN: CPT | Performed by: ANESTHESIOLOGY

## 2018-10-10 RX ORDER — OXYCODONE HYDROCHLORIDE 10 MG/1
10 TABLET ORAL EVERY 6 HOURS PRN
Qty: 120 TABLET | Refills: 0 | Status: SHIPPED | OUTPATIENT
Start: 2018-10-10 | End: 2018-11-07 | Stop reason: SDUPTHER

## 2018-10-10 NOTE — PROGRESS NOTES
"CHIEF COMPLAINT    F/U back pain, pt also c/o neck pain that he thinks is radiating from his back.     Subjective   Kian Mcdaniels is a 63 y.o. male  who presents to the office for follow-up.He has a history of back and neck pain.    He recently had a SCS trial which he did not find to be successful.    This trial was for lumbar pain, as we avoided any passage of leads through the cervicothoracic area due to syringomyelia.      Back Pain   This is a chronic problem. The current episode started more than 1 year ago. The problem occurs constantly. The problem is unchanged. The pain is present in the sacro-iliac. The quality of the pain is described as aching, shooting and stabbing. The pain is moderate. The pain is the same all the time. The symptoms are aggravated by bending, sitting and standing. Stiffness is present all day. Associated symptoms include abdominal pain (\"sides\"), leg pain, numbness, paresthesias, tingling and weakness. Pertinent negatives include no chest pain, dysuria, fever or headaches (\"frequent headaches\"). Risk factors include lack of exercise, obesity and sedentary lifestyle. He has tried analgesics for the symptoms. The treatment provided mild relief.   Neck Pain    This is a chronic problem. The problem occurs constantly. The problem has been gradually worsening. The pain is associated with nothing. The pain is present in the left side, right side and midline. The quality of the pain is described as cramping. The pain is severe. The symptoms are aggravated by sneezing and twisting. The pain is same all the time. Associated symptoms include leg pain, numbness, tingling and weakness. Pertinent negatives include no chest pain, fever or headaches (\"frequent headaches\"). He has tried muscle relaxants, oral narcotics and bed rest for the symptoms. The treatment provided mild (less relief...) relief.        PEG Assessment   What number best describes your pain on average in the past " week?7  What number best describes how, during the past week, pain has interfered with your enjoyment of life?7  What number best describes how, during the past week, pain has interfered with your general activity?  7    --  The aforementioned information the Chief Complaint section and above subjective data including any HPI data has been personally reviewed and affirmed.  --        The following portions of the patient's history were reviewed and updated as appropriate: allergies, current medications, past family history, past medical history, past social history, past surgical history and problem list.    -------    The following portions of the patient's history were reviewed and updated as appropriate: allergies, current medications, past family history, past medical history, past social history, past surgical history and problem list.    Allergies   Allergen Reactions   • Erythromycin Nausea Only   • Lisinopril Other (See Comments)     7/2016 possibly caused pancreatitis per Dr Quinonez   • Metformin Nausea And Vomiting         Current Outpatient Prescriptions:   •  albuterol (VENTOLIN HFA) 108 (90 BASE) MCG/ACT inhaler, Inhale 1-2 puffs Every 6 (Six) Hours As Needed for wheezing (RESCUE INHALER)., Disp: , Rfl:   •  amLODIPine (NORVASC) 10 MG tablet, Take 10 mg by mouth Every Morning., Disp: , Rfl:   •  Ascorbic Acid (VITAMIN C PO), Take 1,000 mg by mouth Daily., Disp: , Rfl:   •  aspirin 81 MG EC tablet, Take 1 tablet by mouth Every Morning., Disp: , Rfl:   •  atorvastatin (LIPITOR) 20 MG tablet, Take 20 mg by mouth Every Night., Disp: , Rfl:   •  Blood Glucose Monitoring Suppl (FREESTYLE FREEDOM LITE) W/DEVICE kit, , Disp: , Rfl:   •  carvedilol (COREG) 12.5 MG tablet, Take 12.5 mg by mouth 2 (Two) Times a Day With Meals., Disp: , Rfl:   •  cetirizine (ZyrTEC) 10 MG tablet, Take 10 mg by mouth Every Morning., Disp: , Rfl:   •  clopidogrel (PLAVIX) 75 MG tablet, Take 75 mg by mouth Every Morning. Stopped  9/17/18, Disp: , Rfl:   •  cyclobenzaprine (FLEXERIL) 10 MG tablet, Take 1 tablet by mouth 2 (Two) Times a Day As Needed for Muscle Spasms., Disp: 60 tablet, Rfl: 2  •  diclofenac (VOLTAREN) 1 % gel gel, Apply 4 g topically 4 (Four) Times a Day., Disp: 3 tube, Rfl: 11  •  docusate sodium (COLACE) 100 MG capsule, Take 100 mg by mouth 2 (Two) Times a Day., Disp: , Rfl:   •  Empagliflozin (JARDIANCE) 10 MG tablet, Take 10 mg by mouth Every Morning., Disp: , Rfl:   •  furosemide (LASIX) 40 MG tablet, Take 40 mg by mouth Every Night., Disp: , Rfl:   •  gabapentin (NEURONTIN) 400 MG capsule, Take 800 mg by mouth 3 (Three) Times a Day., Disp: , Rfl:   •  glucose blood (FREESTYLE LITE) test strip, Test 4 times per day, Disp: , Rfl:   •  HUMULIN R 500 UNIT/ML CONCENTRATED injection, Inject 500 Units under the skin Continuous. Via insulin pump basal rate 0.68units/hr from 0000 to 1000 1000 to 1600 is 0.7units/hr 1600 to 0000 0.75units/hr Plus sliding scale based on carb intake, Disp: , Rfl:   •  insulin degludec (TRESIBA FLEXTOUCH) 100 UNIT/ML solution pen-injector injection, Inject 40 Units under the skin into the appropriate area as directed Every Morning., Disp: , Rfl:   •  Insulin Infusion Pump (T:SLIM INSULIN PUMP) device, , Disp: , Rfl:   •  isosorbide mononitrate (IMDUR) 60 MG 24 hr tablet, Take 60 mg by mouth Every Morning., Disp: , Rfl:   •  Lancets (FREESTYLE) lancets, Test 4 times per day, Disp: , Rfl:   •  losartan (COZAAR) 50 MG tablet, Take 50 mg by mouth Every Morning., Disp: , Rfl:   •  Multiple Vitamin tablet, Take 1 tablet by mouth daily., Disp: , Rfl:   •  NARCAN 4 MG/0.1ML nasal spray, , Disp: , Rfl: 0  •  oxyCODONE (ROXICODONE) 10 MG tablet, Take 1 tablet by mouth Every 6 (Six) Hours As Needed for Severe Pain ., Disp: 120 tablet, Rfl: 0  •  pantoprazole (PROTONIX) 40 MG EC tablet, Take 40 mg by mouth Every Morning., Disp: , Rfl:   •  potassium chloride (K-DUR) 10 MEQ CR tablet, Take 10 mEq by mouth  Daily., Disp: , Rfl:   •  raNITIdine (ZANTAC) 300 MG tablet, Take 300 mg by mouth Every Night., Disp: , Rfl:   •  TESTOSTERONE CYPIONATE IM, Inject 1 mL into the appropriate muscle as directed by prescriber Every 14 (Fourteen) Days. 200mg/ml, Disp: , Rfl:   •  vitamin D (ERGOCALCIFEROL) 58289 UNITS capsule capsule, Take 50,000 Units by mouth Every 7 (Seven) Days. thursdays, Disp: , Rfl:     Current Outpatient Prescriptions on File Prior to Visit   Medication Sig Dispense Refill   • albuterol (VENTOLIN HFA) 108 (90 BASE) MCG/ACT inhaler Inhale 1-2 puffs Every 6 (Six) Hours As Needed for wheezing (RESCUE INHALER).     • amLODIPine (NORVASC) 10 MG tablet Take 10 mg by mouth Every Morning.     • Ascorbic Acid (VITAMIN C PO) Take 1,000 mg by mouth Daily.     • aspirin 81 MG EC tablet Take 1 tablet by mouth Every Morning.     • atorvastatin (LIPITOR) 20 MG tablet Take 20 mg by mouth Every Night.     • Blood Glucose Monitoring Suppl (FREESTYLE FREEDOM LITE) W/DEVICE kit      • carvedilol (COREG) 12.5 MG tablet Take 12.5 mg by mouth 2 (Two) Times a Day With Meals.     • cetirizine (ZyrTEC) 10 MG tablet Take 10 mg by mouth Every Morning.     • clopidogrel (PLAVIX) 75 MG tablet Take 75 mg by mouth Every Morning. Stopped 9/17/18     • cyclobenzaprine (FLEXERIL) 10 MG tablet Take 1 tablet by mouth 2 (Two) Times a Day As Needed for Muscle Spasms. 60 tablet 2   • diclofenac (VOLTAREN) 1 % gel gel Apply 4 g topically 4 (Four) Times a Day. 3 tube 11   • docusate sodium (COLACE) 100 MG capsule Take 100 mg by mouth 2 (Two) Times a Day.     • Empagliflozin (JARDIANCE) 10 MG tablet Take 10 mg by mouth Every Morning.     • furosemide (LASIX) 40 MG tablet Take 40 mg by mouth Every Night.     • gabapentin (NEURONTIN) 400 MG capsule Take 800 mg by mouth 3 (Three) Times a Day.     • glucose blood (FREESTYLE LITE) test strip Test 4 times per day     • HUMULIN R 500 UNIT/ML CONCENTRATED injection Inject 500 Units under the skin Continuous.  Via insulin pump basal rate 0.68units/hr from 0000 to 1000  1000 to 1600 is 0.7units/hr  1600 to 0000 0.75units/hr  Plus sliding scale based on carb intake     • insulin degludec (TRESIBA FLEXTOUCH) 100 UNIT/ML solution pen-injector injection Inject 40 Units under the skin into the appropriate area as directed Every Morning.     • Insulin Infusion Pump (T:SLIM INSULIN PUMP) device      • isosorbide mononitrate (IMDUR) 60 MG 24 hr tablet Take 60 mg by mouth Every Morning.     • Lancets (FREESTYLE) lancets Test 4 times per day     • losartan (COZAAR) 50 MG tablet Take 50 mg by mouth Every Morning.     • Multiple Vitamin tablet Take 1 tablet by mouth daily.     • NARCAN 4 MG/0.1ML nasal spray   0   • pantoprazole (PROTONIX) 40 MG EC tablet Take 40 mg by mouth Every Morning.     • potassium chloride (K-DUR) 10 MEQ CR tablet Take 10 mEq by mouth Daily.     • raNITIdine (ZANTAC) 300 MG tablet Take 300 mg by mouth Every Night.     • TESTOSTERONE CYPIONATE IM Inject 1 mL into the appropriate muscle as directed by prescriber Every 14 (Fourteen) Days. 200mg/ml     • vitamin D (ERGOCALCIFEROL) 70180 UNITS capsule capsule Take 50,000 Units by mouth Every 7 (Seven) Days. thursdays       No current facility-administered medications on file prior to visit.        Patient Active Problem List   Diagnosis   • Bulging lumbar disc   • Chronic pain   • Diabetic neuropathy (CMS/HCC)   • Low back pain   • Degenerative arthritis of lumbar spine   • Neuropathy involving both lower extremities   • Encounter for long-term (current) use of high-risk medication   • Postlaminectomy syndrome of lumbar region   • Syringomyelia and syringobulbia (CMS/HCC)   • Lumbar pseudoarthrosis   • DM2 (diabetes mellitus, type 2) (CMS/HCC)   • Insulin pump in place   • Hx of decompressive lumbar laminectomy   • Bilateral carpal tunnel syndrome   • Degenerative cervical disc   • Acute pain of right knee   • Primary localized osteoarthrosis of right lower leg   •  Arthritis of right knee   • Sacroiliac inflammation (CMS/HCC)   • Sacroiliac joint dysfunction   • Severe obesity (BMI 35.0-39.9)   • Chronic pain of right knee   • Tricompartment osteoarthritis of left knee   • Tricompartment osteoarthritis of right knee       Past Medical History:   Diagnosis Date   • Abscess of scrotum 2000   • Angina pectoris (CMS/HCC)    • Arteriosclerotic cardiovascular disease    • Arthritis    • COPD (chronic obstructive pulmonary disease) (CMS/HCC)    • Coronary artery disease    • Diabetes mellitus (CMS/HCC)    • Diabetic peripheral neuropathy (CMS/HCC)    • Disease of thyroid gland     NODULE PRESENT   • ED (erectile dysfunction) of non-organic origin    • GERD (gastroesophageal reflux disease)    • Headache disorder     DUE TO NECK   • Hyperlipidemia    • Hypertension    • Insulin pump in place    • Insulin pump in place    • Knee pain, bilateral    • Low back pain    • Myocardial infarction (CMS/[facility])    • Neck pain    • Spinal stenosis    • TIA (transient ischemic attack)     LEFT EYE   • Type 2 diabetes mellitus (CMS/[facility])    • Upper back pain    • Wears dentures     UPPER PLATE       Past Surgical History:   Procedure Laterality Date   • AMPUTATION FOOT / TOE Left     GREAT TOE   • BACK SURGERY  10/26/2015    Bilateral L4-L5 laminectomy -Dr. Gutierres   • CARDIAC CATHETERIZATION     • CARDIAC SURGERY      CABG X 6    • CHOLECYSTECTOMY     • CORONARY ARTERY BYPASS GRAFT      X 6   • EPIDURAL BLOCK     • EYE SURGERY     • HAND SURGERY  04/28/2016   • LUMBAR DISCECTOMY FUSION INSTRUMENTATION N/A 12/12/2016    Procedure: L 4-5 FUSION WITH INSTRUMENTATION WITH BMP, WITH REMOVAL OF INSTRUMENTATION ;  Surgeon: Rowdy Spencer MD;  Location: [facility];  Service:    • LUMBAR LAMINECTOMY DISCECTOMY DECOMPRESSION N/A 12/12/2016    Procedure: L4-5 REPEAT LAMINECTOMY ;  Surgeon: Tim Gutierres MD;  Location: [facility];  Service:    • LUMBAR LAMINECTOMY DISCECTOMY DECOMPRESSION N/A  "12/14/2016    Procedure: EVACUATION OF LUMBAR HEMATOMA;  Surgeon: Rowdy Spencer MD;  Location: [facility] OR;  Service:    • MULTIPLE TOOTH EXTRACTIONS      UPPER TEETH EXTRACTED   • ORTHOPEDIC SURGERY     • PENIS SURGERY      RECONSTRUCTION   • SCROTUM EXPLORATION      scrotum surgery   • SPINAL CORD STIMULATOR IMPLANT N/A 9/24/2018    Procedure: SPINAL CORD STIMULATOR Phase 1 - [employer];  Surgeon: Mulugeta Guzman MD;  Location: [facility] OR;  Service: Pain Management   • SPINE SURGERY         Family History   Problem Relation Age of Onset   • Diabetes Other    • Heart disease Other    • Hypertension Other    • Kidney disease Other         renal failure   • Heart disease Maternal Grandmother    • Hypertension Maternal Grandmother    • Malig Hyperthermia Neg Hx        Social History     Social History   • Marital status:      Spouse name: N/A   • Number of children: N/A   • Years of education: N/A     Occupational History   • Not on file.     Social History Main Topics   • Smoking status: Former Smoker     Packs/day: 1.00     Years: 25.00     Types: Cigarettes     Start date: 1975     Quit date: 2000   • Smokeless tobacco: Never Used   • Alcohol use No      Comment: no alcohol since 2000   • Drug use: No   • Sexual activity: Defer     Other Topics Concern   • Not on file     Social History Narrative   • No narrative on file       -------        Review of Systems   Constitutional: Positive for activity change. Negative for chills, fatigue and fever.   HENT: Negative for congestion.    Eyes: Negative for visual disturbance.   Respiratory: Positive for cough (has seen ENT) and shortness of breath. Negative for apnea and wheezing.    Cardiovascular: Positive for leg swelling. Negative for chest pain and palpitations.   Gastrointestinal: Positive for abdominal pain (\"sides\"). Negative for constipation and diarrhea.   Genitourinary: Negative for difficulty urinating and dysuria.   Musculoskeletal: " "Positive for back pain, joint swelling (bilateral ankles) and neck pain. Arthralgias: right knee    Skin: Negative for rash and wound.   Allergic/Immunologic: Negative for immunocompromised state.   Neurological: Positive for tingling, weakness, numbness and paresthesias. Negative for dizziness, light-headedness and headaches (\"frequent headaches\").   Hematological: Bruises/bleeds easily (Plavix).   Psychiatric/Behavioral: Positive for sleep disturbance (\"little bit\"). Negative for agitation, confusion, hallucinations and suicidal ideas. The patient is not nervous/anxious.          Vitals:    10/10/18 1326   BP: 126/76   Pulse: 87   Resp: 15   Temp: 96.3 °F (35.7 °C)   SpO2: 94%   Weight: 125 kg (276 lb 6.4 oz)   Height: 181.6 cm (71.5\")   PainSc:   7   PainLoc: Back         Objective   Physical Exam   Constitutional: He is oriented to person, place, and time. He appears well-developed and well-nourished.   HENT:   Head: Normocephalic and atraumatic.   Pulmonary/Chest: Effort normal.   Musculoskeletal:        Cervical back: He exhibits decreased range of motion, tenderness and pain.        Lumbar back: He exhibits decreased range of motion (significant decr... pain on flex past 30 deg. ).   Neurological: He is alert and oriented to person, place, and time. Gait abnormal.   spurling equiv bilat   Psychiatric: He has a normal mood and affect.   Nursing note and vitals reviewed.          Assessment/Plan   Kian was seen today for back pain.    Diagnoses and all orders for this visit:    Chronic pain syndrome    Encounter for long-term (current) use of high-risk medication    Postlaminectomy syndrome of lumbar region    Cervical spondylosis without myelopathy  -     Case Request    S/P lumbar spinal fusion  -     oxyCODONE (ROXICODONE) 10 MG tablet; Take 1 tablet by mouth Every 6 (Six) Hours As Needed for Severe Pain .        --- Follow-up for procedure... Bilateral C3-4 CMBB, x2, 2-4 wks apart    --------    Plavix " (clopidogrel) must be held for seven days prior to a spinal injection.    Anticoagulation risks for procedures.... there are risks to discontinuation of anticoagulants such as Plavix for neuraxial procedures and surgery.  Risks include but are not limited to deep vein thromboses (blood clot, such as one in the leg), pulmonary emboli (blood clot in a lung), myocardial infarction (heart attack), and stroke.      Neuraxial access (approaching the spine with a needle), including spinal injection, is relatively contraindicated while anticoagulated because of the risk of hemorrhage and/or epidural hematoma (bleeding inside the spine), which can be a neurosurgical emergency to evacuate bleeding.  Left unchecked, bleeding can cause nerve damage leading to paralysis and/or death.    The risks and benefits of holding Plavix for a spinal injection must be weighed, and is a joint decision involving the patient as well as the prescriber of the anticoagulant drug.    --------        -- office 1 month and q monthly for medication management  -- could consider a Nevro SCS trial for low back... But he must have MRI compatibility due to syringomyelia               DAVID REPORT    As part of the patient's treatment plan, I am prescribing controlled substances. The patient has been made aware of appropriate use of such medications, including potential risk of somnolence, limited ability to drive and/or work safely, and the potential for dependence or overdose. It has also bee made clear that these medications are for use by this patient only, without concomitant use of alcohol or other substances unless prescribed.     Patient has completed prescribing agreement detailing terms of continued prescribing of controlled substances, including monitoring DAVID reports, urine drug screening, and pill counts if necessary. The patient is aware that inappropriate use will results in cessation of prescribing such medications.    DAVID report  has been reviewed and scanned into the patient's chart.    As the clinician, I personally reviewed the DAVID from as ajit while the patient was in the office today.    History and physical exam exhibit continued safe and appropriate use of controlled substances.      EMR Dragon/Transcription disclaimer:   Much of this encounter note is an electronic transcription/translation of spoken language to printed text. The electronic translation of spoken language may permit erroneous, or at times, nonsensical words or phrases to be inadvertently transcribed; Although I have reviewed the note for such errors, some may still exist.

## 2018-10-11 ENCOUNTER — OFFICE VISIT (OUTPATIENT)
Dept: ORTHOPEDIC SURGERY | Facility: CLINIC | Age: 63
End: 2018-10-11

## 2018-10-11 VITALS — BODY MASS INDEX: 38.92 KG/M2 | TEMPERATURE: 97.6 F | WEIGHT: 278 LBS | HEIGHT: 71 IN

## 2018-10-11 DIAGNOSIS — M17.12 TRICOMPARTMENT OSTEOARTHRITIS OF LEFT KNEE: ICD-10-CM

## 2018-10-11 DIAGNOSIS — M17.11 TRICOMPARTMENT OSTEOARTHRITIS OF RIGHT KNEE: Primary | ICD-10-CM

## 2018-10-11 PROCEDURE — 99212 OFFICE O/P EST SF 10 MIN: CPT | Performed by: ORTHOPAEDIC SURGERY

## 2018-10-11 NOTE — PROGRESS NOTES
Patient:  Kian Mcdaniels is a 63 y.o. male    Chief Complaint/ Reason for Visit:    Chief Complaint   Patient presents with   • Right Knee - Follow-up, Pain       HPI:  Mr. Mcdaniels returns today and has had progressively worsening pain, swelling, and stiffness in his knees, especially the right knee.  We know he has significant osteoarthritis.  As is well documented in his records today, he has undergone thorough nonsurgical management.  He has used topical agents, physical therapy, bracing, and now requires a cane 100% of the time due to the pain in his arthritic knees.  He says he doesn't think he can walk without the cane.  He says he can't even go up 1 or 2 steps without severe pain in his knees.  Rest and elevation are the only things that lately seemed to help his discomfort in his knees.      PMH:    Past Medical History:   Diagnosis Date   • Abscess of scrotum 2000   • Angina pectoris (CMS/HCC)    • Arteriosclerotic cardiovascular disease    • Arthritis    • COPD (chronic obstructive pulmonary disease) (CMS/HCC)    • Coronary artery disease    • Diabetes mellitus (CMS/HCC)    • Diabetic peripheral neuropathy (CMS/HCC)    • Disease of thyroid gland     NODULE PRESENT   • ED (erectile dysfunction) of non-organic origin    • GERD (gastroesophageal reflux disease)    • Headache disorder     DUE TO NECK   • Hyperlipidemia    • Hypertension    • Insulin pump in place    • Insulin pump in place    • Knee pain, bilateral    • Low back pain    • Myocardial infarction (CMS/HCC)    • Neck pain    • Spinal stenosis    • TIA (transient ischemic attack)     LEFT EYE   • Type 2 diabetes mellitus (CMS/HCC)    • Upper back pain    • Wears dentures     UPPER PLATE       PSH:    Past Surgical History:   Procedure Laterality Date   • AMPUTATION FOOT / TOE Left     GREAT TOE   • BACK SURGERY  10/26/2015    Bilateral L4-L5 laminectomy -Dr. Gutierres   • CARDIAC CATHETERIZATION     • CARDIAC SURGERY      CABG X 6    •  CHOLECYSTECTOMY     • CORONARY ARTERY BYPASS GRAFT      X 6   • EPIDURAL BLOCK     • EYE SURGERY     • HAND SURGERY  04/28/2016   • LUMBAR DISCECTOMY FUSION INSTRUMENTATION N/A 12/12/2016    Procedure: L 4-5 FUSION WITH INSTRUMENTATION WITH BMP, WITH REMOVAL OF INSTRUMENTATION ;  Surgeon: Rowdy Spencer MD;  Location: [facility] MAIN OR;  Service:    • LUMBAR LAMINECTOMY DISCECTOMY DECOMPRESSION N/A 12/12/2016    Procedure: L4-5 REPEAT LAMINECTOMY ;  Surgeon: Tim Gutierres MD;  Location: [facility] OR;  Service:    • LUMBAR LAMINECTOMY DISCECTOMY DECOMPRESSION N/A 12/14/2016    Procedure: EVACUATION OF LUMBAR HEMATOMA;  Surgeon: Rowdy Spencer MD;  Location: [facility] MAIN OR;  Service:    • MULTIPLE TOOTH EXTRACTIONS      UPPER TEETH EXTRACTED   • ORTHOPEDIC SURGERY     • PENIS SURGERY      RECONSTRUCTION   • SCROTUM EXPLORATION      scrotum surgery   • SPINAL CORD STIMULATOR IMPLANT N/A 9/24/2018    Procedure: SPINAL CORD STIMULATOR Phase 1 - [employer];  Surgeon: Mulugeta Guzman MD;  Location: [facility] OR;  Service: Pain Management   • SPINE SURGERY         Social Hx:    Social History     Social History   • Marital status:      Spouse name: N/A   • Number of children: N/A   • Years of education: N/A     Occupational History   • Not on file.     Social History Main Topics   • Smoking status: Former Smoker     Packs/day: 1.00     Years: 25.00     Types: Cigarettes     Start date: 1975     Quit date: 2000   • Smokeless tobacco: Never Used   • Alcohol use No      Comment: no alcohol since 2000   • Drug use: No   • Sexual activity: Defer     Other Topics Concern   • Not on file     Social History Narrative   • No narrative on file       Family Hx:    Family History   Problem Relation Age of Onset   • Diabetes Other    • Heart disease Other    • Hypertension Other    • Kidney disease Other         renal failure   • Heart disease Maternal Grandmother    • Hypertension Maternal Grandmother    •  Malig Hyperthermia Neg Hx        Meds:    Current Outpatient Prescriptions:   •  amLODIPine (NORVASC) 10 MG tablet, Take 10 mg by mouth Every Morning., Disp: , Rfl:   •  Ascorbic Acid (VITAMIN C PO), Take 1,000 mg by mouth Daily., Disp: , Rfl:   •  aspirin 81 MG EC tablet, Take 1 tablet by mouth Every Morning., Disp: , Rfl:   •  atorvastatin (LIPITOR) 20 MG tablet, Take 20 mg by mouth Every Night., Disp: , Rfl:   •  carvedilol (COREG) 12.5 MG tablet, Take 12.5 mg by mouth 2 (Two) Times a Day With Meals., Disp: , Rfl:   •  cetirizine (ZyrTEC) 10 MG tablet, Take 10 mg by mouth Every Morning., Disp: , Rfl:   •  clopidogrel (PLAVIX) 75 MG tablet, Take 75 mg by mouth Every Morning. Stopped 9/17/18, Disp: , Rfl:   •  cyclobenzaprine (FLEXERIL) 10 MG tablet, Take 1 tablet by mouth 2 (Two) Times a Day As Needed for Muscle Spasms., Disp: 60 tablet, Rfl: 2  •  diclofenac (VOLTAREN) 1 % gel gel, Apply 4 g topically 4 (Four) Times a Day., Disp: 3 tube, Rfl: 11  •  docusate sodium (COLACE) 100 MG capsule, Take 100 mg by mouth 2 (Two) Times a Day., Disp: , Rfl:   •  Empagliflozin (JARDIANCE) 10 MG tablet, Take 10 mg by mouth Every Morning., Disp: , Rfl:   •  furosemide (LASIX) 40 MG tablet, Take 40 mg by mouth Every Night., Disp: , Rfl:   •  gabapentin (NEURONTIN) 400 MG capsule, Take 800 mg by mouth 3 (Three) Times a Day., Disp: , Rfl:   •  HUMULIN R 500 UNIT/ML CONCENTRATED injection, Inject 500 Units under the skin Continuous. Via insulin pump basal rate 0.68units/hr from 0000 to 1000 1000 to 1600 is 0.7units/hr 1600 to 0000 0.75units/hr Plus sliding scale based on carb intake, Disp: , Rfl:   •  insulin degludec (TRESIBA FLEXTOUCH) 100 UNIT/ML solution pen-injector injection, Inject 40 Units under the skin into the appropriate area as directed Every Morning., Disp: , Rfl:   •  isosorbide mononitrate (IMDUR) 60 MG 24 hr tablet, Take 60 mg by mouth Every Morning., Disp: , Rfl:   •  losartan (COZAAR) 50 MG tablet, Take 50 mg by  "mouth Every Morning., Disp: , Rfl:   •  Multiple Vitamin tablet, Take 1 tablet by mouth daily., Disp: , Rfl:   •  NARCAN 4 MG/0.1ML nasal spray, , Disp: , Rfl: 0  •  oxyCODONE (ROXICODONE) 10 MG tablet, Take 1 tablet by mouth Every 6 (Six) Hours As Needed for Severe Pain ., Disp: 120 tablet, Rfl: 0  •  pantoprazole (PROTONIX) 40 MG EC tablet, Take 40 mg by mouth Every Morning., Disp: , Rfl:   •  potassium chloride (K-DUR) 10 MEQ CR tablet, Take 10 mEq by mouth Daily., Disp: , Rfl:   •  raNITIdine (ZANTAC) 300 MG tablet, Take 300 mg by mouth Every Night., Disp: , Rfl:   •  TESTOSTERONE CYPIONATE IM, Inject 1 mL into the appropriate muscle as directed by prescriber Every 14 (Fourteen) Days. 200mg/ml, Disp: , Rfl:   •  vitamin D (ERGOCALCIFEROL) 44358 UNITS capsule capsule, Take 50,000 Units by mouth Every 7 (Seven) Days. thursdays, Disp: , Rfl:   •  albuterol (VENTOLIN HFA) 108 (90 BASE) MCG/ACT inhaler, Inhale 1-2 puffs Every 6 (Six) Hours As Needed for wheezing (RESCUE INHALER)., Disp: , Rfl:   •  Blood Glucose Monitoring Suppl (FREESTYLE FREEDOM LITE) W/DEVICE kit, , Disp: , Rfl:   •  glucose blood (FREESTYLE LITE) test strip, Test 4 times per day, Disp: , Rfl:   •  Insulin Infusion Pump (T:SLIM INSULIN PUMP) device, , Disp: , Rfl:   •  Lancets (FREESTYLE) lancets, Test 4 times per day, Disp: , Rfl:     Allergies:    Allergies   Allergen Reactions   • Erythromycin Nausea Only   • Lisinopril Other (See Comments)     7/2016 possibly caused pancreatitis per Dr Quinonez   • Metformin Nausea And Vomiting       ROS:  Review of Systems    Vitals:    10/11/18 0926   Temp: 97.6 °F (36.4 °C)   TempSrc: Temporal Artery    Weight: 126 kg (278 lb)   Height: 179.1 cm (70.5\")     Body mass index is 39.33 kg/m².    Physical Exam    The patient is awake, alert, and oriented ×3.  The patient is in no acute distress.  Breathing is regular and unlabored with a respiratory rate of 14/m.  Extraocular movements and pupillary responses are " symmetrically intact. Sclerae are anicteric.   Hearing is within normal limits.  Speech is within normal limits.      The patient has very uncomfortable-appearing gait, and even has moderate difficulty getting up from a seated position due to the pain and stiffness in his right knee.  He walks with a limp and with a cane.  He has a moderate effusion on his right knee and a mild to moderate effusion on the left knee.  There is no abnormal warmth in either knee.  He has global joint line tenderness in both knees.  His calves are both soft and nontender.          Assessment:     Diagnosis Plan   1. Tricompartment osteoarthritis of right knee  Ambulatory Referral to Orthopedic Surgery   2. Tricompartment osteoarthritis of left knee  Ambulatory Referral to Orthopedic Surgery           Plan:  I discussed everything with the patient.  He is close to requiring a right total knee replacement and this is the treatment he would like to discuss.  He does not want to try injections.  We are going to send him to Dr. Carter for additional advice care and treatment.  I did remind the patient that it would be in his best interest in all respects to continue to work on weight loss.      Orders Placed This Encounter   Procedures   • Ambulatory Referral to Orthopedic Surgery     Referral Priority:   Routine     Referral Type:   Consultation     Referral Reason:   Specialty Services Required     Referred to Provider:   Andres Carter MD     Requested Specialty:   Orthopedic Surgery     Number of Visits Requested:   1

## 2018-11-06 ENCOUNTER — CONSULT (OUTPATIENT)
Dept: ORTHOPEDIC SURGERY | Facility: CLINIC | Age: 63
End: 2018-11-06

## 2018-11-06 VITALS — BODY MASS INDEX: 39.2 KG/M2 | HEIGHT: 71 IN | TEMPERATURE: 97.2 F | WEIGHT: 280 LBS

## 2018-11-06 DIAGNOSIS — M25.562 BILATERAL CHRONIC KNEE PAIN: Primary | ICD-10-CM

## 2018-11-06 DIAGNOSIS — M17.12 ARTHRITIS OF LEFT KNEE: ICD-10-CM

## 2018-11-06 DIAGNOSIS — G89.29 BILATERAL CHRONIC KNEE PAIN: Primary | ICD-10-CM

## 2018-11-06 DIAGNOSIS — M25.561 BILATERAL CHRONIC KNEE PAIN: Primary | ICD-10-CM

## 2018-11-06 DIAGNOSIS — M17.11 ARTHRITIS OF RIGHT KNEE: ICD-10-CM

## 2018-11-06 PROCEDURE — 73562 X-RAY EXAM OF KNEE 3: CPT | Performed by: ORTHOPAEDIC SURGERY

## 2018-11-06 PROCEDURE — 99214 OFFICE O/P EST MOD 30 MIN: CPT | Performed by: ORTHOPAEDIC SURGERY

## 2018-11-06 RX ORDER — ATORVASTATIN CALCIUM 40 MG/1
40 TABLET, FILM COATED ORAL DAILY
Refills: 4 | COMMUNITY
Start: 2018-10-29

## 2018-11-06 RX ORDER — PIOGLITAZONEHYDROCHLORIDE 15 MG/1
15 TABLET ORAL
COMMUNITY
Start: 2018-10-29 | End: 2018-11-28

## 2018-11-06 NOTE — PROGRESS NOTES
Patient Name: Kian Mcdaniels   YOB: 1955  Referring Primary Care Physician: Wyatt Harmon MD  BMI: Body mass index is 39.05 kg/m².    Chief Complaint:    Chief Complaint   Patient presents with   • Left Knee - Establish Care, Pain   • Right Knee - Establish Care, Pain      Mostly the right  Subjective:    HPI:   Kian Mcdaniels is a pleasant 63 y.o. year old who presents today for evaluation of   Chief Complaint   Patient presents with   • Left Knee - Establish Care, Pain   • Right Knee - Establish Care, Pain    (Location). Duration: This has been going on for years. Timing: The pain is constant. and worsening Context or causative factors: unknown.  Relieving Factors:  In the past has tried cortisone injections  OTC Tylenol  activtiy modification  assistive devices  physical therapy  ice/heat/rest.     This problem is new to this examiner.     Medications:   Home Medications:  Current Outpatient Prescriptions on File Prior to Visit   Medication Sig   • albuterol (VENTOLIN HFA) 108 (90 BASE) MCG/ACT inhaler Inhale 1-2 puffs Every 6 (Six) Hours As Needed for wheezing (RESCUE INHALER).   • amLODIPine (NORVASC) 10 MG tablet Take 10 mg by mouth Every Morning.   • Ascorbic Acid (VITAMIN C PO) Take 1,000 mg by mouth Daily.   • aspirin 81 MG EC tablet Take 1 tablet by mouth Every Morning.   • Blood Glucose Monitoring Suppl (FREESTYLE FREEDOM LITE) W/DEVICE kit    • carvedilol (COREG) 12.5 MG tablet Take 12.5 mg by mouth 2 (Two) Times a Day With Meals.   • cetirizine (ZyrTEC) 10 MG tablet Take 10 mg by mouth Every Morning.   • clopidogrel (PLAVIX) 75 MG tablet Take 75 mg by mouth Every Morning. Stopped 9/17/18   • cyclobenzaprine (FLEXERIL) 10 MG tablet Take 1 tablet by mouth 2 (Two) Times a Day As Needed for Muscle Spasms.   • diclofenac (VOLTAREN) 1 % gel gel Apply 4 g topically 4 (Four) Times a Day.   • docusate sodium (COLACE) 100 MG capsule Take 100 mg by mouth 2 (Two) Times a Day.   •  Empagliflozin (JARDIANCE) 10 MG tablet Take 10 mg by mouth Every Morning.   • furosemide (LASIX) 40 MG tablet Take 40 mg by mouth Every Night.   • gabapentin (NEURONTIN) 400 MG capsule Take 800 mg by mouth 3 (Three) Times a Day.   • glucose blood (FREESTYLE LITE) test strip Test 4 times per day   • HUMULIN R 500 UNIT/ML CONCENTRATED injection Inject 500 Units under the skin Continuous. Via insulin pump basal rate 0.68units/hr from 0000 to 1000  1000 to 1600 is 0.7units/hr  1600 to 0000 0.75units/hr  Plus sliding scale based on carb intake   • insulin degludec (TRESIBA FLEXTOUCH) 100 UNIT/ML solution pen-injector injection Inject 40 Units under the skin into the appropriate area as directed Every Morning.   • Insulin Infusion Pump (T:SLIM INSULIN PUMP) device    • isosorbide mononitrate (IMDUR) 60 MG 24 hr tablet Take 60 mg by mouth Every Morning.   • Lancets (FREESTYLE) lancets Test 4 times per day   • losartan (COZAAR) 50 MG tablet Take 50 mg by mouth Every Morning.   • Multiple Vitamin tablet Take 1 tablet by mouth daily.   • NARCAN 4 MG/0.1ML nasal spray    • oxyCODONE (ROXICODONE) 10 MG tablet Take 1 tablet by mouth Every 6 (Six) Hours As Needed for Severe Pain .   • pantoprazole (PROTONIX) 40 MG EC tablet Take 40 mg by mouth Every Morning.   • potassium chloride (K-DUR) 10 MEQ CR tablet Take 10 mEq by mouth Daily.   • raNITIdine (ZANTAC) 300 MG tablet Take 300 mg by mouth Every Night.   • TESTOSTERONE CYPIONATE IM Inject 1 mL into the appropriate muscle as directed by prescriber Every 14 (Fourteen) Days. 200mg/ml   • vitamin D (ERGOCALCIFEROL) 35531 UNITS capsule capsule Take 50,000 Units by mouth Every 7 (Seven) Days. thursdays   • atorvastatin (LIPITOR) 20 MG tablet Take 20 mg by mouth Every Night.     No current facility-administered medications on file prior to visit.      Current Medications:  Scheduled Meds:  Continuous Infusions:  No current facility-administered medications for this visit.   PRN  Meds:.    I have reviewed the patient's medical history in detail and updated the computerized patient record.  Review and summarization of old records includes:    Past Medical History:   Diagnosis Date   • Abscess of scrotum 2000   • Angina pectoris (CMS/HCC)    • Arteriosclerotic cardiovascular disease    • Arthritis    • COPD (chronic obstructive pulmonary disease) (CMS/HCC)    • Coronary artery disease    • Diabetes mellitus (CMS/HCC)    • Diabetic peripheral neuropathy (CMS/HCC)    • Disease of thyroid gland     NODULE PRESENT   • ED (erectile dysfunction) of non-organic origin    • GERD (gastroesophageal reflux disease)    • Headache disorder     DUE TO NECK   • Hyperlipidemia    • Hypertension    • Insulin pump in place    • Insulin pump in place    • Knee pain, bilateral    • Low back pain    • Myocardial infarction (CMS/HCC)    • Neck pain    • Spinal stenosis    • TIA (transient ischemic attack)     LEFT EYE   • Type 2 diabetes mellitus (CMS/HCC)    • Upper back pain    • Wears dentures     UPPER PLATE        Past Surgical History:   Procedure Laterality Date   • AMPUTATION FOOT / TOE Left     GREAT TOE   • BACK SURGERY  10/26/2015    Bilateral L4-L5 laminectomy -Dr. Gutierres   • CARDIAC CATHETERIZATION     • CARDIAC SURGERY      CABG X 6    • CHOLECYSTECTOMY     • CORONARY ARTERY BYPASS GRAFT      X 6   • EPIDURAL BLOCK     • EYE SURGERY     • HAND SURGERY  04/28/2016   • LUMBAR DISCECTOMY FUSION INSTRUMENTATION N/A 12/12/2016    Procedure: L 4-5 FUSION WITH INSTRUMENTATION WITH BMP, WITH REMOVAL OF INSTRUMENTATION ;  Surgeon: Rowdy Spencer MD;  Location: [facility];  Service:    • LUMBAR LAMINECTOMY DISCECTOMY DECOMPRESSION N/A 12/12/2016    Procedure: L4-5 REPEAT LAMINECTOMY ;  Surgeon: Tim Gutierres MD;  Location: [facility];  Service:    • LUMBAR LAMINECTOMY DISCECTOMY DECOMPRESSION N/A 12/14/2016    Procedure: EVACUATION OF LUMBAR HEMATOMA;  Surgeon: Rowdy Spencer MD;  Location: [facility]  MAIN OR;  Service:    • MULTIPLE TOOTH EXTRACTIONS      UPPER TEETH EXTRACTED   • ORTHOPEDIC SURGERY     • PENIS SURGERY      RECONSTRUCTION   • SCROTUM EXPLORATION      scrotum surgery   • SPINAL CORD STIMULATOR IMPLANT N/A 9/24/2018    Procedure: SPINAL CORD STIMULATOR Phase 1 - [employer];  Surgeon: Mulugeta Guzman MD;  Location: [facility] MAIN OR;  Service: Pain Management   • SPINE SURGERY          Social History     Occupational History   • Not on file.     Social History Main Topics   • Smoking status: Former Smoker     Packs/day: 1.00     Years: 25.00     Types: Cigarettes     Start date: 1975     Quit date: 2000   • Smokeless tobacco: Never Used   • Alcohol use No      Comment: no alcohol since 2000   • Drug use: No   • Sexual activity: Defer    Social History     Social History Narrative   • No narrative on file        Family History   Problem Relation Age of Onset   • Diabetes Other    • Heart disease Other    • Hypertension Other    • Kidney disease Other         renal failure   • Heart disease Maternal Grandmother    • Hypertension Maternal Grandmother    • Malig Hyperthermia Neg Hx        ROS: 14 point review of systems was performed and all other systems were reviewed and are negative except for documented findings in HPI and today's encounter.     Allergies:   Allergies   Allergen Reactions   • Erythromycin Nausea Only   • Lisinopril Other (See Comments)     7/2016 possibly caused pancreatitis per Dr Quinonez   • Metformin Nausea And Vomiting     Constitutional:  Denies fever, shaking or chills   Eyes:  Denies change in visual acuity   HENT:  Denies nasal congestion or sore throat   Respiratory:  Denies cough or shortness of breath   Cardiovascular:  Denies chest pain or severe LE edema   GI:  Denies abdominal pain, nausea, vomiting, bloody stools or diarrhea   Musculoskeletal:  Numbness, tingling, pain, or loss of motor function only as noted above in history of present illness.  : Denies  "painful urination or hematuria  Integument:  Denies rash, lesion or ulceration   Neurologic:  Denies headache or focal weakness  Endocrine:  Denies lymphadenopathy  Psych:  Denies confusion or change in mental status   Hem:  Denies active bleeding    Subjective     Objective:    Physical Exam: 63 y.o. male  Wt Readings from Last 3 Encounters:   11/06/18 127 kg (280 lb)   10/11/18 126 kg (278 lb)   10/10/18 125 kg (276 lb 6.4 oz)     Ht Readings from Last 3 Encounters:   11/06/18 180.3 cm (71\")   10/11/18 179.1 cm (70.5\")   10/10/18 181.6 cm (71.5\")     Body mass index is 39.05 kg/m².    Vitals:    11/06/18 1031   Temp: 97.2 °F (36.2 °C)       Vital signs reviewed.   General Appearance:    Alert, cooperative, in no acute distress                  Eyes: conjunctiva clear  ENT: external ears and nose atraumatic  CV: no peripheral edema  Resp: normal respiratory effort  Skin: no rashes or wounds; normal turgor  Psych: mood and affect appropriate  Lymph: no nodes appreciated  Neuro: gross sensation intact  Vascular:  Palpable peripheral pulse in noted extremity  Musculoskeletal Ext-7-110 right remities: KNEE Exam: medial joint line tenderness with crepitation, synovitis, swelling, and joint effusion bilateral knee.      Radiology:   Imaging done today and discussed at length with the patient:    Indication: pain related symptoms,  Views: 3V AP, LAT & 40 degree PA bilateral knee(s)   Findings: severe end-stage arthritis (bone on bone, subchondral sclerosis/cysts, osteophytes)  Comparison views: viewed last xray done in the office.       Assessment:     ICD-10-CM ICD-9-CM   1. Bilateral chronic knee pain M25.561 719.46    M25.562 338.29    G89.29    2. Arthritis of left knee M17.12 716.96   3. Arthritis of right knee M17.11 716.96        Procedures       Plan: Biomechanics of pertinent body area discussed.  Risks, benefits, alternatives, comparisons, and complications of accepted medicines, injections, recommendations, " surgical procedures, and therapies explained and education provided in laymen's terms. The patient was given the opportunity to ask questions and they were answerved to their satisfaction.   Natural history and expected course of this patient's diagnosis discussed along with evaluation of therapies. Questions answered.  TKA: Obtain Cardiac clearance for surgery. Continuation of conservative management vs. TKA: I reviewed anatomy of a total knee arthroplasty in laymen's terms, as well as typical postoperative recovery and possibly 6-12 months for maximal recovery, and possible need for rehabilitation stay after hospitalization. We also discussed risks, benefits, alternatives, and limitations of procedure with risks including but not limited to neurovascular damage, bleeding, infection, malalignment, chronic pian, failure of implants, osteolysis, loosening of implants, loss of motion, weakness, stiffness, instability, DVT, pulmonary embolus, death, stroke, complex regional pain syndrome, myocardial infarction, and need for additional procedures. Concept of substitution vs. replacement discussed.  No guarantees were given regarding results of surgery.  Patient verbalized understanding, and was given the opportunity to ask and have all questions answered today.   Went over increased risks with his medical hx but feels he has no more options.      11/6/2018

## 2018-11-07 ENCOUNTER — OFFICE VISIT (OUTPATIENT)
Dept: PAIN MEDICINE | Facility: CLINIC | Age: 63
End: 2018-11-07

## 2018-11-07 VITALS
HEART RATE: 87 BPM | SYSTOLIC BLOOD PRESSURE: 144 MMHG | BODY MASS INDEX: 38.92 KG/M2 | DIASTOLIC BLOOD PRESSURE: 88 MMHG | HEIGHT: 71 IN | WEIGHT: 278 LBS | OXYGEN SATURATION: 97 % | RESPIRATION RATE: 15 BRPM | TEMPERATURE: 98.3 F

## 2018-11-07 DIAGNOSIS — Z98.1 S/P LUMBAR SPINAL FUSION: ICD-10-CM

## 2018-11-07 DIAGNOSIS — G89.29 OTHER CHRONIC PAIN: Primary | ICD-10-CM

## 2018-11-07 DIAGNOSIS — M47.812 CERVICAL SPONDYLOSIS WITHOUT MYELOPATHY: ICD-10-CM

## 2018-11-07 DIAGNOSIS — M96.1 POSTLAMINECTOMY SYNDROME OF LUMBAR REGION: ICD-10-CM

## 2018-11-07 DIAGNOSIS — M54.5 LOW BACK PAIN, UNSPECIFIED BACK PAIN LATERALITY, UNSPECIFIED CHRONICITY, WITH SCIATICA PRESENCE UNSPECIFIED: ICD-10-CM

## 2018-11-07 DIAGNOSIS — Z79.899 ENCOUNTER FOR LONG-TERM (CURRENT) USE OF HIGH-RISK MEDICATION: ICD-10-CM

## 2018-11-07 DIAGNOSIS — M54.2 NECK PAIN: ICD-10-CM

## 2018-11-07 PROCEDURE — 99214 OFFICE O/P EST MOD 30 MIN: CPT | Performed by: NURSE PRACTITIONER

## 2018-11-07 RX ORDER — OXYCODONE HYDROCHLORIDE 10 MG/1
10 TABLET ORAL EVERY 6 HOURS PRN
Qty: 120 TABLET | Refills: 0 | Status: SHIPPED | OUTPATIENT
Start: 2018-11-07 | End: 2018-12-05 | Stop reason: SDUPTHER

## 2018-11-07 NOTE — PROGRESS NOTES
"CHIEF COMPLAINT  F/U neck and back pain. Still wanting to pursue having injection in neck.     Subjective   Kian Mcdaniels is a 63 y.o. male  who presents to the office for follow-up.He has a history of chronic back and neck pain.    In terms of back pain, failed [employer] SCS trial.  Dr. Guzman recommends consideration of Nevro trial in the future.     In terms of neck pain. Nothing surgical needed. 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  Has failed cervical epidural injections.  At the previous office visit Dr. Guzman had placed an order for cervical MBB.  These were denied by insurance, appeal letter was sent in yesterday.      Complains of pain in his neck, back and right knee. Today his pain is 7/10VAS.  Continues with Oxycodone 10 mg 4/day, gabapentin 800 mg 3/day and tizanidine 4mg 1-2/night. Denies any side effects from the regimen. The regimen helps decrease his pain by 50%. Notes improvement in function and activity. ADL's by self. Reports that he has never received compounded pain cream from [facility].      Saw Dr. Carter ([facility]) yesterday, will be scheduled for right TKA pending cardiac clearance.      Back Pain   This is a chronic problem. The current episode started more than 1 year ago. The problem occurs constantly. The problem has been waxing and waning since onset. The pain is present in the sacro-iliac. The quality of the pain is described as aching, shooting and stabbing. The pain is at a severity of 6/10. The pain is moderate. The pain is the same all the time. The symptoms are aggravated by bending, sitting and standing. Stiffness is present all day. Associated symptoms include abdominal pain (\"sides\"), leg pain, numbness, paresthesias, tingling and weakness. Pertinent negatives include no chest pain, dysuria, fever or headaches (\"frequent headaches\"). Risk factors include lack of exercise, obesity and sedentary lifestyle. He has tried analgesics " "for the symptoms. The treatment provided moderate relief.   Neck Pain    This is a chronic problem. The current episode started more than 1 month ago. The problem occurs constantly. The problem has been waxing and waning. The pain is associated with nothing. The pain is present in the left side, right side and midline. The quality of the pain is described as cramping. The pain is at a severity of 6/10. The pain is moderate. The symptoms are aggravated by sneezing and twisting. The pain is same all the time. Associated symptoms include leg pain, numbness, tingling and weakness. Pertinent negatives include no chest pain, fever or headaches (\"frequent headaches\"). He has tried muscle relaxants, oral narcotics and bed rest for the symptoms. The treatment provided moderate relief.      PEG Assessment   What number best describes your pain on average in the past week?8  What number best describes how, during the past week, pain has interfered with your enjoyment of life?8  What number best describes how, during the past week, pain has interfered with your general activity?  8    The following portions of the patient's history were reviewed and updated as appropriate: allergies, current medications, past family history, past medical history, past social history, past surgical history and problem list.    Review of Systems   Constitutional: Positive for activity change. Negative for chills, fatigue and fever.   HENT: Negative for congestion.    Eyes: Negative for visual disturbance.   Respiratory: Positive for cough (has seen ENT). Negative for apnea, shortness of breath and wheezing.    Cardiovascular: Positive for leg swelling. Negative for chest pain and palpitations.   Gastrointestinal: Positive for abdominal pain (\"sides\"). Negative for constipation and diarrhea.   Genitourinary: Negative for difficulty urinating and dysuria.   Musculoskeletal: Positive for back pain, joint swelling (bilateral ankles) and neck pain. " "Arthralgias: right knee    Skin: Negative for rash and wound.   Allergic/Immunologic: Negative for immunocompromised state.   Neurological: Positive for tingling, weakness, numbness and paresthesias. Negative for dizziness, light-headedness and headaches (\"frequent headaches\").   Hematological: Bruises/bleeds easily (Plavix).   Psychiatric/Behavioral: Positive for sleep disturbance (\"little bit\"). Negative for agitation, confusion, hallucinations and suicidal ideas. The patient is not nervous/anxious.      Vitals:    11/07/18 1404 11/07/18 1405   BP: 144/88    Pulse: 87    Resp: 15    Temp: 98.3 °F (36.8 °C)    SpO2: 97%    Weight: 126 kg (278 lb)    Height: 180.3 cm (70.98\")    PainSc:   6   6   PainLoc: Back Neck     Objective   Physical Exam   Constitutional: He is oriented to person, place, and time. Vital signs are normal. He appears well-developed and well-nourished. He is cooperative. No distress.   HENT:   Head: Normocephalic and atraumatic.   Nose: Nose normal.   Eyes: Conjunctivae and lids are normal.   Neck: Trachea normal. Neck supple. Muscular tenderness present. No spinous process tenderness present. Decreased range of motion present.   Cardiovascular: Normal rate.    Pulmonary/Chest: Effort normal. No respiratory distress.   Abdominal:   obese   Musculoskeletal:        Right knee: He exhibits swelling. Tenderness found.        Left knee: Tenderness found.        Cervical back: He exhibits decreased range of motion, tenderness and pain. He exhibits no spasm.        Lumbar back: He exhibits decreased range of motion, tenderness and pain.   Neurological: He is alert and oriented to person, place, and time. He has normal strength. Gait (ambulates with cane ) abnormal.   Skin: Skin is warm, dry and intact. He is not diaphoretic.   Psychiatric: He has a normal mood and affect. His speech is normal and behavior is normal. Cognition and memory are normal.   Nursing note and vitals reviewed.    Assessment/Plan "   Kian was seen today for back pain.    Diagnoses and all orders for this visit:    Other chronic pain    Low back pain, unspecified back pain laterality, unspecified chronicity, with sciatica presence unspecified    Postlaminectomy syndrome of lumbar region    Neck pain    Cervical spondylosis without myelopathy    Encounter for long-term (current) use of high-risk medication    S/P lumbar spinal fusion  -     oxyCODONE (ROXICODONE) 10 MG tablet; Take 1 tablet by mouth Every 6 (Six) Hours As Needed for Severe Pain .      --- Proceed with cervical MBB once authorized by insurance  --- Refill Oxycodone. Patient appears stable with current regimen. No adverse effects. Regarding continuation of opioids, there is no evidence of aberrant behavior or any red flags.  The patient continues with appropriate response to opioid therapy. ADL's remain intact by self.   --- The urine drug screen confirmation from 8/15/18 has been reviewed and the result is appropriate based on patient history and DAVID report  --- Follow-up 1 month         DAVID REPORT    As part of the patient's treatment plan, I am prescribing controlled substances. The patient has been made aware of appropriate use of such medications, including potential risk of somnolence, limited ability to drive and/or work safely, and the potential for dependence or overdose. It has also bee made clear that these medications are for use by this patient only, without concomitant use of alcohol or other substances unless prescribed.     Patient has completed prescribing agreement detailing terms of continued prescribing of controlled substances, including monitoring DAVID reports, urine drug screening, and pill counts if necessary. The patient is aware that inappropriate use will results in cessation of prescribing such medications.    DAVID report has been reviewed and scanned into the patient's chart.    As the clinician, I personally reviewed the DAVID from  11/6/2018 while the patient was in the office today.    History and physical exam exhibit continued safe and appropriate use of controlled substances.    EMR Dragon/Transcription disclaimer:   Much of this encounter note is an electronic transcription/translation of spoken language to printed text. The electronic translation of spoken language may permit erroneous, or at times, nonsensical words or phrases to be inadvertently transcribed; Although I have reviewed the note for such errors, some may still exist.

## 2018-12-05 ENCOUNTER — OFFICE VISIT (OUTPATIENT)
Dept: PAIN MEDICINE | Facility: CLINIC | Age: 63
End: 2018-12-05

## 2018-12-05 VITALS
RESPIRATION RATE: 16 BRPM | HEART RATE: 63 BPM | DIASTOLIC BLOOD PRESSURE: 91 MMHG | TEMPERATURE: 97.9 F | BODY MASS INDEX: 38.78 KG/M2 | WEIGHT: 277 LBS | HEIGHT: 71 IN | OXYGEN SATURATION: 95 % | SYSTOLIC BLOOD PRESSURE: 186 MMHG

## 2018-12-05 DIAGNOSIS — M50.30 DEGENERATIVE CERVICAL DISC: ICD-10-CM

## 2018-12-05 DIAGNOSIS — G89.29 OTHER CHRONIC PAIN: Primary | ICD-10-CM

## 2018-12-05 DIAGNOSIS — M96.1 POSTLAMINECTOMY SYNDROME OF LUMBAR REGION: ICD-10-CM

## 2018-12-05 DIAGNOSIS — Z79.899 ENCOUNTER FOR LONG-TERM (CURRENT) USE OF HIGH-RISK MEDICATION: ICD-10-CM

## 2018-12-05 DIAGNOSIS — M25.562 BILATERAL CHRONIC KNEE PAIN: ICD-10-CM

## 2018-12-05 DIAGNOSIS — G89.29 BILATERAL CHRONIC KNEE PAIN: ICD-10-CM

## 2018-12-05 DIAGNOSIS — M25.561 BILATERAL CHRONIC KNEE PAIN: ICD-10-CM

## 2018-12-05 DIAGNOSIS — Z98.1 S/P LUMBAR SPINAL FUSION: ICD-10-CM

## 2018-12-05 PROCEDURE — 99214 OFFICE O/P EST MOD 30 MIN: CPT | Performed by: NURSE PRACTITIONER

## 2018-12-05 RX ORDER — OXYCODONE HYDROCHLORIDE 10 MG/1
10 TABLET ORAL EVERY 6 HOURS PRN
Qty: 120 TABLET | Refills: 0 | Status: SHIPPED | OUTPATIENT
Start: 2018-12-05 | End: 2019-01-12 | Stop reason: HOSPADM

## 2018-12-05 NOTE — PROGRESS NOTES
"CHIEF COMPLAINT  Since 11/7/18 office visit, Pt states his LBP is basically unchanged.  Pt also applies compound cream on knees bilat.,with brief partial relief.    Subjective   Kian Mcdaniels is a 63 y.o. male  who presents to the office for follow-up.He has a history of chronic back, neck, and knee surgery.    In terms of back pain, failed [employer] SCS trial.  Dr. Guzman recommends consideration of Nevro trial in the future.      In terms of neck pain. Nothing surgical needed. 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  Has failed cervical epidural injections.  Order for cervical MBB previoulsy placed.  These were denied by insurance, appeal has been sent.       Complains of pain in his neck, back and right knee. Today his pain is 7/10VAS.  Continues with Oxycodone 10 mg 4/day, gabapentin 800 mg 3/day and tizanidine 4mg 1-2/night. Denies any side effects from the regimen. The regimen helps decrease his pain by 50%. Notes improvement in function and activity. ADL's by self. Reports that he has never received compounded pain cream from [facility].       Dr. Carter (ortho) - right knee replacement 1/8/2018    Back Pain   This is a chronic problem. The current episode started more than 1 year ago. The problem occurs constantly. The problem has been waxing and waning since onset. The pain is present in the sacro-iliac. The quality of the pain is described as aching, shooting and stabbing. The pain is at a severity of 7/10. The pain is moderate. The pain is the same all the time. The symptoms are aggravated by bending, sitting and standing. Stiffness is present all day. Associated symptoms include leg pain, numbness (legs bilat.), paresthesias, tingling and weakness. Pertinent negatives include no abdominal pain (\"sides\"), chest pain, dysuria, fever or headaches (\"frequent headaches\"). Risk factors include lack of exercise, obesity and sedentary lifestyle. He has tried " "analgesics for the symptoms. The treatment provided moderate relief.   Neck Pain    This is a chronic problem. The current episode started more than 1 month ago. The problem occurs constantly. The problem has been waxing and waning. The pain is associated with nothing. The pain is present in the left side, right side and midline. The quality of the pain is described as cramping. The pain is at a severity of 7/10. The pain is moderate. The symptoms are aggravated by sneezing and twisting. The pain is same all the time. Associated symptoms include leg pain, numbness (legs bilat.), tingling and weakness. Pertinent negatives include no chest pain, fever or headaches (\"frequent headaches\"). He has tried muscle relaxants, oral narcotics and bed rest for the symptoms. The treatment provided moderate relief.      PEG Assessment   What number best describes your pain on average in the past week?7  What number best describes how, during the past week, pain has interfered with your enjoyment of life?7  What number best describes how, during the past week, pain has interfered with your general activity?  7    The following portions of the patient's history were reviewed and updated as appropriate: allergies, current medications, past family history, past medical history, past social history, past surgical history and problem list.    Review of Systems   Constitutional: Negative for activity change, chills, fatigue and fever.   HENT: Negative for congestion.    Eyes: Negative for visual disturbance.   Respiratory: Negative for apnea, cough (has seen ENT), shortness of breath and wheezing.    Cardiovascular: Positive for leg swelling. Negative for chest pain and palpitations.   Gastrointestinal: Negative for abdominal pain (\"sides\"), constipation and diarrhea.   Genitourinary: Negative for difficulty urinating and dysuria.   Musculoskeletal: Positive for back pain, joint swelling (bilateral ankles) and neck pain. Arthralgias: " "right knee    Skin: Negative for rash and wound.   Allergic/Immunologic: Negative for immunocompromised state.   Neurological: Positive for tingling, weakness, numbness (legs bilat.) and paresthesias. Negative for dizziness, light-headedness and headaches (\"frequent headaches\").   Hematological: Bruises/bleeds easily (Plavix).   Psychiatric/Behavioral: Positive for sleep disturbance (\"little bit\"). Negative for agitation, confusion, hallucinations and suicidal ideas. The patient is not nervous/anxious.        Vitals:    12/05/18 1246   BP: (!) 186/91   Pulse: 63   Resp: 16   Temp: 97.9 °F (36.6 °C)   SpO2: 95%   Weight: 126 kg (277 lb)   Height: 180.3 cm (70.98\")   PainSc:   7   PainLoc: Back  Comment: lbp bilaterally ranges from 4-8/10         Objective   Physical Exam   Constitutional: He is oriented to person, place, and time. Vital signs are normal. He appears well-developed and well-nourished. He is cooperative. No distress.   HENT:   Head: Normocephalic and atraumatic.   Nose: Nose normal.   Eyes: Conjunctivae and lids are normal.   Neck: Trachea normal. Neck supple. Muscular tenderness present. No spinous process tenderness present. Decreased range of motion present.   Cardiovascular: Normal rate.   Pulmonary/Chest: Effort normal. No respiratory distress.   Abdominal:   obese   Musculoskeletal:        Right knee: He exhibits swelling. Tenderness found.        Left knee: Tenderness found.        Cervical back: He exhibits decreased range of motion, tenderness and pain. He exhibits no spasm.        Lumbar back: He exhibits decreased range of motion, tenderness and pain.   Neurological: He is alert and oriented to person, place, and time. He has normal strength. Gait (ambulates with cane ) abnormal.   Skin: Skin is warm, dry and intact. He is not diaphoretic.   Psychiatric: He has a normal mood and affect. His speech is normal and behavior is normal. Cognition and memory are normal.   Nursing note and vitals " reviewed.    Assessment/Plan   Kian was seen today for back pain.    Diagnoses and all orders for this visit:    Other chronic pain    Postlaminectomy syndrome of lumbar region    Degenerative cervical disc    Bilateral chronic knee pain    Encounter for long-term (current) use of high-risk medication    S/P lumbar spinal fusion  -     oxyCODONE (ROXICODONE) 10 MG tablet; Take 1 tablet by mouth Every 6 (Six) Hours As Needed for Severe Pain .      --- Proceed with Cervical MBB once authorized by insurance  --- Ok for acute post op pain management per orthopedic surgeon   --- Refill Oxycodone. Patient appears stable with current regimen. No adverse effects. Regarding continuation of opioids, there is no evidence of aberrant behavior or any red flags.  The patient continues with appropriate response to opioid therapy. ADL's remain intact by self.   --- The urine drug screen confirmation from 8/5/18 has been reviewed and the result is appropriate based on patient history and DAVID report  --- Follow-up 6-8 weeks           DAVID REPORT  As part of the patient's treatment plan, I am prescribing controlled substances. The patient has been made aware of appropriate use of such medications, including potential risk of somnolence, limited ability to drive and/or work safely, and the potential for dependence or overdose. It has also bee made clear that these medications are for use by this patient only, without concomitant use of alcohol or other substances unless prescribed.     Patient has completed prescribing agreement detailing terms of continued prescribing of controlled substances, including monitoring DAVID reports, urine drug screening, and pill counts if necessary. The patient is aware that inappropriate use will results in cessation of prescribing such medications.    DAVID report has been reviewed and scanned into the patient's chart.    As the clinician, I personally reviewed the DAVID from 12/4/2018 while  the patient was in the office today.    History and physical exam exhibit continued safe and appropriate use of controlled substances.    EMR Dragon/Transcription disclaimer:   Much of this encounter note is an electronic transcription/translation of spoken language to printed text. The electronic translation of spoken language may permit erroneous, or at times, nonsensical words or phrases to be inadvertently transcribed; Although I have reviewed the note for such errors, some may still exist.

## 2019-01-03 ENCOUNTER — APPOINTMENT (OUTPATIENT)
Dept: PREADMISSION TESTING | Facility: HOSPITAL | Age: 64
End: 2019-01-03

## 2019-01-03 VITALS
BODY MASS INDEX: 40.18 KG/M2 | WEIGHT: 287 LBS | TEMPERATURE: 98.3 F | RESPIRATION RATE: 16 BRPM | DIASTOLIC BLOOD PRESSURE: 70 MMHG | HEIGHT: 71 IN | OXYGEN SATURATION: 97 % | HEART RATE: 77 BPM | SYSTOLIC BLOOD PRESSURE: 119 MMHG

## 2019-01-03 DIAGNOSIS — M17.11 ARTHRITIS OF RIGHT KNEE: ICD-10-CM

## 2019-01-03 LAB
ANION GAP SERPL CALCULATED.3IONS-SCNC: 11.4 MMOL/L
BACTERIA UR QL AUTO: NORMAL /HPF
BILIRUB UR QL STRIP: NEGATIVE
BUN BLD-MCNC: 20 MG/DL (ref 8–23)
BUN/CREAT SERPL: 15.6 (ref 7–25)
CALCIUM SPEC-SCNC: 9.3 MG/DL (ref 8.6–10.5)
CHLORIDE SERPL-SCNC: 105 MMOL/L (ref 98–107)
CLARITY UR: CLEAR
CO2 SERPL-SCNC: 30.6 MMOL/L (ref 22–29)
COLOR UR: YELLOW
CREAT BLD-MCNC: 1.28 MG/DL (ref 0.76–1.27)
DEPRECATED RDW RBC AUTO: 50 FL (ref 37–54)
ERYTHROCYTE [DISTWIDTH] IN BLOOD BY AUTOMATED COUNT: 15.7 % (ref 11.5–14.5)
GFR SERPL CREATININE-BSD FRML MDRD: 69 ML/MIN/1.73
GLUCOSE BLD-MCNC: 101 MG/DL (ref 65–99)
GLUCOSE UR STRIP-MCNC: NEGATIVE MG/DL
HCT VFR BLD AUTO: 52.2 % (ref 40.4–52.2)
HGB BLD-MCNC: 16.2 G/DL (ref 13.7–17.6)
HGB UR QL STRIP.AUTO: ABNORMAL
HYALINE CASTS UR QL AUTO: NORMAL /LPF
KETONES UR QL STRIP: NEGATIVE
LEUKOCYTE ESTERASE UR QL STRIP.AUTO: NEGATIVE
MCH RBC QN AUTO: 27.1 PG (ref 27–32.7)
MCHC RBC AUTO-ENTMCNC: 31 G/DL (ref 32.6–36.4)
MCV RBC AUTO: 87.4 FL (ref 79.8–96.2)
NITRITE UR QL STRIP: NEGATIVE
PH UR STRIP.AUTO: <=5 [PH] (ref 5–8)
PLATELET # BLD AUTO: 224 10*3/MM3 (ref 140–500)
PMV BLD AUTO: 9.9 FL (ref 6–12)
POTASSIUM BLD-SCNC: 3.8 MMOL/L (ref 3.5–5.2)
PROT UR QL STRIP: ABNORMAL
RBC # BLD AUTO: 5.97 10*6/MM3 (ref 4.6–6)
RBC # UR: NORMAL /HPF
REF LAB TEST METHOD: NORMAL
SODIUM BLD-SCNC: 147 MMOL/L (ref 136–145)
SP GR UR STRIP: 1.02 (ref 1–1.03)
SQUAMOUS #/AREA URNS HPF: NORMAL /HPF
UROBILINOGEN UR QL STRIP: ABNORMAL
WBC NRBC COR # BLD: 7.25 10*3/MM3 (ref 4.5–10.7)
WBC UR QL AUTO: NORMAL /HPF

## 2019-01-03 PROCEDURE — 80048 BASIC METABOLIC PNL TOTAL CA: CPT | Performed by: ORTHOPAEDIC SURGERY

## 2019-01-03 PROCEDURE — 36415 COLL VENOUS BLD VENIPUNCTURE: CPT

## 2019-01-03 PROCEDURE — 81001 URINALYSIS AUTO W/SCOPE: CPT | Performed by: ORTHOPAEDIC SURGERY

## 2019-01-03 PROCEDURE — 85027 COMPLETE CBC AUTOMATED: CPT | Performed by: ORTHOPAEDIC SURGERY

## 2019-01-03 ASSESSMENT — KOOS JR
KOOS JR SCORE: 21
KOOS JR SCORE: 34.174

## 2019-01-03 NOTE — DISCHARGE INSTRUCTIONS
Take the following medications the morning of surgery with a small sip of water:  ALBUTEROL, AMLODIPINE, CARVEDILOL, ISOSORBIDE, LOSARTAN, OXYCODONE, PANTOPRAZOLE    PLEASE ARRIVE AT THE HOSPITAL AT 7 AM ON January 8, 2019    General Instructions:  • Do not eat solid food after midnight the night before surgery.  • You may drink clear liquids day of surgery but must stop at least one hour before your hospital arrival time.  • It is beneficial for you to have a clear drink that contains carbohydrates the day of surgery.  We suggest a 12 to 20 ounce bottle of Gatorade or Powerade for non-diabetic patients or a 12 to 20 ounce bottle of G2 or Powerade Zero for diabetic patients. (Pediatric patients, are not advised to drink a 12 to 20 ounce carbohydrate drink)    Clear liquids are liquids you can see through.  Nothing red in color.     Plain water                               Sports drinks  Sodas                                   Gelatin (Jell-O)  Fruit juices without pulp such as white grape juice and apple juice  Popsicles that contain no fruit or yogurt  Tea or coffee (no cream or milk added)  Gatorade / Powerade  G2 / Powerade Zero    • Infants may have breast milk up to four hours before surgery.  • Infants drinking formula may drink formula up to six hours before surgery.   • Patients who avoid smoking, chewing tobacco and alcohol for 4 weeks prior to surgery have a reduced risk of post-operative complications.  Quit smoking as many days before surgery as you can.  • Do not smoke, use chewing tobacco or drink alcohol the day of surgery.   • If applicable bring your C-PAP/ BI-PAP machine.  • Bring any papers given to you in the doctor’s office.  • Wear clean comfortable clothes and socks.  • Do not wear contact lenses or make-up.  Bring a case for your glasses.   • Bring crutches or walker if applicable.  • Remove all piercings.  Leave jewelry and any other valuables at home.  • Hair extensions with metal clips  must be removed prior to surgery.  • The Pre-Admission Testing nurse will instruct you to bring medications if unable to obtain an accurate list in Pre-Admission Testing.      Preventing a Surgical Site Infection:  • For 2 to 3 days before surgery, avoid shaving with a razor because the razor can irritate skin and make it easier to develop an infection.    • Any areas of open skin can increase the risk of a post-operative wound infection by allowing bacteria to enter and travel throughout the body.  Notify your surgeon if you have any skin wounds / rashes even if it is not near the expected surgical site.  The area will need assessed to determine if surgery should be delayed until it is healed.  • The night prior to surgery sleep in a clean bed with clean clothing.  Do not allow pets to sleep with you.  • Shower on the morning of surgery using a fresh bar of anti-bacterial soap (such as Dial) and clean washcloth.  Dry with a clean towel and dress in clean clothing.  • Ask your surgeon if you will be receiving antibiotics prior to surgery.  • Make sure you, your family, and all healthcare providers clean their hands with soap and water or an alcohol based hand  before caring for you or your wound.    Day of surgery:  Upon arrival, a Pre-op nurse and Anesthesiologist will review your health history, obtain vital signs, and answer questions you may have.  The only belongings needed at this time will be your home medications and if applicable your C-PAP/BI-PAP machine.  If you are staying overnight your family can leave the rest of your belongings in the car and bring them to your room later.  A Pre-op nurse will start an IV and you may receive medication in preparation for surgery, including something to help you relax.  Your family will be able to see you in the Pre-op area.  While you are in surgery your family should notify the waiting room  if they leave the waiting room area and provide a  contact phone number.    2% CHLORAHEXIDINE GLUCONATE* CLOTH  Preparing or “prepping” skin before surgery can reduce the risk of infection at the surgical site. To make the process easier, [facility] has chosen disposable cloths moistened with a rinse-free, 2% Chlorhexidine Gluconate (CHG) antiseptic solution. The steps below outline the prepping process and should be carefully followed.        Use the prep cloth on the area that is circled in the diagram             Directions Night before Surgery  1) Shower using a fresh bar of anti-bacterial soap (such as Dial) and clean washcloth.  Use a clean towel to completely dry your skin.  2) Do not use any lotions, oils or creams on your skin.  3) Open the package and remove 1 cloth, wipe your skin for 30 seconds in a circular motion.  Allow to dry for 3 minutes.  4) Repeat #3 with second cloth.  5) Do not touch your eyes, ears, or mouth with the prep cloth.  6) Allow the wet prep solution to air dry.  7) Discard the prep cloth and wash your hands with soap and water.   8) Dress in clean bed clothes and sleep on fresh clean bed sheets.   9) You may experience some temporary itching after the prep.    Directions Day of Surgery  1) Repeat steps 1,2,3,4,5,6,7, and 9.   2) Dress in clean clothes before coming to the hospital.    BACTROBAN NASAL OINTMENT  There are many germs normally in your nose. Bactroban is an ointment that will help reduce these germs. Please follow these instructions for Bactroban use:      ____The day before surgery in the morning  Date________    ____The day before surgery in the evening              Date________    ____The day of surgery in the morning    Date________    **Squirt ½ package of Bactroban Ointment onto a cotton applicator and apply to inside of 1st nostril.  Squirt the remaining Bactroban and apply to the inside of the other nostril.    Please be aware that surgery does come with discomfort.  We want to make every effort to  control your discomfort so please discuss any uncontrolled symptoms with your nurse.   Your doctor will most likely have prescribed pain medications.      If you are going home after surgery you will receive individualized written care instructions before being discharged.  A responsible adult must drive you to and from the hospital on the day of your surgery and stay with you for 24 hours.    If you are staying overnight following surgery, you will be transported to your hospital room following the recovery period.  [facility] has all private rooms.    You have received a list of surgical assistants for your reference.  If you have any questions please call Pre-Admission Testing at [phone].  Deductibles and co-payments are collected on the day of service. Please be prepared to pay the required co-pay, deductible or deposit on the day of service as defined by your plan.

## 2019-01-07 ENCOUNTER — OFFICE VISIT (OUTPATIENT)
Dept: ORTHOPEDIC SURGERY | Facility: CLINIC | Age: 64
End: 2019-01-07

## 2019-01-07 VITALS — TEMPERATURE: 97.3 F | WEIGHT: 277.4 LBS | BODY MASS INDEX: 38.84 KG/M2 | HEIGHT: 71 IN

## 2019-01-07 DIAGNOSIS — M17.11 ARTHRITIS OF RIGHT KNEE: Primary | ICD-10-CM

## 2019-01-07 PROCEDURE — S0260 H&P FOR SURGERY: HCPCS | Performed by: NURSE PRACTITIONER

## 2019-01-07 NOTE — PROGRESS NOTES
"   History & Physical       Patient: Kian Mcdaniels  YOB: 1955  Medical Record Number: [id number]  Wt Readings from Last 3 Encounters:   01/07/19 126 kg (277 lb 6.4 oz)   01/03/19 130 kg (287 lb)   12/05/18 126 kg (277 lb)     Ht Readings from Last 3 Encounters:   01/07/19 179.1 cm (70.5\")   01/03/19 180.3 cm (71\")   12/05/18 180.3 cm (70.98\")     Body mass index is 39.24 kg/m².  Facility age limit for growth percentiles is 20 years.    Surgeon:  Dr. Andres Carter    Chief Complaints:   Chief Complaint   Patient presents with   • Right Knee - Pain, Pre-op Exam       Subjective:    History of Present Illness: 63 y.o. male presents with   Chief Complaint   Patient presents with   • Right Knee - Pain, Pre-op Exam   . Onset of symptoms was years ago and has been progressively worsening despite more conservative treatment measures.  Symptoms are associated with ability to move, exercise, and perform activities of daily living.  Symptoms are aggravated by weight bearing and ROM necessary for activities of daily living.   Symptoms improve with rest, ice and elevation only minimally.      Allergies:   Allergies   Allergen Reactions   • Erythromycin Nausea Only and Other (See Comments)     PT STATES ACUTE KIDNEY INJURY   • Lisinopril Other (See Comments)     7/2016 possibly caused pancreatitis per Dr Quinonez   • Metformin Nausea And Vomiting       Medications:   Home Medications:  Current Outpatient Medications on File Prior to Visit   Medication Sig   • albuterol (VENTOLIN HFA) 108 (90 BASE) MCG/ACT inhaler Inhale 1-2 puffs Every 6 (Six) Hours As Needed for wheezing (RESCUE INHALER).   • amLODIPine (NORVASC) 10 MG tablet Take 10 mg by mouth Every Morning.   • Ascorbic Acid (VITAMIN C PO) Take 1,000 mg by mouth Daily.   • aspirin 81 MG EC tablet Take 1 tablet by mouth Every Morning. (Patient taking differently: Take 81 mg by mouth Every Morning. HOLD FOR SURGERY)   • atorvastatin (LIPITOR) 40 MG tablet " Take 40 mg by mouth Daily.   • Blood Glucose Monitoring Suppl (FREESTYLE FREEDOM LITE) W/DEVICE kit    • carvedilol (COREG) 12.5 MG tablet Take 12.5 mg by mouth 2 (Two) Times a Day With Meals.   • cetirizine (ZyrTEC) 10 MG tablet Take 10 mg by mouth Every Morning.   • clopidogrel (PLAVIX) 75 MG tablet Take 75 mg by mouth Every Morning. HOLDING FOR SURGERY   • cyclobenzaprine (FLEXERIL) 10 MG tablet Take 1 tablet by mouth 2 (Two) Times a Day As Needed for Muscle Spasms.   • diclofenac (VOLTAREN) 1 % gel gel Apply 4 g topically 4 (Four) Times a Day.   • docusate sodium (COLACE) 100 MG capsule Take 100 mg by mouth 2 (Two) Times a Day.   • furosemide (LASIX) 40 MG tablet Take 40 mg by mouth Every Night.   • gabapentin (NEURONTIN) 400 MG capsule Take 800 mg by mouth 3 (Three) Times a Day.   • glucose blood (FREESTYLE LITE) test strip Test 4 times per day   • HUMULIN R 500 UNIT/ML CONCENTRATED injection Inject 500 Units under the skin Continuous. Via insulin pump basal rate 0.68units/hr from 0000 to 1000  1000 to 1600 is 0.7units/hr  1600 to 0000 0.75units/hr  Plus sliding scale based on carb intake   • insulin degludec (TRESIBA FLEXTOUCH) 100 UNIT/ML solution pen-injector injection Inject 45 Units under the skin into the appropriate area as directed Every Morning.   • Insulin Infusion Pump (T:SLIM INSULIN PUMP) device    • isosorbide mononitrate (IMDUR) 60 MG 24 hr tablet Take 60 mg by mouth Every Morning.   • Lancets (FREESTYLE) lancets Test 4 times per day   • losartan (COZAAR) 50 MG tablet Take 50 mg by mouth Every Morning.   • Multiple Vitamin tablet Take 1 tablet by mouth Daily. HOLD FOR SURGERY   • NARCAN 4 MG/0.1ML nasal spray    • NON FORMULARY Compound cream - LIDOCAINE, GABAPENTIN, DICLOFENAC   • oxyCODONE (ROXICODONE) 10 MG tablet Take 1 tablet by mouth Every 6 (Six) Hours As Needed for Severe Pain .   • pantoprazole (PROTONIX) 40 MG EC tablet Take 40 mg by mouth Every Morning.   • potassium chloride (K-DUR)  10 MEQ CR tablet Take 10 mEq by mouth Daily.   • raNITIdine (ZANTAC) 300 MG tablet Take 300 mg by mouth Every Night.   • TESTOSTERONE CYPIONATE IM Inject 1 mL into the appropriate muscle as directed by prescriber Every 14 (Fourteen) Days. 200mg/ml   • vitamin D (ERGOCALCIFEROL) 64078 UNITS capsule capsule Take 50,000 Units by mouth Every 7 (Seven) Days. thursdays     No current facility-administered medications on file prior to visit.      Current Medications:  Scheduled Meds:  Continuous Infusions:  No current facility-administered medications for this visit.   PRN Meds:.    I have reviewed the patient's medical history in detail and updated the computerized patient record.  Review and summarization of old records include:    Past Medical History:   Diagnosis Date   • Arteriosclerotic cardiovascular disease    • Arthritis    • At risk for sleep apnea    • COPD (chronic obstructive pulmonary disease) (CMS/HCC)    • Coronary artery disease    • Diabetes mellitus (CMS/HCC)    • Diabetic peripheral neuropathy (CMS/HCC)    • Disease of thyroid gland     NODULE PRESENT   • ED (erectile dysfunction) of non-organic origin    • GERD (gastroesophageal reflux disease)    • Headache disorder     DUE TO NECK   • Hyperlipidemia    • Hypertension    • Insulin pump in place    • Knee pain, bilateral    • Low back pain    • Myocardial infarction (CMS/HCC)    • Neck pain    • Spinal stenosis    • TIA (transient ischemic attack)     LEFT EYE   • Type 2 diabetes mellitus (CMS/HCC)    • Upper back pain    • Wears dentures     UPPER PLATE        Past Surgical History:   Procedure Laterality Date   • AMPUTATION FOOT / TOE Left     GREAT TOE   • BACK SURGERY  10/26/2015    Bilateral L4-L5 laminectomy -Dr. Gutierres   • CARDIAC CATHETERIZATION     • CARDIAC SURGERY      CABG X 6    • CHOLECYSTECTOMY     • CORONARY ARTERY BYPASS GRAFT      X 6   • EPIDURAL BLOCK     • EYE SURGERY      CATARACT EXTRACTION   • HAND SURGERY  04/28/2016   • LUMBAR  DISCECTOMY FUSION INSTRUMENTATION N/A 2016    Procedure: L 4-5 FUSION WITH INSTRUMENTATION WITH BMP, WITH REMOVAL OF INSTRUMENTATION ;  Surgeon: Rowdy Spencer MD;  Location: [facility];  Service:    • LUMBAR LAMINECTOMY DISCECTOMY DECOMPRESSION N/A 2016    Procedure: L4-5 REPEAT LAMINECTOMY ;  Surgeon: Tim Gutierres MD;  Location: [facility];  Service:    • LUMBAR LAMINECTOMY DISCECTOMY DECOMPRESSION N/A 2016    Procedure: EVACUATION OF LUMBAR HEMATOMA;  Surgeon: Rowdy Spencer MD;  Location: [facility];  Service:    • MULTIPLE TOOTH EXTRACTIONS      UPPER TEETH EXTRACTED   • ORTHOPEDIC SURGERY     • PENIS SURGERY      RECONSTRUCTION   • SCROTUM EXPLORATION      scrotum surgery   • SPINAL CORD STIMULATOR IMPLANT N/A 2018    Procedure: SPINAL CORD STIMULATOR Phase 1 - [employer];  Surgeon: Mulugeta Guzman MD;  Location: [facility];  Service: Pain Management   • SPINE SURGERY          Social History     Occupational History   • Not on file   Tobacco Use   • Smoking status: Former Smoker     Packs/day: 1.00     Years: 25.00     Pack years: 25.00     Types: Cigarettes     Start date:      Last attempt to quit: 2000     Years since quittin.0   • Smokeless tobacco: Never Used   Substance and Sexual Activity   • Alcohol use: No     Comment: no alcohol since    • Drug use: No   • Sexual activity: Defer    Social History     Social History Narrative   • Not on file        Family History   Problem Relation Age of Onset   • Diabetes Other    • Heart disease Other    • Hypertension Other    • Kidney disease Other         renal failure   • Heart disease Maternal Grandmother    • Hypertension Maternal Grandmother    • Malig Hyperthermia Neg Hx        ROS: 14 point review of systems was performed and was negative except for documented findings in HPI and today's encounter.     Allergies:   Allergies   Allergen Reactions   • Erythromycin Nausea Only and Other  "(See Comments)     PT STATES ACUTE KIDNEY INJURY   • Lisinopril Other (See Comments)     7/2016 possibly caused pancreatitis per Dr Quinonez   • Metformin Nausea And Vomiting     Constitutional:  Denies fever, shaking or chills   Eyes:  Denies change in visual acuity   HENT:  Denies nasal congestion or sore throat   Respiratory:  Denies cough or shortness of breath   Cardiovascular:  Denies chest pain or severe LE edema   GI:  Denies abdominal pain, nausea, vomiting, bloody stools or diarrhea   Musculoskeletal:  Denies numbness tingling or loss of motor function except as outlined above in history of present illness.  : Denies painful urination or hematuria  Integument:  Denies rash, lesion or ulceration   Neurologic:  Denies headache or focal weakness  Endocrine:  Denies lymphadenopathy  Psych:  Denies confusion or change in mental status   Hem:  Denies active bleeding    Physical Exam: 63 y.o. male  Wt Readings from Last 3 Encounters:   01/07/19 126 kg (277 lb 6.4 oz)   01/03/19 130 kg (287 lb)   12/05/18 126 kg (277 lb)     Ht Readings from Last 3 Encounters:   01/07/19 179.1 cm (70.5\")   01/03/19 180.3 cm (71\")   12/05/18 180.3 cm (70.98\")     Body mass index is 39.24 kg/m².  Facility age limit for growth percentiles is 20 years.  Vitals:    01/07/19 0919   Temp: 97.3 °F (36.3 °C)       Vital signs reviewed.   General Appearance:    Alert, cooperative, in no acute distress                  Eyes: conjunctiva clear  ENT: external ears and nose atraumatic  CV: no peripheral edema  Resp: normal respiratory effort  Skin: no rashes or wounds; normal turgor  Psych: mood and affect appropriate  Lymph: no nodes appreciated  Neuro: gross sensation intact  Vascular:  Palpable peripheral pulse in noted extremity  Musculoskeletal Extremities: DETAILED KNEE Exam: Right knee: Painful gait w/wo limp, muscle atrophy, erythema, ecchymosis, or gross deformity noted, Large knee effusion, + lateral joint line tenderness, Active " range of motion normal, 5/5 strength flexion and extension, The knee is stable to varus and valgus stress testing, VARUS VALGUS NEUTRAL: valgus alignment of the limb, Lachman negative, Posterior drawer negative, Ru's negative, Patellofemoral grind +, Sensation grossly intact to light tough throughout the lower extremity, Skin is intact, Distal pulses are palpable, No signs or symptoms of DVT          Diagnostic Tests:  Appointment on 01/03/2019   Component Date Value Ref Range Status   • WBC 01/03/2019 7.25  4.50 - 10.70 10*3/mm3 Final   • RBC 01/03/2019 5.97  4.60 - 6.00 10*6/mm3 Final   • Hemoglobin 01/03/2019 16.2  13.7 - 17.6 g/dL Final   • Hematocrit 01/03/2019 52.2  40.4 - 52.2 % Final   • MCV 01/03/2019 87.4  79.8 - 96.2 fL Final   • MCH 01/03/2019 27.1  27.0 - 32.7 pg Final   • MCHC 01/03/2019 31.0* 32.6 - 36.4 g/dL Final   • RDW 01/03/2019 15.7* 11.5 - 14.5 % Final   • RDW-SD 01/03/2019 50.0  37.0 - 54.0 fl Final   • MPV 01/03/2019 9.9  6.0 - 12.0 fL Final   • Platelets 01/03/2019 224  140 - 500 10*3/mm3 Final   • Glucose 01/03/2019 101* 65 - 99 mg/dL Final   • BUN 01/03/2019 20  8 - 23 mg/dL Final   • Creatinine 01/03/2019 1.28* 0.76 - 1.27 mg/dL Final   • Sodium 01/03/2019 147* 136 - 145 mmol/L Final   • Potassium 01/03/2019 3.8  3.5 - 5.2 mmol/L Final   • Chloride 01/03/2019 105  98 - 107 mmol/L Final   • CO2 01/03/2019 30.6* 22.0 - 29.0 mmol/L Final   • Calcium 01/03/2019 9.3  8.6 - 10.5 mg/dL Final   • eGFR   Amer 01/03/2019 69  >60 mL/min/1.73 Final   • BUN/Creatinine Ratio 01/03/2019 15.6  7.0 - 25.0 Final   • Anion Gap 01/03/2019 11.4  mmol/L Final   • Color, UA 01/03/2019 Yellow  Yellow, Straw Final   • Appearance, UA 01/03/2019 Clear  Clear Final   • pH, UA 01/03/2019 <=5.0  5.0 - 8.0 Final   • Specific Gravity, UA 01/03/2019 1.017  1.005 - 1.030 Final   • Glucose, UA 01/03/2019 Negative  Negative Final   • Ketones, UA 01/03/2019 Negative  Negative Final   • Bilirubin, UA 01/03/2019  Negative  Negative Final   • Blood, UA 01/03/2019 Trace* Negative Final   • Protein, UA 01/03/2019 100 mg/dL (2+)* Negative Final   • Leuk Esterase, UA 01/03/2019 Negative  Negative Final   • Nitrite, UA 01/03/2019 Negative  Negative Final   • Urobilinogen, UA 01/03/2019 0.2 E.U./dL  0.2 - 1.0 E.U./dL Final   • RBC, UA 01/03/2019 0-2  None Seen, 0-2 /HPF Final   • WBC, UA 01/03/2019 0-2  None Seen, 0-2 /HPF Final   • Bacteria, UA 01/03/2019 None Seen  None Seen /HPF Final   • Squamous Epithelial Cells, UA 01/03/2019 0-2  None Seen, 0-2 /HPF Final   • Hyaline Casts, UA 01/03/2019 0-2  None Seen /LPF Final   • Methodology 01/03/2019 Automated Microscopy   Final       Imaging was done previously in the office, images were personally viewed, viewed images and discussed with the patient:    Indication: pain related symptoms,  Views: 3V AP, LAT & 40 degree PA right knee(s)   Findings: severe end-stage arthritis (bone on bone, subchondral sclerosis/cysts, osteophytes)  Comparison views: viewed last xray done in the office.     Assessment:  Patient Active Problem List   Diagnosis   • Bulging lumbar disc   • Chronic pain   • Diabetic neuropathy (CMS/HCC)   • Low back pain   • Degenerative arthritis of lumbar spine   • Neuropathy involving both lower extremities   • Encounter for long-term (current) use of high-risk medication   • Postlaminectomy syndrome of lumbar region   • Syringomyelia and syringobulbia (CMS/HCC)   • Lumbar pseudoarthrosis   • DM2 (diabetes mellitus, type 2) (CMS/HCC)   • Insulin pump in place   • Hx of decompressive lumbar laminectomy   • Bilateral carpal tunnel syndrome   • Degenerative cervical disc   • Acute pain of right knee   • Primary localized osteoarthrosis of right lower leg   • Arthritis of right knee   • Sacroiliac inflammation (CMS/HCC)   • Sacroiliac joint dysfunction   • Severe obesity (BMI 35.0-39.9)   • Chronic pain of right knee   • Tricompartment osteoarthritis of left knee   •  Tricompartment osteoarthritis of right knee   • Arthritis of left knee   • Bilateral chronic knee pain       Plan:  Dr. Andres Carter reviewed anatomy of a total joint arthroplasty in laymen's terms, as well as typical postoperative recovery and possibly 6-12 months for maximal recovery, and possible need for rehabilitation stay after hospitalization. We also discussed risks, benefits, alternatives, and limitations of procedure with risks including but not limited to neurovascular damage, bleeding, infection, malalignment, chronic pian, failure of implants, osteolysis, loosening of implants, loss of motion, weakness, stiffness, instability, DVT, pulmonary embolus, death, stroke, complex regional pain syndrome, myocardial infarction, and need for additional procedures. Concept of substitution vs. replacement discussed.  No guarantees were given regarding results of surgery.      Kian Mcdaniels was given the opportunity to ask and have all questions answered today.  The patient voiced understanding of the risks, benefits, and alternative forms of treatment that were discussed and the patient consents to proceed with surgery.     Patient's blood clot history is negative but is normally on plavix and asa and had 6 vessel bypass in 2000. Saw Dr. Gabriel for cardiac clearance on Dec 19th and was cleared (see note in care everywhere).  Endocrinologist Dr. Shannan Singh with [facility] endocrinology has ordered to change basal insulin pump to 80% for 24hrs surrounding surgery will have [facility] manage while inpatient.     Planned DVT prophylaxis for surgery:  Xarelto 14 then will go back to plavix and asa, Hgb 16.2   GFR 69 last A1c was 9.1 on 12/28     Discharge Plan: POD 2-3 to home and home health, takes perc 10 every six hours at home for chronic back pain issues. [facility] management Dr. Guzman and patient reports he is aware of this surgery and is ok with Dr. Carter prescribing perc for surgery. Admit for TKA  due to multiple chronic health issues.  Although he has notable risk factors, he has done all he can to minimize and properly manage these for his TKR surgery and is aware of the risks and verb wish to proceed.     Date: 1/7/2019  KATHLEEN Boss

## 2019-01-07 NOTE — H&P (VIEW-ONLY)
"   History & Physical       Patient: Kian Mcdaniels  YOB: 1955  Medical Record Number: [id number]  Wt Readings from Last 3 Encounters:   01/07/19 126 kg (277 lb 6.4 oz)   01/03/19 130 kg (287 lb)   12/05/18 126 kg (277 lb)     Ht Readings from Last 3 Encounters:   01/07/19 179.1 cm (70.5\")   01/03/19 180.3 cm (71\")   12/05/18 180.3 cm (70.98\")     Body mass index is 39.24 kg/m².  Facility age limit for growth percentiles is 20 years.    Surgeon:  Dr. Andres Carter    Chief Complaints:   Chief Complaint   Patient presents with   • Right Knee - Pain, Pre-op Exam       Subjective:    History of Present Illness: 63 y.o. male presents with   Chief Complaint   Patient presents with   • Right Knee - Pain, Pre-op Exam   . Onset of symptoms was years ago and has been progressively worsening despite more conservative treatment measures.  Symptoms are associated with ability to move, exercise, and perform activities of daily living.  Symptoms are aggravated by weight bearing and ROM necessary for activities of daily living.   Symptoms improve with rest, ice and elevation only minimally.      Allergies:   Allergies   Allergen Reactions   • Erythromycin Nausea Only and Other (See Comments)     PT STATES ACUTE KIDNEY INJURY   • Lisinopril Other (See Comments)     7/2016 possibly caused pancreatitis per Dr Quinonez   • Metformin Nausea And Vomiting       Medications:   Home Medications:  Current Outpatient Medications on File Prior to Visit   Medication Sig   • albuterol (VENTOLIN HFA) 108 (90 BASE) MCG/ACT inhaler Inhale 1-2 puffs Every 6 (Six) Hours As Needed for wheezing (RESCUE INHALER).   • amLODIPine (NORVASC) 10 MG tablet Take 10 mg by mouth Every Morning.   • Ascorbic Acid (VITAMIN C PO) Take 1,000 mg by mouth Daily.   • aspirin 81 MG EC tablet Take 1 tablet by mouth Every Morning. (Patient taking differently: Take 81 mg by mouth Every Morning. HOLD FOR SURGERY)   • atorvastatin (LIPITOR) 40 MG tablet " Take 40 mg by mouth Daily.   • Blood Glucose Monitoring Suppl (FREESTYLE FREEDOM LITE) W/DEVICE kit    • carvedilol (COREG) 12.5 MG tablet Take 12.5 mg by mouth 2 (Two) Times a Day With Meals.   • cetirizine (ZyrTEC) 10 MG tablet Take 10 mg by mouth Every Morning.   • clopidogrel (PLAVIX) 75 MG tablet Take 75 mg by mouth Every Morning. HOLDING FOR SURGERY   • cyclobenzaprine (FLEXERIL) 10 MG tablet Take 1 tablet by mouth 2 (Two) Times a Day As Needed for Muscle Spasms.   • diclofenac (VOLTAREN) 1 % gel gel Apply 4 g topically 4 (Four) Times a Day.   • docusate sodium (COLACE) 100 MG capsule Take 100 mg by mouth 2 (Two) Times a Day.   • furosemide (LASIX) 40 MG tablet Take 40 mg by mouth Every Night.   • gabapentin (NEURONTIN) 400 MG capsule Take 800 mg by mouth 3 (Three) Times a Day.   • glucose blood (FREESTYLE LITE) test strip Test 4 times per day   • HUMULIN R 500 UNIT/ML CONCENTRATED injection Inject 500 Units under the skin Continuous. Via insulin pump basal rate 0.68units/hr from 0000 to 1000  1000 to 1600 is 0.7units/hr  1600 to 0000 0.75units/hr  Plus sliding scale based on carb intake   • insulin degludec (TRESIBA FLEXTOUCH) 100 UNIT/ML solution pen-injector injection Inject 45 Units under the skin into the appropriate area as directed Every Morning.   • Insulin Infusion Pump (T:SLIM INSULIN PUMP) device    • isosorbide mononitrate (IMDUR) 60 MG 24 hr tablet Take 60 mg by mouth Every Morning.   • Lancets (FREESTYLE) lancets Test 4 times per day   • losartan (COZAAR) 50 MG tablet Take 50 mg by mouth Every Morning.   • Multiple Vitamin tablet Take 1 tablet by mouth Daily. HOLD FOR SURGERY   • NARCAN 4 MG/0.1ML nasal spray    • NON FORMULARY Compound cream - LIDOCAINE, GABAPENTIN, DICLOFENAC   • oxyCODONE (ROXICODONE) 10 MG tablet Take 1 tablet by mouth Every 6 (Six) Hours As Needed for Severe Pain .   • pantoprazole (PROTONIX) 40 MG EC tablet Take 40 mg by mouth Every Morning.   • potassium chloride (K-DUR)  10 MEQ CR tablet Take 10 mEq by mouth Daily.   • raNITIdine (ZANTAC) 300 MG tablet Take 300 mg by mouth Every Night.   • TESTOSTERONE CYPIONATE IM Inject 1 mL into the appropriate muscle as directed by prescriber Every 14 (Fourteen) Days. 200mg/ml   • vitamin D (ERGOCALCIFEROL) 87895 UNITS capsule capsule Take 50,000 Units by mouth Every 7 (Seven) Days. thursdays     No current facility-administered medications on file prior to visit.      Current Medications:  Scheduled Meds:  Continuous Infusions:  No current facility-administered medications for this visit.   PRN Meds:.    I have reviewed the patient's medical history in detail and updated the computerized patient record.  Review and summarization of old records include:    Past Medical History:   Diagnosis Date   • Arteriosclerotic cardiovascular disease    • Arthritis    • At risk for sleep apnea    • COPD (chronic obstructive pulmonary disease) (CMS/HCC)    • Coronary artery disease    • Diabetes mellitus (CMS/HCC)    • Diabetic peripheral neuropathy (CMS/HCC)    • Disease of thyroid gland     NODULE PRESENT   • ED (erectile dysfunction) of non-organic origin    • GERD (gastroesophageal reflux disease)    • Headache disorder     DUE TO NECK   • Hyperlipidemia    • Hypertension    • Insulin pump in place    • Knee pain, bilateral    • Low back pain    • Myocardial infarction (CMS/HCC)    • Neck pain    • Spinal stenosis    • TIA (transient ischemic attack)     LEFT EYE   • Type 2 diabetes mellitus (CMS/HCC)    • Upper back pain    • Wears dentures     UPPER PLATE        Past Surgical History:   Procedure Laterality Date   • AMPUTATION FOOT / TOE Left     GREAT TOE   • BACK SURGERY  10/26/2015    Bilateral L4-L5 laminectomy -Dr. Gutierres   • CARDIAC CATHETERIZATION     • CARDIAC SURGERY      CABG X 6    • CHOLECYSTECTOMY     • CORONARY ARTERY BYPASS GRAFT      X 6   • EPIDURAL BLOCK     • EYE SURGERY      CATARACT EXTRACTION   • HAND SURGERY  04/28/2016   • LUMBAR  DISCECTOMY FUSION INSTRUMENTATION N/A 2016    Procedure: L 4-5 FUSION WITH INSTRUMENTATION WITH BMP, WITH REMOVAL OF INSTRUMENTATION ;  Surgeon: Rowdy Spencer MD;  Location: [facility];  Service:    • LUMBAR LAMINECTOMY DISCECTOMY DECOMPRESSION N/A 2016    Procedure: L4-5 REPEAT LAMINECTOMY ;  Surgeon: Tim Gutierres MD;  Location: [facility];  Service:    • LUMBAR LAMINECTOMY DISCECTOMY DECOMPRESSION N/A 2016    Procedure: EVACUATION OF LUMBAR HEMATOMA;  Surgeon: Rowdy Spencer MD;  Location: [facility];  Service:    • MULTIPLE TOOTH EXTRACTIONS      UPPER TEETH EXTRACTED   • ORTHOPEDIC SURGERY     • PENIS SURGERY      RECONSTRUCTION   • SCROTUM EXPLORATION      scrotum surgery   • SPINAL CORD STIMULATOR IMPLANT N/A 2018    Procedure: SPINAL CORD STIMULATOR Phase 1 - [employer];  Surgeon: Mulugeta Guzman MD;  Location: [facility];  Service: Pain Management   • SPINE SURGERY          Social History     Occupational History   • Not on file   Tobacco Use   • Smoking status: Former Smoker     Packs/day: 1.00     Years: 25.00     Pack years: 25.00     Types: Cigarettes     Start date:      Last attempt to quit: 2000     Years since quittin.0   • Smokeless tobacco: Never Used   Substance and Sexual Activity   • Alcohol use: No     Comment: no alcohol since    • Drug use: No   • Sexual activity: Defer    Social History     Social History Narrative   • Not on file        Family History   Problem Relation Age of Onset   • Diabetes Other    • Heart disease Other    • Hypertension Other    • Kidney disease Other         renal failure   • Heart disease Maternal Grandmother    • Hypertension Maternal Grandmother    • Malig Hyperthermia Neg Hx        ROS: 14 point review of systems was performed and was negative except for documented findings in HPI and today's encounter.     Allergies:   Allergies   Allergen Reactions   • Erythromycin Nausea Only and Other  "(See Comments)     PT STATES ACUTE KIDNEY INJURY   • Lisinopril Other (See Comments)     7/2016 possibly caused pancreatitis per Dr Quinonez   • Metformin Nausea And Vomiting     Constitutional:  Denies fever, shaking or chills   Eyes:  Denies change in visual acuity   HENT:  Denies nasal congestion or sore throat   Respiratory:  Denies cough or shortness of breath   Cardiovascular:  Denies chest pain or severe LE edema   GI:  Denies abdominal pain, nausea, vomiting, bloody stools or diarrhea   Musculoskeletal:  Denies numbness tingling or loss of motor function except as outlined above in history of present illness.  : Denies painful urination or hematuria  Integument:  Denies rash, lesion or ulceration   Neurologic:  Denies headache or focal weakness  Endocrine:  Denies lymphadenopathy  Psych:  Denies confusion or change in mental status   Hem:  Denies active bleeding    Physical Exam: 63 y.o. male  Wt Readings from Last 3 Encounters:   01/07/19 126 kg (277 lb 6.4 oz)   01/03/19 130 kg (287 lb)   12/05/18 126 kg (277 lb)     Ht Readings from Last 3 Encounters:   01/07/19 179.1 cm (70.5\")   01/03/19 180.3 cm (71\")   12/05/18 180.3 cm (70.98\")     Body mass index is 39.24 kg/m².  Facility age limit for growth percentiles is 20 years.  Vitals:    01/07/19 0919   Temp: 97.3 °F (36.3 °C)       Vital signs reviewed.   General Appearance:    Alert, cooperative, in no acute distress                  Eyes: conjunctiva clear  ENT: external ears and nose atraumatic  CV: no peripheral edema  Resp: normal respiratory effort  Skin: no rashes or wounds; normal turgor  Psych: mood and affect appropriate  Lymph: no nodes appreciated  Neuro: gross sensation intact  Vascular:  Palpable peripheral pulse in noted extremity  Musculoskeletal Extremities: DETAILED KNEE Exam: Right knee: Painful gait w/wo limp, muscle atrophy, erythema, ecchymosis, or gross deformity noted, Large knee effusion, + lateral joint line tenderness, Active " range of motion normal, 5/5 strength flexion and extension, The knee is stable to varus and valgus stress testing, VARUS VALGUS NEUTRAL: valgus alignment of the limb, Lachman negative, Posterior drawer negative, Ru's negative, Patellofemoral grind +, Sensation grossly intact to light tough throughout the lower extremity, Skin is intact, Distal pulses are palpable, No signs or symptoms of DVT          Diagnostic Tests:  Appointment on 01/03/2019   Component Date Value Ref Range Status   • WBC 01/03/2019 7.25  4.50 - 10.70 10*3/mm3 Final   • RBC 01/03/2019 5.97  4.60 - 6.00 10*6/mm3 Final   • Hemoglobin 01/03/2019 16.2  13.7 - 17.6 g/dL Final   • Hematocrit 01/03/2019 52.2  40.4 - 52.2 % Final   • MCV 01/03/2019 87.4  79.8 - 96.2 fL Final   • MCH 01/03/2019 27.1  27.0 - 32.7 pg Final   • MCHC 01/03/2019 31.0* 32.6 - 36.4 g/dL Final   • RDW 01/03/2019 15.7* 11.5 - 14.5 % Final   • RDW-SD 01/03/2019 50.0  37.0 - 54.0 fl Final   • MPV 01/03/2019 9.9  6.0 - 12.0 fL Final   • Platelets 01/03/2019 224  140 - 500 10*3/mm3 Final   • Glucose 01/03/2019 101* 65 - 99 mg/dL Final   • BUN 01/03/2019 20  8 - 23 mg/dL Final   • Creatinine 01/03/2019 1.28* 0.76 - 1.27 mg/dL Final   • Sodium 01/03/2019 147* 136 - 145 mmol/L Final   • Potassium 01/03/2019 3.8  3.5 - 5.2 mmol/L Final   • Chloride 01/03/2019 105  98 - 107 mmol/L Final   • CO2 01/03/2019 30.6* 22.0 - 29.0 mmol/L Final   • Calcium 01/03/2019 9.3  8.6 - 10.5 mg/dL Final   • eGFR   Amer 01/03/2019 69  >60 mL/min/1.73 Final   • BUN/Creatinine Ratio 01/03/2019 15.6  7.0 - 25.0 Final   • Anion Gap 01/03/2019 11.4  mmol/L Final   • Color, UA 01/03/2019 Yellow  Yellow, Straw Final   • Appearance, UA 01/03/2019 Clear  Clear Final   • pH, UA 01/03/2019 <=5.0  5.0 - 8.0 Final   • Specific Gravity, UA 01/03/2019 1.017  1.005 - 1.030 Final   • Glucose, UA 01/03/2019 Negative  Negative Final   • Ketones, UA 01/03/2019 Negative  Negative Final   • Bilirubin, UA 01/03/2019  Negative  Negative Final   • Blood, UA 01/03/2019 Trace* Negative Final   • Protein, UA 01/03/2019 100 mg/dL (2+)* Negative Final   • Leuk Esterase, UA 01/03/2019 Negative  Negative Final   • Nitrite, UA 01/03/2019 Negative  Negative Final   • Urobilinogen, UA 01/03/2019 0.2 E.U./dL  0.2 - 1.0 E.U./dL Final   • RBC, UA 01/03/2019 0-2  None Seen, 0-2 /HPF Final   • WBC, UA 01/03/2019 0-2  None Seen, 0-2 /HPF Final   • Bacteria, UA 01/03/2019 None Seen  None Seen /HPF Final   • Squamous Epithelial Cells, UA 01/03/2019 0-2  None Seen, 0-2 /HPF Final   • Hyaline Casts, UA 01/03/2019 0-2  None Seen /LPF Final   • Methodology 01/03/2019 Automated Microscopy   Final       Imaging was done previously in the office, images were personally viewed, viewed images and discussed with the patient:    Indication: pain related symptoms,  Views: 3V AP, LAT & 40 degree PA right knee(s)   Findings: severe end-stage arthritis (bone on bone, subchondral sclerosis/cysts, osteophytes)  Comparison views: viewed last xray done in the office.     Assessment:  Patient Active Problem List   Diagnosis   • Bulging lumbar disc   • Chronic pain   • Diabetic neuropathy (CMS/HCC)   • Low back pain   • Degenerative arthritis of lumbar spine   • Neuropathy involving both lower extremities   • Encounter for long-term (current) use of high-risk medication   • Postlaminectomy syndrome of lumbar region   • Syringomyelia and syringobulbia (CMS/HCC)   • Lumbar pseudoarthrosis   • DM2 (diabetes mellitus, type 2) (CMS/HCC)   • Insulin pump in place   • Hx of decompressive lumbar laminectomy   • Bilateral carpal tunnel syndrome   • Degenerative cervical disc   • Acute pain of right knee   • Primary localized osteoarthrosis of right lower leg   • Arthritis of right knee   • Sacroiliac inflammation (CMS/HCC)   • Sacroiliac joint dysfunction   • Severe obesity (BMI 35.0-39.9)   • Chronic pain of right knee   • Tricompartment osteoarthritis of left knee   •  Tricompartment osteoarthritis of right knee   • Arthritis of left knee   • Bilateral chronic knee pain       Plan:  Dr. Andres Carter reviewed anatomy of a total joint arthroplasty in laymen's terms, as well as typical postoperative recovery and possibly 6-12 months for maximal recovery, and possible need for rehabilitation stay after hospitalization. We also discussed risks, benefits, alternatives, and limitations of procedure with risks including but not limited to neurovascular damage, bleeding, infection, malalignment, chronic pian, failure of implants, osteolysis, loosening of implants, loss of motion, weakness, stiffness, instability, DVT, pulmonary embolus, death, stroke, complex regional pain syndrome, myocardial infarction, and need for additional procedures. Concept of substitution vs. replacement discussed.  No guarantees were given regarding results of surgery.      Kian Mcdaniels was given the opportunity to ask and have all questions answered today.  The patient voiced understanding of the risks, benefits, and alternative forms of treatment that were discussed and the patient consents to proceed with surgery.     Patient's blood clot history is negative but is normally on plavix and asa and had 6 vessel bypass in 2000. Saw Dr. Gabriel for cardiac clearance on Dec 19th and was cleared (see note in care everywhere).  Endocrinologist Dr. Shannan Singh with [facility] endocrinology has ordered to change basal insulin pump to 80% for 24hrs surrounding surgery will have [facility] manage while inpatient.     Planned DVT prophylaxis for surgery:  Xarelto 14 then will go back to plavix and asa, Hgb 16.2   GFR 69 last A1c was 9.1 on 12/28     Discharge Plan: POD 2-3 to home and home health, takes perc 10 every six hours at home for chronic back pain issues. [facility] management Dr. Guzman and patient reports he is aware of this surgery and is ok with Dr. Carter prescribing perc for surgery. Admit for TKA  due to multiple chronic health issues.  Although he has notable risk factors, he has done all he can to minimize and properly manage these for his TKR surgery and is aware of the risks and verb wish to proceed.     Date: 1/7/2019  KATHLEEN Boss

## 2019-01-08 ENCOUNTER — APPOINTMENT (OUTPATIENT)
Dept: GENERAL RADIOLOGY | Facility: HOSPITAL | Age: 64
End: 2019-01-08

## 2019-01-08 ENCOUNTER — ANESTHESIA (OUTPATIENT)
Dept: PERIOP | Facility: HOSPITAL | Age: 64
End: 2019-01-08

## 2019-01-08 ENCOUNTER — ANESTHESIA EVENT (OUTPATIENT)
Dept: PERIOP | Facility: HOSPITAL | Age: 64
End: 2019-01-08

## 2019-01-08 ENCOUNTER — HOSPITAL ENCOUNTER (INPATIENT)
Facility: HOSPITAL | Age: 64
LOS: 4 days | Discharge: NURSING, REHAB OR LONG-TERM CARE | End: 2019-01-12
Attending: ORTHOPAEDIC SURGERY | Admitting: ORTHOPAEDIC SURGERY

## 2019-01-08 DIAGNOSIS — Z96.651 STATUS POST TOTAL RIGHT KNEE REPLACEMENT: Primary | ICD-10-CM

## 2019-01-08 DIAGNOSIS — M17.11 ARTHRITIS OF RIGHT KNEE: ICD-10-CM

## 2019-01-08 PROBLEM — I10 HYPERTENSION: Status: ACTIVE | Noted: 2019-01-08

## 2019-01-08 LAB
GLUCOSE BLDC GLUCOMTR-MCNC: 186 MG/DL (ref 70–130)
GLUCOSE BLDC GLUCOMTR-MCNC: 288 MG/DL (ref 70–130)
GLUCOSE BLDC GLUCOMTR-MCNC: 84 MG/DL (ref 70–130)
GLUCOSE BLDC GLUCOMTR-MCNC: 93 MG/DL (ref 70–130)

## 2019-01-08 PROCEDURE — 25010000002 SUCCINYLCHOLINE PER 20 MG: Performed by: NURSE ANESTHETIST, CERTIFIED REGISTERED

## 2019-01-08 PROCEDURE — 97110 THERAPEUTIC EXERCISES: CPT

## 2019-01-08 PROCEDURE — 25010000002 ONDANSETRON PER 1 MG: Performed by: NURSE ANESTHETIST, CERTIFIED REGISTERED

## 2019-01-08 PROCEDURE — C1713 ANCHOR/SCREW BN/BN,TIS/BN: HCPCS | Performed by: ORTHOPAEDIC SURGERY

## 2019-01-08 PROCEDURE — 25010000002 DEXAMETHASONE PER 1 MG: Performed by: NURSE ANESTHETIST, CERTIFIED REGISTERED

## 2019-01-08 PROCEDURE — 25010000002 ROPIVACAINE PER 1 MG: Performed by: ORTHOPAEDIC SURGERY

## 2019-01-08 PROCEDURE — 97162 PT EVAL MOD COMPLEX 30 MIN: CPT

## 2019-01-08 PROCEDURE — 27447 TOTAL KNEE ARTHROPLASTY: CPT | Performed by: NURSE PRACTITIONER

## 2019-01-08 PROCEDURE — 25010000002 MIDAZOLAM PER 1 MG: Performed by: ANESTHESIOLOGY

## 2019-01-08 PROCEDURE — 25010000002 HYDROMORPHONE PER 4 MG: Performed by: NURSE ANESTHETIST, CERTIFIED REGISTERED

## 2019-01-08 PROCEDURE — 25010000002 KETOROLAC TROMETHAMINE PER 15 MG: Performed by: ORTHOPAEDIC SURGERY

## 2019-01-08 PROCEDURE — 25010000003 CEFAZOLIN IN DEXTROSE 2-4 GM/100ML-% SOLUTION: Performed by: ORTHOPAEDIC SURGERY

## 2019-01-08 PROCEDURE — 25010000003 CEFAZOLIN IN DEXTROSE 2-4 GM/100ML-% SOLUTION: Performed by: NURSE PRACTITIONER

## 2019-01-08 PROCEDURE — C1776 JOINT DEVICE (IMPLANTABLE): HCPCS | Performed by: ORTHOPAEDIC SURGERY

## 2019-01-08 PROCEDURE — 25010000002 PROPOFOL 10 MG/ML EMULSION: Performed by: NURSE ANESTHETIST, CERTIFIED REGISTERED

## 2019-01-08 PROCEDURE — 0SRC0J9 REPLACEMENT OF RIGHT KNEE JOINT WITH SYNTHETIC SUBSTITUTE, CEMENTED, OPEN APPROACH: ICD-10-PCS | Performed by: ORTHOPAEDIC SURGERY

## 2019-01-08 PROCEDURE — 25010000002 FENTANYL CITRATE (PF) 100 MCG/2ML SOLUTION: Performed by: NURSE ANESTHETIST, CERTIFIED REGISTERED

## 2019-01-08 PROCEDURE — 73560 X-RAY EXAM OF KNEE 1 OR 2: CPT

## 2019-01-08 PROCEDURE — 82962 GLUCOSE BLOOD TEST: CPT

## 2019-01-08 PROCEDURE — 27447 TOTAL KNEE ARTHROPLASTY: CPT | Performed by: ORTHOPAEDIC SURGERY

## 2019-01-08 DEVICE — SMARTSET HIGH PERFORMANCE MV MEDIUM VISCOSITY BONE CEMENT 40G
Type: IMPLANTABLE DEVICE | Site: KNEE | Status: FUNCTIONAL
Brand: SMARTSET

## 2019-01-08 DEVICE — P.F.C. SIGMA TIBIAL TRAY FIXED BEARING MODULAR COCR 5 CEMENTED
Type: IMPLANTABLE DEVICE | Site: KNEE | Status: FUNCTIONAL
Brand: P.F.C. SIGMA

## 2019-01-08 DEVICE — IMPLANTABLE DEVICE: Type: IMPLANTABLE DEVICE | Site: KNEE | Status: FUNCTIONAL

## 2019-01-08 DEVICE — SIGMA TIBIAL INSERT FIXED BEARING CURVED PLUS 5 10MM XLK
Type: IMPLANTABLE DEVICE | Site: KNEE | Status: FUNCTIONAL
Brand: SIGMA

## 2019-01-08 DEVICE — P.F.C. SIGMA OVAL DOME PATELLA 3-PEG 41MM CEMENTED
Type: IMPLANTABLE DEVICE | Site: KNEE | Status: FUNCTIONAL
Brand: P.F.C. SIGMA

## 2019-01-08 DEVICE — SIGMA FEMORAL CRUCIATE RETAINING CEMENTED 5 RIGHT
Type: IMPLANTABLE DEVICE | Site: KNEE | Status: FUNCTIONAL
Brand: SIGMA

## 2019-01-08 RX ORDER — FUROSEMIDE 40 MG/1
40 TABLET ORAL NIGHTLY
Status: DISCONTINUED | OUTPATIENT
Start: 2019-01-08 | End: 2019-01-08

## 2019-01-08 RX ORDER — AMLODIPINE BESYLATE 10 MG/1
10 TABLET ORAL EVERY MORNING
Status: DISCONTINUED | OUTPATIENT
Start: 2019-01-09 | End: 2019-01-12 | Stop reason: HOSPADM

## 2019-01-08 RX ORDER — ISOSORBIDE MONONITRATE 30 MG/1
60 TABLET, EXTENDED RELEASE ORAL EVERY MORNING
Status: DISCONTINUED | OUTPATIENT
Start: 2019-01-09 | End: 2019-01-12 | Stop reason: HOSPADM

## 2019-01-08 RX ORDER — OXYCODONE AND ACETAMINOPHEN 10; 325 MG/1; MG/1
2 TABLET ORAL EVERY 4 HOURS PRN
Status: DISCONTINUED | OUTPATIENT
Start: 2019-01-08 | End: 2019-01-12 | Stop reason: HOSPADM

## 2019-01-08 RX ORDER — SUCCINYLCHOLINE CHLORIDE 20 MG/ML
INJECTION INTRAMUSCULAR; INTRAVENOUS AS NEEDED
Status: DISCONTINUED | OUTPATIENT
Start: 2019-01-08 | End: 2019-01-08 | Stop reason: SURG

## 2019-01-08 RX ORDER — ONDANSETRON 4 MG/1
4 TABLET, FILM COATED ORAL EVERY 6 HOURS PRN
Status: DISCONTINUED | OUTPATIENT
Start: 2019-01-08 | End: 2019-01-12 | Stop reason: HOSPADM

## 2019-01-08 RX ORDER — DIPHENHYDRAMINE HCL 25 MG
25 CAPSULE ORAL EVERY 6 HOURS PRN
Status: DISCONTINUED | OUTPATIENT
Start: 2019-01-08 | End: 2019-01-12 | Stop reason: HOSPADM

## 2019-01-08 RX ORDER — ALBUTEROL SULFATE 90 UG/1
2 AEROSOL, METERED RESPIRATORY (INHALATION) EVERY 6 HOURS PRN
Status: DISCONTINUED | OUTPATIENT
Start: 2019-01-08 | End: 2019-01-12 | Stop reason: HOSPADM

## 2019-01-08 RX ORDER — MIDAZOLAM HYDROCHLORIDE 1 MG/ML
1 INJECTION INTRAMUSCULAR; INTRAVENOUS
Status: DISCONTINUED | OUTPATIENT
Start: 2019-01-08 | End: 2019-01-08 | Stop reason: HOSPADM

## 2019-01-08 RX ORDER — CYCLOBENZAPRINE HCL 10 MG
10 TABLET ORAL 2 TIMES DAILY PRN
Status: DISCONTINUED | OUTPATIENT
Start: 2019-01-08 | End: 2019-01-12 | Stop reason: HOSPADM

## 2019-01-08 RX ORDER — PROMETHAZINE HYDROCHLORIDE 25 MG/1
25 SUPPOSITORY RECTAL ONCE AS NEEDED
Status: DISCONTINUED | OUTPATIENT
Start: 2019-01-08 | End: 2019-01-08

## 2019-01-08 RX ORDER — LIDOCAINE HYDROCHLORIDE 10 MG/ML
0.5 INJECTION, SOLUTION EPIDURAL; INFILTRATION; INTRACAUDAL; PERINEURAL ONCE AS NEEDED
Status: DISCONTINUED | OUTPATIENT
Start: 2019-01-08 | End: 2019-01-08 | Stop reason: HOSPADM

## 2019-01-08 RX ORDER — ACETAMINOPHEN 325 MG/1
325 TABLET ORAL EVERY 4 HOURS PRN
Status: DISCONTINUED | OUTPATIENT
Start: 2019-01-08 | End: 2019-01-12 | Stop reason: HOSPADM

## 2019-01-08 RX ORDER — CEFAZOLIN SODIUM 2 G/100ML
2 INJECTION, SOLUTION INTRAVENOUS EVERY 8 HOURS
Status: COMPLETED | OUTPATIENT
Start: 2019-01-08 | End: 2019-01-09

## 2019-01-08 RX ORDER — DEXTROSE MONOHYDRATE 25 G/50ML
25 INJECTION, SOLUTION INTRAVENOUS
Status: DISCONTINUED | OUTPATIENT
Start: 2019-01-08 | End: 2019-01-12 | Stop reason: HOSPADM

## 2019-01-08 RX ORDER — MEPERIDINE HYDROCHLORIDE 25 MG/ML
12.5 INJECTION INTRAMUSCULAR; INTRAVENOUS; SUBCUTANEOUS
Status: DISCONTINUED | OUTPATIENT
Start: 2019-01-08 | End: 2019-01-08

## 2019-01-08 RX ORDER — FUROSEMIDE 40 MG/1
40 TABLET ORAL NIGHTLY
Status: DISCONTINUED | OUTPATIENT
Start: 2019-01-09 | End: 2019-01-12 | Stop reason: HOSPADM

## 2019-01-08 RX ORDER — ONDANSETRON 2 MG/ML
INJECTION INTRAMUSCULAR; INTRAVENOUS AS NEEDED
Status: DISCONTINUED | OUTPATIENT
Start: 2019-01-08 | End: 2019-01-08 | Stop reason: SURG

## 2019-01-08 RX ORDER — PROMETHAZINE HYDROCHLORIDE 25 MG/1
12.5 TABLET ORAL EVERY 6 HOURS PRN
Status: DISCONTINUED | OUTPATIENT
Start: 2019-01-08 | End: 2019-01-12 | Stop reason: HOSPADM

## 2019-01-08 RX ORDER — OXYCODONE AND ACETAMINOPHEN 7.5; 325 MG/1; MG/1
1 TABLET ORAL ONCE AS NEEDED
Status: DISCONTINUED | OUTPATIENT
Start: 2019-01-08 | End: 2019-01-08

## 2019-01-08 RX ORDER — ATORVASTATIN CALCIUM 20 MG/1
40 TABLET, FILM COATED ORAL DAILY
Status: DISCONTINUED | OUTPATIENT
Start: 2019-01-08 | End: 2019-01-12 | Stop reason: HOSPADM

## 2019-01-08 RX ORDER — LOSARTAN POTASSIUM 50 MG/1
50 TABLET ORAL EVERY MORNING
Status: DISCONTINUED | OUTPATIENT
Start: 2019-01-09 | End: 2019-01-12 | Stop reason: HOSPADM

## 2019-01-08 RX ORDER — FAMOTIDINE 20 MG/1
40 TABLET, FILM COATED ORAL DAILY
Status: DISCONTINUED | OUTPATIENT
Start: 2019-01-08 | End: 2019-01-12 | Stop reason: HOSPADM

## 2019-01-08 RX ORDER — FAMOTIDINE 10 MG/ML
20 INJECTION, SOLUTION INTRAVENOUS ONCE
Status: COMPLETED | OUTPATIENT
Start: 2019-01-08 | End: 2019-01-08

## 2019-01-08 RX ORDER — HYDRALAZINE HYDROCHLORIDE 20 MG/ML
5 INJECTION INTRAMUSCULAR; INTRAVENOUS
Status: DISCONTINUED | OUTPATIENT
Start: 2019-01-08 | End: 2019-01-08

## 2019-01-08 RX ORDER — ACETAMINOPHEN 325 MG/1
650 TABLET ORAL ONCE AS NEEDED
Status: DISCONTINUED | OUTPATIENT
Start: 2019-01-08 | End: 2019-01-08

## 2019-01-08 RX ORDER — DOCUSATE SODIUM 100 MG/1
100 CAPSULE, LIQUID FILLED ORAL 2 TIMES DAILY
Status: DISCONTINUED | OUTPATIENT
Start: 2019-01-08 | End: 2019-01-12 | Stop reason: HOSPADM

## 2019-01-08 RX ORDER — MIDAZOLAM HYDROCHLORIDE 1 MG/ML
2 INJECTION INTRAMUSCULAR; INTRAVENOUS
Status: DISCONTINUED | OUTPATIENT
Start: 2019-01-08 | End: 2019-01-08 | Stop reason: HOSPADM

## 2019-01-08 RX ORDER — FENTANYL CITRATE 50 UG/ML
50 INJECTION, SOLUTION INTRAMUSCULAR; INTRAVENOUS
Status: DISCONTINUED | OUTPATIENT
Start: 2019-01-08 | End: 2019-01-08

## 2019-01-08 RX ORDER — TRANEXAMIC ACID 100 MG/ML
INJECTION, SOLUTION INTRAVENOUS AS NEEDED
Status: DISCONTINUED | OUTPATIENT
Start: 2019-01-08 | End: 2019-01-08 | Stop reason: SURG

## 2019-01-08 RX ORDER — ALBUTEROL SULFATE 2.5 MG/3ML
2.5 SOLUTION RESPIRATORY (INHALATION) ONCE AS NEEDED
Status: DISCONTINUED | OUTPATIENT
Start: 2019-01-08 | End: 2019-01-08

## 2019-01-08 RX ORDER — FENTANYL CITRATE 50 UG/ML
INJECTION, SOLUTION INTRAMUSCULAR; INTRAVENOUS AS NEEDED
Status: DISCONTINUED | OUTPATIENT
Start: 2019-01-08 | End: 2019-01-08 | Stop reason: SURG

## 2019-01-08 RX ORDER — ROCURONIUM BROMIDE 10 MG/ML
INJECTION, SOLUTION INTRAVENOUS AS NEEDED
Status: DISCONTINUED | OUTPATIENT
Start: 2019-01-08 | End: 2019-01-08 | Stop reason: SURG

## 2019-01-08 RX ORDER — NALOXONE HCL 0.4 MG/ML
0.1 VIAL (ML) INJECTION
Status: DISCONTINUED | OUTPATIENT
Start: 2019-01-08 | End: 2019-01-12 | Stop reason: HOSPADM

## 2019-01-08 RX ORDER — LIDOCAINE HYDROCHLORIDE 20 MG/ML
INJECTION, SOLUTION INFILTRATION; PERINEURAL AS NEEDED
Status: DISCONTINUED | OUTPATIENT
Start: 2019-01-08 | End: 2019-01-08 | Stop reason: SURG

## 2019-01-08 RX ORDER — DEXAMETHASONE SODIUM PHOSPHATE 10 MG/ML
INJECTION INTRAMUSCULAR; INTRAVENOUS AS NEEDED
Status: DISCONTINUED | OUTPATIENT
Start: 2019-01-08 | End: 2019-01-08 | Stop reason: SURG

## 2019-01-08 RX ORDER — EPHEDRINE SULFATE 50 MG/ML
5 INJECTION, SOLUTION INTRAVENOUS ONCE AS NEEDED
Status: DISCONTINUED | OUTPATIENT
Start: 2019-01-08 | End: 2019-01-08

## 2019-01-08 RX ORDER — HYDROMORPHONE HCL 110MG/55ML
PATIENT CONTROLLED ANALGESIA SYRINGE INTRAVENOUS AS NEEDED
Status: DISCONTINUED | OUTPATIENT
Start: 2019-01-08 | End: 2019-01-08 | Stop reason: SURG

## 2019-01-08 RX ORDER — OXYCODONE AND ACETAMINOPHEN 10; 325 MG/1; MG/1
1 TABLET ORAL EVERY 4 HOURS PRN
Status: DISCONTINUED | OUTPATIENT
Start: 2019-01-08 | End: 2019-01-12 | Stop reason: HOSPADM

## 2019-01-08 RX ORDER — HYDROMORPHONE HYDROCHLORIDE 1 MG/ML
0.5 INJECTION, SOLUTION INTRAMUSCULAR; INTRAVENOUS; SUBCUTANEOUS
Status: DISCONTINUED | OUTPATIENT
Start: 2019-01-08 | End: 2019-01-12 | Stop reason: HOSPADM

## 2019-01-08 RX ORDER — DIPHENHYDRAMINE HCL 25 MG
25 CAPSULE ORAL
Status: DISCONTINUED | OUTPATIENT
Start: 2019-01-08 | End: 2019-01-08

## 2019-01-08 RX ORDER — GLYCOPYRROLATE 0.2 MG/ML
INJECTION INTRAMUSCULAR; INTRAVENOUS AS NEEDED
Status: DISCONTINUED | OUTPATIENT
Start: 2019-01-08 | End: 2019-01-08 | Stop reason: SURG

## 2019-01-08 RX ORDER — PROMETHAZINE HYDROCHLORIDE 25 MG/ML
12.5 INJECTION, SOLUTION INTRAMUSCULAR; INTRAVENOUS ONCE AS NEEDED
Status: DISCONTINUED | OUTPATIENT
Start: 2019-01-08 | End: 2019-01-08

## 2019-01-08 RX ORDER — BISACODYL 5 MG/1
10 TABLET, DELAYED RELEASE ORAL DAILY PRN
Status: DISCONTINUED | OUTPATIENT
Start: 2019-01-08 | End: 2019-01-12 | Stop reason: HOSPADM

## 2019-01-08 RX ORDER — WOUND DRESSING ADHESIVE - LIQUID
LIQUID MISCELLANEOUS AS NEEDED
Status: DISCONTINUED | OUTPATIENT
Start: 2019-01-08 | End: 2019-01-08 | Stop reason: HOSPADM

## 2019-01-08 RX ORDER — CETIRIZINE HYDROCHLORIDE 10 MG/1
10 TABLET ORAL EVERY MORNING
Status: DISCONTINUED | OUTPATIENT
Start: 2019-01-09 | End: 2019-01-12 | Stop reason: HOSPADM

## 2019-01-08 RX ORDER — ONDANSETRON 2 MG/ML
4 INJECTION INTRAMUSCULAR; INTRAVENOUS EVERY 6 HOURS PRN
Status: DISCONTINUED | OUTPATIENT
Start: 2019-01-08 | End: 2019-01-12 | Stop reason: HOSPADM

## 2019-01-08 RX ORDER — SODIUM CHLORIDE, SODIUM LACTATE, POTASSIUM CHLORIDE, CALCIUM CHLORIDE 600; 310; 30; 20 MG/100ML; MG/100ML; MG/100ML; MG/100ML
100 INJECTION, SOLUTION INTRAVENOUS CONTINUOUS
Status: ACTIVE | OUTPATIENT
Start: 2019-01-08 | End: 2019-01-09

## 2019-01-08 RX ORDER — POTASSIUM CHLORIDE 750 MG/1
10 CAPSULE, EXTENDED RELEASE ORAL DAILY
Status: DISCONTINUED | OUTPATIENT
Start: 2019-01-08 | End: 2019-01-12 | Stop reason: HOSPADM

## 2019-01-08 RX ORDER — LABETALOL HYDROCHLORIDE 5 MG/ML
5 INJECTION, SOLUTION INTRAVENOUS
Status: DISCONTINUED | OUTPATIENT
Start: 2019-01-08 | End: 2019-01-08

## 2019-01-08 RX ORDER — MAGNESIUM HYDROXIDE 1200 MG/15ML
LIQUID ORAL AS NEEDED
Status: DISCONTINUED | OUTPATIENT
Start: 2019-01-08 | End: 2019-01-08 | Stop reason: HOSPADM

## 2019-01-08 RX ORDER — CARVEDILOL 3.12 MG/1
12.5 TABLET ORAL 2 TIMES DAILY WITH MEALS
Status: DISCONTINUED | OUTPATIENT
Start: 2019-01-08 | End: 2019-01-12 | Stop reason: HOSPADM

## 2019-01-08 RX ORDER — NICOTINE POLACRILEX 4 MG
15 LOZENGE BUCCAL
Status: DISCONTINUED | OUTPATIENT
Start: 2019-01-08 | End: 2019-01-12 | Stop reason: HOSPADM

## 2019-01-08 RX ORDER — ONDANSETRON 4 MG/1
4 TABLET, ORALLY DISINTEGRATING ORAL EVERY 6 HOURS PRN
Status: DISCONTINUED | OUTPATIENT
Start: 2019-01-08 | End: 2019-01-12 | Stop reason: HOSPADM

## 2019-01-08 RX ORDER — PROPOFOL 10 MG/ML
VIAL (ML) INTRAVENOUS AS NEEDED
Status: DISCONTINUED | OUTPATIENT
Start: 2019-01-08 | End: 2019-01-08 | Stop reason: SURG

## 2019-01-08 RX ORDER — SODIUM CHLORIDE 0.9 % (FLUSH) 0.9 %
1-10 SYRINGE (ML) INJECTION AS NEEDED
Status: DISCONTINUED | OUTPATIENT
Start: 2019-01-08 | End: 2019-01-08 | Stop reason: HOSPADM

## 2019-01-08 RX ORDER — CEFAZOLIN SODIUM 2 G/100ML
2 INJECTION, SOLUTION INTRAVENOUS ONCE
Status: COMPLETED | OUTPATIENT
Start: 2019-01-08 | End: 2019-01-08

## 2019-01-08 RX ORDER — PANTOPRAZOLE SODIUM 40 MG/1
40 TABLET, DELAYED RELEASE ORAL EVERY MORNING
Status: DISCONTINUED | OUTPATIENT
Start: 2019-01-09 | End: 2019-01-12 | Stop reason: HOSPADM

## 2019-01-08 RX ORDER — FLUMAZENIL 0.1 MG/ML
0.2 INJECTION INTRAVENOUS AS NEEDED
Status: DISCONTINUED | OUTPATIENT
Start: 2019-01-08 | End: 2019-01-08

## 2019-01-08 RX ORDER — NALOXONE HCL 0.4 MG/ML
0.2 VIAL (ML) INJECTION AS NEEDED
Status: DISCONTINUED | OUTPATIENT
Start: 2019-01-08 | End: 2019-01-08

## 2019-01-08 RX ORDER — SODIUM CHLORIDE, SODIUM LACTATE, POTASSIUM CHLORIDE, CALCIUM CHLORIDE 600; 310; 30; 20 MG/100ML; MG/100ML; MG/100ML; MG/100ML
9 INJECTION, SOLUTION INTRAVENOUS CONTINUOUS
Status: DISCONTINUED | OUTPATIENT
Start: 2019-01-08 | End: 2019-01-12 | Stop reason: HOSPADM

## 2019-01-08 RX ORDER — HYDROMORPHONE HYDROCHLORIDE 1 MG/ML
0.5 INJECTION, SOLUTION INTRAMUSCULAR; INTRAVENOUS; SUBCUTANEOUS
Status: DISCONTINUED | OUTPATIENT
Start: 2019-01-08 | End: 2019-01-08

## 2019-01-08 RX ORDER — ONDANSETRON 2 MG/ML
4 INJECTION INTRAMUSCULAR; INTRAVENOUS ONCE AS NEEDED
Status: DISCONTINUED | OUTPATIENT
Start: 2019-01-08 | End: 2019-01-08

## 2019-01-08 RX ORDER — BISACODYL 10 MG
10 SUPPOSITORY, RECTAL RECTAL DAILY PRN
Status: DISCONTINUED | OUTPATIENT
Start: 2019-01-08 | End: 2019-01-12 | Stop reason: HOSPADM

## 2019-01-08 RX ORDER — GABAPENTIN 400 MG/1
800 CAPSULE ORAL 3 TIMES DAILY
Status: DISCONTINUED | OUTPATIENT
Start: 2019-01-08 | End: 2019-01-10

## 2019-01-08 RX ORDER — HYDROCODONE BITARTRATE AND ACETAMINOPHEN 7.5; 325 MG/1; MG/1
1 TABLET ORAL ONCE AS NEEDED
Status: DISCONTINUED | OUTPATIENT
Start: 2019-01-08 | End: 2019-01-08

## 2019-01-08 RX ORDER — PROMETHAZINE HYDROCHLORIDE 25 MG/1
25 TABLET ORAL ONCE AS NEEDED
Status: DISCONTINUED | OUTPATIENT
Start: 2019-01-08 | End: 2019-01-08

## 2019-01-08 RX ADMIN — LIDOCAINE HYDROCHLORIDE 100 MG: 20 INJECTION, SOLUTION INFILTRATION; PERINEURAL at 09:32

## 2019-01-08 RX ADMIN — FAMOTIDINE 40 MG: 20 TABLET, FILM COATED ORAL at 15:36

## 2019-01-08 RX ADMIN — ROCURONIUM BROMIDE 25 MG: 10 INJECTION INTRAVENOUS at 09:39

## 2019-01-08 RX ADMIN — FAMOTIDINE 20 MG: 10 INJECTION, SOLUTION INTRAVENOUS at 09:09

## 2019-01-08 RX ADMIN — DOCUSATE SODIUM 100 MG: 100 CAPSULE, LIQUID FILLED ORAL at 20:28

## 2019-01-08 RX ADMIN — CEFAZOLIN SODIUM 2 G: 2 INJECTION, SOLUTION INTRAVENOUS at 09:29

## 2019-01-08 RX ADMIN — ATORVASTATIN CALCIUM 40 MG: 20 TABLET, FILM COATED ORAL at 20:28

## 2019-01-08 RX ADMIN — SUGAMMADEX 200 MG: 100 INJECTION, SOLUTION INTRAVENOUS at 11:22

## 2019-01-08 RX ADMIN — CEFAZOLIN SODIUM 2 G: 2 INJECTION, SOLUTION INTRAVENOUS at 20:29

## 2019-01-08 RX ADMIN — OXYCODONE HYDROCHLORIDE AND ACETAMINOPHEN 2 TABLET: 10; 325 TABLET ORAL at 20:27

## 2019-01-08 RX ADMIN — POLYETHYLENE GLYCOL 3350 17 G: 17 POWDER, FOR SOLUTION ORAL at 15:41

## 2019-01-08 RX ADMIN — SUCCINYLCHOLINE CHLORIDE 100 MG: 20 INJECTION, SOLUTION INTRAMUSCULAR; INTRAVENOUS; PARENTERAL at 09:32

## 2019-01-08 RX ADMIN — GABAPENTIN 800 MG: 400 CAPSULE ORAL at 15:42

## 2019-01-08 RX ADMIN — CARVEDILOL 12.5 MG: 3.12 TABLET, FILM COATED ORAL at 17:08

## 2019-01-08 RX ADMIN — ROCURONIUM BROMIDE 20 MG: 10 INJECTION INTRAVENOUS at 10:04

## 2019-01-08 RX ADMIN — PROPOFOL 200 MG: 10 INJECTION, EMULSION INTRAVENOUS at 09:32

## 2019-01-08 RX ADMIN — SODIUM CHLORIDE, POTASSIUM CHLORIDE, SODIUM LACTATE AND CALCIUM CHLORIDE 9 ML/HR: 600; 310; 30; 20 INJECTION, SOLUTION INTRAVENOUS at 09:09

## 2019-01-08 RX ADMIN — RIVAROXABAN 10 MG: 10 TABLET, FILM COATED ORAL at 20:28

## 2019-01-08 RX ADMIN — DOCUSATE SODIUM 100 MG: 100 CAPSULE, LIQUID FILLED ORAL at 15:42

## 2019-01-08 RX ADMIN — GABAPENTIN 800 MG: 400 CAPSULE ORAL at 20:27

## 2019-01-08 RX ADMIN — TRANEXAMIC ACID 1000 MG: 100 INJECTION, SOLUTION INTRAVENOUS at 11:12

## 2019-01-08 RX ADMIN — CYCLOBENZAPRINE 10 MG: 10 TABLET, FILM COATED ORAL at 20:28

## 2019-01-08 RX ADMIN — FENTANYL CITRATE 50 MCG: 50 INJECTION INTRAMUSCULAR; INTRAVENOUS at 09:30

## 2019-01-08 RX ADMIN — MUPIROCIN 10 APPLICATION: 20 OINTMENT TOPICAL at 21:23

## 2019-01-08 RX ADMIN — DEXAMETHASONE SODIUM PHOSPHATE 4 MG: 10 INJECTION INTRAMUSCULAR; INTRAVENOUS at 10:30

## 2019-01-08 RX ADMIN — ROCURONIUM BROMIDE 5 MG: 10 INJECTION INTRAVENOUS at 09:32

## 2019-01-08 RX ADMIN — MIDAZOLAM HYDROCHLORIDE 1 MG: 2 INJECTION, SOLUTION INTRAMUSCULAR; INTRAVENOUS at 09:09

## 2019-01-08 RX ADMIN — HYDROMORPHONE HYDROCHLORIDE 1 MG: 2 INJECTION INTRAMUSCULAR; INTRAVENOUS; SUBCUTANEOUS at 10:20

## 2019-01-08 RX ADMIN — OXYCODONE HYDROCHLORIDE AND ACETAMINOPHEN 1 TABLET: 10; 325 TABLET ORAL at 15:47

## 2019-01-08 RX ADMIN — FENTANYL CITRATE 50 MCG: 50 INJECTION INTRAMUSCULAR; INTRAVENOUS at 10:12

## 2019-01-08 RX ADMIN — GLYCOPYRROLATE 0.2 MG: 0.2 INJECTION INTRAMUSCULAR; INTRAVENOUS at 09:30

## 2019-01-08 RX ADMIN — CEFAZOLIN SODIUM 2 G: 2 INJECTION, SOLUTION INTRAVENOUS at 12:30

## 2019-01-08 RX ADMIN — POTASSIUM CHLORIDE 10 MEQ: 750 CAPSULE, EXTENDED RELEASE ORAL at 15:42

## 2019-01-08 RX ADMIN — TRANEXAMIC ACID 1000 MG: 100 INJECTION, SOLUTION INTRAVENOUS at 09:56

## 2019-01-08 RX ADMIN — ONDANSETRON 4 MG: 2 INJECTION INTRAMUSCULAR; INTRAVENOUS at 11:10

## 2019-01-08 NOTE — CONSULTS
Patient Name:  Kian Mcdaniels  YOB: 1955  MRN:  [id number]  Date of Admission:  1/8/2019  Date of Consult:  1/8/2019  Patient Care Team:  Wyatt Harmon MD as PCP - General (Family Medicine)  Shannan Singh APRN (Family Medicine)  Lisa Banerjee PA-C as Physician Assistant (Neurosurgery)  Kian Hoskins MD (Orthopedic Surgery)  Richard Prasad MD as Surgeon (Orthopedic Surgery)  Tim Gutierres MD as Surgeon (Neurosurgery)  Rahat Pepe MD as Consulting Physician (Cardiology)    Inpatient Internal Medicine Consult  Consult performed by: Greg Madrigal MD  Consult ordered by: Kaylee Jorge APRN  Reason for consult: Evaluate status and make recommendations regarding treatment for diabetes and hypertension.        Subjective   History of Present Illness  Mr. Mcdaniels is a 63 y.o. male that has been admitted to [facility] following elective RIGHT TOTAL KNEE ARTHROPLASTY WITH NEGRITA NAVIGATION. He has been admitted to an orthopedic floor following surgery and we were asked to see and assist with his medical problems, specifically relating to his diabetes and HTN. At the time of my visit he denies any chest pain, SOA, nausea, vomiting or diarrhea. He has tolerated a diet. He does complain of expected postoperative discomfort. He reports being in a normal state of health leading up to surgery.    His diabetes is managed by an endocrinologist. He was instructed to return his insulin pump to 80% day of surgery and resume 100% tomorrow. He also takes a long-acting basal insulin each morning and is supposed to be on Jardiance but cannot take due to insurance reasons.    He was seen and cleared for surgery by his cardiologist. He reports they discussed coming off antiplatelet therapy and that it was okay with his cardiologist to do so. He has history of remote bypass surgery but no cardiac stents. No recent cardiac issues.    Past Medical History:    Diagnosis Date   • Arteriosclerotic cardiovascular disease    • Arthritis    • At risk for sleep apnea    • COPD (chronic obstructive pulmonary disease) (CMS/HCC)    • Coronary artery disease    • Diabetes mellitus (CMS/HCC)    • Diabetic peripheral neuropathy (CMS/HCC)    • Disease of thyroid gland     NODULE PRESENT   • ED (erectile dysfunction) of non-organic origin    • GERD (gastroesophageal reflux disease)    • Headache disorder     DUE TO NECK   • Hyperlipidemia    • Hypertension    • Insulin pump in place    • Knee pain, bilateral    • Low back pain    • Myocardial infarction (CMS/HCC)    • Neck pain    • Spinal stenosis    • TIA (transient ischemic attack)     LEFT EYE   • Type 2 diabetes mellitus (CMS/HCC)    • Upper back pain    • Wears dentures     UPPER PLATE     Past Surgical History:   Procedure Laterality Date   • AMPUTATION FOOT / TOE Left     GREAT TOE   • BACK SURGERY  10/26/2015    Bilateral L4-L5 laminectomy -Dr. Gutierres   • CARDIAC CATHETERIZATION     • CARDIAC SURGERY      CABG X 6    • CHOLECYSTECTOMY     • CORONARY ARTERY BYPASS GRAFT      X 6   • EPIDURAL BLOCK     • EYE SURGERY      CATARACT EXTRACTION   • HAND SURGERY  04/28/2016   • LUMBAR DISCECTOMY FUSION INSTRUMENTATION N/A 12/12/2016    Procedure: L 4-5 FUSION WITH INSTRUMENTATION WITH BMP, WITH REMOVAL OF INSTRUMENTATION ;  Surgeon: Rowdy Spencer MD;  Location: [facility] OR;  Service:    • LUMBAR LAMINECTOMY DISCECTOMY DECOMPRESSION N/A 12/12/2016    Procedure: L4-5 REPEAT LAMINECTOMY ;  Surgeon: Tim Gutierres MD;  Location: [facility] OR;  Service:    • LUMBAR LAMINECTOMY DISCECTOMY DECOMPRESSION N/A 12/14/2016    Procedure: EVACUATION OF LUMBAR HEMATOMA;  Surgeon: Rowdy Spencer MD;  Location: [facility] OR;  Service:    • MULTIPLE TOOTH EXTRACTIONS      UPPER TEETH EXTRACTED   • ORTHOPEDIC SURGERY     • PENIS SURGERY      RECONSTRUCTION   • SCROTUM EXPLORATION      scrotum surgery   • SPINAL CORD STIMULATOR IMPLANT  N/A 2018    Procedure: SPINAL CORD STIMULATOR Phase 1 - DataCert;  Surgeon: Mulugeta Guzman MD;  Location: [facility] OR;  Service: Pain Management   • SPINE SURGERY       Family History   Problem Relation Age of Onset   • Diabetes Other    • Heart disease Other    • Hypertension Other    • Kidney disease Other         renal failure   • Heart disease Maternal Grandmother    • Hypertension Maternal Grandmother    • Malig Hyperthermia Neg Hx      Social History     Tobacco Use   • Smoking status: Former Smoker     Packs/day: 1.00     Years: 25.00     Pack years: 25.00     Types: Cigarettes     Start date:      Last attempt to quit: 2000     Years since quittin.0   • Smokeless tobacco: Never Used   Substance Use Topics   • Alcohol use: No     Comment: no alcohol since    • Drug use: No     Medications Prior to Admission   Medication Sig Dispense Refill Last Dose   • albuterol (VENTOLIN HFA) 108 (90 BASE) MCG/ACT inhaler Inhale 1-2 puffs Every 6 (Six) Hours As Needed for wheezing (RESCUE INHALER).   2019 at 0645   • amLODIPine (NORVASC) 10 MG tablet Take 10 mg by mouth Every Morning.   2019 at 0645   • Ascorbic Acid (VITAMIN C PO) Take 1,000 mg by mouth Daily.   2019   • atorvastatin (LIPITOR) 40 MG tablet Take 40 mg by mouth Daily.  4 2019 at 2300   • Blood Glucose Monitoring Suppl (FREESTYLE FREEDOM LITE) W/DEVICE kit    2019 at 0823   • carvedilol (COREG) 12.5 MG tablet Take 12.5 mg by mouth 2 (Two) Times a Day With Meals.   2019 at 0645   • cetirizine (ZyrTEC) 10 MG tablet Take 10 mg by mouth Every Morning.   2019 at 0730   • clopidogrel (PLAVIX) 75 MG tablet Take 75 mg by mouth Every Morning. HOLDING FOR SURGERY   2019   • cyclobenzaprine (FLEXERIL) 10 MG tablet Take 1 tablet by mouth 2 (Two) Times a Day As Needed for Muscle Spasms. 60 tablet 2 2019 at 2300   • diclofenac (VOLTAREN) 1 % gel gel Apply 4 g topically 4 (Four) Times a Day. 3 tube 11  12/4/2018   • docusate sodium (COLACE) 100 MG capsule Take 100 mg by mouth 2 (Two) Times a Day.   1/7/2019 at 2300   • furosemide (LASIX) 40 MG tablet Take 40 mg by mouth Every Night.   1/7/2019 at 2000   • gabapentin (NEURONTIN) 400 MG capsule Take 800 mg by mouth 3 (Three) Times a Day.   1/7/2019 at 2300   • glucose blood (FREESTYLE LITE) test strip Test 4 times per day   1/8/2019   • HUMULIN R 500 UNIT/ML CONCENTRATED injection Inject 500 Units under the skin Continuous. Via insulin pump basal rate 0.68units/hr from 0000 to 1000  1000 to 1600 is 0.7units/hr  1600 to 0000 0.75units/hr  Plus sliding scale based on carb intake   1/8/2019 at 0827   • insulin degludec (TRESIBA FLEXTOUCH) 100 UNIT/ML solution pen-injector injection Inject 45 Units under the skin into the appropriate area as directed Every Morning.   1/7/2019 at 0730   • Insulin Infusion Pump (T:SLIM INSULIN PUMP) device    1/8/2019 at 0827   • isosorbide mononitrate (IMDUR) 60 MG 24 hr tablet Take 60 mg by mouth Every Morning.   1/8/2019 at 0645   • Lancets (FREESTYLE) lancets Test 4 times per day   1/8/2019 at Unknown time   • losartan (COZAAR) 50 MG tablet Take 50 mg by mouth Every Morning.   1/7/2019 at 0730   • Multiple Vitamin tablet Take 1 tablet by mouth Daily. HOLD FOR SURGERY   1/1/2019   • NON FORMULARY Compound cream - LIDOCAINE, GABAPENTIN, DICLOFENAC   1/5/2019   • oxyCODONE (ROXICODONE) 10 MG tablet Take 1 tablet by mouth Every 6 (Six) Hours As Needed for Severe Pain . 120 tablet 0 1/8/2019 at 0645   • pantoprazole (PROTONIX) 40 MG EC tablet Take 40 mg by mouth Every Morning.   1/8/2019 at 0645   • potassium chloride (K-DUR) 10 MEQ CR tablet Take 10 mEq by mouth Daily.   1/7/2019 at 0730   • raNITIdine (ZANTAC) 300 MG tablet Take 300 mg by mouth Every Night.   1/7/2019 at 2300   • TESTOSTERONE CYPIONATE IM Inject 1 mL into the appropriate muscle as directed by prescriber Every 14 (Fourteen) Days. 200mg/ml   12/25/2018   • vitamin D  (ERGOCALCIFEROL) 79411 UNITS capsule capsule Take 50,000 Units by mouth Every 7 (Seven) Days. thursdays 12/27/2018   • aspirin 81 MG EC tablet Take 1 tablet by mouth Every Morning. (Patient taking differently: Take 81 mg by mouth Every Morning. HOLD FOR SURGERY)   1/1/2019   • NARCAN 4 MG/0.1ML nasal spray   0 Taking     Allergies:  Erythromycin; Lisinopril; and Metformin    Review of Systems   Constitutional: Negative.    HENT: Negative.    Eyes: Negative.    Respiratory: Negative.    Gastrointestinal: Positive for nausea.   Endocrine: Negative.    Genitourinary: Negative.    Musculoskeletal: Negative.    Skin: Negative.    Neurological: Negative.    Hematological: Negative.    Psychiatric/Behavioral: Negative.        Objective      Vital Signs  Temp:  [97.4 °F (36.3 °C)-98.7 °F (37.1 °C)] 97.4 °F (36.3 °C)  Heart Rate:  [75-85] 83  Resp:  [14-16] 16  BP: (109-177)/(74-98) 142/89  Body mass index is 38.16 kg/m².    Physical Exam   Constitutional: He is oriented to person, place, and time. He appears well-developed and well-nourished.   HENT:   Head: Normocephalic and atraumatic.   Eyes: Conjunctivae and EOM are normal. No scleral icterus.   Neck: Normal range of motion. Neck supple. No JVD present.   Cardiovascular: Normal rate and regular rhythm.   No murmur heard.  Pulmonary/Chest: Effort normal and breath sounds normal. No respiratory distress.   Abdominal: Soft. Bowel sounds are normal. He exhibits no distension. There is no tenderness.   Musculoskeletal: He exhibits no edema.   Neurological: He is alert and oriented to person, place, and time. No cranial nerve deficit.   Skin: Skin is warm and dry.   Psychiatric: He has a normal mood and affect. His behavior is normal.   Vitals reviewed.      Results Review:   I reviewed the patient's new clinical results.  I reviewed the patient's new imaging results and agree with the interpretation.  I reviewed the patient's other test results and agree with the  interpretation  I personally viewed and interpreted the patient's EKG/Telemetry data      Assessment/Plan     Active Hospital Problems    Diagnosis Date Noted   • **Arthritis of right knee [M17.11] 11/29/2017   • Hypertension [I10] 01/08/2019   • Bilateral chronic knee pain [M25.561, M25.562, G89.29] 11/06/2018   • Severe obesity (BMI 35.0-39.9) [E66.01] 03/08/2018   • Type 2 diabetes mellitus with neurologic complication, with long-term current use of insulin (CMS/[facility]) [E11.49, Z79.4] 12/12/2016   • Diabetic neuropathy (CMS/[facility]) [E11.40] 02/11/2016      Resolved Hospital Problems   No resolved problems to display.       Mr. Mcdaniels is a 63 y.o. male who is POD#0 RIGHT TOTAL KNEE ARTHROPLASTY WITH NEGRITA NAVIGATION.    · He seems to be doing well thus far postoperatively.   · Given his use of an insulin pump is diabetic management would probably be better done by endocrinology. We will place consult.  · In meantime he is monitoring his blood glucose and adjusting his insulin pump accordingly. Will defer to endocrinology whether to restart Tresiba. He did bring his own from home.  · Monitor glucose QAC and QHS.     · NCS diet.  · Holding parameters have been written for Norvasc.   · Continue IVFs as ordered.    · Monitor renal function.  · Xarelto has been ordered for DVT prophylaxis per surgery.  · He was encouraged to use incentive spirometer as instructed.      Thank you very much for asking [facility] to be involved in this patient's care. We will follow along with you.      Greg Madrigal MD  [facility]  01/08/19  6:16 PM

## 2019-01-08 NOTE — ANESTHESIA PROCEDURE NOTES
ANESTHESIA INTUBATION  Urgency: elective    Airway not difficult    General Information and Staff    Patient location during procedure: OR  Anesthesiologist: Randall Ontiveros MD  CRNA: Estevan Piper CRNA    Indications and Patient Condition  Indications for airway management: airway protection    Preoxygenated: yes  MILS maintained throughout  Mask difficulty assessment: 1 - vent by mask    Final Airway Details  Final airway type: endotracheal airway      Successful airway: ETT  Cuffed: yes   Successful intubation technique: direct laryngoscopy  Endotracheal tube insertion site: oral  Blade: Gertrudis  Blade size: 3  ETT size (mm): 8.0  Cormack-Lehane Classification: grade I - full view of glottis  Placement verified by: chest auscultation and capnometry   Inital cuff pressure (cm H2O): 22  Cuff volume (mL): 7  Measured from: lips  ETT to lips (cm): 24  Number of attempts at approach: 1

## 2019-01-08 NOTE — PLAN OF CARE
Problem: Patient Care Overview  Goal: Plan of Care Review  Outcome: Ongoing (interventions implemented as appropriate)   01/08/19 9448   OTHER   Outcome Summary Pain well controlled with PO pain medication. Ambulates with assist x1 and walker. DTV at 1930. DC plan pending. VSS. Educated about importance of BP monitoring.    Coping/Psychosocial   Plan of Care Reviewed With patient   Plan of Care Review   Progress improving     Goal: Individualization and Mutuality  Outcome: Ongoing (interventions implemented as appropriate)    Goal: Discharge Needs Assessment  Outcome: Ongoing (interventions implemented as appropriate)    Goal: Interprofessional Rounds/Family Conf  Outcome: Ongoing (interventions implemented as appropriate)      Problem: Fall Risk (Adult)  Goal: Identify Related Risk Factors and Signs and Symptoms  Outcome: Outcome(s) achieved Date Met: 01/08/19    Goal: Absence of Fall  Outcome: Ongoing (interventions implemented as appropriate)      Problem: Knee Arthroplasty (Total, Partial) (Adult)  Goal: Signs and Symptoms of Listed Potential Problems Will be Absent, Minimized or Managed (Knee Arthroplasty)  Outcome: Ongoing (interventions implemented as appropriate)    Goal: Anesthesia/Sedation Recovery  Outcome: Ongoing (interventions implemented as appropriate)

## 2019-01-08 NOTE — OP NOTE
Operative Note    Name: Kian Mcdaniels  YOB: 1955  MRN: [id number]  BMI: Body mass index is 38.16 kg/m².    DATE OF SURGERY: 1/8/2019    PREOPERATIVE DIAGNOSIS: right knee end-stage osteoarthritis    POSTOPERATIVE DIAGNOSIS: right knee end-stage osteoarthritis    PROCEDURE PERFORMED: right total knee replacement with Grace navigation    SURGEON: Dr. Andres Carter    ASSISTANT: KATHLEEN Stewart    IMPLANTS:   Implant Name Type Inv. Item Serial No.  Lot No. LRB No. Used   CMT BONE ENDURANCE SMARTSET 40GM - [id number] Implant CMT BONE ENDURANCE SMARTSET 40GM  DEPUY [id number] Right 2   INSRT TIB SIGMA CRSLNK CRV PLS 5 10 - [id number] Implant INSRT TIB SIGMA CRSLNK CRV PLS 5 10  DEPUY [id number] Right 1   TRY TIB MOD SIGMA COCR SZ5 - [id number] Implant TRY TIB MOD SIGMA COCR SZ5  DEPUY [id number] Right 1   COMP FEM SIGMA NONPOR CR SZ5 RT - [id number] Implant COMP FEM SIGMA NONPOR CR SZ5 RT  DEPUY [id number] Right 1   PAT SIGMA 3PEG OVL 41MM LG - [id number] Implant PAT SIGMA 3PEG OVL 41MM LG  DEPUY [id number] Right 1        Estimated Blood Loss: 100cc  Specimens : none  Complications: none  22 Modifier:  increased BMI of 38 requiring increased time, effort, and physical exertion of the surgeon and the assistant plus anthropomorphic features that required 30% more time than typical  2  DESCRIPTION OF PROCEDURE: The patient was taken to the operating room and placed in the supine position. Preoperative antibiotics were administered. Surgical time out was performed. After adequate induction of anesthesia, the leg was prepped and draped in the usual sterile fashion.  Surgical time out was performed. The leg was exsanguinated with an Esmarch bandage and the tourniquet inflated to 250 mmHg. A midline incision was performed followed by a medial parapatellar arthrotomy. The patella was subluxed laterally.  A portion of the fat pad, ACL, and anterior horns of the meniscus were excised.  The GotaCopy  navigation device was affixed and navigated. The distal cut was made. The femur was then sized with a sizing guide. The femoral cutting block was placed and all femoral cuts were performed. The proximal tibia was exposed. Grace navigation protocol was accomplished.  11 millimeters were removed.  We used the extramedullary tibial cutting guide set for removal of 3mm of bone off the low side. The tibial cut was performed. The posterior horns of the menisci were excised. The posterior osteophytes were removed. Flexion extension blocks were then used to balance the knee. The tibial cut surface was then sized with the sizing templates and the tibial and femoral trial were then placed. The tray was aligned with the middle third of the tubercle.    Attention was then placed to the patella. The patella was balanced to track centrally through range of motion.  It measured 27 and patella resurfacing was performed.   At this point all trial components were removed, the knee was copiously irrigated with pulsed lavage, and the knee was injected with anesthetic cocktail solution. The cut surfaces were then dried with clean lap sponges, and the components were cemented tibia, followed by femur. The knee was held in full extension and all excess cement was removed. The knee was held still until the cement had completely hardened. We then placed the trial polyethylene spacer which resulted in full extension and excellent flexion-extension balance. We placed the final polyethylene spacer.   The knee was then copiously irrigated with the full 3 liters. The tourniquet was then released. There was excellent hemostasis. We closed the knee in multiple layers in standard fashion. Sterile dressing were applied. At the end of the case, the sponge and needle counts were reported as being correct. There were no known complications. The patient was then transported to the recovery room.    Surgical assistant performed retraction, suction,  hemostasis, and specific limb positioning and manipulation required for accuracy of joint placement, and assistance in wound closure and dressing application.       Andres Carter M.D.

## 2019-01-08 NOTE — ANESTHESIA PREPROCEDURE EVALUATION
Anesthesia Evaluation     NPO Solid Status: > 8 hours             Airway   Mallampati: II  TM distance: >3 FB  Neck ROM: full  No difficulty expected  Dental      Comment: No upper teeth    Pulmonary - normal exam   (+) COPD,   Cardiovascular - normal exam    (+) hypertension, past MI , CAD, CABG, hyperlipidemia,       Neuro/Psych  (+) headaches, numbness,     GI/Hepatic/Renal/Endo    (+) morbid obesity, GERD,  diabetes mellitus using insulin,     Musculoskeletal     (+) neck pain,   Abdominal    Substance History      OB/GYN          Other   (+) arthritis                   Anesthesia Plan    ASA 3     general     intravenous induction   Anesthetic plan, all risks, benefits, and alternatives have been provided, discussed and informed consent has been obtained with: patient.

## 2019-01-08 NOTE — THERAPY EVALUATION
Acute Care - Physical Therapy Initial Evaluation  [facility]     Patient Name: Kian Mcdaniels  : 1955  MRN: [id number]  Today's Date: 2019   Onset of Illness/Injury or Date of Surgery: 19  Date of Referral to PT: 19  Referring Physician: Dr. Carter      Admit Date: 2019    Visit Dx:     ICD-10-CM ICD-9-CM   1. Status post total right knee replacement Z96.651 V43.65   2. Arthritis of right knee M17.11 716.96     Patient Active Problem List   Diagnosis   • Bulging lumbar disc   • Chronic pain   • Diabetic neuropathy (CMS/HCC)   • Low back pain   • Degenerative arthritis of lumbar spine   • Neuropathy involving both lower extremities   • Encounter for long-term (current) use of high-risk medication   • Postlaminectomy syndrome of lumbar region   • Syringomyelia and syringobulbia (CMS/HCC)   • Lumbar pseudoarthrosis   • DM2 (diabetes mellitus, type 2) (CMS/HCC)   • Insulin pump in place   • Hx of decompressive lumbar laminectomy   • Bilateral carpal tunnel syndrome   • Degenerative cervical disc   • Acute pain of right knee   • Primary localized osteoarthrosis of right lower leg   • Arthritis of right knee   • Sacroiliac inflammation (CMS/HCC)   • Sacroiliac joint dysfunction   • Severe obesity (BMI 35.0-39.9)   • Chronic pain of right knee   • Tricompartment osteoarthritis of left knee   • Tricompartment osteoarthritis of right knee   • Arthritis of left knee   • Bilateral chronic knee pain     Past Medical History:   Diagnosis Date   • Arteriosclerotic cardiovascular disease    • Arthritis    • At risk for sleep apnea    • COPD (chronic obstructive pulmonary disease) (CMS/HCC)    • Coronary artery disease    • Diabetes mellitus (CMS/HCC)    • Diabetic peripheral neuropathy (CMS/HCC)    • Disease of thyroid gland     NODULE PRESENT   • ED (erectile dysfunction) of non-organic origin    • GERD (gastroesophageal reflux disease)    • Headache disorder     DUE TO NECK   • Hyperlipidemia     • Hypertension    • Insulin pump in place    • Knee pain, bilateral    • Low back pain    • Myocardial infarction (CMS/HCC)    • Neck pain    • Spinal stenosis    • TIA (transient ischemic attack)     LEFT EYE   • Type 2 diabetes mellitus (CMS/HCC)    • Upper back pain    • Wears dentures     UPPER PLATE     Past Surgical History:   Procedure Laterality Date   • AMPUTATION FOOT / TOE Left     GREAT TOE   • BACK SURGERY  10/26/2015    Bilateral L4-L5 laminectomy -Dr. Gutierres   • CARDIAC CATHETERIZATION     • CARDIAC SURGERY      CABG X 6    • CHOLECYSTECTOMY     • CORONARY ARTERY BYPASS GRAFT      X 6   • EPIDURAL BLOCK     • EYE SURGERY      CATARACT EXTRACTION   • HAND SURGERY  04/28/2016   • LUMBAR DISCECTOMY FUSION INSTRUMENTATION N/A 12/12/2016    Procedure: L 4-5 FUSION WITH INSTRUMENTATION WITH BMP, WITH REMOVAL OF INSTRUMENTATION ;  Surgeon: Rowdy Spencer MD;  Location: [facility] OR;  Service:    • LUMBAR LAMINECTOMY DISCECTOMY DECOMPRESSION N/A 12/12/2016    Procedure: L4-5 REPEAT LAMINECTOMY ;  Surgeon: Tim Gutierres MD;  Location: [facility] OR;  Service:    • LUMBAR LAMINECTOMY DISCECTOMY DECOMPRESSION N/A 12/14/2016    Procedure: EVACUATION OF LUMBAR HEMATOMA;  Surgeon: Rowdy Spencer MD;  Location: [facility] OR;  Service:    • MULTIPLE TOOTH EXTRACTIONS      UPPER TEETH EXTRACTED   • ORTHOPEDIC SURGERY     • PENIS SURGERY      RECONSTRUCTION   • SCROTUM EXPLORATION      scrotum surgery   • SPINAL CORD STIMULATOR IMPLANT N/A 9/24/2018    Procedure: SPINAL CORD STIMULATOR Phase 1 - [employer];  Surgeon: Mulugeta Guzman MD;  Location: [facility];  Service: Pain Management   • SPINE SURGERY          PT ASSESSMENT (last 12 hours)      Physical Therapy Evaluation     Row Name 01/08/19 9371          PT Evaluation Time/Intention    Subjective Information  complains of;pain  -MA     Document Type  evaluation  -MA     Mode of Treatment  physical therapy  -MA     Patient Effort  adequate   -MA     Symptoms Noted During/After Treatment  fatigue;increased pain  -MA     Row Name 01/08/19 1617          General Information    Patient Profile Reviewed?  yes  -MA     Onset of Illness/Injury or Date of Surgery  01/08/19  -MA     Referring Physician  Dr. Carter  -MA     Patient Observations  alert;cooperative;agree to therapy  -MA     Patient/Family Observations  Supine in bed with HOB elevated, NAD, SCD applied, O2 donned  -MA     Prior Level of Function  independent:;all household mobility  -MA     Equipment Currently Used at Home  walker, rolling  -MA     Pertinent History of Current Functional Problem  POD0 R TKA  -MA     Existing Precautions/Restrictions  fall  -MA     Limitations/Impairments  safety/cognitive  -MA     Risks Reviewed  patient:  -MA     Benefits Reviewed  patient:  -MA     Barriers to Rehab  medically complex  -MA     Row Name 01/08/19 1617          Resource/Environmental Concerns    Current Living Arrangements  home/apartment/condo  -MA     Row Name 01/08/19 1617          Cognitive Assessment/Intervention- PT/OT    Orientation Status (Cognition)  oriented x 4  -MA     Follows Commands (Cognition)  WFL  -MA     Safety Deficit (Cognitive)  mild deficit  -MA     Personal Safety Interventions  fall prevention program maintained;gait belt;nonskid shoes/slippers when out of bed  -MA     Row Name 01/08/19 1617          Safety Issues, Functional Mobility    Impairments Affecting Function (Mobility)  pain;strength;range of motion (ROM);endurance/activity tolerance  -MA     Row Name 01/08/19 1617          Mobility Assessment/Treatment    Extremity Weight-bearing Status  right lower extremity  -MA     Right Lower Extremity (Weight-bearing Status)  weight-bearing as tolerated (WBAT)  -MA     Row Name 01/08/19 1617          Bed Mobility Assessment/Treatment    Bed Mobility Assessment/Treatment  supine-sit;sit-supine  -MA     Supine-Sit [place] (Bed Mobility)  moderate assist (50% patient  effort);verbal cues  -MA     Sit-Supine [place] (Bed Mobility)  not tested  -MA     Bed Mobility, Safety Issues  decreased use of legs for bridging/pushing;impaired trunk control for bed mobility  -MA     Assistive Device (Bed Mobility)  bed rails;head of bed elevated  -MA     Row Name 01/08/19 1617          Transfer Assessment/Treatment    Transfer Assessment/Treatment  sit-stand transfer;stand-sit transfer  -MA     Sit-Stand [place] (Transfers)  moderate assist (50% patient effort);verbal cues  -MA     Stand-Sit [place] (Transfers)  minimum assist (75% patient effort);verbal cues  -MA     Row Name 01/08/19 1617          Sit-Stand Transfer    Assistive Device (Sit-Stand Transfers)  walker, front-wheeled  -MA     Row Name 01/08/19 1617          Stand-Sit Transfer    Assistive Device (Stand-Sit Transfers)  walker, front-wheeled  -MA     Row Name 01/08/19 1617          Gait/Stairs Assessment/Training    Gait/Stairs Assessment/Training  gait/ambulation independence  -MA     [place] Level (Gait)  minimum assist (75% patient effort);verbal cues  -MA     Assistive Device (Gait)  walker, front-wheeled  -MA     Distance in Feet (Gait)  5  -MA     Pattern (Gait)  step-to  -MA     Deviations/Abnormal Patterns (Gait)  antalgic  -MA     Comment (Gait/Stairs)  Sequencing cues. Limited 2/2 incr pain  -MA     Row Name 01/08/19 1617          General ROM    GENERAL ROM COMMENTS  R LE limited 2/2 pain  -MA     Row Name 01/08/19 1617          MMT (Manual Muscle Testing)    General MMT Comments  R LE post op weakness  -MA     Row Name 01/08/19 1617          Motor Assessment/Intervention    Additional Documentation  Therapeutic Exercise (Group);Therapeutic Exercise Interventions (Group)  -MA     Row Name 01/08/19 1617          Therapeutic Exercise    Comment (Therapeutic Exercise)  R TKA protocol x 10 reps  -MA     Row Name 01/08/19 1617          Sensory Assessment/Intervention    Sensory General Assessment  no  sensation deficits identified  -MA     Row Name 01/08/19 1617          Vision Assessment/Intervention    Visual Impairment/Limitations  WFL  -MA     Row Name 01/08/19 1617          Pain Assessment    Additional Documentation  Pain Scale: FACES Pre/Post-Treatment (Group);Pain Scale: Numbers Pre/Post-Treatment (Group)  -MA     Row Name 01/08/19 1617          Pain Scale: Numbers Pre/Post-Treatment    Pain Scale: Numbers, Post-Treatment  5/10  -MA     Pain Location - Side  Right  -MA     Pain Location - Orientation  incisional  -MA     Pain Location  knee  -MA     Pain Intervention(s)  Cold applied;Repositioned;Ambulation/increased activity;Rest  -MA     Row Name             Wound 01/08/19 1023 Right knee incision    Wound - Properties Group Date first assessed: 01/08/19  -HH Time first assessed: 1023  -HH Side: Right  -HH Location: knee  -HH Type: incision  -HH    Row Name 01/08/19 1617          Plan of Care Review    Plan of Care Reviewed With  patient  -[facility] Name 01/08/19 1617          Physical Therapy Clinical Impression    Date of Referral to PT  01/08/19  -MA     PT Diagnosis (PT Clinical Impression)  impaired functional mobility 2/2 post op  -MA     Patient/Family Goals Statement (PT Clinical Impression)  Voiced SNF interest  -MA     Criteria for Skilled Interventions Met (PT Clinical Impression)  yes;treatment indicated  -MA     Pathology/Pathophysiology Noted (Describe Specifically for Each System)  musculoskeletal  -MA     Impairments Found (describe specific impairments)  aerobic capacity/endurance;ergonomics and body mechanics;gait, locomotion, and balance;ROM  -MA     Rehab Potential (PT Clinical Summary)  good, to achieve stated therapy goals  -MA     Care Plan Review (PT)  patient/other agree to care plan  -MA     Row Name 01/08/19 1617          Vital Signs    O2 Delivery Pre Treatment  room air  -MA     Row Name 01/08/19 1617          Physical Therapy Goals    Bed Mobility Goal Selection (PT)  bed  mobility, PT goal 1  -MA     Transfer Goal Selection (PT)  transfer, PT goal 1  -MA     Gait Training Goal Selection (PT)  gait training, PT goal 1  -MA     ROM Goal Selection (PT)  ROM, PT goal 1  -MA     Additional Documentation  ROM Goal Selection (PT) (Row)  -MA     Row Name 01/08/19 1617          Bed Mobility Goal 1 (PT)    Activity/Assistive Device (Bed Mobility Goal 1, PT)  bed mobility activities, all  -MA     [place] Level/Cues Needed (Bed Mobility Goal 1, PT)  supervision required  -MA     Time Frame (Bed Mobility Goal 1, PT)  1 week  -MA     Progress/Outcomes (Bed Mobility Goal 1, PT)  goal ongoing  -MA     Row Name 01/08/19 1617          Transfer Goal 1 (PT)    Activity/Assistive Device (Transfer Goal 1, PT)  transfers, all;walker, rolling  -MA     [place] Level/Cues Needed (Transfer Goal 1, PT)  supervision required  -MA     Time Frame (Transfer Goal 1, PT)  1 week  -MA     Progress/Outcome (Transfer Goal 1, PT)  goal ongoing  -MA     Row Name 01/08/19 1617          Gait Training Goal 1 (PT)    Activity/Assistive Device (Gait Training Goal 1, PT)  gait (walking locomotion);walker, rolling  -MA     [place] Level (Gait Training Goal 1, PT)  supervision required  -MA     Distance (Gait Goal 1, PT)  150  -MA     Time Frame (Gait Training Goal 1, PT)  1 week  -MA     Progress/Outcome (Gait Training Goal 1, PT)  goal ongoing  -MA     Row Name 01/08/19 1617          ROM Goal 1 (PT)    ROM Goal 1 (PT)  R knee AROM 5-90'  -MA     Time Frame (ROM Goal 1, PT)  1 week  -MA     Progress/Outcome (ROM Goal 1, PT)  goal ongoing  -MA     Row Name 01/08/19 1617          Positioning and Restraints    Pre-Treatment Position  in bed  -MA     Post Treatment Position  chair  -MA     In Chair  notified nsg;sitting;call light within reach;encouraged to call for assist;legs elevated ice pack applied, O2 donned  -MA     Row Name 01/08/19 1617          Living Environment    Home Accessibility  stairs to enter home   -MA       User Key  (r) = Recorded By, (t) = Taken By, (c) = Cosigned By    Initials Name Provider Type     Destiny Bowen, RN Registered Nurse    Jovita Bobby, PT Physical Therapist        Physical Therapy Education     Title: PT OT SLP Therapies (Done)     Topic: Physical Therapy (Done)     Point: Mobility training (Done)     Learning Progress Summary           Patient Acceptance, E, VU by MA at 1/8/2019  4:22 PM                   Point: Home exercise program (Done)     Learning Progress Summary           Patient Acceptance, E, VU by MA at 1/8/2019  4:22 PM                   Point: Body mechanics (Done)     Learning Progress Summary           Patient Acceptance, E, VU by MA at 1/8/2019  4:22 PM                   Point: Precautions (Done)     Learning Progress Summary           Patient Acceptance, E, VU by MA at 1/8/2019  4:22 PM                               User Key     Initials Effective Dates Name Provider Type Court KAY 04/03/18 -  Jovita Flores, PT Physical Therapist PT              PT Recommendation and Plan  Anticipated Discharge Disposition (PT): skilled nursing facility, home with home health  Planned Therapy Interventions (PT Eval): balance training, bed mobility training, gait training, home exercise program, patient/family education, postural re-education, ROM (range of motion), stair training, strengthening, transfer training  Therapy Frequency (PT Clinical Impression): 2 times/day  Outcome Summary/Treatment Plan (PT)  Anticipated Discharge Disposition (PT): skilled nursing facility, home with home health  Plan of Care Reviewed With: patient  Outcome Summary: Patient is a pleasant 63 y.o. male POD0 R TKA with expected post op weakness and impaired functional mobility. Patient is independent at baseline and lives at home with spouse- voiced planning on SNF due to PMHx. Today, patient performed bed mobility with modA, required Zonia for transfers, and ambulated 5' min-modA  with a RW. Patient will benefit from skilled PT services acutely to address functional deficits as well as improve level of independence prior to DC.  Outcome Measures     Row Name 01/08/19 1600             How much help from another person do you currently need...    Turning from your back to your side while in flat bed without using bedrails?  2  -MA      Moving from lying on back to sitting on the side of a flat bed without bedrails?  2  -MA      Moving to and from a bed to a chair (including a wheelchair)?  3  -MA      Standing up from a chair using your arms (e.g., wheelchair, bedside chair)?  3  -MA      Climbing 3-5 steps with a railing?  1  -MA      To walk in hospital room?  1  -MA      AM-PAC 6 Clicks Score  12  -MA         Functional Assessment    Outcome Measure Options  AM-PAC 6 Clicks Basic Mobility (PT)  -MA        User Key  (r) = Recorded By, (t) = Taken By, (c) = Cosigned By    Initials Name Provider Type    Jovita Bobby, PT Physical Therapist         Time Calculation:   PT Charges     Row Name 01/08/19 1616             Time Calculation    Start Time  1556  -MA      Stop Time  1616  -MA      Time Calculation (min)  20 min  -MA      PT Received On  01/08/19  -MA      PT - Next Appointment  01/09/19  -MA      PT Goal Re-Cert Due Date  01/15/19  -MA        User Key  (r) = Recorded By, (t) = Taken By, (c) = Cosigned By    Initials Name Provider Type    Jovita Bobby, PT Physical Therapist        Therapy Suggested Charges     Code   Minutes Charges    None           Therapy Charges for Today     Code Description Service Date Service Provider Modifiers Qty    [id number]  PT EVAL MOD COMPLEXITY 2 1/8/2019 Jovita Flores, PT GP 1    [id number]  PT THER PROC EA 15 MIN 1/8/2019 Jovita Flores, PT GP 1          PT G-Codes  Outcome Measure Options: AM-PAC 6 Clicks Basic Mobility (PT)  AM-PAC 6 Clicks Score: 12      Jovita Flores PT  1/8/2019

## 2019-01-08 NOTE — ANESTHESIA POSTPROCEDURE EVALUATION
"Patient: Kian Mcdaniels    Procedure Summary     Date:  01/08/19 Room / Location:  [facility] OR [address] [place] [address] MAIN OR    Anesthesia Start:  0925 Anesthesia Stop:  1136    Procedure:  RIGHT TOTAL KNEE ARTHROPLASTY WITH NEGRITA NAVIGATION (Right Knee) Diagnosis:       Arthritis of right knee      (Arthritis of right knee [M17.11])    Surgeon:  Andres Carter MD Provider:  Randall Ontiveros MD    Anesthesia Type:  general ASA Status:  3          Anesthesia Type: general  Last vitals  BP   177/93 (01/08/19 1225)   Temp   37.1 °C (98.7 °F) (01/08/19 1135)   Pulse   79 (01/08/19 1225)   Resp   16 (01/08/19 1225)     SpO2   97 % (01/08/19 1225)     Post Anesthesia Care and Evaluation    Patient location during evaluation: bedside  Patient participation: complete - patient participated  Level of consciousness: awake and alert  Pain management: adequate  Airway patency: patent  Anesthetic complications: No anesthetic complications    Cardiovascular status: acceptable  Respiratory status: acceptable  Hydration status: acceptable    Comments: /93   Pulse 79   Temp 37.1 °C (98.7 °F) (Oral)   Resp 16   Ht 181.6 cm (71.5\")   Wt 126 kg (277 lb 8 oz)   SpO2 97%   BMI 38.16 kg/m²           "

## 2019-01-08 NOTE — PLAN OF CARE
Problem: Patient Care Overview  Goal: Plan of Care Review   01/08/19 1621   OTHER   Outcome Summary Patient is a pleasant 63 y.o. male POD0 R TKA with expected post op weakness and impaired functional mobility. Patient is independent at baseline and lives at home with spouse- voiced planning on SNF due to PMHx. Today, patient performed bed mobility with modA, required Zonia for transfers, and ambulated 5' min-modA with a RW. Patient will benefit from skilled PT services acutely to address functional deficits as well as improve level of independence prior to DC.   Coping/Psychosocial   Plan of Care Reviewed With patient

## 2019-01-09 LAB
ANION GAP SERPL CALCULATED.3IONS-SCNC: 11.6 MMOL/L
BUN BLD-MCNC: 26 MG/DL (ref 8–23)
BUN/CREAT SERPL: 18.2 (ref 7–25)
CALCIUM SPEC-SCNC: 8.8 MG/DL (ref 8.6–10.5)
CHLORIDE SERPL-SCNC: 100 MMOL/L (ref 98–107)
CO2 SERPL-SCNC: 21.4 MMOL/L (ref 22–29)
CREAT BLD-MCNC: 1.43 MG/DL (ref 0.76–1.27)
GFR SERPL CREATININE-BSD FRML MDRD: 61 ML/MIN/1.73
GLUCOSE BLD-MCNC: 239 MG/DL (ref 65–99)
GLUCOSE BLDC GLUCOMTR-MCNC: 103 MG/DL (ref 70–130)
GLUCOSE BLDC GLUCOMTR-MCNC: 127 MG/DL (ref 70–130)
GLUCOSE BLDC GLUCOMTR-MCNC: 179 MG/DL (ref 70–130)
GLUCOSE BLDC GLUCOMTR-MCNC: 184 MG/DL (ref 70–130)
GLUCOSE BLDC GLUCOMTR-MCNC: 68 MG/DL (ref 70–130)
GLUCOSE BLDC GLUCOMTR-MCNC: 68 MG/DL (ref 70–130)
GLUCOSE BLDC GLUCOMTR-MCNC: 83 MG/DL (ref 70–130)
HCT VFR BLD AUTO: 46.6 % (ref 40.4–52.2)
HGB BLD-MCNC: 15.2 G/DL (ref 13.7–17.6)
POTASSIUM BLD-SCNC: 4.5 MMOL/L (ref 3.5–5.2)
SODIUM BLD-SCNC: 133 MMOL/L (ref 136–145)

## 2019-01-09 PROCEDURE — 99024 POSTOP FOLLOW-UP VISIT: CPT | Performed by: NURSE PRACTITIONER

## 2019-01-09 PROCEDURE — 97150 GROUP THERAPEUTIC PROCEDURES: CPT

## 2019-01-09 PROCEDURE — 25010000003 CEFAZOLIN IN DEXTROSE 2-4 GM/100ML-% SOLUTION: Performed by: NURSE PRACTITIONER

## 2019-01-09 PROCEDURE — 99255 IP/OBS CONSLTJ NEW/EST HI 80: CPT | Performed by: INTERNAL MEDICINE

## 2019-01-09 PROCEDURE — 85014 HEMATOCRIT: CPT | Performed by: NURSE PRACTITIONER

## 2019-01-09 PROCEDURE — 85018 HEMOGLOBIN: CPT | Performed by: NURSE PRACTITIONER

## 2019-01-09 PROCEDURE — 82962 GLUCOSE BLOOD TEST: CPT

## 2019-01-09 PROCEDURE — 97110 THERAPEUTIC EXERCISES: CPT

## 2019-01-09 PROCEDURE — 80048 BASIC METABOLIC PNL TOTAL CA: CPT | Performed by: NURSE PRACTITIONER

## 2019-01-09 RX ORDER — PROMETHAZINE HYDROCHLORIDE 12.5 MG/1
12.5 TABLET ORAL EVERY 6 HOURS PRN
Qty: 20 TABLET | Refills: 2 | Status: SHIPPED | OUTPATIENT
Start: 2019-01-09 | End: 2019-07-25

## 2019-01-09 RX ORDER — BISACODYL 5 MG/1
10 TABLET, DELAYED RELEASE ORAL DAILY PRN
Qty: 30 TABLET | Refills: 2 | Status: SHIPPED | OUTPATIENT
Start: 2019-01-09

## 2019-01-09 RX ORDER — CLOPIDOGREL BISULFATE 75 MG/1
75 TABLET ORAL EVERY MORNING
Status: DISCONTINUED | OUTPATIENT
Start: 2019-01-09 | End: 2019-01-12 | Stop reason: HOSPADM

## 2019-01-09 RX ORDER — OXYCODONE AND ACETAMINOPHEN 10; 325 MG/1; MG/1
TABLET ORAL
Qty: 60 TABLET | Refills: 0 | Status: SHIPPED | OUTPATIENT
Start: 2019-01-09 | End: 2019-01-11

## 2019-01-09 RX ORDER — OXYCODONE AND ACETAMINOPHEN 10; 325 MG/1; MG/1
TABLET ORAL
Qty: 60 TABLET | Refills: 0 | Status: SHIPPED | OUTPATIENT
Start: 2019-01-09 | End: 2019-01-09

## 2019-01-09 RX ORDER — CALCIUM CARBONATE 200(500)MG
2 TABLET,CHEWABLE ORAL 3 TIMES DAILY PRN
Status: DISCONTINUED | OUTPATIENT
Start: 2019-01-09 | End: 2019-01-12 | Stop reason: HOSPADM

## 2019-01-09 RX ORDER — KETOROLAC TROMETHAMINE 30 MG/ML
30 INJECTION, SOLUTION INTRAMUSCULAR; INTRAVENOUS ONCE
Status: DISCONTINUED | OUTPATIENT
Start: 2019-01-09 | End: 2019-01-12 | Stop reason: HOSPADM

## 2019-01-09 RX ORDER — ASPIRIN 81 MG/1
81 TABLET ORAL EVERY MORNING
Status: DISCONTINUED | OUTPATIENT
Start: 2019-01-09 | End: 2019-01-12 | Stop reason: HOSPADM

## 2019-01-09 RX ADMIN — AMLODIPINE BESYLATE 10 MG: 10 TABLET ORAL at 09:09

## 2019-01-09 RX ADMIN — LOSARTAN POTASSIUM 50 MG: 50 TABLET, FILM COATED ORAL at 09:09

## 2019-01-09 RX ADMIN — CARVEDILOL 12.5 MG: 3.12 TABLET, FILM COATED ORAL at 17:25

## 2019-01-09 RX ADMIN — POTASSIUM CHLORIDE 10 MEQ: 750 CAPSULE, EXTENDED RELEASE ORAL at 09:10

## 2019-01-09 RX ADMIN — ISOSORBIDE MONONITRATE 60 MG: 30 TABLET ORAL at 09:09

## 2019-01-09 RX ADMIN — MUPIROCIN 10 APPLICATION: 20 OINTMENT TOPICAL at 09:10

## 2019-01-09 RX ADMIN — GABAPENTIN 800 MG: 400 CAPSULE ORAL at 15:13

## 2019-01-09 RX ADMIN — FAMOTIDINE 40 MG: 20 TABLET, FILM COATED ORAL at 09:10

## 2019-01-09 RX ADMIN — ASPIRIN 81 MG: 81 TABLET, DELAYED RELEASE ORAL at 15:14

## 2019-01-09 RX ADMIN — ATORVASTATIN CALCIUM 40 MG: 20 TABLET, FILM COATED ORAL at 20:27

## 2019-01-09 RX ADMIN — CARVEDILOL 12.5 MG: 3.12 TABLET, FILM COATED ORAL at 09:10

## 2019-01-09 RX ADMIN — DOCUSATE SODIUM 100 MG: 100 CAPSULE, LIQUID FILLED ORAL at 09:09

## 2019-01-09 RX ADMIN — OXYCODONE HYDROCHLORIDE AND ACETAMINOPHEN 2 TABLET: 10; 325 TABLET ORAL at 17:26

## 2019-01-09 RX ADMIN — PANTOPRAZOLE SODIUM 40 MG: 40 TABLET, DELAYED RELEASE ORAL at 04:35

## 2019-01-09 RX ADMIN — CEFAZOLIN SODIUM 2 G: 2 INJECTION, SOLUTION INTRAVENOUS at 03:54

## 2019-01-09 RX ADMIN — OXYCODONE HYDROCHLORIDE AND ACETAMINOPHEN 2 TABLET: 10; 325 TABLET ORAL at 13:20

## 2019-01-09 RX ADMIN — FUROSEMIDE 40 MG: 40 TABLET ORAL at 20:28

## 2019-01-09 RX ADMIN — OXYCODONE HYDROCHLORIDE AND ACETAMINOPHEN 2 TABLET: 10; 325 TABLET ORAL at 00:21

## 2019-01-09 RX ADMIN — OXYCODONE HYDROCHLORIDE AND ACETAMINOPHEN 2 TABLET: 10; 325 TABLET ORAL at 09:09

## 2019-01-09 RX ADMIN — MUPIROCIN 10 APPLICATION: 20 OINTMENT TOPICAL at 20:27

## 2019-01-09 RX ADMIN — GABAPENTIN 800 MG: 400 CAPSULE ORAL at 09:10

## 2019-01-09 RX ADMIN — OXYCODONE HYDROCHLORIDE AND ACETAMINOPHEN 2 TABLET: 10; 325 TABLET ORAL at 04:35

## 2019-01-09 RX ADMIN — POLYETHYLENE GLYCOL 3350 17 G: 17 POWDER, FOR SOLUTION ORAL at 09:10

## 2019-01-09 RX ADMIN — CETIRIZINE HYDROCHLORIDE 10 MG: 10 TABLET, FILM COATED ORAL at 09:09

## 2019-01-09 RX ADMIN — CLOPIDOGREL 75 MG: 75 TABLET, FILM COATED ORAL at 15:14

## 2019-01-09 RX ADMIN — GABAPENTIN 800 MG: 400 CAPSULE ORAL at 20:26

## 2019-01-09 RX ADMIN — DOCUSATE SODIUM 100 MG: 100 CAPSULE, LIQUID FILLED ORAL at 20:27

## 2019-01-09 NOTE — CONSULTS
63 y.o.  Patient Care Team:  Wyatt Harmon MD as PCP - General (Family Medicine)  Shannan Singh APRN (Family Medicine)  Lisa Banerjee PA-C as Physician Assistant (Neurosurgery)  Kian Hoskins MD (Orthopedic Surgery)  Richard Prasad MD as Surgeon (Orthopedic Surgery)  Tim Gutierres MD as Surgeon (Neurosurgery)  Rahat Pepe MD as Consulting Physician (Cardiology)      No chief complaint on file.  Uncontrolled type 2 diabetes mellitus on insulin pump management    HPI   Patient is a 63-year-old -American male with a history of uncontrolled type 2 diabetes mellitus on insulin pump underwent elective right total knee arthroplasty yesterday  Postoperatively his diabetes needs to be managed hence the consultation    Patient is a known diabetic for more than 35 years  He reported that is currently using T Slim insulin pump  He was advised to use 80% of the basal rate as temporary basal for the time of surgery  He is currently using 100% basophil this morning  He also started using mealtime insulin  Pump settings were reviewed  Patient is on 0.85 units per hour basal  His carb ratio is 1-10 and his correction is 1-60 and his target is 120 mg blood sugar  Insulin action time is 4 hours    Uncontrolled type 2 diabetes mellitus with severe peripheral neuropathy  Patient denied any knowledge of diabetic retinopathy  He goes to see Dr. Qureshi  Patient also denied any knowledge of diabetic nephropathy  Hypoglycemia  Patient has occasional episodes of hypoglycemia  He reports that his home blood sugars are usually in the 150-200 range  At times they have been in the 250s for Yakov preceding surgery  His last known him (in the 8% range  Patient follows a nurse practitioner for diabetes management    Patient is currently receiving oral pain medication but he believes that he is able to handle the pump very well  The nursing staff concur with this assessment          Past Medical History:    Diagnosis Date   • Arteriosclerotic cardiovascular disease    • Arthritis    • At risk for sleep apnea    • COPD (chronic obstructive pulmonary disease) (CMS/[facility])    • Coronary artery disease    • Diabetes mellitus (CMS/[facility])    • Diabetic peripheral neuropathy (CMS/[facility])    • Disease of thyroid gland     NODULE PRESENT   • ED (erectile dysfunction) of non-organic origin    • GERD (gastroesophageal reflux disease)    • Headache disorder     DUE TO NECK   • Hyperlipidemia    • Hypertension    • Insulin pump in place    • Knee pain, bilateral    • Low back pain    • Myocardial infarction (CMS/[facility])    • Neck pain    • Spinal stenosis    • TIA (transient ischemic attack)     LEFT EYE   • Type 2 diabetes mellitus (CMS/[facility])    • Upper back pain    • Wears dentures     UPPER PLATE       Family History   Problem Relation Age of Onset   • Diabetes Other    • Heart disease Other    • Hypertension Other    • Kidney disease Other         renal failure   • Heart disease Maternal Grandmother    • Hypertension Maternal Grandmother    • Malig Hyperthermia Neg Hx        Social History     Socioeconomic History   • Marital status:      Spouse name: Not on file   • Number of children: Not on file   • Years of education: Not on file   • Highest education level: Not on file   Social Needs   • Financial resource strain: Not on file   • Food insecurity - worry: Not on file   • Food insecurity - inability: Not on file   • Transportation needs - medical: Not on file   • Transportation needs - non-medical: Not on file   Occupational History   • Not on file   Tobacco Use   • Smoking status: Former Smoker     Packs/day: 1.00     Years: 25.00     Pack years: 25.00     Types: Cigarettes     Start date:      Last attempt to quit:      Years since quittin.0   • Smokeless tobacco: Never Used   Substance and Sexual Activity   • Alcohol use: No     Comment: no alcohol since    • Drug use: No   • Sexual activity: Defer   Other  Topics Concern   • Not on file   Social History Narrative   • Not on file       Allergies   Allergen Reactions   • Erythromycin Nausea Only and Other (See Comments)     PT STATES ACUTE KIDNEY INJURY   • Lisinopril Other (See Comments)     7/2016 possibly caused pancreatitis per Dr Quinonez   • Metformin Nausea And Vomiting         Current Facility-Administered Medications:   •  acetaminophen (TYLENOL) tablet 325 mg, 325 mg, Oral, Q4H PRN, [place]Kaylee, APRN  •  albuterol sulfate HFA (PROVENTIL HFA;VENTOLIN HFA;PROAIR HFA) inhaler 2 puff, 2 puff, Inhalation, Q6H PRN, [place]lee, APRN  •  amLODIPine (NORVASC) tablet 10 mg, 10 mg, Oral, QAM, Greg Madrigal MD, 10 mg at 01/09/19 0909  •  aspirin EC tablet 81 mg, 81 mg, Oral, QAM, [place]lee, APRN  •  atorvastatin (LIPITOR) tablet 40 mg, 40 mg, Oral, Daily, [place] BayKaylee dillard, APRN, 40 mg at 01/08/19 2028  •  bisacodyl (DULCOLAX) EC tablet 10 mg, 10 mg, Oral, Daily PRN, [place]lee dillard, APRN  •  bisacodyl (DULCOLAX) suppository 10 mg, 10 mg, Rectal, Daily PRN, [place]lee dillard, APRN  •  calcium carbonate (TUMS) chewable tablet 500 mg (200 mg elemental), 2 tablet, Oral, TID PRN, Greg Madrigal MD  •  carvedilol (COREG) tablet 12.5 mg, 12.5 mg, Oral, BID With Meals, [place]lee dillard, APRN, 12.5 mg at 01/09/19 0910  •  cetirizine (zyrTEC) tablet 10 mg, 10 mg, Oral, QAM, [place] dillard, APRN, 10 mg at 01/09/19 0909  •  clopidogrel (PLAVIX) tablet 75 mg, 75 mg, Oral, QAM, [place], APRN  •  cyclobenzaprine (FLEXERIL) tablet 10 mg, 10 mg, Oral, BID PRN, [place]lee dillard, APRN, 10 mg at 01/08/19 2028  •  dextrose (D50W) 25 g/ 50mL Intravenous Solution 25 g, 25 g, Intravenous, Q15 Min PRN, Greg Madrigal MD  •  dextrose (GLUTOSE) oral gel 15 g, 15 g, Oral, Q15 Min PRN, Greg Madrigal MD  •  diphenhydrAMINE (BENADRYL) capsule 25 mg, 25 mg, Oral, Q6H PRN **OR** diphenhydrAMINE (BENADRYL) capsule 25 mg, 25 mg, Oral, Q6H PRN, Kaylee Jorge, APRN  •  docusate sodium (COLACE)  capsule 100 mg, 100 mg, Oral, BID, Kaylee Jorge APRN, 100 mg at 01/09/19 0909  •  famotidine (PEPCID) tablet 40 mg, 40 mg, Oral, Daily, Kaylee Jorge APRN, 40 mg at 01/09/19 0910  •  furosemide (LASIX) tablet 40 mg, 40 mg, Oral, Nightly, rGeg Madrigal MD  •  gabapentin (NEURONTIN) capsule 800 mg, 800 mg, Oral, TID, Kaylee Jorge APRN, 800 mg at 01/09/19 0910  •  glucagon (human recombinant) (GLUCAGEN DIAGNOSTIC) injection 1 mg, 1 mg, Subcutaneous, PRN, Greg Madrigal MD  •  HYDROmorphone (DILAUDID) injection 0.5 mg, 0.5 mg, Intravenous, Q2H PRN **AND** naloxone (NARCAN) injection 0.1 mg, 0.1 mg, Intravenous, Q5 Min PRN, Kaylee Jorge APRN  •  isosorbide mononitrate (IMDUR) 24 hr tablet 60 mg, 60 mg, Oral, QAM, Kaylee Jorge APRN, 60 mg at 01/09/19 0909  •  ketorolac (TORADOL) injection 30 mg, 30 mg, Intravenous, Once, Andres Carter MD  •  lactated ringers infusion, 100 mL/hr, Intravenous, Continuous, Kaylee Jorge APRN, Stopped at 01/08/19 2146  •  lactated ringers infusion, 9 mL/hr, Intravenous, Continuous, Reba Sainz MD, Last Rate: 9 mL/hr at 01/08/19 0909, 9 mL/hr at 01/08/19 0909  •  losartan (COZAAR) tablet 50 mg, 50 mg, Oral, QAM, Greg Madrigal MD, 50 mg at 01/09/19 0909  •  magnesium hydroxide (MILK OF MAGNESIA) suspension 2400 mg/10mL 10 mL, 10 mL, Oral, Daily PRN, Kaylee Jorge APRN  •  mupirocin (BACTROBAN) 2 % nasal ointment, , Each Nare, BID, Kaylee Jorge APRN, 10 application at 01/09/19 0910  •  ondansetron (ZOFRAN) tablet 4 mg, 4 mg, Oral, Q6H PRN **OR** ondansetron ODT (ZOFRAN-ODT) disintegrating tablet 4 mg, 4 mg, Oral, Q6H PRN **OR** ondansetron (ZOFRAN) injection 4 mg, 4 mg, Intravenous, Q6H PRN, Kaylee Jorge, APRN  •  oxyCODONE-acetaminophen (PERCOCET)  MG per tablet 1 tablet, 1 tablet, Oral, Q4H PRN, 1 tablet at 01/08/19 1547 **OR** oxyCODONE-acetaminophen (PERCOCET)  MG per tablet 2 tablet, 2 tablet, Oral, Q4H PRN, Kaylee Jorge, APRN, 2 tablet at  01/09/19 0909  •  pantoprazole (PROTONIX) EC tablet 40 mg, 40 mg, Oral, QAM, Kaylee Jorge, APRN, 40 mg at 01/09/19 0435  •  polyethylene glycol 3350 powder (packet), 17 g, Oral, Daily, WelcomeKaylee dillard, APRN, 17 g at 01/09/19 0910  •  potassium chloride (MICRO-K) CR capsule 10 mEq, 10 mEq, Oral, Daily, Greg Madrigal MD, 10 mEq at 01/09/19 0910  •  promethazine (PHENERGAN) tablet 12.5 mg, 12.5 mg, Oral, Q6H PRN, Kaylee Jorge, APRN         Review of Systems   Constitutional: Negative.    HENT: Negative.    Eyes: Negative.    Respiratory: Negative.    Cardiovascular: Negative.    Gastrointestinal: Positive for abdominal distention. Negative for abdominal pain.   Musculoskeletal: Positive for joint swelling.   Neurological: Positive for numbness.   Psychiatric/Behavioral: Negative.    All other systems reviewed and are negative.    Objective     Vital Signs  Temp:  [97.4 °F (36.3 °C)-98.2 °F (36.8 °C)] 98.2 °F (36.8 °C)  Heart Rate:  [67-85] 67  Resp:  [16] 16  BP: (141-164)/(70-89) 151/70    Physical Exam  Physical Exam   Constitutional: He is oriented to person, place, and time.   HENT:   Head: Normocephalic and atraumatic.   Eyes: EOM are normal. Pupils are equal, round, and reactive to light.   Neck: Normal range of motion. Neck supple.   Cardiovascular: Normal rate, regular rhythm, normal heart sounds and intact distal pulses.   Pulmonary/Chest: Effort normal and breath sounds normal.   Abdominal: Soft. Bowel sounds are normal. He exhibits distension. There is no tenderness.   Musculoskeletal: Normal range of motion. He exhibits tenderness. He exhibits no edema.   Status post right knee surgery   Neurological: He is alert and oriented to person, place, and time.   Skin: Skin is warm and dry.   Psychiatric: He has a normal mood and affect. His behavior is normal.   Nursing note and vitals reviewed.      Results Review:    I reviewed the patient's new clinical results.  Glucose   Date/Time Value Ref Range Status    01/09/2019 1110 103 70 - 130 mg/dL Final   01/09/2019 0625 184 (H) 70 - 130 mg/dL Final   01/08/2019 2101 288 (H) 70 - 130 mg/dL Final   01/08/2019 1634 186 (H) 70 - 130 mg/dL Final   01/08/2019 1146 84 70 - 130 mg/dL Final   01/08/2019 0800 93 70 - 130 mg/dL Final     Lab Results (last 72 hours)     Procedure Component Value Units Date/Time    POC Glucose Once [[id number]]  (Normal) Collected:  01/09/19 1110    Specimen:  Blood Updated:  01/09/19 1111     Glucose 103 mg/dL     POC Glucose Once [[id number]]  (Abnormal) Collected:  01/09/19 0625    Specimen:  Blood Updated:  01/09/19 0626     Glucose 184 mg/dL     Basic Metabolic Panel [[id number]]  (Abnormal) Collected:  01/09/19 0407    Specimen:  Blood Updated:  01/09/19 0458     Glucose 239 mg/dL      BUN 26 mg/dL      Creatinine 1.43 mg/dL      Sodium 133 mmol/L      Potassium 4.5 mmol/L      Chloride 100 mmol/L      CO2 21.4 mmol/L      Calcium 8.8 mg/dL      eGFR   Amer 61 mL/min/1.73      BUN/Creatinine Ratio 18.2     Anion Gap 11.6 mmol/L     Narrative:       GFR Normal >60  Chronic Kidney Disease <60  Kidney Failure <15    Hemoglobin & Hematocrit, Blood [[id number]]  (Normal) Collected:  01/09/19 0407    Specimen:  Blood Updated:  01/09/19 0430     Hemoglobin 15.2 g/dL      Hematocrit 46.6 %     POC Glucose Once [[id number]]  (Abnormal) Collected:  01/08/19 2101    Specimen:  Blood Updated:  01/08/19 2102     Glucose 288 mg/dL     POC Glucose Once [[id number]]  (Abnormal) Collected:  01/08/19 1634    Specimen:  Blood Updated:  01/08/19 1641     Glucose 186 mg/dL     POC Glucose Once [[id number]]  (Normal) Collected:  01/08/19 1146    Specimen:  Blood Updated:  01/08/19 1204     Glucose 84 mg/dL     POC Glucose Once [[id number]]  (Normal) Collected:  01/08/19 0800    Specimen:  Blood Updated:  01/08/19 0801     Glucose 93 mg/dL           Imaging Results (last 72 hours)     Procedure Component Value Units Date/Time    XR Knee 1 or 2 View Right [[id number]]  Collected:  01/08/19 1214     Updated:  01/08/19 1218    Narrative:       Right knee radiograph     HISTORY: Postop     TECHNIQUE: AP and lateral radiograph the right knee     COMPARISON: Right knee radiographs 11/8/2017     FINDINGS:  Post surgical changes from recent right total knee arthroplasty are  present. The hardware is intact. There is no new periprosthetic  fracture.       Impression:       Postoperative changes from recent right total knee  arthroplasty without findings of immediate complication.     This report was finalized on 1/8/2019 12:15 PM by Dr. Elliot Laguna M.D.             Assessment/Plan     Patient Active Problem List    Diagnosis   • *Arthritis of right knee [M17.11]   • Hypertension [I10]   • Arthritis of left knee [M17.12]   • Bilateral chronic knee pain [M25.561, M25.562, G89.29]   • Tricompartment osteoarthritis of right knee [M17.11]   • Severe obesity (BMI 35.0-39.9) [E66.01]   • Chronic pain of right knee [M25.561, G89.29]   • Tricompartment osteoarthritis of left knee [M17.12]   • Sacroiliac inflammation (CMS/HCC) [M46.1]   • Sacroiliac joint dysfunction [M53.3]   • Acute pain of right knee [M25.561]   • Primary localized osteoarthrosis of right lower leg [M17.11]   • Degenerative cervical disc [M50.30]   • Bilateral carpal tunnel syndrome [G56.03]   • Hx of decompressive lumbar laminectomy [Z98.890]   • Lumbar pseudoarthrosis [S32.009K]   • Type 2 diabetes mellitus with neurologic complication, with long-term current use of insulin (CMS/HCC) [E11.49, Z79.4]   • Insulin pump in place [Z96.41]   • Syringomyelia and syringobulbia (CMS/HCC) [G95.0]   • Encounter for long-term (current) use of high-risk medication [Z79.899]   • Postlaminectomy syndrome of lumbar region [M96.1]   • Bulging lumbar disc [M51.26]   • Chronic pain [G89.29]   • Diabetic neuropathy (CMS/HCC) [E11.40]   • Low back pain [M54.5]   • Degenerative arthritis of lumbar spine [M47.816]   • Neuropathy involving both  "lower extremities [G57.93]     Uncontrolled type 2 diabetes mellitus  Uncontrolled type 2 diabetes mellitus with neuropathy  Insulin pump status  History of hypoglycemia  Obesity    Patient's T-slim pump settings reviewed  Patient is adequate amount of insulin  He does take the insulin pump set this morning and he usually keeps it for 2 days  Basal settings are 0.85 units per hour continuously  Mealtime ratios are 1 unit for every 10 carbs and one unit for every 60/120 for correction    I advised the patient to continue the current settings  Will continue to monitor the Accu-Cheks for the rest of the day  If the blood sugars are more than 200 I will readjust the pump settings  I advised the patient to consider hemoglobin A1c of less than 7% over time as optimal for his age and comorbid conditions  Patient verbalized understanding along with his family member    The total floor time spent for old record and lab review and floor time was more than 110 min of which greater than 60 min of time (greater than 50% of the total time)  was spent face to face with the patient counseling and coordination of care on recommended evaluation and treatment options, instructions for management/treatment and /or follow up and importance of compliance with chosen management or treatment options    Kin Malloy MD FACE.  01/09/19  1:02 PM        EMR Dragon / transcription disclaimer:     \"Dictated utilizing Dragon dictation\".   "

## 2019-01-09 NOTE — THERAPY TREATMENT NOTE
Acute Care - Physical Therapy Treatment Note  [facility]     Patient Name: iKan Mcdaniels  : 1955  MRN: [id number]  Today's Date: 2019  Onset of Illness/Injury or Date of Surgery: 19  Date of Referral to PT: 19  Referring Physician: Dr. Carter    Admit Date: 2019    Visit Dx:    ICD-10-CM ICD-9-CM   1. Status post total right knee replacement Z96.651 V43.65   2. Arthritis of right knee M17.11 716.96     Patient Active Problem List   Diagnosis   • Bulging lumbar disc   • Chronic pain   • Diabetic neuropathy (CMS/HCC)   • Low back pain   • Degenerative arthritis of lumbar spine   • Neuropathy involving both lower extremities   • Encounter for long-term (current) use of high-risk medication   • Postlaminectomy syndrome of lumbar region   • Syringomyelia and syringobulbia (CMS/HCC)   • Lumbar pseudoarthrosis   • Type 2 diabetes mellitus with neurologic complication, with long-term current use of insulin (CMS/HCC)   • Insulin pump in place   • Hx of decompressive lumbar laminectomy   • Bilateral carpal tunnel syndrome   • Degenerative cervical disc   • Acute pain of right knee   • Primary localized osteoarthrosis of right lower leg   • Arthritis of right knee   • Sacroiliac inflammation (CMS/HCC)   • Sacroiliac joint dysfunction   • Severe obesity (BMI 35.0-39.9)   • Chronic pain of right knee   • Tricompartment osteoarthritis of left knee   • Tricompartment osteoarthritis of right knee   • Arthritis of left knee   • Bilateral chronic knee pain   • Hypertension       Therapy Treatment    Rehabilitation Treatment Summary     Row Name 19 1218             Treatment Time/Intention    Discipline  physical therapy assistant  -      Document Type  therapy note (daily note)  -      Subjective Information  complains of;weakness;fatigue;pain;swelling  -      Care Plan Review  patient/other agree to care plan  -      Care Plan Review, Other Participant(s)  spouse  -ROSA      Comment   spouse concerned about home alone until weekend-trying to get off work to assist pt possible SNU, but [facility] states Ins approved 5 days in hosp  -      Existing Precautions/Restrictions  fall  -      Treatment Considerations/Comments  pt amb w/bilat knee flex c/o having back issues that also make standing up diff  -JM2      Recorded by [JM] Luisa Bingham, PTA 01/09/19 1334  [JM2] Luisa Bingham, [facility] 01/09/19 1525      Row Name 01/09/19 1218             Bed Mobility Assessment/Treatment    Comment (Bed Mobility)  in chair  -JM      Recorded by [JM] Luisa Bingham, PTA 01/09/19 1334      Row Name 01/09/19 1218             Sit-Stand Transfer    Sit-Stand [place] (Transfers)  minimum assist (75% patient effort);moderate assist (50% patient effort)  -      Assistive Device (Sit-Stand Transfers)  walker, front-wheeled  -      Recorded by [JM] Luisa Bingham, [facility] 01/09/19 1334      Row Name 01/09/19 1218             Stand-Sit Transfer    Stand-Sit [place] (Transfers)  contact guard;minimum assist (75% patient effort);verbal cues  -      Assistive Device (Stand-Sit Transfers)  walker, front-wheeled  -      Recorded by [JM] Luisa Bingham, PTA 01/09/19 1334      Row Name 01/09/19 1218             Gait/Stairs Assessment/Training    [place] Level (Gait)  minimum assist (75% patient effort)  -      Assistive Device (Gait)  walker, front-wheeled  -      Distance in Feet (Gait)  30  -      Deviations/Abnormal Patterns (Gait)  antalgic;latanya decreased;stride length decreased  -JM2      Bilateral Gait Deviations  forward flexed posture;weight shift ability decreased  -JM2      Comment (Gait/Stairs)  pt fatigues quickly, in part due to weak back from prior issues, amb w/chair following closely due to amb w/bilat knee flexion  -JM3      Recorded by [] Luisa Bingham, PTA 01/09/19 1334  [JM2] Luisa Bingham, [facility] 01/09/19 1452  [JM3] Luisa Bingham, [facility] 01/09/19 1525      Row Name  01/09/19 1218             General ROM    GENERAL ROM COMMENTS  -7-72 R knee  -JM      Recorded by [] Luisa Bingham, \[facility]\"" 01/09/19 1525      Row Name 01/09/19 1218             Therapeutic Exercise    Comment (Therapeutic Exercise)  TKR protocol x 15 reps w/assist  -JM      Recorded by [ROSA] Luisa Bingham, \[facility]\"" 01/09/19 1525      Row Name 01/09/19 1218             Positioning and Restraints    Pre-Treatment Position  sitting in chair/recliner  -JM      In Chair  reclined;call light within reach;encouraged to call for assist;with family/caregiver;notified nsg  -JM      Recorded by [ROSA] Luisa Bingham, \[facility]\"" 01/09/19 1525      Row Name 01/09/19 1218             Pain Scale: Numbers Pre/Post-Treatment    Pain Scale: Numbers, Post-Treatment  5/10  -JM      Pain Location - Side  Right  -JM      Pain Location  knee  -JM      Pain Intervention(s)  Medication (See MAR);Repositioned;Cold applied  -JM      Recorded by [ROSA] Luisa Bingham \[facility]\"" 01/09/19 1525      Row Name                Wound 01/08/19 1023 Right knee incision    Wound - Properties Group Date first assessed: 01/08/19 [HH] Time first assessed: 1023 [HH] Side: Right [HH] Location: knee [HH] Type: incision [HH] Recorded by:  [] Destiny Bowen RN 01/08/19 1023      User Key  (r) = Recorded By, (t) = Taken By, (c) = Cosigned By    Initials Name Effective Dates Discipline    Destiny Desai RN 06/16/16 -  Nurse    Luisa Malik \[facility]\"" 03/07/18 -  PT          Wound 01/08/19 1023 Right knee incision (Active)   Dressing Appearance dried drainage 1/9/2019  9:09 AM   Closure SISSY 1/9/2019  9:09 AM   Base dressing in place, unable to visualize 1/9/2019  9:09 AM   Drainage Amount moderate 1/9/2019  9:09 AM   Dressing Care, Wound other (see comments) 1/9/2019  9:09 AM           Physical Therapy Education     Title: PT OT SLP Therapies (Done)     Topic: Physical Therapy (Done)     Point: Mobility training (Done)     Learning Progress Summary            Patient Acceptance, E,TB,D, VU,NR by ROSA at 1/9/2019  3:27 PM    Acceptance, E, VU by MA at 1/8/2019  4:22 PM   Family Acceptance, E,TB,D, VU,NR by ROSA at 1/9/2019  3:27 PM                   Point: Home exercise program (Done)     Learning Progress Summary           Patient Acceptance, E,TB,D, VU,NR by ROSA at 1/9/2019  3:27 PM    Acceptance, E, VU by MA at 1/8/2019  4:22 PM   Family Acceptance, E,TB,D, VU,NR by ROSA at 1/9/2019  3:27 PM                   Point: Body mechanics (Done)     Learning Progress Summary           Patient Acceptance, E,TB,D, VU,NR by ROSA at 1/9/2019  3:27 PM    Acceptance, E, VU by MA at 1/8/2019  4:22 PM   Family Acceptance, E,TB,D, VU,NR by ROSA at 1/9/2019  3:27 PM                   Point: Precautions (Done)     Learning Progress Summary           Patient Acceptance, E,TB,D, VU,NR by ROSA at 1/9/2019  3:27 PM    Acceptance, E, VU by MA at 1/8/2019  4:22 PM   Family Acceptance, E,TB,D, VU,NR by ROSA at 1/9/2019  3:27 PM                               User Key     Initials Effective Dates Name Provider Type Discipline    ROSA 03/07/18 -  Luisa Bingham, [facility] Physical Therapy Assistant PT    MA 04/03/18 -  Jovita Flores PT Physical Therapist PT                PT Recommendation and Plan     Plan of Care Reviewed With: patient, spouse  Progress: improving  Outcome Summary: pt w/guarded amb due to weakness and flexed knees bilat; fam now agreed home w/HH if can stay a few extra days-as already approved by [employer]-so they can set up extra help at home  Outcome Measures     Row Name 01/09/19 1500 01/08/19 1600          How much help from another person do you currently need...    Turning from your back to your side while in flat bed without using bedrails?  3  -ROSA  2  -MA     Moving from lying on back to sitting on the side of a flat bed without bedrails?  3  -ROSA  2  -MA     Moving to and from a bed to a chair (including a wheelchair)?  3  -ROSA  3  -MA     Standing up from a chair using your arms (e.g.,  wheelchair, bedside chair)?  2  -  3  -MA     Climbing 3-5 steps with a railing?  1  -  1  -MA     To walk in hospital room?  3  -  1  -MA     AM-PAC 6 Clicks Score  15  -  12  -MA        Functional Assessment    Outcome Measure Options  --  AM-PAC 6 Clicks Basic Mobility (PT)  -MA       User Key  (r) = Recorded By, (t) = Taken By, (c) = Cosigned By    Initials Name Provider Type    Luisa Malik PTA Physical Therapy Assistant    Jovita Bboby, PT Physical Therapist         Time Calculation:   PT Charges     Row Name 01/09/19 1528             Time Calculation    Start Time  1050  -      Stop Time  1155  -      Time Calculation (min)  65 min  -ROSA      PT Received On  01/09/19  -ROSA      PT - Next Appointment  01/09/19  -ROSA         Time Calculation- PT    Total Timed Code Minutes- PT  30 minute(s)  -        User Key  (r) = Recorded By, (t) = Taken By, (c) = Cosigned By    Initials Name Provider Type    Luisa Malik PTA Physical Therapy Assistant        Therapy Suggested Charges     Code   Minutes Charges    None           Therapy Charges for Today     Code Description Service Date Service Provider Modifiers Qty    53763270108 HC PT THER PROC EA 15 MIN 1/9/2019 uLisa Bingham PTA GP 2    87006567792 HC PT THER PROC GROUP 1/9/2019 Luisa Bingham PTA GP 1          PT G-Codes  Outcome Measure Options: AM-PAC 6 Clicks Basic Mobility (PT)  AM-PAC 6 Clicks Score: 15    Luisa Bingham PTA  1/9/2019

## 2019-01-09 NOTE — PROGRESS NOTES
Orthopedic Total Joint Progress Note        Patient: Kian Mcdaniels    Date of Admission: 1/8/2019  7:01 AM    YOB: 1955    Medical Record Number: [id number]    Attending Physician: Andres Carter MD      POD # 1 Day Post-Op Procedure(s) (LRB):  RIGHT TOTAL KNEE ARTHROPLASTY WITH NEGRITA NAVIGATION (Right)       Systemic or Specific Complaints: No Complaints      Allergies:   Allergies   Allergen Reactions   • Erythromycin Nausea Only and Other (See Comments)     PT STATES ACUTE KIDNEY INJURY   • Lisinopril Other (See Comments)     7/2016 possibly caused pancreatitis per Dr Quinonez   • Metformin Nausea And Vomiting       Medications:   Current Medications:  Scheduled Meds:  amLODIPine 10 mg Oral QAM   aspirin 81 mg Oral QAM   atorvastatin 40 mg Oral Daily   carvedilol 12.5 mg Oral BID With Meals   cetirizine 10 mg Oral QAM   clopidogrel 75 mg Oral QAM   docusate sodium 100 mg Oral BID   famotidine 40 mg Oral Daily   furosemide 40 mg Oral Nightly   gabapentin 800 mg Oral TID   isosorbide mononitrate 60 mg Oral QAM   ketorolac 30 mg Intravenous Once   losartan 50 mg Oral QAM   mupirocin  Each Nare BID   pantoprazole 40 mg Oral QAM   polyethylene glycol 17 g Oral Daily   potassium chloride 10 mEq Oral Daily     Continuous Infusions:  lactated ringers 100 mL/hr Last Rate: Stopped (01/08/19 2146)   lactated ringers 9 mL/hr Last Rate: 9 mL/hr (01/08/19 0909)     PRN Meds:.•  acetaminophen  •  albuterol sulfate HFA  •  bisacodyl  •  bisacodyl  •  calcium carbonate  •  cyclobenzaprine  •  dextrose  •  dextrose  •  diphenhydrAMINE **OR** diphenhydrAMINE  •  glucagon (human recombinant)  •  HYDROmorphone **AND** naloxone  •  magnesium hydroxide  •  ondansetron **OR** ondansetron ODT **OR** ondansetron  •  oxyCODONE-acetaminophen **OR** oxyCODONE-acetaminophen  •  promethazine      Physical Exam: 63 y.o. male Wt Readings from Last 3 Encounters:   01/08/19 126 kg (277 lb 8 oz)   01/07/19 126 kg (277 lb  "6.4 oz)   01/03/19 130 kg (287 lb)     Ht Readings from Last 3 Encounters:   01/08/19 181.6 cm (71.5\")   01/07/19 179.1 cm (70.5\")   01/03/19 180.3 cm (71\")     Body mass index is 38.16 kg/m².  Facility age limit for growth percentiles is 20 years.    General Appearance:    alert and oriented            Pain Relief: Patient reports good relief Vitals:    01/08/19 1900 01/08/19 2304 01/09/19 0227 01/09/19 0656   BP: 141/78 154/76 164/83 161/88   BP Location: Right arm Right arm Right arm Left arm   Patient Position: Lying Sitting Lying Lying   Pulse: 85 75 76 72   Resp: 16 16 16 16   Temp: 97.7 °F (36.5 °C) 97.6 °F (36.4 °C) 97.8 °F (36.6 °C) 97.8 °F (36.6 °C)   TempSrc: Oral Oral Oral Oral   SpO2: 98% 95% 94% 94%   Weight:       Height:              HEENT:    Normocephalic, without obvious abnormality, atraumatic.             Lungs:     Respirations regular, even and unlabored      Heart:    Regular controlled rate   Abdomen:     soft non-tender, non-distended       Extremities:   Operative extremity neurovascular status intact. ROM appropriate.  Incision intact w/out signs or symptoms of infection.  No cyanosis, calf is soft and nontender.     Pulses:     Pulses palpable and equal bilaterally     Skin:     Skin Warm/Dry w/out cyanosis     Activity: Mobilizing Per P.T.   Weight Bearing: As Tolerated    Diagnostic Tests:   Admission on 01/08/2019   Component Date Value Ref Range Status   • Glucose 01/08/2019 93  70 - 130 mg/dL Final   • Glucose 01/08/2019 84  70 - 130 mg/dL Final   • Glucose 01/08/2019 186* 70 - 130 mg/dL Final   • Glucose 01/08/2019 288* 70 - 130 mg/dL Final   • Glucose 01/09/2019 239* 65 - 99 mg/dL Final   • BUN 01/09/2019 26* 8 - 23 mg/dL Final   • Creatinine 01/09/2019 1.43* 0.76 - 1.27 mg/dL Final   • Sodium 01/09/2019 133* 136 - 145 mmol/L Final   • Potassium 01/09/2019 4.5  3.5 - 5.2 mmol/L Final   • Chloride 01/09/2019 100  98 - 107 mmol/L Final   • CO2 01/09/2019 21.4* 22.0 - 29.0 mmol/L " Final   • Calcium 01/09/2019 8.8  8.6 - 10.5 mg/dL Final   • eGFR   Amer 01/09/2019 61  >60 mL/min/1.73 Final   • BUN/Creatinine Ratio 01/09/2019 18.2  7.0 - 25.0 Final   • Anion Gap 01/09/2019 11.6  mmol/L Final   • Hemoglobin 01/09/2019 15.2  13.7 - 17.6 g/dL Final   • Hematocrit 01/09/2019 46.6  40.4 - 52.2 % Final   • Glucose 01/09/2019 184* 70 - 130 mg/dL Final   • Glucose 01/09/2019 103  70 - 130 mg/dL Final       Imaging Results (last 72 hours)     Procedure Component Value Units Date/Time    XR Knee 1 or 2 View Right [[id number]] Collected:  01/08/19 1214     Updated:  01/08/19 1218    Narrative:       Right knee radiograph     HISTORY: Postop     TECHNIQUE: AP and lateral radiograph the right knee     COMPARISON: Right knee radiographs 11/8/2017     FINDINGS:  Post surgical changes from recent right total knee arthroplasty are  present. The hardware is intact. There is no new periprosthetic  fracture.       Impression:       Postoperative changes from recent right total knee  arthroplasty without findings of immediate complication.     This report was finalized on 1/8/2019 12:15 PM by Dr. Elliot Laguna M.D.             Personally viewed ortho images and report     Assessment:  Weakness in the left knee when doing PT for the right, enc to use walker with precautions, will keep for additional PT session in the AM, Also Dr. Madrigal ordered consult with Dr. Malloy for diabetes management.  Will continue to monitor post op blood sugars for better control prior to dc.   Doing well POD  # 1 Day Post-Op following total joint replacement  Acute Blood Loss Anemia, stable  Post-operative Pain  Limited mobility, requires use of walker and assistance when OOB.    Patient Active Problem List   Diagnosis   • Bulging lumbar disc   • Chronic pain   • Diabetic neuropathy (CMS/HCC)   • Low back pain   • Degenerative arthritis of lumbar spine   • Neuropathy involving both lower extremities   • Encounter for long-term  (current) use of high-risk medication   • Postlaminectomy syndrome of lumbar region   • Syringomyelia and syringobulbia (CMS/HCC)   • Lumbar pseudoarthrosis   • Type 2 diabetes mellitus with neurologic complication, with long-term current use of insulin (CMS/HCC)   • Insulin pump in place   • Hx of decompressive lumbar laminectomy   • Bilateral carpal tunnel syndrome   • Degenerative cervical disc   • Acute pain of right knee   • Primary localized osteoarthrosis of right lower leg   • Arthritis of right knee   • Sacroiliac inflammation (CMS/HCC)   • Sacroiliac joint dysfunction   • Severe obesity (BMI 35.0-39.9)   • Chronic pain of right knee   • Tricompartment osteoarthritis of left knee   • Tricompartment osteoarthritis of right knee   • Arthritis of left knee   • Bilateral chronic knee pain   • Hypertension        Plan:    Dr. Madrigal for post op medical management.  Dr. Malloy for post op diabetes management.  Continue to monitor labs and/or v/s, for tolerance to post op blood loss.  Continue efforts to increase mobilization.  Continue Pain Control Measures.  Continue incisional Care.  DVT prophylaxis:  Discussed with Andres aCrter M.D., will resume Plavix and ASA as before surgery (sees Dr. [facility]).  Follow up in office with Andres Carter M.D. In 2 weeks.    Discharge Plan:tomorrow to home, home health and when cleared by physical therapy as safe for discharge    Date: 1/9/2019  KATHLEEN Boss  Seen today by Andres Carter M.D. and KATHLEEN Stewart

## 2019-01-09 NOTE — NURSING NOTE
Checked pts sugar as dinner trays arrived. Glucose was 68, repeat after dinner glucose was still 68.  I instructed the pt to turn off all insulin including basal insulin and gave him some chocolate cake that his family had at bedside.  Will recheck in an hour and determine course of action at that time.

## 2019-01-09 NOTE — PLAN OF CARE
Problem: Patient Care Overview  Goal: Plan of Care Review  Outcome: Ongoing (interventions implemented as appropriate)   01/09/19 1525   OTHER   Outcome Summary pt w/guarded amb due to weakness and flexed knees bilat; fam now agreed home w/HH if can stay a few extra days-as already approved by [employer]-so they can set up extra help at home   Coping/Psychosocial   Plan of Care Reviewed With patient;spouse   Plan of Care Review   Progress improving

## 2019-01-09 NOTE — PROGRESS NOTES
Continued Stay Note  [facility]     Patient Name: Kian Mcdaniels  MRN: [id number]  Today's Date: 1/9/2019    Admit Date: 1/8/2019    Discharge Plan     Row Name 01/09/19 1446       Plan    Plan  Home w/ family and [facility]     Patient/Family in Agreement with Plan  yes    Plan Comments  Facesheet and d/c plan verified, pt plans to d/c [place] ew/ spouse and [facility]. Bill/[facility] notified.         Discharge Codes    No documentation.             Lakisha Marcial RN

## 2019-01-09 NOTE — PLAN OF CARE
Problem: Patient Care Overview  Goal: Plan of Care Review  Outcome: Ongoing (interventions implemented as appropriate)   01/09/19 0136   OTHER   Outcome Summary patient ambulating with assistance, pain controlled at this time, patient using own insulin pump and consent to monitor on chart, patient states site to be changed in am, educated on b/p and glucose monitoring   Coping/Psychosocial   Plan of Care Reviewed With patient   Plan of Care Review   Progress improving     Goal: Individualization and Mutuality  Outcome: Ongoing (interventions implemented as appropriate)    Goal: Discharge Needs Assessment  Outcome: Ongoing (interventions implemented as appropriate)    Goal: Interprofessional Rounds/Family Conf  Outcome: Ongoing (interventions implemented as appropriate)      Problem: Fall Risk (Adult)  Goal: Absence of Fall  Outcome: Ongoing (interventions implemented as appropriate)      Problem: Knee Arthroplasty (Total, Partial) (Adult)  Goal: Signs and Symptoms of Listed Potential Problems Will be Absent, Minimized or Managed (Knee Arthroplasty)  Outcome: Ongoing (interventions implemented as appropriate)    Goal: Anesthesia/Sedation Recovery  Outcome: Ongoing (interventions implemented as appropriate)

## 2019-01-09 NOTE — PLAN OF CARE
Problem: Patient Care Overview  Goal: Plan of Care Review  Outcome: Ongoing (interventions implemented as appropriate)   01/09/19 4817   OTHER   Outcome Summary pain controlled with PO, glucose running low (pump turned off at this point), ambulating assist of 1 with walker, anahi changed today-small spot of blood on new one, plan for home with home health tom or Friday, educated on glucose control and meds   Coping/Psychosocial   Plan of Care Reviewed With patient   Plan of Care Review   Progress improving     Goal: Individualization and Mutuality  Outcome: Ongoing (interventions implemented as appropriate)      Problem: Fall Risk (Adult)  Goal: Absence of Fall  Outcome: Ongoing (interventions implemented as appropriate)      Problem: Knee Arthroplasty (Total, Partial) (Adult)  Goal: Signs and Symptoms of Listed Potential Problems Will be Absent, Minimized or Managed (Knee Arthroplasty)  Outcome: Outcome(s) achieved Date Met: 01/09/19    Goal: Anesthesia/Sedation Recovery  Outcome: Ongoing (interventions implemented as appropriate)

## 2019-01-09 NOTE — THERAPY TREATMENT NOTE
Acute Care - Physical Therapy Treatment Note  [facility]     Patient Name: Kian Mcdaniels  : 1955  MRN: [id number]  Today's Date: 2019  Onset of Illness/Injury or Date of Surgery: 19  Date of Referral to PT: 19  Referring Physician: Dr. Carter    Admit Date: 2019    Visit Dx:    ICD-10-CM ICD-9-CM   1. Status post total right knee replacement Z96.651 V43.65   2. Arthritis of right knee M17.11 716.96     Patient Active Problem List   Diagnosis   • Bulging lumbar disc   • Chronic pain   • Diabetic neuropathy (CMS/HCC)   • Low back pain   • Degenerative arthritis of lumbar spine   • Neuropathy involving both lower extremities   • Encounter for long-term (current) use of high-risk medication   • Postlaminectomy syndrome of lumbar region   • Syringomyelia and syringobulbia (CMS/HCC)   • Lumbar pseudoarthrosis   • Type 2 diabetes mellitus with neurologic complication, with long-term current use of insulin (CMS/HCC)   • Insulin pump in place   • Hx of decompressive lumbar laminectomy   • Bilateral carpal tunnel syndrome   • Degenerative cervical disc   • Acute pain of right knee   • Primary localized osteoarthrosis of right lower leg   • Arthritis of right knee   • Sacroiliac inflammation (CMS/HCC)   • Sacroiliac joint dysfunction   • Severe obesity (BMI 35.0-39.9)   • Chronic pain of right knee   • Tricompartment osteoarthritis of left knee   • Tricompartment osteoarthritis of right knee   • Arthritis of left knee   • Bilateral chronic knee pain   • Hypertension       Therapy Treatment    Rehabilitation Treatment Summary     Row Name 19 1529 19 1218          Treatment Time/Intention    Discipline  physical therapy assistant  -ROSA  physical therapy assistant  -ROSA     Document Type  therapy note (daily note)  -ROSA  therapy note (daily note)  -ROSA     Subjective Information  complains of;weakness;fatigue;pain;swelling  -ROSA  complains of;weakness;fatigue;pain;swelling  -ROSA     Care  Plan Review  patient/other agree to care plan  -  patient/other agree to care plan  -     Care Plan Review, Other Participant(s)  --  spouse  -     Comment  incr fatigue, falling asleep in gym  -  spouse concerned about home alone until weekend-trying to get off work to assist pt possible SNU, but fam states Ins approved 5 days in hosp  -     Existing Precautions/Restrictions  fall  -JM  fall  -JM     Treatment Considerations/Comments  improved amb w/less knee flex if constantly cued, but decr dist  -  pt amb w/bilat knee flex c/o having back issues that also make standing up diff  -JM2     Recorded by [] Luisa Bingham, PTA 01/09/19 1532 [] Luisa Bingham, [facility] 01/09/19 1334  [2] Luisa Bingham, [facility] 01/09/19 1525     Row Name 01/09/19 1529 01/09/19 1218          Bed Mobility Assessment/Treatment    Comment (Bed Mobility)  in chair  -  in chair  -     Recorded by [] Luisa Bingham, PTA 01/09/19 1532 [] Luisa Bingham, PTA 01/09/19 1334     Row Name 01/09/19 1529 01/09/19 1218          Sit-Stand Transfer    Sit-Stand [place] (Transfers)  minimum assist (75% patient effort);moderate assist (50% patient effort)  -  minimum assist (75% patient effort);moderate assist (50% patient effort)  -     Assistive Device (Sit-Stand Transfers)  walker, front-wheeled  -  walker, front-wheeled  -     Recorded by [] Luisa Bingham, PTA 01/09/19 1532 [JM] Luisa Bingham, PTA 01/09/19 1334     Row Name 01/09/19 1529 01/09/19 1218          Stand-Sit Transfer    Stand-Sit [place] (Transfers)  contact guard;minimum assist (75% patient effort);verbal cues  -  contact guard;minimum assist (75% patient effort);verbal cues  -     Assistive Device (Stand-Sit Transfers)  walker, front-wheeled  -  walker, front-wheeled  -     Recorded by [] Luisa Bingham, PTA 01/09/19 1532 [] Luisa Bingham, PTA 01/09/19 1334     Row Name 01/09/19 1529 01/09/19 1218          Gait/Stairs  Assessment/Training    [place] Level (Gait)  minimum assist (75% patient effort);contact guard;verbal cues  -  minimum assist (75% patient effort)  -     Assistive Device (Gait)  walker, front-wheeled  -JM  walker, front-wheeled  -     Distance in Feet (Gait)  20  -JM  30  -JM     Deviations/Abnormal Patterns (Gait)  antalgic;latanya decreased;stride length decreased  -  antalgic;latanya decreased;stride length decreased  -JM2     Bilateral Gait Deviations  forward flexed posture;weight shift ability decreased  -JM  forward flexed posture;weight shift ability decreased  -JM2     Comment (Gait/Stairs)  less knee flex, but constant cues to correct; decr dist due to back also fatigued/painful  -  pt fatigues quickly, in part due to weak back from prior issues, amb w/chair following closely due to amb w/bilat knee flexion  -JM3     Recorded by [JM] Luisa Bingham, [facility] 01/09/19 1532 [JM] Luisa Bingham, [facility] 01/09/19 1334  [JM2] Luisa Bingham, [facility] 01/09/19 1452  [JM3] Luisa Bingham, [facility] 01/09/19 1525     Row Name 01/09/19 1218             General ROM    GENERAL ROM COMMENTS  -7-72 R knee  -      Recorded by [] Luisa Bingham, [facility] 01/09/19 1525      Row Name 01/09/19 1529 01/09/19 1218          Therapeutic Exercise    Comment (Therapeutic Exercise)  TKR protocol x 20 reps w/assist  -  TKR protocol x 15 reps w/assist  -     Recorded by [JM] Luisa Bingham, [facility] 01/09/19 1532 [JM] Luisa Bingham, [facility] 01/09/19 1525     Row Name 01/09/19 1529 01/09/19 1218          Positioning and Restraints    Pre-Treatment Position  sitting in chair/recliner  -  sitting in chair/recliner  -     In Chair  reclined;call light within reach;encouraged to call for assist;with family/caregiver;notified nsg  -ROSA  reclined;call light within reach;encouraged to call for assist;with family/caregiver;notified nsg  -ROSA     Recorded by [JM] Luisa Bingham, [facility] 01/09/19 1532 [JM] Luisa Bingham PTA 01/09/19  1525     Row Name 01/09/19 1529 01/09/19 1218          Pain Scale: Numbers Pre/Post-Treatment    Pain Scale: Numbers, Post-Treatment  6/10  -JM  5/10  -JM     Pain Location - Side  Right  -JM  Right  -JM     Pain Location  knee  -JM  knee  -JM     Pain Intervention(s)  Medication (See MAR);Repositioned;Cold applied  -JM  Medication (See MAR);Repositioned;Cold applied  -JM     Recorded by [ROSA] Luisa Bingham PTA 01/09/19 1532 [ROSA] Luisa Bingham PTA 01/09/19 1525     Row Name                Wound 01/08/19 1023 Right knee incision    Wound - Properties Group Date first assessed: 01/08/19 [HH] Time first assessed: 1023 [] Side: Right [HH] Location: knee [HH] Type: incision [HH] Recorded by:  [] Destiny Bowen RN 01/08/19 1023      User Key  (r) = Recorded By, (t) = Taken By, (c) = Cosigned By    Initials Name Effective Dates Discipline     Destiny Bowen RN 06/16/16 -  Nurse    Luisa Malik, SREEKANTH 03/07/18 -  PT          Wound 01/08/19 1023 Right knee incision (Active)   Dressing Appearance dried drainage 1/9/2019  9:09 AM   Closure SISSY 1/9/2019  9:09 AM   Base dressing in place, unable to visualize 1/9/2019  9:09 AM   Drainage Amount moderate 1/9/2019  9:09 AM   Dressing Care, Wound other (see comments) 1/9/2019  9:09 AM           Physical Therapy Education     Title: PT OT SLP Therapies (Done)     Topic: Physical Therapy (Done)     Point: Mobility training (Done)     Learning Progress Summary           Patient Acceptance, E,TB,D, VU,NR by ROSA at 1/9/2019  3:27 PM    Acceptance, E, VU by MA at 1/8/2019  4:22 PM   Family Acceptance, E,TB,D, VU,NR by ROSA at 1/9/2019  3:27 PM                   Point: Home exercise program (Done)     Learning Progress Summary           Patient Acceptance, E,TB,D, VU,NR by ROSA at 1/9/2019  3:27 PM    Acceptance, E, VU by MA at 1/8/2019  4:22 PM   Family Acceptance, E,TB,D, VU,NR by ROSA at 1/9/2019  3:27 PM                   Point: Body mechanics (Done)     Learning  Progress Summary           Patient Acceptance, E,TB,D, VU,NR by  at 1/9/2019  3:27 PM    Acceptance, E, VU by MA at 1/8/2019  4:22 PM   Family Acceptance, E,TB,D, VU,NR by  at 1/9/2019  3:27 PM                   Point: Precautions (Done)     Learning Progress Summary           Patient Acceptance, E,TB,D, VU,NR by  at 1/9/2019  3:27 PM    Acceptance, E, VU by MA at 1/8/2019  4:22 PM   Family Acceptance, E,TB,D, VU,NR by  at 1/9/2019  3:27 PM                               User Key     Initials Effective Dates Name Provider Type Discipline     03/07/18 -  Luisa Bingham PTA Physical Therapy Assistant PT    MA 04/03/18 -  Jovita Flores, PT Physical Therapist PT                PT Recommendation and Plan     Plan of Care Reviewed With: patient, spouse  Progress: improving  Outcome Summary: pt w/guarded amb due to weakness and flexed knees bilat; fam now agreed home w/HH if can stay a few extra days-as already approved by [employer]-so they can set up extra help at home  Outcome Measures     Row Name 01/09/19 1500 01/08/19 1600          How much help from another person do you currently need...    Turning from your back to your side while in flat bed without using bedrails?  3  -  2  -MA     Moving from lying on back to sitting on the side of a flat bed without bedrails?  3  -  2  -MA     Moving to and from a bed to a chair (including a wheelchair)?  3  -  3  -MA     Standing up from a chair using your arms (e.g., wheelchair, bedside chair)?  2  -  3  -MA     Climbing 3-5 steps with a railing?  1  -  1  -MA     To walk in hospital room?  3  -  1  -MA     AM-PAC 6 Clicks Score  15  -  12  -MA        Functional Assessment    Outcome Measure Options  --  AM-PAC 6 Clicks Basic Mobility (PT)  -MA       User Key  (r) = Recorded By, (t) = Taken By, (c) = Cosigned By    Initials Name Provider Type    Luisa Malik PTA Physical Therapy Assistant    Jovita Bobby, PT Physical Therapist          Time Calculation:   PT Charges     Row Name 01/09/19 1532 01/09/19 1528          Time Calculation    Start Time  1410  -ROSA  1050  -ROSA     Stop Time  1515  -ROSA  1155  -ROSA     Time Calculation (min)  65 min  -ROSA  65 min  -ROSA     PT Received On  01/09/19  -ROSA  01/09/19  -ROSA     PT - Next Appointment  01/10/19  -ROSA  01/09/19  -ROSA        Time Calculation- PT    Total Timed Code Minutes- PT  35 minute(s)  -JM  30 minute(s)  -ROSA       User Key  (r) = Recorded By, (t) = Taken By, (c) = Cosigned By    Initials Name Provider Type    Luisa Malik PTA Physical Therapy Assistant        Therapy Suggested Charges     Code   Minutes Charges    None           Therapy Charges for Today     Code Description Service Date Service Provider Modifiers Qty    18415019259 HC PT THER PROC EA 15 MIN 1/9/2019 Luisa Bingham, SREEKANTH GP 2    54281553029 HC PT THER PROC GROUP 1/9/2019 Luisa Bingham PTA GP 1    44440542275 HC PT THER PROC EA 15 MIN 1/9/2019 Luisa Bingham PTA GP 2    13739363326 HC PT THER PROC GROUP 1/9/2019 Luisa Bingham, SREEKANTH GP 1          PT G-Codes  Outcome Measure Options: AM-PAC 6 Clicks Basic Mobility (PT)  AM-PAC 6 Clicks Score: 15    Luisa Bingham PTA  1/9/2019

## 2019-01-10 ENCOUNTER — APPOINTMENT (OUTPATIENT)
Dept: MRI IMAGING | Facility: HOSPITAL | Age: 64
End: 2019-01-10
Attending: PSYCHIATRY & NEUROLOGY

## 2019-01-10 ENCOUNTER — APPOINTMENT (OUTPATIENT)
Dept: GENERAL RADIOLOGY | Facility: HOSPITAL | Age: 64
End: 2019-01-10

## 2019-01-10 LAB
ANION GAP SERPL CALCULATED.3IONS-SCNC: 11.8 MMOL/L
ANION GAP SERPL CALCULATED.3IONS-SCNC: 12.9 MMOL/L
ARTERIAL PATENCY WRIST A: POSITIVE
ATMOSPHERIC PRESS: 758.6 MMHG
BASE EXCESS BLDA CALC-SCNC: 0.5 MMOL/L (ref 0–2)
BASOPHILS # BLD AUTO: 0.03 10*3/MM3 (ref 0–0.2)
BASOPHILS # BLD AUTO: 0.04 10*3/MM3 (ref 0–0.2)
BASOPHILS NFR BLD AUTO: 0.2 % (ref 0–1.5)
BASOPHILS NFR BLD AUTO: 0.3 % (ref 0–1.5)
BDY SITE: ABNORMAL
BUN BLD-MCNC: 32 MG/DL (ref 8–23)
BUN BLD-MCNC: 32 MG/DL (ref 8–23)
BUN/CREAT SERPL: 19.8 (ref 7–25)
BUN/CREAT SERPL: 21.2 (ref 7–25)
CALCIUM SPEC-SCNC: 8.7 MG/DL (ref 8.6–10.5)
CALCIUM SPEC-SCNC: 8.7 MG/DL (ref 8.6–10.5)
CHLORIDE SERPL-SCNC: 97 MMOL/L (ref 98–107)
CHLORIDE SERPL-SCNC: 98 MMOL/L (ref 98–107)
CO2 SERPL-SCNC: 25.2 MMOL/L (ref 22–29)
CO2 SERPL-SCNC: 26.1 MMOL/L (ref 22–29)
CREAT BLD-MCNC: 1.51 MG/DL (ref 0.76–1.27)
CREAT BLD-MCNC: 1.62 MG/DL (ref 0.76–1.27)
DEPRECATED RDW RBC AUTO: 49.7 FL (ref 37–54)
DEPRECATED RDW RBC AUTO: 50.2 FL (ref 37–54)
EOSINOPHIL # BLD AUTO: 0.2 10*3/MM3 (ref 0–0.7)
EOSINOPHIL # BLD AUTO: 0.26 10*3/MM3 (ref 0–0.7)
EOSINOPHIL NFR BLD AUTO: 1.6 % (ref 0.3–6.2)
EOSINOPHIL NFR BLD AUTO: 2.1 % (ref 0.3–6.2)
ERYTHROCYTE [DISTWIDTH] IN BLOOD BY AUTOMATED COUNT: 15.6 % (ref 11.5–14.5)
ERYTHROCYTE [DISTWIDTH] IN BLOOD BY AUTOMATED COUNT: 15.7 % (ref 11.5–14.5)
GAS FLOW AIRWAY: 3 LPM
GFR SERPL CREATININE-BSD FRML MDRD: 53 ML/MIN/1.73
GFR SERPL CREATININE-BSD FRML MDRD: 57 ML/MIN/1.73
GLUCOSE BLD-MCNC: 140 MG/DL (ref 65–99)
GLUCOSE BLD-MCNC: 143 MG/DL (ref 65–99)
GLUCOSE BLDC GLUCOMTR-MCNC: 165 MG/DL (ref 70–130)
GLUCOSE BLDC GLUCOMTR-MCNC: 202 MG/DL (ref 70–130)
GLUCOSE BLDC GLUCOMTR-MCNC: 203 MG/DL (ref 70–130)
GLUCOSE BLDC GLUCOMTR-MCNC: 229 MG/DL (ref 70–130)
GLUCOSE BLDC GLUCOMTR-MCNC: 233 MG/DL (ref 70–130)
GLUCOSE BLDC GLUCOMTR-MCNC: 235 MG/DL (ref 70–130)
HCO3 BLDA-SCNC: 25.6 MMOL/L (ref 22–28)
HCT VFR BLD AUTO: 45.6 % (ref 40.4–52.2)
HCT VFR BLD AUTO: 47.8 % (ref 40.4–52.2)
HGB BLD-MCNC: 14.6 G/DL (ref 13.7–17.6)
HGB BLD-MCNC: 15.5 G/DL (ref 13.7–17.6)
IMM GRANULOCYTES # BLD AUTO: 0.03 10*3/MM3 (ref 0–0.03)
IMM GRANULOCYTES # BLD AUTO: 0.03 10*3/MM3 (ref 0–0.03)
IMM GRANULOCYTES NFR BLD AUTO: 0.2 % (ref 0–0.5)
IMM GRANULOCYTES NFR BLD AUTO: 0.2 % (ref 0–0.5)
INR PPP: 1.14 (ref 0.9–1.1)
LYMPHOCYTES # BLD AUTO: 2.18 10*3/MM3 (ref 0.9–4.8)
LYMPHOCYTES # BLD AUTO: 3.29 10*3/MM3 (ref 0.9–4.8)
LYMPHOCYTES NFR BLD AUTO: 17.8 % (ref 19.6–45.3)
LYMPHOCYTES NFR BLD AUTO: 26.5 % (ref 19.6–45.3)
MCH RBC QN AUTO: 27.4 PG (ref 27–32.7)
MCH RBC QN AUTO: 28.2 PG (ref 27–32.7)
MCHC RBC AUTO-ENTMCNC: 32 G/DL (ref 32.6–36.4)
MCHC RBC AUTO-ENTMCNC: 32.4 G/DL (ref 32.6–36.4)
MCV RBC AUTO: 85.7 FL (ref 79.8–96.2)
MCV RBC AUTO: 86.9 FL (ref 79.8–96.2)
MODALITY: ABNORMAL
MONOCYTES # BLD AUTO: 0.96 10*3/MM3 (ref 0.2–1.2)
MONOCYTES # BLD AUTO: 0.99 10*3/MM3 (ref 0.2–1.2)
MONOCYTES NFR BLD AUTO: 7.8 % (ref 5–12)
MONOCYTES NFR BLD AUTO: 8 % (ref 5–12)
NEUTROPHILS # BLD AUTO: 7.82 10*3/MM3 (ref 1.9–8.1)
NEUTROPHILS # BLD AUTO: 8.84 10*3/MM3 (ref 1.9–8.1)
NEUTROPHILS NFR BLD AUTO: 62.9 % (ref 42.7–76)
NEUTROPHILS NFR BLD AUTO: 72.4 % (ref 42.7–76)
PCO2 BLDA: 41.7 MM HG (ref 35–45)
PH BLDA: 7.4 PH UNITS (ref 7.35–7.45)
PLATELET # BLD AUTO: 206 10*3/MM3 (ref 140–500)
PLATELET # BLD AUTO: 210 10*3/MM3 (ref 140–500)
PMV BLD AUTO: 10 FL (ref 6–12)
PMV BLD AUTO: 9.9 FL (ref 6–12)
PO2 BLDA: 73.6 MM HG (ref 80–100)
POTASSIUM BLD-SCNC: 3.9 MMOL/L (ref 3.5–5.2)
POTASSIUM BLD-SCNC: 4.1 MMOL/L (ref 3.5–5.2)
PROTHROMBIN TIME: 14.4 SECONDS (ref 11.7–14.2)
RBC # BLD AUTO: 5.32 10*6/MM3 (ref 4.6–6)
RBC # BLD AUTO: 5.5 10*6/MM3 (ref 4.6–6)
SAO2 % BLDCOA: 94.5 % (ref 92–99)
SODIUM BLD-SCNC: 135 MMOL/L (ref 136–145)
SODIUM BLD-SCNC: 136 MMOL/L (ref 136–145)
TOTAL RATE: 20 BREATHS/MINUTE
WBC NRBC COR # BLD: 12.24 10*3/MM3 (ref 4.5–10.7)
WBC NRBC COR # BLD: 12.43 10*3/MM3 (ref 4.5–10.7)

## 2019-01-10 PROCEDURE — 99232 SBSQ HOSP IP/OBS MODERATE 35: CPT | Performed by: PSYCHIATRY & NEUROLOGY

## 2019-01-10 PROCEDURE — 36600 WITHDRAWAL OF ARTERIAL BLOOD: CPT

## 2019-01-10 PROCEDURE — 94799 UNLISTED PULMONARY SVC/PX: CPT

## 2019-01-10 PROCEDURE — A9577 INJ MULTIHANCE: HCPCS | Performed by: ORTHOPAEDIC SURGERY

## 2019-01-10 PROCEDURE — 0 GADOBENATE DIMEGLUMINE 529 MG/ML SOLUTION: Performed by: ORTHOPAEDIC SURGERY

## 2019-01-10 PROCEDURE — 82962 GLUCOSE BLOOD TEST: CPT

## 2019-01-10 PROCEDURE — 82803 BLOOD GASES ANY COMBINATION: CPT

## 2019-01-10 PROCEDURE — 70553 MRI BRAIN STEM W/O & W/DYE: CPT

## 2019-01-10 PROCEDURE — 70544 MR ANGIOGRAPHY HEAD W/O DYE: CPT

## 2019-01-10 PROCEDURE — 80048 BASIC METABOLIC PNL TOTAL CA: CPT | Performed by: INTERNAL MEDICINE

## 2019-01-10 PROCEDURE — 70549 MR ANGIOGRAPH NECK W/O&W/DYE: CPT

## 2019-01-10 PROCEDURE — 97150 GROUP THERAPEUTIC PROCEDURES: CPT

## 2019-01-10 PROCEDURE — 71045 X-RAY EXAM CHEST 1 VIEW: CPT

## 2019-01-10 PROCEDURE — 99233 SBSQ HOSP IP/OBS HIGH 50: CPT | Performed by: INTERNAL MEDICINE

## 2019-01-10 PROCEDURE — 97110 THERAPEUTIC EXERCISES: CPT

## 2019-01-10 PROCEDURE — 36415 COLL VENOUS BLD VENIPUNCTURE: CPT | Performed by: INTERNAL MEDICINE

## 2019-01-10 PROCEDURE — 85025 COMPLETE CBC W/AUTO DIFF WBC: CPT | Performed by: HOSPITALIST

## 2019-01-10 PROCEDURE — 63710000001 INSULIN LISPRO (HUMAN) PER 5 UNITS: Performed by: INTERNAL MEDICINE

## 2019-01-10 PROCEDURE — 80048 BASIC METABOLIC PNL TOTAL CA: CPT | Performed by: HOSPITALIST

## 2019-01-10 PROCEDURE — 99024 POSTOP FOLLOW-UP VISIT: CPT | Performed by: NURSE PRACTITIONER

## 2019-01-10 PROCEDURE — 63710000001 INSULIN GLARGINE PER 5 UNITS: Performed by: INTERNAL MEDICINE

## 2019-01-10 PROCEDURE — 85610 PROTHROMBIN TIME: CPT | Performed by: HOSPITALIST

## 2019-01-10 PROCEDURE — 85025 COMPLETE CBC W/AUTO DIFF WBC: CPT | Performed by: INTERNAL MEDICINE

## 2019-01-10 RX ORDER — GABAPENTIN 400 MG/1
400 CAPSULE ORAL 3 TIMES DAILY
Status: DISCONTINUED | OUTPATIENT
Start: 2019-01-10 | End: 2019-01-12 | Stop reason: HOSPADM

## 2019-01-10 RX ORDER — INSULIN GLARGINE 100 [IU]/ML
10 INJECTION, SOLUTION SUBCUTANEOUS EVERY 12 HOURS SCHEDULED
Status: DISCONTINUED | OUTPATIENT
Start: 2019-01-10 | End: 2019-01-11

## 2019-01-10 RX ADMIN — GABAPENTIN 800 MG: 400 CAPSULE ORAL at 08:14

## 2019-01-10 RX ADMIN — PANTOPRAZOLE SODIUM 40 MG: 40 TABLET, DELAYED RELEASE ORAL at 06:13

## 2019-01-10 RX ADMIN — ACETAMINOPHEN 325 MG: 325 TABLET, FILM COATED ORAL at 04:11

## 2019-01-10 RX ADMIN — ISOSORBIDE MONONITRATE 60 MG: 30 TABLET ORAL at 08:14

## 2019-01-10 RX ADMIN — INSULIN LISPRO 3 UNITS: 100 INJECTION, SOLUTION INTRAVENOUS; SUBCUTANEOUS at 12:01

## 2019-01-10 RX ADMIN — OXYCODONE HYDROCHLORIDE AND ACETAMINOPHEN 1 TABLET: 10; 325 TABLET ORAL at 08:28

## 2019-01-10 RX ADMIN — POTASSIUM CHLORIDE 10 MEQ: 750 CAPSULE, EXTENDED RELEASE ORAL at 08:15

## 2019-01-10 RX ADMIN — CLOPIDOGREL 75 MG: 75 TABLET, FILM COATED ORAL at 08:14

## 2019-01-10 RX ADMIN — INSULIN LISPRO 4 UNITS: 100 INJECTION, SOLUTION INTRAVENOUS; SUBCUTANEOUS at 12:02

## 2019-01-10 RX ADMIN — CARVEDILOL 12.5 MG: 3.12 TABLET, FILM COATED ORAL at 18:18

## 2019-01-10 RX ADMIN — INSULIN GLARGINE 10 UNITS: 100 INJECTION, SOLUTION SUBCUTANEOUS at 20:52

## 2019-01-10 RX ADMIN — CARVEDILOL 12.5 MG: 3.12 TABLET, FILM COATED ORAL at 08:14

## 2019-01-10 RX ADMIN — DOCUSATE SODIUM 100 MG: 100 CAPSULE, LIQUID FILLED ORAL at 20:54

## 2019-01-10 RX ADMIN — ASPIRIN 81 MG: 81 TABLET, DELAYED RELEASE ORAL at 08:14

## 2019-01-10 RX ADMIN — INSULIN LISPRO 3 UNITS: 100 INJECTION, SOLUTION INTRAVENOUS; SUBCUTANEOUS at 20:53

## 2019-01-10 RX ADMIN — INSULIN LISPRO 4 UNITS: 100 INJECTION, SOLUTION INTRAVENOUS; SUBCUTANEOUS at 18:19

## 2019-01-10 RX ADMIN — INSULIN LISPRO 3 UNITS: 100 INJECTION, SOLUTION INTRAVENOUS; SUBCUTANEOUS at 18:18

## 2019-01-10 RX ADMIN — FUROSEMIDE 40 MG: 40 TABLET ORAL at 20:56

## 2019-01-10 RX ADMIN — GABAPENTIN 400 MG: 400 CAPSULE ORAL at 16:29

## 2019-01-10 RX ADMIN — GADOBENATE DIMEGLUMINE 20 ML: 529 INJECTION, SOLUTION INTRAVENOUS at 10:07

## 2019-01-10 RX ADMIN — AMLODIPINE BESYLATE 10 MG: 10 TABLET ORAL at 08:14

## 2019-01-10 RX ADMIN — GABAPENTIN 400 MG: 400 CAPSULE ORAL at 20:54

## 2019-01-10 RX ADMIN — CETIRIZINE HYDROCHLORIDE 10 MG: 10 TABLET, FILM COATED ORAL at 08:14

## 2019-01-10 RX ADMIN — POLYETHYLENE GLYCOL 3350 17 G: 17 POWDER, FOR SOLUTION ORAL at 08:15

## 2019-01-10 RX ADMIN — INSULIN GLARGINE 10 UNITS: 100 INJECTION, SOLUTION SUBCUTANEOUS at 12:01

## 2019-01-10 RX ADMIN — OXYCODONE HYDROCHLORIDE AND ACETAMINOPHEN 1 TABLET: 10; 325 TABLET ORAL at 12:02

## 2019-01-10 RX ADMIN — OXYCODONE HYDROCHLORIDE AND ACETAMINOPHEN 1 TABLET: 10; 325 TABLET ORAL at 16:29

## 2019-01-10 RX ADMIN — ATORVASTATIN CALCIUM 40 MG: 20 TABLET, FILM COATED ORAL at 08:14

## 2019-01-10 RX ADMIN — OXYCODONE HYDROCHLORIDE AND ACETAMINOPHEN 1 TABLET: 10; 325 TABLET ORAL at 20:53

## 2019-01-10 RX ADMIN — DOCUSATE SODIUM 100 MG: 100 CAPSULE, LIQUID FILLED ORAL at 08:14

## 2019-01-10 RX ADMIN — LOSARTAN POTASSIUM 50 MG: 50 TABLET, FILM COATED ORAL at 08:14

## 2019-01-10 RX ADMIN — FAMOTIDINE 40 MG: 20 TABLET, FILM COATED ORAL at 08:14

## 2019-01-10 NOTE — PLAN OF CARE
Problem: Patient Care Overview  Goal: Plan of Care Review  Outcome: Ongoing (interventions implemented as appropriate)   01/10/19 6565   OTHER   Outcome Summary patient continued to get drowsy and confused through the night. Had issues with blood sugar dropping earlier, checked blood sugar multiple times. Held pain meds due to drowsiness. Unable to void, in and out cath done. Rapid response called for change in mental status. Neurologist on call was paged. For MRI in AM. Had some wheezing and low o2 sats,.. Dr Hawk made aware. For chest x-ray this morning. Ray dressing with some old drainage noted. Neuro vascular checks intact. Educated on blood sugar monitoring. verbalises understanding.   Coping/Psychosocial   Plan of Care Reviewed With patient   Plan of Care Review   Progress improving     Goal: Individualization and Mutuality  Outcome: Ongoing (interventions implemented as appropriate)    Goal: Discharge Needs Assessment  Outcome: Ongoing (interventions implemented as appropriate)    Goal: Interprofessional Rounds/Family Conf  Outcome: Ongoing (interventions implemented as appropriate)      Problem: Fall Risk (Adult)  Goal: Absence of Fall  Outcome: Ongoing (interventions implemented as appropriate)      Problem: Knee Arthroplasty (Total, Partial) (Adult)  Goal: Signs and Symptoms of Listed Potential Problems Will be Absent, Minimized or Managed (Knee Arthroplasty)  Outcome: Ongoing (interventions implemented as appropriate)

## 2019-01-10 NOTE — CONSULTS
Neurology Consult Note    Consult Date: 1/10/2019    Referring MD: Andres Carter MD    Reason for Consult I have been asked to see the patient in neurological consultation to render advice and opinion regarding altered mental status    Kian Mcdaniels is a 63 y.o. male with PMH of COPD, CAD, IDDM, here s/p total knee replacement of the right knee. Last night he became altered and drowsy and pain medication was held. His last dose of percocet had been late afternoon early evening. Around 0330 a rapid response was called. Rapid team reported that he was having difficulty answering questions and difficulty with hand coordination bilaterally. Suspicion was that he had some sort of metabolic encephalopathy. MRI was done this morning and was negative. His encephalopathy was improved but he still feels drowsy and mildly confused. His wife present at the bedside says he is much better than last night.     Past Medical/Surgical Hx:  Past Medical History:   Diagnosis Date   • Arteriosclerotic cardiovascular disease    • Arthritis    • At risk for sleep apnea    • COPD (chronic obstructive pulmonary disease) (CMS/HCC)    • Coronary artery disease    • Diabetes mellitus (CMS/HCC)    • Diabetic peripheral neuropathy (CMS/HCC)    • Disease of thyroid gland     NODULE PRESENT   • ED (erectile dysfunction) of non-organic origin    • GERD (gastroesophageal reflux disease)    • Headache disorder     DUE TO NECK   • Hyperlipidemia    • Hypertension    • Insulin pump in place    • Knee pain, bilateral    • Low back pain    • Myocardial infarction (CMS/HCC)    • Neck pain    • Spinal stenosis    • TIA (transient ischemic attack)     LEFT EYE   • Type 2 diabetes mellitus (CMS/HCC)    • Upper back pain    • Wears dentures     UPPER PLATE     Past Surgical History:   Procedure Laterality Date   • AMPUTATION FOOT / TOE Left     GREAT TOE   • BACK SURGERY  10/26/2015    Bilateral L4-L5 laminectomy -Dr. Gutierres   • CARDIAC CATHETERIZATION      • CARDIAC SURGERY      CABG X 6    • CHOLECYSTECTOMY     • CORONARY ARTERY BYPASS GRAFT      X 6   • EPIDURAL BLOCK     • EYE SURGERY      CATARACT EXTRACTION   • HAND SURGERY  04/28/2016   • LUMBAR DISCECTOMY FUSION INSTRUMENTATION N/A 12/12/2016    Procedure: L 4-5 FUSION WITH INSTRUMENTATION WITH BMP, WITH REMOVAL OF INSTRUMENTATION ;  Surgeon: Rowdy Spencer MD;  Location: [facility] MAIN OR;  Service:    • LUMBAR LAMINECTOMY DISCECTOMY DECOMPRESSION N/A 12/12/2016    Procedure: L4-5 REPEAT LAMINECTOMY ;  Surgeon: Tim Gutierres MD;  Location: [facility] MAIN OR;  Service:    • LUMBAR LAMINECTOMY DISCECTOMY DECOMPRESSION N/A 12/14/2016    Procedure: EVACUATION OF LUMBAR HEMATOMA;  Surgeon: Rowdy Spencer MD;  Location: [facility] MAIN OR;  Service:    • MULTIPLE TOOTH EXTRACTIONS      UPPER TEETH EXTRACTED   • ORTHOPEDIC SURGERY     • PENIS SURGERY      RECONSTRUCTION   • SCROTUM EXPLORATION      scrotum surgery   • SPINAL CORD STIMULATOR IMPLANT N/A 9/24/2018    Procedure: SPINAL CORD STIMULATOR Phase 1 - [employer];  Surgeon: Mulugeta Guzman MD;  Location: [facility] MAIN OR;  Service: Pain Management   • SPINE SURGERY     • TOTAL KNEE ARTHROPLASTY Right 1/8/2019    Procedure: RIGHT TOTAL KNEE ARTHROPLASTY WITH NEGRITA NAVIGATION;  Surgeon: Andres Carter MD;  Location: [facility] MAIN OR;  Service: Orthopedics       Medications On Admission  Medications Prior to Admission   Medication Sig Dispense Refill Last Dose   • albuterol (VENTOLIN HFA) 108 (90 BASE) MCG/ACT inhaler Inhale 1-2 puffs Every 6 (Six) Hours As Needed for wheezing (RESCUE INHALER).   1/8/2019 at 0645   • amLODIPine (NORVASC) 10 MG tablet Take 10 mg by mouth Every Morning.   1/8/2019 at 0645   • Ascorbic Acid (VITAMIN C PO) Take 1,000 mg by mouth Daily.   1/1/2019   • atorvastatin (LIPITOR) 40 MG tablet Take 40 mg by mouth Daily.  4 1/7/2019 at 2300   • Blood Glucose Monitoring Suppl (FREESTYLE FREEDOM LITE) W/DEVICE kit    1/8/2019 at 0823    • carvedilol (COREG) 12.5 MG tablet Take 12.5 mg by mouth 2 (Two) Times a Day With Meals.   1/8/2019 at 0645   • cetirizine (ZyrTEC) 10 MG tablet Take 10 mg by mouth Every Morning.   1/7/2019 at 0730   • clopidogrel (PLAVIX) 75 MG tablet Take 75 mg by mouth Every Morning. HOLDING FOR SURGERY   1/1/2019   • cyclobenzaprine (FLEXERIL) 10 MG tablet Take 1 tablet by mouth 2 (Two) Times a Day As Needed for Muscle Spasms. 60 tablet 2 1/7/2019 at 2300   • diclofenac (VOLTAREN) 1 % gel gel Apply 4 g topically 4 (Four) Times a Day. 3 tube 11 12/4/2018   • docusate sodium (COLACE) 100 MG capsule Take 100 mg by mouth 2 (Two) Times a Day.   1/7/2019 at 2300   • furosemide (LASIX) 40 MG tablet Take 40 mg by mouth Every Night.   1/7/2019 at 2000   • gabapentin (NEURONTIN) 400 MG capsule Take 800 mg by mouth 3 (Three) Times a Day.   1/7/2019 at 2300   • glucose blood (FREESTYLE LITE) test strip Test 4 times per day   1/8/2019   • HUMULIN R 500 UNIT/ML CONCENTRATED injection Inject 500 Units under the skin Continuous. Via insulin pump basal rate 0.68units/hr from 0000 to 1000  1000 to 1600 is 0.7units/hr  1600 to 0000 0.75units/hr  Plus sliding scale based on carb intake   1/8/2019 at 0827   • insulin degludec (TRESIBA FLEXTOUCH) 100 UNIT/ML solution pen-injector injection Inject 45 Units under the skin into the appropriate area as directed Every Morning.   1/7/2019 at 0730   • Insulin Infusion Pump (T:SLIM INSULIN PUMP) device    1/8/2019 at 0827   • isosorbide mononitrate (IMDUR) 60 MG 24 hr tablet Take 60 mg by mouth Every Morning.   1/8/2019 at 0645   • Lancets (FREESTYLE) lancets Test 4 times per day   1/8/2019 at Unknown time   • losartan (COZAAR) 50 MG tablet Take 50 mg by mouth Every Morning.   1/7/2019 at 0730   • Multiple Vitamin tablet Take 1 tablet by mouth Daily. HOLD FOR SURGERY   1/1/2019   • NON FORMULARY Compound cream - LIDOCAINE, GABAPENTIN, DICLOFENAC   1/5/2019   • oxyCODONE (ROXICODONE) 10 MG tablet Take 1  tablet by mouth Every 6 (Six) Hours As Needed for Severe Pain . 120 tablet 0 2019 at 0645   • pantoprazole (PROTONIX) 40 MG EC tablet Take 40 mg by mouth Every Morning.   2019 at 0645   • potassium chloride (K-DUR) 10 MEQ CR tablet Take 10 mEq by mouth Daily.   2019 at 0730   • raNITIdine (ZANTAC) 300 MG tablet Take 300 mg by mouth Every Night.   2019 at 2300   • TESTOSTERONE CYPIONATE IM Inject 1 mL into the appropriate muscle as directed by prescriber Every 14 (Fourteen) Days. 200mg/ml   2018   • vitamin D (ERGOCALCIFEROL) 30994 UNITS capsule capsule Take 50,000 Units by mouth Every 7 (Seven) Days. 2018   • aspirin 81 MG EC tablet Take 1 tablet by mouth Every Morning. (Patient taking differently: Take 81 mg by mouth Every Morning. HOLD FOR SURGERY)   2019   • NARCAN 4 MG/0.1ML nasal spray   0 Taking       Allergies:  Allergies   Allergen Reactions   • Erythromycin Nausea Only and Other (See Comments)     PT STATES ACUTE KIDNEY INJURY   • Lisinopril Other (See Comments)     2016 possibly caused pancreatitis per Dr Quinonez   • Metformin Nausea And Vomiting       Social Hx:  Social History     Socioeconomic History   • Marital status:      Spouse name: Not on file   • Number of children: Not on file   • Years of education: Not on file   • Highest education level: Not on file   Social Needs   • Financial resource strain: Not on file   • Food insecurity - worry: Not on file   • Food insecurity - inability: Not on file   • Transportation needs - medical: Not on file   • Transportation needs - non-medical: Not on file   Occupational History   • Not on file   Tobacco Use   • Smoking status: Former Smoker     Packs/day: 1.00     Years: 25.00     Pack years: 25.00     Types: Cigarettes     Start date:      Last attempt to quit: 2000     Years since quittin.0   • Smokeless tobacco: Never Used   Substance and Sexual Activity   • Alcohol use: No     Comment: no  "alcohol since 2000   • Drug use: No   • Sexual activity: Defer   Other Topics Concern   • Not on file   Social History Narrative   • Not on file       Family Hx:  Family History   Problem Relation Age of Onset   • Diabetes Other    • Heart disease Other    • Hypertension Other    • Kidney disease Other         renal failure   • Heart disease Maternal Grandmother    • Hypertension Maternal Grandmother    • Malig Hyperthermia Neg Hx        Review of Systems  No fevers, chills  + weakness, + numbness  No CP, SOA    Exam    /74 (BP Location: Left arm, Patient Position: Lying)   Pulse 93   Temp 100.3 °F (37.9 °C) (Oral)   Resp 16   Ht 181.6 cm (71.5\")   Wt 126 kg (277 lb 8 oz)   SpO2 95%   BMI 38.16 kg/m²   Gen: NAD, vitals reviewed  MS: oriented x3, recent/remote memory intact, impaired attention/concentration, language intact, no neglect.  CN: visual acuity grossly normal, PERRL, EOMI, no facial droop, no dysarthria  Motor: 5/5 throughout upper and lower extremities, normal tone  Sensory: Diminished to vibration distal LE bilaterally    DATA:    Lab Results   Component Value Date    GLUCOSE 143 (H) 01/10/2019    CALCIUM 8.7 01/10/2019     (L) 01/10/2019    K 4.1 01/10/2019    CO2 25.2 01/10/2019    CL 98 01/10/2019    BUN 32 (H) 01/10/2019    CREATININE 1.51 (H) 01/10/2019    EGFRIFAFRI 57 (L) 01/10/2019    BCR 21.2 01/10/2019    ANIONGAP 11.8 01/10/2019     Lab Results   Component Value Date    WBC 12.24 (H) 01/10/2019    HGB 14.6 01/10/2019    HCT 45.6 01/10/2019    MCV 85.7 01/10/2019     01/10/2019     No results found for: CHOL  No results found for: HDL  No results found for: LDL  No results found for: TRIG  Lab Results   Component Value Date    HGBA1C 10.5 (H) 01/29/2014     Lab Results   Component Value Date    INR 1.14 (H) 01/10/2019    INR 1.0 10/20/2015    INR 1.3 (H) 01/28/2014    PROTIME 14.4 (H) 01/10/2019    PROTIME 13.4 10/20/2015    PROTIME 14.1 (H) 01/28/2014       Lab " review: GFR 57, wbc 12    Imaging review: MRI/MRA personally reviewed which appears normal with no stroke or ICH, no flow limiting stenosis, radiology report pending.    Impression:  1) Metabolic encephalopathy, improving  2) Chronic kidney disease  3) S/p right knee replacement    Comment: I think this is hospital associated delirium related to a combination of post op pain and medications. His gabapentin dose is a little too high for his renal function so I'll reduce that. I think it might be contributing to his AMS. MRI is not showing stroke. He may have waxing/waning AMS till he gets out of the hospital and I warned his wife that this may happen in the future.    PLAN:  1.Gabapentin reduced to 400 tid    No further neuro workup planned for now. Will sign off, call us back if needed.

## 2019-01-10 NOTE — PROGRESS NOTES
Name: Kian Mcdaniels ADMIT: 2019   : 1955  PCP: Wyatt Harmon MD    MRN: [id number] LOS: 2 days   AGE/SEX: 63 y.o. male  ROOM: Count includes the [facility]     Subjective   Confused this morning, rapid response called. Neuro team has evaluated.  Family at bedside states mental status returning to normal. There is no facial asymmetry or unilateral muscle weakness.    Objective   Vital Signs  Temp:  [97.8 °F (36.6 °C)-101.9 °F (38.8 °C)] 99.8 °F (37.7 °C)  Heart Rate:  [] 88  Resp:  [16] 16  BP: (111-172)/(65-93) 128/65  SpO2:  [89 %-97 %] 93 %  on  Flow (L/min):  [2-3] 2;   Device (Oxygen Therapy): nasal cannula  Body mass index is 38.16 kg/m².    Physical Exam   Constitutional: He is oriented to person, place, and time. No distress.   Cardiovascular: Normal rate and regular rhythm.   No murmur heard.  Pulmonary/Chest: Effort normal and breath sounds normal.   Abdominal: Soft. Bowel sounds are normal. He exhibits no distension. There is no tenderness.   Musculoskeletal: Normal range of motion. He exhibits no edema.   Neurological: He is alert and oriented to person, place, and time.   Skin: Skin is warm and dry. He is not diaphoretic.       Results Review:       I reviewed the patient's new clinical results.  Results from last 7 days   Lab Units  01/10/19   0407  01/10/19   0129  01/09/19   0407   WBC 10*3/mm3  12.24*  12.43*   --    HEMOGLOBIN g/dL  14.6  15.5  15.2   PLATELETS 10*3/mm3  206  210   --      Results from last 7 days   Lab Units  01/10/19   0407  01/10/19   0129  01/09/19   0407   SODIUM mmol/L  135*  136  133*   POTASSIUM mmol/L  4.1  3.9  4.5   CHLORIDE mmol/L  98  97*  100   CO2 mmol/L  25.2  26.1  21.4*   BUN mg/dL  32*  32*  26*   CREATININE mg/dL  1.51*  1.62*  1.43*   GLUCOSE mg/dL  143*  140*  239*   Estimated Creatinine Clearance: 70 mL/min (A) (by C-G formula based on SCr of 1.51 mg/dL (H)).  Results from last 7 days   Lab Units  01/10/19   0407  01/10/19   0129  19   0407    CALCIUM mg/dL  8.7  8.7  8.8     Lab Results   Component Value Date    HGBA1C 10.5 (H) 01/29/2014     Glucose   Date/Time Value Ref Range Status   01/10/2019 1641 233 (H) 70 - 130 mg/dL Final   01/10/2019 1128 229 (H) 70 - 130 mg/dL Final   01/10/2019 0832 202 (H) 70 - 130 mg/dL Final   01/10/2019 0633 165 (H) 70 - 130 mg/dL Final   01/10/2019 0102 203 (H) 70 - 130 mg/dL Final   01/09/2019 2108 179 (H) 70 - 130 mg/dL Final   01/09/2019 1920 127 70 - 130 mg/dL Final   01/09/2019 1813 83 70 - 130 mg/dL Final         amLODIPine 10 mg Oral QAM   aspirin 81 mg Oral QAM   atorvastatin 40 mg Oral Daily   carvedilol 12.5 mg Oral BID With Meals   cetirizine 10 mg Oral QAM   clopidogrel 75 mg Oral QAM   docusate sodium 100 mg Oral BID   famotidine 40 mg Oral Daily   furosemide 40 mg Oral Nightly   gabapentin 400 mg Oral TID   insulin glargine 10 Units Subcutaneous Q12H   insulin lispro 2-5 Units Subcutaneous 4x Daily AC & at Bedtime   insulin lispro 4 Units Subcutaneous TID With Meals   isosorbide mononitrate 60 mg Oral QAM   ketorolac 30 mg Intravenous Once   losartan 50 mg Oral QAM   pantoprazole 40 mg Oral QAM   polyethylene glycol 17 g Oral Daily   potassium chloride 10 mEq Oral Daily       lactated ringers 9 mL/hr Last Rate: 9 mL/hr (01/08/19 0909)         Assessment/Plan      Active Hospital Problems    Diagnosis Date Noted   • **Arthritis of right knee [M17.11] 11/29/2017   • Hypertension [I10] 01/08/2019   • Bilateral chronic knee pain [M25.561, M25.562, G89.29] 11/06/2018   • Severe obesity (BMI 35.0-39.9) [E66.01] 03/08/2018   • Type 2 diabetes mellitus with neurologic complication, with long-term current use of insulin (CMS/[facility]) [E11.49, Z79.4] 12/12/2016   • Diabetic neuropathy (CMS/[facility]) [E11.40] 02/11/2016      Resolved Hospital Problems   No resolved problems to display.       Mr. Mcdaniels is a 63 y.o. male who is POD#2 RIGHT TOTAL KNEE ARTHROPLASTY WITH NEGRITA NAVIGATION.    · Seen by neurology due to  mental status changes. Stroke workup negative in mental status improving. Probably due to medications.  · Isolated fever this morning. Chest x-ray negative. Encouraged him to use incentive spirometer.  · Endocrine managing diabetes.  · BP stable, continue Norvasc.      Greg Madrigal MD  [facility]  01/10/19  6:16 PM

## 2019-01-10 NOTE — PROGRESS NOTES
Orthopedic Total Joint Progress Note        Patient: Kian Mcdaniels    Date of Admission: 1/8/2019  7:01 AM    YOB: 1955    Medical Record Number: [id number]    Attending Physician: Andres Carter MD      POD # 2 Days Post-Op Procedure(s) (LRB):  RIGHT TOTAL KNEE ARTHROPLASTY WITH NEGRITA NAVIGATION (Right)       Systemic or Specific Complaints:  Severe increase in confusion and mental status change in the middle of the night last night.  This morning patient is lying in bed, alert and oriented to person but not place and time.       Allergies:   Allergies   Allergen Reactions   • Erythromycin Nausea Only and Other (See Comments)     PT STATES ACUTE KIDNEY INJURY   • Lisinopril Other (See Comments)     7/2016 possibly caused pancreatitis per Dr Quinonez   • Metformin Nausea And Vomiting       Medications:   Current Medications:  Scheduled Meds:  amLODIPine 10 mg Oral QAM   aspirin 81 mg Oral QAM   atorvastatin 40 mg Oral Daily   carvedilol 12.5 mg Oral BID With Meals   cetirizine 10 mg Oral QAM   clopidogrel 75 mg Oral QAM   docusate sodium 100 mg Oral BID   famotidine 40 mg Oral Daily   furosemide 40 mg Oral Nightly   gabapentin 400 mg Oral TID   insulin glargine 10 Units Subcutaneous Q12H   insulin lispro 2-5 Units Subcutaneous 4x Daily AC & at Bedtime   insulin lispro 4 Units Subcutaneous TID With Meals   isosorbide mononitrate 60 mg Oral QAM   ketorolac 30 mg Intravenous Once   losartan 50 mg Oral QAM   pantoprazole 40 mg Oral QAM   polyethylene glycol 17 g Oral Daily   potassium chloride 10 mEq Oral Daily     Continuous Infusions:  lactated ringers 9 mL/hr Last Rate: 9 mL/hr (01/08/19 0909)     PRN Meds:.•  acetaminophen  •  albuterol sulfate HFA  •  bisacodyl  •  bisacodyl  •  calcium carbonate  •  cyclobenzaprine  •  dextrose  •  dextrose  •  diphenhydrAMINE **OR** diphenhydrAMINE  •  glucagon (human recombinant)  •  HYDROmorphone **AND** naloxone  •  magnesium hydroxide  •  ondansetron  "**OR** ondansetron ODT **OR** ondansetron  •  oxyCODONE-acetaminophen **OR** oxyCODONE-acetaminophen  •  promethazine      Physical Exam: 63 y.o. male Wt Readings from Last 3 Encounters:   01/08/19 126 kg (277 lb 8 oz)   01/07/19 126 kg (277 lb 6.4 oz)   01/03/19 130 kg (287 lb)     Ht Readings from Last 3 Encounters:   01/08/19 181.6 cm (71.5\")   01/07/19 179.1 cm (70.5\")   01/03/19 180.3 cm (71\")     Body mass index is 38.16 kg/m².  Facility age limit for growth percentiles is 20 years.    General Appearance:    alert and confused            Pain Relief: Patient reports good relief Vitals:    01/10/19 0300 01/10/19 0412 01/10/19 0718 01/10/19 1119   BP: 160/83  140/74 128/69   BP Location: Left arm  Left arm Left arm   Patient Position: Lying  Lying Lying   Pulse: 104  93 97   Resp: 16  16 16   Temp: (!) 101.9 °F (38.8 °C)  Comment: RN NOTIFIED 100.2 °F (37.9 °C)  Comment: tylenol given 100.3 °F (37.9 °C) 99.4 °F (37.4 °C)   TempSrc:  Oral Oral Oral   SpO2: 96%  95% 93%   Weight:       Height:              HEENT:    Normocephalic, without obvious abnormality, atraumatic.             Lungs:     Respirations regular, even and unlabored      Heart:    Regular controlled rate   Abdomen:     soft non-tender, non-distended       Extremities:   Operative extremity neurovascular status intact. ROM appropriate.  Incision intact w/out signs or symptoms of infection DEBBY dressing intact.  No cyanosis, calf is soft and nontender.     Pulses:     Pulses palpable and equal bilaterally     Skin:     Skin Warm/Dry w/out cyanosis     Activity: Mobilizing Per P.T.   Weight Bearing: As Tolerated    Diagnostic Tests:   Admission on 01/08/2019   Component Date Value Ref Range Status   • Glucose 01/08/2019 93  70 - 130 mg/dL Final   • Glucose 01/08/2019 84  70 - 130 mg/dL Final   • Glucose 01/08/2019 186* 70 - 130 mg/dL Final   • Glucose 01/08/2019 288* 70 - 130 mg/dL Final   • Glucose 01/09/2019 239* 65 - 99 mg/dL Final   • BUN " 01/09/2019 26* 8 - 23 mg/dL Final   • Creatinine 01/09/2019 1.43* 0.76 - 1.27 mg/dL Final   • Sodium 01/09/2019 133* 136 - 145 mmol/L Final   • Potassium 01/09/2019 4.5  3.5 - 5.2 mmol/L Final   • Chloride 01/09/2019 100  98 - 107 mmol/L Final   • CO2 01/09/2019 21.4* 22.0 - 29.0 mmol/L Final   • Calcium 01/09/2019 8.8  8.6 - 10.5 mg/dL Final   • eGFR   Amer 01/09/2019 61  >60 mL/min/1.73 Final   • BUN/Creatinine Ratio 01/09/2019 18.2  7.0 - 25.0 Final   • Anion Gap 01/09/2019 11.6  mmol/L Final   • Hemoglobin 01/09/2019 15.2  13.7 - 17.6 g/dL Final   • Hematocrit 01/09/2019 46.6  40.4 - 52.2 % Final   • Glucose 01/09/2019 184* 70 - 130 mg/dL Final   • Glucose 01/09/2019 103  70 - 130 mg/dL Final   • Glucose 01/09/2019 68* 70 - 130 mg/dL Final   • Glucose 01/09/2019 68* 70 - 130 mg/dL Final   • Glucose 01/09/2019 83  70 - 130 mg/dL Final   • Glucose 01/09/2019 127  70 - 130 mg/dL Final   • Glucose 01/09/2019 179* 70 - 130 mg/dL Final   • Glucose 01/10/2019 203* 70 - 130 mg/dL Final   • Glucose 01/10/2019 140* 65 - 99 mg/dL Final   • BUN 01/10/2019 32* 8 - 23 mg/dL Final   • Creatinine 01/10/2019 1.62* 0.76 - 1.27 mg/dL Final   • Sodium 01/10/2019 136  136 - 145 mmol/L Final   • Potassium 01/10/2019 3.9  3.5 - 5.2 mmol/L Final   • Chloride 01/10/2019 97* 98 - 107 mmol/L Final   • CO2 01/10/2019 26.1  22.0 - 29.0 mmol/L Final   • Calcium 01/10/2019 8.7  8.6 - 10.5 mg/dL Final   • eGFR   Amer 01/10/2019 53* >60 mL/min/1.73 Final   • BUN/Creatinine Ratio 01/10/2019 19.8  7.0 - 25.0 Final   • Anion Gap 01/10/2019 12.9  mmol/L Final   • Protime 01/10/2019 14.4* 11.7 - 14.2 Seconds Final   • INR 01/10/2019 1.14* 0.90 - 1.10 Final   • WBC 01/10/2019 12.43* 4.50 - 10.70 10*3/mm3 Final   • RBC 01/10/2019 5.50  4.60 - 6.00 10*6/mm3 Final   • Hemoglobin 01/10/2019 15.5  13.7 - 17.6 g/dL Final   • Hematocrit 01/10/2019 47.8  40.4 - 52.2 % Final   • MCV 01/10/2019 86.9  79.8 - 96.2 fL Final   • MCH 01/10/2019 28.2   27.0 - 32.7 pg Final   • MCHC 01/10/2019 32.4* 32.6 - 36.4 g/dL Final   • RDW 01/10/2019 15.7* 11.5 - 14.5 % Final   • RDW-SD 01/10/2019 50.2  37.0 - 54.0 fl Final   • MPV 01/10/2019 9.9  6.0 - 12.0 fL Final   • Platelets 01/10/2019 210  140 - 500 10*3/mm3 Final   • Neutrophil % 01/10/2019 62.9  42.7 - 76.0 % Final   • Lymphocyte % 01/10/2019 26.5  19.6 - 45.3 % Final   • Monocyte % 01/10/2019 8.0  5.0 - 12.0 % Final   • Eosinophil % 01/10/2019 2.1  0.3 - 6.2 % Final   • Basophil % 01/10/2019 0.3  0.0 - 1.5 % Final   • Immature Grans % 01/10/2019 0.2  0.0 - 0.5 % Final   • Neutrophils, Absolute 01/10/2019 7.82  1.90 - 8.10 10*3/mm3 Final   • Lymphocytes, Absolute 01/10/2019 3.29  0.90 - 4.80 10*3/mm3 Final   • Monocytes, Absolute 01/10/2019 0.99  0.20 - 1.20 10*3/mm3 Final   • Eosinophils, Absolute 01/10/2019 0.26  0.00 - 0.70 10*3/mm3 Final   • Basophils, Absolute 01/10/2019 0.04  0.00 - 0.20 10*3/mm3 Final   • Immature Grans, Absolute 01/10/2019 0.03  0.00 - 0.03 10*3/mm3 Final   • Site 01/10/2019 Arterial: right radial   Final   • Bruce's Test 01/10/2019 Positive   Final   • pH, Arterial 01/10/2019 7.396  7.350 - 7.450 pH units Final   • pCO2, Arterial 01/10/2019 41.7  35.0 - 45.0 mm Hg Final   • pO2, Arterial 01/10/2019 73.6* 80.0 - 100.0 mm Hg Final   • HCO3, Arterial 01/10/2019 25.6  22.0 - 28.0 mmol/L Final   • Base Excess, Arterial 01/10/2019 0.5  0.0 - 2.0 mmol/L Final   • O2 Saturation Calculated 01/10/2019 94.5  92.0 - 99.0 % Final   • Barometric Pressure for Blood Gas 01/10/2019 758.6  mmHg Final   • Modality 01/10/2019 Cannula   Final   • Flow Rate 01/10/2019 3  lpm Final   • Rate 01/10/2019 20  Breaths/minute Final   • Glucose 01/10/2019 143* 65 - 99 mg/dL Final   • BUN 01/10/2019 32* 8 - 23 mg/dL Final   • Creatinine 01/10/2019 1.51* 0.76 - 1.27 mg/dL Final   • Sodium 01/10/2019 135* 136 - 145 mmol/L Final   • Potassium 01/10/2019 4.1  3.5 - 5.2 mmol/L Final   • Chloride 01/10/2019 98  98 - 107 mmol/L  Final   • CO2 01/10/2019 25.2  22.0 - 29.0 mmol/L Final   • Calcium 01/10/2019 8.7  8.6 - 10.5 mg/dL Final   • eGFR   Amer 01/10/2019 57* >60 mL/min/1.73 Final   • BUN/Creatinine Ratio 01/10/2019 21.2  7.0 - 25.0 Final   • Anion Gap 01/10/2019 11.8  mmol/L Final   • WBC 01/10/2019 12.24* 4.50 - 10.70 10*3/mm3 Final   • RBC 01/10/2019 5.32  4.60 - 6.00 10*6/mm3 Final   • Hemoglobin 01/10/2019 14.6  13.7 - 17.6 g/dL Final   • Hematocrit 01/10/2019 45.6  40.4 - 52.2 % Final   • MCV 01/10/2019 85.7  79.8 - 96.2 fL Final   • MCH 01/10/2019 27.4  27.0 - 32.7 pg Final   • MCHC 01/10/2019 32.0* 32.6 - 36.4 g/dL Final   • RDW 01/10/2019 15.6* 11.5 - 14.5 % Final   • RDW-SD 01/10/2019 49.7  37.0 - 54.0 fl Final   • MPV 01/10/2019 10.0  6.0 - 12.0 fL Final   • Platelets 01/10/2019 206  140 - 500 10*3/mm3 Final   • Neutrophil % 01/10/2019 72.4  42.7 - 76.0 % Final   • Lymphocyte % 01/10/2019 17.8* 19.6 - 45.3 % Final   • Monocyte % 01/10/2019 7.8  5.0 - 12.0 % Final   • Eosinophil % 01/10/2019 1.6  0.3 - 6.2 % Final   • Basophil % 01/10/2019 0.2  0.0 - 1.5 % Final   • Immature Grans % 01/10/2019 0.2  0.0 - 0.5 % Final   • Neutrophils, Absolute 01/10/2019 8.84* 1.90 - 8.10 10*3/mm3 Final   • Lymphocytes, Absolute 01/10/2019 2.18  0.90 - 4.80 10*3/mm3 Final   • Monocytes, Absolute 01/10/2019 0.96  0.20 - 1.20 10*3/mm3 Final   • Eosinophils, Absolute 01/10/2019 0.20  0.00 - 0.70 10*3/mm3 Final   • Basophils, Absolute 01/10/2019 0.03  0.00 - 0.20 10*3/mm3 Final   • Immature Grans, Absolute 01/10/2019 0.03  0.00 - 0.03 10*3/mm3 Final   • Glucose 01/10/2019 165* 70 - 130 mg/dL Final   • Glucose 01/10/2019 202* 70 - 130 mg/dL Final   • Glucose 01/10/2019 229* 70 - 130 mg/dL Final       Imaging Results (last 72 hours)     Procedure Component Value Units Date/Time    MRI Brain With & Without Contrast [[id number]] Collected:  01/10/19 1210     Updated:  01/10/19 1210    Narrative:       MRI EXAMINATION OF BRAIN WITH AND WITHOUT  CONTRAST, MRA OF THE HEAD  WITHOUT CONTRAST AND MRA OF THE NECK WITH AND WITHOUT CONTRAST     HISTORY:  Stroke.     COMPARISON: None.     MRI EXAMINATION OF BRAIN WITH AND WITHOUT CONTRAST     A MRI examination of the brain was performed before and after the  intravenous administration of contrast utilizing sagittal T1, axial  diffusion, T1, T2, T2 FLAIR, susceptibility weighted imaging as well as  axial and coronal T1 postcontrast weighted sequences.     FINDINGS: There is no evidence of restricted diffusion to suggest acute  infarction. There is expected flow-void in the basilar artery and in the  distal aspect of the internal carotid arteries bilaterally on the axial  T2 sequence. The axial T2 FLAIR sequence demonstrates a mild degree of  increased signal intensity involving the white matter of the cerebral  hemispheres bilaterally, nonspecific but suggesting minimal small vessel  ischemic disease.     After contrast administration there was no evidence of abnormal  enhancement.       Impression:       No evidence of acute infarction, mass or of abnormal  enhancement. Minimal small vessel ischemic disease.        MRA OF THE HEAD WITHOUT CONTRAST     The study is hampered somewhat by patient motion. There is signal  present within the distal aspect of the vertebral arteries bilaterally.  The vertebral arteries are codominant. The basilar artery and the  proximal aspects of the posterior cerebral arteries appear unremarkable.     There is signal present within the distal aspect of the internal carotid  arteries bilaterally. The right A1 segment is moderately hypoplastic.  The proximal aspects of the anterior and middle cerebral arteries appear  unremarkable.     IMPRESSION: The study is hampered somewhat by patient motion. There is  no evidence of a focal high-grade stenosis or aneurysm. The right A1  segment is hypoplastic.           MRA OF THE NECK WITH AND WITHOUT CONTRAST     The study is hampered by patient  motion. The great vessels are arranged  in a classic configuration. There is 0% stenosis involving the carotid  bifurcations using NASCET criteria. There is signal loss at the proximal  aspect of the right common carotid artery and to a lesser extent the  left common carotid artery, likely artifactual.     IMPRESSION: The study is hampered by artifact. There is signal loss  involving the proximal aspects of the common carotid arteries  bilaterally. This is likely artifactual. Underlying stenosis cannot be  entirely excluded. Further evaluation could be performed with a CT  angiogram of the neck and head as indicated.       MRI Angiogram Head Without Contrast [[id number]] Collected:  01/10/19 1210     Updated:  01/10/19 1210    Narrative:       MRI EXAMINATION OF BRAIN WITH AND WITHOUT CONTRAST, MRA OF THE HEAD  WITHOUT CONTRAST AND MRA OF THE NECK WITH AND WITHOUT CONTRAST     HISTORY:  Stroke.     COMPARISON: None.     MRI EXAMINATION OF BRAIN WITH AND WITHOUT CONTRAST     A MRI examination of the brain was performed before and after the  intravenous administration of contrast utilizing sagittal T1, axial  diffusion, T1, T2, T2 FLAIR, susceptibility weighted imaging as well as  axial and coronal T1 postcontrast weighted sequences.     FINDINGS: There is no evidence of restricted diffusion to suggest acute  infarction. There is expected flow-void in the basilar artery and in the  distal aspect of the internal carotid arteries bilaterally on the axial  T2 sequence. The axial T2 FLAIR sequence demonstrates a mild degree of  increased signal intensity involving the white matter of the cerebral  hemispheres bilaterally, nonspecific but suggesting minimal small vessel  ischemic disease.     After contrast administration there was no evidence of abnormal  enhancement.       Impression:       No evidence of acute infarction, mass or of abnormal  enhancement. Minimal small vessel ischemic disease.        MRA OF THE HEAD  WITHOUT CONTRAST     The study is hampered somewhat by patient motion. There is signal  present within the distal aspect of the vertebral arteries bilaterally.  The vertebral arteries are codominant. The basilar artery and the  proximal aspects of the posterior cerebral arteries appear unremarkable.     There is signal present within the distal aspect of the internal carotid  arteries bilaterally. The right A1 segment is moderately hypoplastic.  The proximal aspects of the anterior and middle cerebral arteries appear  unremarkable.     IMPRESSION: The study is hampered somewhat by patient motion. There is  no evidence of a focal high-grade stenosis or aneurysm. The right A1  segment is hypoplastic.           MRA OF THE NECK WITH AND WITHOUT CONTRAST     The study is hampered by patient motion. The great vessels are arranged  in a classic configuration. There is 0% stenosis involving the carotid  bifurcations using NASCET criteria. There is signal loss at the proximal  aspect of the right common carotid artery and to a lesser extent the  left common carotid artery, likely artifactual.     IMPRESSION: The study is hampered by artifact. There is signal loss  involving the proximal aspects of the common carotid arteries  bilaterally. This is likely artifactual. Underlying stenosis cannot be  entirely excluded. Further evaluation could be performed with a CT  angiogram of the neck and head as indicated.       MRI Angiogram Neck With & Without Contrast [[id number]] Collected:  01/10/19 1210     Updated:  01/10/19 1210    Narrative:       MRI EXAMINATION OF BRAIN WITH AND WITHOUT CONTRAST, MRA OF THE HEAD  WITHOUT CONTRAST AND MRA OF THE NECK WITH AND WITHOUT CONTRAST     HISTORY:  Stroke.     COMPARISON: None.     MRI EXAMINATION OF BRAIN WITH AND WITHOUT CONTRAST     A MRI examination of the brain was performed before and after the  intravenous administration of contrast utilizing sagittal T1, axial  diffusion, T1, T2, T2  FLAIR, susceptibility weighted imaging as well as  axial and coronal T1 postcontrast weighted sequences.     FINDINGS: There is no evidence of restricted diffusion to suggest acute  infarction. There is expected flow-void in the basilar artery and in the  distal aspect of the internal carotid arteries bilaterally on the axial  T2 sequence. The axial T2 FLAIR sequence demonstrates a mild degree of  increased signal intensity involving the white matter of the cerebral  hemispheres bilaterally, nonspecific but suggesting minimal small vessel  ischemic disease.     After contrast administration there was no evidence of abnormal  enhancement.       Impression:       No evidence of acute infarction, mass or of abnormal  enhancement. Minimal small vessel ischemic disease.        MRA OF THE HEAD WITHOUT CONTRAST     The study is hampered somewhat by patient motion. There is signal  present within the distal aspect of the vertebral arteries bilaterally.  The vertebral arteries are codominant. The basilar artery and the  proximal aspects of the posterior cerebral arteries appear unremarkable.     There is signal present within the distal aspect of the internal carotid  arteries bilaterally. The right A1 segment is moderately hypoplastic.  The proximal aspects of the anterior and middle cerebral arteries appear  unremarkable.     IMPRESSION: The study is hampered somewhat by patient motion. There is  no evidence of a focal high-grade stenosis or aneurysm. The right A1  segment is hypoplastic.           MRA OF THE NECK WITH AND WITHOUT CONTRAST     The study is hampered by patient motion. The great vessels are arranged  in a classic configuration. There is 0% stenosis involving the carotid  bifurcations using NASCET criteria. There is signal loss at the proximal  aspect of the right common carotid artery and to a lesser extent the  left common carotid artery, likely artifactual.     IMPRESSION: The study is hampered by  artifact. There is signal loss  involving the proximal aspects of the common carotid arteries  bilaterally. This is likely artifactual. Underlying stenosis cannot be  entirely excluded. Further evaluation could be performed with a CT  angiogram of the neck and head as indicated.       XR Chest 1 View [[id number]] Collected:  01/10/19 0526     Updated:  01/10/19 0530    Narrative:       PORTABLE CHEST RADIOGRAPH     HISTORY: Wheezing     COMPARISON: September 19, 2018     FINDINGS:  Cardiomegaly is identified. There is no evidence of vascular congestion.  Lung volumes are diminished, with bibasilar atelectasis noted. No  definite pneumothorax or pleural effusion is seen. There are changes of  prior median sternotomy with coronary artery bypass grafting.       Impression:       Overall diminished lung volumes with bibasilar atelectasis.     This report was finalized on 1/10/2019 5:27 AM by Dr. Genevieve Gonsalez M.D.       XR Knee 1 or 2 View Right [[id number]] Collected:  01/08/19 1214     Updated:  01/08/19 1218    Narrative:       Right knee radiograph     HISTORY: Postop     TECHNIQUE: AP and lateral radiograph the right knee     COMPARISON: Right knee radiographs 11/8/2017     FINDINGS:  Post surgical changes from recent right total knee arthroplasty are  present. The hardware is intact. There is no new periprosthetic  fracture.       Impression:       Postoperative changes from recent right total knee  arthroplasty without findings of immediate complication.     This report was finalized on 1/8/2019 12:15 PM by Dr. Elliot Laguna M.D.             Personally viewed ortho images and report chest xr reviewed and discussed with patient and his wife at bedside.      Assessment: Acute mental status change during the night being worked up by Neurology.  Doing well orthopedically POD  # 2 Days Post-Op following total joint replacement  Acute Blood Loss Anemia, stable  Post-operative Pain  Limited mobility, requires use  of walker and assistance when OOB.    Patient Active Problem List   Diagnosis   • Bulging lumbar disc   • Chronic pain   • Diabetic neuropathy (CMS/HCC)   • Low back pain   • Degenerative arthritis of lumbar spine   • Neuropathy involving both lower extremities   • Encounter for long-term (current) use of high-risk medication   • Postlaminectomy syndrome of lumbar region   • Syringomyelia and syringobulbia (CMS/HCC)   • Lumbar pseudoarthrosis   • Type 2 diabetes mellitus with neurologic complication, with long-term current use of insulin (CMS/HCC)   • Insulin pump in place   • Hx of decompressive lumbar laminectomy   • Bilateral carpal tunnel syndrome   • Degenerative cervical disc   • Acute pain of right knee   • Primary localized osteoarthrosis of right lower leg   • Arthritis of right knee   • Sacroiliac inflammation (CMS/HCC)   • Sacroiliac joint dysfunction   • Severe obesity (BMI 35.0-39.9)   • Chronic pain of right knee   • Tricompartment osteoarthritis of left knee   • Tricompartment osteoarthritis of right knee   • Arthritis of left knee   • Bilateral chronic knee pain   • Hypertension        Plan:  Dr. Malloy endocrinology, blood sugars improving today.  Neuro consult in progress to evaluate acute mental status change, improving.  [facility] for medical management.  Continue to monitor labs and/or v/s, for tolerance to post op blood loss.  Continue efforts to increase mobilization.  Continue Pain Control Measures.  Continue incisional Care.  DVT prophylaxis.  Follow up in office with Andres Carter M.D. In 2 weeks.  Will reevaluate in AM for discharge pending medical clearance.    Discharge Plan:per Managing MD to home, home health and when cleared by physical therapy as safe for discharge    Date: 1/10/2019  Kaylee Jorge, APRN

## 2019-01-10 NOTE — NURSING NOTE
Patient was found to be drowsy, confused and some intermittent shaking noted. Blood sugar checked- 203. Rapid response called for change in mental status.

## 2019-01-10 NOTE — PLAN OF CARE
Problem: Patient Care Overview  Goal: Plan of Care Review  Outcome: Ongoing (interventions implemented as appropriate)   01/10/19 0344 01/10/19 1730 01/10/19 7491   OTHER   Outcome Summary --  --  Pt pod 2 right TKA. Dressing has small amount of dried drainage on DEBBY. Confusion has resolved and MRI shows no sign of stroke. Neuro consulted and believes the pt is experiencing encephalopathy due to ansethesia and narcotics. Insulin pump has been removed and dosing of insulin given by nursing. Will continue to monitor.   Coping/Psychosocial   Plan of Care Reviewed With --  patient;spouse --    Plan of Care Review   Progress improving --  --      Goal: Individualization and Mutuality  Outcome: Ongoing (interventions implemented as appropriate)    Goal: Discharge Needs Assessment  Outcome: Ongoing (interventions implemented as appropriate)    Goal: Interprofessional Rounds/Family Conf  Outcome: Ongoing (interventions implemented as appropriate)      Problem: Fall Risk (Adult)  Goal: Absence of Fall  Outcome: Ongoing (interventions implemented as appropriate)      Problem: Knee Arthroplasty (Total, Partial) (Adult)  Goal: Signs and Symptoms of Listed Potential Problems Will be Absent, Minimized or Managed (Knee Arthroplasty)  Outcome: Ongoing (interventions implemented as appropriate)    Goal: Anesthesia/Sedation Recovery  Outcome: Ongoing (interventions implemented as appropriate)

## 2019-01-10 NOTE — THERAPY TREATMENT NOTE
Acute Care - Physical Therapy Treatment Note  [facility]     Patient Name: Kian Mcdaniels  : 1955  MRN: [id number]  Today's Date: 1/10/2019  Onset of Illness/Injury or Date of Surgery: 19  Date of Referral to PT: 19  Referring Physician: Dr. Carter    Admit Date: 2019    Visit Dx:    ICD-10-CM ICD-9-CM   1. Status post total right knee replacement Z96.651 V43.65   2. Arthritis of right knee M17.11 716.96     Patient Active Problem List   Diagnosis   • Bulging lumbar disc   • Chronic pain   • Diabetic neuropathy (CMS/HCC)   • Low back pain   • Degenerative arthritis of lumbar spine   • Neuropathy involving both lower extremities   • Encounter for long-term (current) use of high-risk medication   • Postlaminectomy syndrome of lumbar region   • Syringomyelia and syringobulbia (CMS/HCC)   • Lumbar pseudoarthrosis   • Type 2 diabetes mellitus with neurologic complication, with long-term current use of insulin (CMS/HCC)   • Insulin pump in place   • Hx of decompressive lumbar laminectomy   • Bilateral carpal tunnel syndrome   • Degenerative cervical disc   • Acute pain of right knee   • Primary localized osteoarthrosis of right lower leg   • Arthritis of right knee   • Sacroiliac inflammation (CMS/HCC)   • Sacroiliac joint dysfunction   • Severe obesity (BMI 35.0-39.9)   • Chronic pain of right knee   • Tricompartment osteoarthritis of left knee   • Tricompartment osteoarthritis of right knee   • Arthritis of left knee   • Bilateral chronic knee pain   • Hypertension       Therapy Treatment    Rehabilitation Treatment Summary     Row Name 01/10/19 1625             Treatment Time/Intention    Discipline  physical therapy assistant  -      Document Type  therapy note (daily note)  -      Subjective Information  complains of;weakness;fatigue;pain;swelling  -      Care Plan Review  patient/other agree to care plan  -      Care Plan Review, Other Participant(s)  sibling  -      Comment   lethargic, but more awake by end of session  -      Existing Precautions/Restrictions  fall  -      Recorded by [] Luisa Bingham [facility] 01/10/19 1730      Row Name 01/10/19 1625             Sit-Stand Transfer    Sit-Stand Bingham (Transfers)  moderate assist (50% patient effort);maximum assist (25% patient effort);2 person assist  -      Assistive Device (Sit-Stand Transfers)  walker, front-wheeled  -      Recorded by [] Luisa Bingham [facility] 01/10/19 1730      Row Name 01/10/19 1629             Stand-Sit Transfer    Stand-Sit Bingham (Transfers)  minimum assist (75% patient effort);verbal cues;nonverbal cues (demo/gesture);2 person assist  -      Assistive Device (Stand-Sit Transfers)  walker, front-wheeled  -      Recorded by [] Luisa iBngham [facility] 01/10/19 1730      Row Name 01/10/19 1624             Gait/Stairs Assessment/Training    Bingham Level (Gait)  verbal cues;nonverbal cues (demo/gesture);2 person assist;moderate assist (50% patient effort)  -      Assistive Device (Gait)  walker, front-wheeled  -      Distance in Feet (Gait)  6  -JM      Deviations/Abnormal Patterns (Gait)  antalgic;latanya decreased;stride length decreased  -      Bilateral Gait Deviations  forward flexed posture;weight shift ability decreased  -      Comment (Gait/Stairs)  bilat knee flex making incr amb unsafe, constant cues for knee ext bilat  -      Recorded by [] Luisa Bingham PTA 01/10/19 5730      Row Name 01/10/19 1626             General ROM    GENERAL ROM COMMENTS  -12-78  -      Recorded by [] Luisa Bingham [facility] 01/10/19 1730      Row Name 01/10/19 1627             Therapeutic Exercise    Comment (Therapeutic Exercise)  TKR protocol x 20 reps w/assist and cues to stay awake and on task  -      Recorded by [] Luisa Bingham [facility] 01/10/19 1730      Row Name 01/10/19 1627             Positioning and Restraints    Pre-Treatment Position  sitting in chair/recliner   -ROSA      In Chair  reclined;call light within reach;encouraged to call for assist;with nsg sister left, wife on way up from garage  -ROSA      Recorded by [ROSA] Luisa Bingham, SREEKANTH 01/10/19 1730      Row Name 01/10/19 1625             Pain Scale: Numbers Pre/Post-Treatment    Pain Scale: Numbers, Post-Treatment  6/10  -JM      Pain Location - Side  Right  -      Pain Location  knee  -      Pain Intervention(s)  Medication (See MAR);Repositioned;Cold applied;Elevated  -      Recorded by [ROSA] Luisa Bingham, SREEKANTH 01/10/19 1730      Row Name                Wound 01/08/19 1023 Right knee incision    Wound - Properties Group Date first assessed: 01/08/19 [HH] Time first assessed: 1023 [HH] Side: Right [HH] Location: knee [HH] Type: incision [HH] Recorded by:  [] Destiny Bowen RN 01/08/19 1023      User Key  (r) = Recorded By, (t) = Taken By, (c) = Cosigned By    Initials Name Effective Dates Discipline     Destiny Bowen RN 06/16/16 -  Nurse    Luisa Malik, SREEKANTH 03/07/18 -  PT          Wound 01/08/19 1023 Right knee incision (Active)   Dressing Appearance dried drainage 1/10/2019  4:29 PM   Closure SISSY 1/10/2019  4:29 PM   Base dressing in place, unable to visualize 1/10/2019  4:29 PM   Drainage Characteristics/Odor serosanguineous 1/10/2019  4:29 PM   Drainage Amount small 1/10/2019  4:29 PM           Physical Therapy Education     Title: PT OT SLP Therapies (In Progress)     Topic: Physical Therapy (In Progress)     Point: Mobility training (In Progress)     Learning Progress Summary           Patient Acceptance, E,TB,D, NR by ROSA at 1/10/2019  5:33 PM    Acceptance, E,TB,D, VU,NR by ROSA at 1/9/2019  3:27 PM    Acceptance, E, VU by MA at 1/8/2019  4:22 PM   Family Acceptance, E,TB,D, NR by ROSA at 1/10/2019  5:33 PM    Acceptance, E,TB,D, VU,NR by ROSA at 1/9/2019  3:27 PM                   Point: Home exercise program (In Progress)     Learning Progress Summary           Patient Acceptance, E,TB,D,  NR by ROSA at 1/10/2019  5:33 PM    Acceptance, E,TB,D, VU,NR by ROSA at 1/9/2019  3:27 PM    Acceptance, E, VU by MA at 1/8/2019  4:22 PM   Family Acceptance, E,TB,D, NR by ROSA at 1/10/2019  5:33 PM    Acceptance, E,TB,D, VU,NR by ROSA at 1/9/2019  3:27 PM                   Point: Body mechanics (In Progress)     Learning Progress Summary           Patient Acceptance, E,TB,D, NR by ROSA at 1/10/2019  5:33 PM    Acceptance, E,TB,D, VU,NR by ROSA at 1/9/2019  3:27 PM    Acceptance, E, VU by MA at 1/8/2019  4:22 PM   Family Acceptance, E,TB,D, NR by ROSA at 1/10/2019  5:33 PM    Acceptance, E,TB,D, VU,NR by ROSA at 1/9/2019  3:27 PM                   Point: Precautions (In Progress)     Learning Progress Summary           Patient Acceptance, E,TB,D, NR by ROSA at 1/10/2019  5:33 PM    Acceptance, E,TB,D, VU,NR by ROSA at 1/9/2019  3:27 PM    Acceptance, E, VU by MA at 1/8/2019  4:22 PM   Family Acceptance, E,TB,D, NR by ROSA at 1/10/2019  5:33 PM    Acceptance, E,TB,D, VU,NR by ROSA at 1/9/2019  3:27 PM                               User Key     Initials Effective Dates Name Provider Type Discipline    ROSA 03/07/18 -  Luisa Bingham PTA Physical Therapy Assistant PT    MA 04/03/18 -  Jovita Flores PT Physical Therapist PT                PT Recommendation and Plan     Plan of Care Reviewed With: patient, spouse  Progress: improving  Outcome Summary: met wife in [place] after PT session; will discuss options w/husb for DC plan; pt req more assist today and is unsafe for home at DC at current status unless 2 physically strong persons to assist at home; educ on safety and importance of posture and knee ext to ensure safe amb  Outcome Measures     Row Name 01/10/19 1700 01/09/19 1500 01/08/19 1600       How much help from another person do you currently need...    Turning from your back to your side while in flat bed without using bedrails?  2  -JM  3  -JM  2  -MA    Moving from lying on back to sitting on the side of a flat bed without  bedrails?  2  -JM  3  -JM  2  -MA    Moving to and from a bed to a chair (including a wheelchair)?  2  -JM  3  -JM  3  -MA    Standing up from a chair using your arms (e.g., wheelchair, bedside chair)?  2  -JM  2  -JM  3  -MA    Climbing 3-5 steps with a railing?  1  -JM  1  -JM  1  -MA    To walk in hospital room?  2  -JM  3  -JM  1  -MA    AM-PAC 6 Clicks Score  11  -JM  15  -JM  12  -MA       Functional Assessment    Outcome Measure Options  --  --  AM-PAC 6 Clicks Basic Mobility (PT)  -MA      User Key  (r) = Recorded By, (t) = Taken By, (c) = Cosigned By    Initials Name Provider Type    Luisa Malik PTA Physical Therapy Assistant    Jovita Bobby, PT Physical Therapist         Time Calculation:   PT Charges     Row Name 01/10/19 1734 01/10/19 1126          Time Calculation    Start Time  1535  -  --     Stop Time  1631  -JM  --     Time Calculation (min)  56 min  -  --     PT Received On  01/10/19  -  --     PT - Next Appointment  01/11/19  -  01/10/19  -        Time Calculation- PT    Total Timed Code Minutes- PT  39 minute(s)  -  --       User Key  (r) = Recorded By, (t) = Taken By, (c) = Cosigned By    Initials Name Provider Type    Luisa Malik PTA Physical Therapy Assistant        Therapy Suggested Charges     Code   Minutes Charges    None           Therapy Charges for Today     Code Description Service Date Service Provider Modifiers Qty    11618758267 HC PT THER PROC EA 15 MIN 1/9/2019 Luisa Bingham PTA GP 2    11675934656 HC PT THER PROC GROUP 1/9/2019 Luisa Bingham, PTA GP 1    63304391190 HC PT THER PROC EA 15 MIN 1/9/2019 Luisa Bingham, PTA GP 2    09631704921 HC PT THER PROC GROUP 1/9/2019 Luisa Bingham, PTA GP 1    44940035280 HC PT THER PROC EA 15 MIN 1/10/2019 Luisa Bingham PTA GP 3    12318053091 HC PT THER PROC GROUP 1/10/2019 Luisa Bingham, SREEKANTH GP 1          PT G-Codes  Outcome Measure Options: AM-PAC 6 Clicks Basic Mobility  (PT)  AM-PAC 6 Clicks Score: 11    Luisa Bingham, PTA  1/10/2019

## 2019-01-10 NOTE — PLAN OF CARE
Problem: Patient Care Overview  Goal: Plan of Care Review  Outcome: Ongoing (interventions implemented as appropriate)   01/10/19 0708   OTHER   Outcome Summary met wife in [place] after PT session; will discuss options w/husb for DC plan; pt req more assist today and is unsafe for home at DC at current status unless 2 physically strong persons to assist at home   Coping/Psychosocial   Plan of Care Reviewed With patient;spouse

## 2019-01-11 LAB
ANION GAP SERPL CALCULATED.3IONS-SCNC: 10.5 MMOL/L
BUN BLD-MCNC: 30 MG/DL (ref 8–23)
BUN/CREAT SERPL: 23.6 (ref 7–25)
CALCIUM SPEC-SCNC: 8.5 MG/DL (ref 8.6–10.5)
CHLORIDE SERPL-SCNC: 98 MMOL/L (ref 98–107)
CO2 SERPL-SCNC: 22.5 MMOL/L (ref 22–29)
CREAT BLD-MCNC: 1.27 MG/DL (ref 0.76–1.27)
DEPRECATED RDW RBC AUTO: 49.7 FL (ref 37–54)
ERYTHROCYTE [DISTWIDTH] IN BLOOD BY AUTOMATED COUNT: 15.7 % (ref 11.5–14.5)
GFR SERPL CREATININE-BSD FRML MDRD: 70 ML/MIN/1.73
GLUCOSE BLD-MCNC: 286 MG/DL (ref 65–99)
GLUCOSE BLDC GLUCOMTR-MCNC: 239 MG/DL (ref 70–130)
GLUCOSE BLDC GLUCOMTR-MCNC: 244 MG/DL (ref 70–130)
GLUCOSE BLDC GLUCOMTR-MCNC: 266 MG/DL (ref 70–130)
GLUCOSE BLDC GLUCOMTR-MCNC: 326 MG/DL (ref 70–130)
HCT VFR BLD AUTO: 42 % (ref 40.4–52.2)
HGB BLD-MCNC: 13.3 G/DL (ref 13.7–17.6)
MCH RBC QN AUTO: 27.1 PG (ref 27–32.7)
MCHC RBC AUTO-ENTMCNC: 31.7 G/DL (ref 32.6–36.4)
MCV RBC AUTO: 85.5 FL (ref 79.8–96.2)
PLATELET # BLD AUTO: 205 10*3/MM3 (ref 140–500)
PMV BLD AUTO: 10.5 FL (ref 6–12)
POTASSIUM BLD-SCNC: 4.6 MMOL/L (ref 3.5–5.2)
RBC # BLD AUTO: 4.91 10*6/MM3 (ref 4.6–6)
SODIUM BLD-SCNC: 131 MMOL/L (ref 136–145)
WBC NRBC COR # BLD: 10.52 10*3/MM3 (ref 4.5–10.7)

## 2019-01-11 PROCEDURE — 63710000001 INSULIN LISPRO (HUMAN) PER 5 UNITS: Performed by: INTERNAL MEDICINE

## 2019-01-11 PROCEDURE — 80048 BASIC METABOLIC PNL TOTAL CA: CPT | Performed by: HOSPITALIST

## 2019-01-11 PROCEDURE — 82962 GLUCOSE BLOOD TEST: CPT

## 2019-01-11 PROCEDURE — 97110 THERAPEUTIC EXERCISES: CPT

## 2019-01-11 PROCEDURE — 99024 POSTOP FOLLOW-UP VISIT: CPT | Performed by: NURSE PRACTITIONER

## 2019-01-11 PROCEDURE — 63710000001 INSULIN GLARGINE PER 5 UNITS: Performed by: INTERNAL MEDICINE

## 2019-01-11 PROCEDURE — 36415 COLL VENOUS BLD VENIPUNCTURE: CPT | Performed by: HOSPITALIST

## 2019-01-11 PROCEDURE — 99233 SBSQ HOSP IP/OBS HIGH 50: CPT | Performed by: INTERNAL MEDICINE

## 2019-01-11 PROCEDURE — 85027 COMPLETE CBC AUTOMATED: CPT | Performed by: HOSPITALIST

## 2019-01-11 RX ORDER — INSULIN GLARGINE 100 [IU]/ML
15 INJECTION, SOLUTION SUBCUTANEOUS EVERY 12 HOURS SCHEDULED
Status: DISCONTINUED | OUTPATIENT
Start: 2019-01-11 | End: 2019-01-12 | Stop reason: HOSPADM

## 2019-01-11 RX ORDER — OXYCODONE AND ACETAMINOPHEN 10; 325 MG/1; MG/1
TABLET ORAL
Qty: 60 TABLET | Refills: 0
Start: 2019-01-11 | End: 2019-01-22 | Stop reason: DRUGHIGH

## 2019-01-11 RX ORDER — GABAPENTIN 400 MG/1
400 CAPSULE ORAL 3 TIMES DAILY
Qty: 90 CAPSULE | Refills: 0
Start: 2019-01-11

## 2019-01-11 RX ADMIN — CARVEDILOL 12.5 MG: 3.12 TABLET, FILM COATED ORAL at 17:15

## 2019-01-11 RX ADMIN — GABAPENTIN 400 MG: 400 CAPSULE ORAL at 17:15

## 2019-01-11 RX ADMIN — INSULIN LISPRO 5 UNITS: 100 INJECTION, SOLUTION INTRAVENOUS; SUBCUTANEOUS at 12:37

## 2019-01-11 RX ADMIN — OXYCODONE HYDROCHLORIDE AND ACETAMINOPHEN 2 TABLET: 10; 325 TABLET ORAL at 12:37

## 2019-01-11 RX ADMIN — LOSARTAN POTASSIUM 50 MG: 50 TABLET, FILM COATED ORAL at 09:51

## 2019-01-11 RX ADMIN — INSULIN LISPRO 3 UNITS: 100 INJECTION, SOLUTION INTRAVENOUS; SUBCUTANEOUS at 21:28

## 2019-01-11 RX ADMIN — FAMOTIDINE 40 MG: 20 TABLET, FILM COATED ORAL at 09:52

## 2019-01-11 RX ADMIN — INSULIN LISPRO 4 UNITS: 100 INJECTION, SOLUTION INTRAVENOUS; SUBCUTANEOUS at 08:02

## 2019-01-11 RX ADMIN — INSULIN LISPRO 4 UNITS: 100 INJECTION, SOLUTION INTRAVENOUS; SUBCUTANEOUS at 17:15

## 2019-01-11 RX ADMIN — CLOPIDOGREL 75 MG: 75 TABLET, FILM COATED ORAL at 07:50

## 2019-01-11 RX ADMIN — DOCUSATE SODIUM 100 MG: 100 CAPSULE, LIQUID FILLED ORAL at 21:28

## 2019-01-11 RX ADMIN — INSULIN LISPRO 8 UNITS: 100 INJECTION, SOLUTION INTRAVENOUS; SUBCUTANEOUS at 17:15

## 2019-01-11 RX ADMIN — DOCUSATE SODIUM 100 MG: 100 CAPSULE, LIQUID FILLED ORAL at 09:52

## 2019-01-11 RX ADMIN — ISOSORBIDE MONONITRATE 60 MG: 30 TABLET ORAL at 09:52

## 2019-01-11 RX ADMIN — CETIRIZINE HYDROCHLORIDE 10 MG: 10 TABLET, FILM COATED ORAL at 07:49

## 2019-01-11 RX ADMIN — POLYETHYLENE GLYCOL 3350 17 G: 17 POWDER, FOR SOLUTION ORAL at 09:51

## 2019-01-11 RX ADMIN — INSULIN GLARGINE 10 UNITS: 100 INJECTION, SOLUTION SUBCUTANEOUS at 08:02

## 2019-01-11 RX ADMIN — POTASSIUM CHLORIDE 10 MEQ: 750 CAPSULE, EXTENDED RELEASE ORAL at 09:51

## 2019-01-11 RX ADMIN — INSULIN LISPRO 3 UNITS: 100 INJECTION, SOLUTION INTRAVENOUS; SUBCUTANEOUS at 08:02

## 2019-01-11 RX ADMIN — OXYCODONE HYDROCHLORIDE AND ACETAMINOPHEN 1 TABLET: 10; 325 TABLET ORAL at 17:21

## 2019-01-11 RX ADMIN — ATORVASTATIN CALCIUM 40 MG: 20 TABLET, FILM COATED ORAL at 09:52

## 2019-01-11 RX ADMIN — FUROSEMIDE 40 MG: 40 TABLET ORAL at 21:28

## 2019-01-11 RX ADMIN — OXYCODONE HYDROCHLORIDE AND ACETAMINOPHEN 1 TABLET: 10; 325 TABLET ORAL at 21:28

## 2019-01-11 RX ADMIN — INSULIN LISPRO 8 UNITS: 100 INJECTION, SOLUTION INTRAVENOUS; SUBCUTANEOUS at 12:37

## 2019-01-11 RX ADMIN — AMLODIPINE BESYLATE 10 MG: 10 TABLET ORAL at 09:52

## 2019-01-11 RX ADMIN — OXYCODONE HYDROCHLORIDE AND ACETAMINOPHEN 1 TABLET: 10; 325 TABLET ORAL at 07:47

## 2019-01-11 RX ADMIN — GABAPENTIN 400 MG: 400 CAPSULE ORAL at 21:28

## 2019-01-11 RX ADMIN — OXYCODONE HYDROCHLORIDE AND ACETAMINOPHEN 2 TABLET: 10; 325 TABLET ORAL at 01:00

## 2019-01-11 RX ADMIN — INSULIN GLARGINE 15 UNITS: 100 INJECTION, SOLUTION SUBCUTANEOUS at 21:29

## 2019-01-11 RX ADMIN — GABAPENTIN 400 MG: 400 CAPSULE ORAL at 09:52

## 2019-01-11 RX ADMIN — ASPIRIN 81 MG: 81 TABLET, DELAYED RELEASE ORAL at 07:48

## 2019-01-11 RX ADMIN — PANTOPRAZOLE SODIUM 40 MG: 40 TABLET, DELAYED RELEASE ORAL at 09:51

## 2019-01-11 RX ADMIN — CARVEDILOL 12.5 MG: 3.12 TABLET, FILM COATED ORAL at 09:52

## 2019-01-11 NOTE — PROGRESS NOTES
Continued Stay Note  [facility]     Patient Name: Kian Mcdaniels  MRN: [id number]  Today's Date: 1/11/2019    Admit Date: 1/8/2019    Discharge Plan     Row Name 01/11/19 1627       Plan    Plan  [facility]. [facility] arranged for 1/12 at 1:30pm    Patient/Family in Agreement with Plan  yes    Plan Comments  CCP received phone call from pt's spouse now stating she does not want pt going to [facility]. CCP clarified as the d/c plan had been reviewed and verified with both pt and spouse. Spouse states they have changed their mind and requested referrals to [facility]. CCP explained that referrals can be made but if they cannot accept then pt's plan will remain [facility]. Spouse verbalized understanding. Referral to [facility] they are out of network. Referral to [facility] verified and they also are out of network. CCP contacted pt's spouse to inform her. Confirmed plan of [facility]. -KENYATTA Holly    Row Name 01/11/19 1414       Plan    Plan  [facility]. [facility] arranged 1/12 at 1:30pm    Patient/Family in Agreement with Plan  yes    Plan Comments  CCP met with pt and spouse at bedside. Spouse anticipates pt will need EMS for transport. CCP explained that coverage for Ems by insurance could not be guaranteed, spouse verbalized understanding. CCP notified by [facility] that pt is accepted. CCP attempted to book EMS today but both [facility] and [facility] are booked solid for rest of day. [facility] arranged for 1/12 at 1:30pm. -KENYATTA Holly        Discharge Codes    No documentation.       Expected Discharge Date and Time     Expected Discharge Date Expected Discharge Time    Jan 11, 2019             KENYATTA Holly

## 2019-01-11 NOTE — PLAN OF CARE
Problem: Patient Care Overview  Goal: Plan of Care Review  Outcome: Ongoing (interventions implemented as appropriate)   01/11/19 0144   OTHER   Outcome Summary POD#3. VSS. VOIDING PER URINAL. UP ASSIST X2. RIGHT KNEE SWOLLEN. DEBBY IN PLACE WITH DRY DRAINAGE. EDUCATION PROVIDED ON THE IMPORTANCE OF BLOOD SUGAR MONITORING AND TAKLING INSULIN AS ORDERED.    Coping/Psychosocial   Plan of Care Reviewed With patient   Plan of Care Review   Progress improving     Goal: Individualization and Mutuality  Outcome: Ongoing (interventions implemented as appropriate)    Goal: Discharge Needs Assessment  Outcome: Ongoing (interventions implemented as appropriate)      Problem: Fall Risk (Adult)  Goal: Absence of Fall  Outcome: Ongoing (interventions implemented as appropriate)

## 2019-01-11 NOTE — THERAPY TREATMENT NOTE
Acute Care - Physical Therapy Treatment Note  [facility]     Patient Name: Kian Mcdaniels  : 1955  MRN: [id number]  Today's Date: 2019  Onset of Illness/Injury or Date of Surgery: 19  Date of Referral to PT: 19  Referring Physician: Dr. Carter    Admit Date: 2019    Visit Dx:    ICD-10-CM ICD-9-CM   1. Status post total right knee replacement Z96.651 V43.65   2. Arthritis of right knee M17.11 716.96     Patient Active Problem List   Diagnosis   • Bulging lumbar disc   • Chronic pain   • Diabetic neuropathy (CMS/HCC)   • Low back pain   • Degenerative arthritis of lumbar spine   • Neuropathy involving both lower extremities   • Encounter for long-term (current) use of high-risk medication   • Postlaminectomy syndrome of lumbar region   • Syringomyelia and syringobulbia (CMS/HCC)   • Lumbar pseudoarthrosis   • Type 2 diabetes mellitus with neurologic complication, with long-term current use of insulin (CMS/HCC)   • Insulin pump in place   • Hx of decompressive lumbar laminectomy   • Bilateral carpal tunnel syndrome   • Degenerative cervical disc   • Acute pain of right knee   • Primary localized osteoarthrosis of right lower leg   • Arthritis of right knee   • Sacroiliac inflammation (CMS/HCC)   • Sacroiliac joint dysfunction   • Severe obesity (BMI 35.0-39.9)   • Chronic pain of right knee   • Tricompartment osteoarthritis of left knee   • Tricompartment osteoarthritis of right knee   • Arthritis of left knee   • Bilateral chronic knee pain   • Hypertension       Therapy Treatment    Rehabilitation Treatment Summary     Row Name 19 1600 19 1134          Treatment Time/Intention    Discipline  physical therapy assistant  -JM  physical therapist  -CA     Document Type  therapy note (daily note)  -ROSA  therapy note (daily note)  -CA     Subjective Information  complains of;weakness;fatigue;pain  -ROSA  no complaints;weakness;pain  -CA     Mode of Treatment  --  physical therapy  " -CA     Care Plan Review  patient/other agree to care plan  -  --     Care Plan Review, Other Participant(s)  spouse  -  --     Patient Effort  --  fair  -CA     Comment  pt lethargic, \"zoning out\" during exer  -  --     Existing Precautions/Restrictions  fall  -  fall  (Significant)   -CA     Treatment Considerations/Comments  unsafe to get up this pm-too lethargic to attempt  -  --     Recorded by [JM] Luisa Bingham, PTA 01/11/19 1651 [CA] Millie Horowitz, PT 01/11/19 1138     Row Name 01/11/19 1134             Cognitive Assessment/Intervention- PT/OT    Orientation Status (Cognition)  oriented x 4  -CA      Follows Commands (Cognition)  WFL  -CA      Personal Safety Interventions  fall prevention program maintained;gait belt;nonskid shoes/slippers when out of bed  -CA      Recorded by [CA] Millie Horowitz, PT 01/11/19 1138      Row Name 01/11/19 1134             Mobility Assessment/Intervention    Right Lower Extremity (Weight-bearing Status)  weight-bearing as tolerated (WBAT)  -CA      Recorded by [CA] Millie Horowitz, PT 01/11/19 1138      Row Name 01/11/19 1600 01/11/19 1134          Bed Mobility Assessment/Treatment    Supine-Sit [place] (Bed Mobility)  --  moderate assist (50% patient effort);verbal cues  -CA     Sit-Supine [place] (Bed Mobility)  --  dependent (less than 25% patient effort);2 person assist  -CA     Comment (Bed Mobility)  too lethargic  -  --     Recorded by [JM] Luisa Bingham, PTA 01/11/19 1651 [CA] Millie Horowitz, PT 01/11/19 1138     Row Name 01/11/19 1600 01/11/19 1134          Sit-Stand Transfer    Sit-Stand [place] (Transfers)  not tested;unable to assess  -  moderate assist (50% patient effort);maximum assist (25% patient effort);2 person assist  -CA     Assistive Device (Sit-Stand Transfers)  --  walker, front-wheeled  -CA     Recorded by [JM] Luisa Bingham, PTA 01/11/19 1651 [CA] Millie Horowitz, PT 01/11/19 1138     Row Name 01/11/19 " "1134             Stand-Sit Transfer    Stand-Sit [place] (Transfers)  2 person assist;dependent (less than 25% patient effort)  -CA      Assistive Device (Stand-Sit Transfers)  walker, front-wheeled  -CA      Recorded by [CA] Millie Horowitz, PT 01/11/19 1138      Row Name 01/11/19 1600 01/11/19 1134          Gait/Stairs Assessment/Training    Gait/Stairs Assessment/Training  --  gait/ambulation independence  -CA     [place] Level (Gait)  --  verbal cues;nonverbal cues (demo/gesture);2 person assist;moderate assist (50% patient effort)  -CA     Assistive Device (Gait)  --  walker, front-wheeled  -CA     Distance in Feet (Gait)  --  4 step fwd  -CA     Pattern (Gait)  --  step-to  -CA     Deviations/Abnormal Patterns (Gait)  --  antalgic;latanya decreased;stride length decreased  -CA     Bilateral Gait Deviations  --  forward flexed posture;weight shift ability decreased;knee buckling, bilateral  -CA     Comment (Gait/Stairs)  NT-unsafe  -JM  Pt with extremely fwd flexed posture and B knees buckling, maximal verbal cues throughout for patient to correct but pt reports \"I can't\". Without warning pt begins to sit down, pt instruced that he needs to stand up as there is no seat behind him and needs to first take backward steps, but pt reports \"I can't\" and begins to sit despite instruction to remain standing until bed is pulled fwd. Pt requires dependent assist x2 to remain standing while bed pulled into place and dependence x 2 to place pt sitting EOB.  -CA     Recorded by [JM] Luisa Bingham, PTA 01/11/19 1651 [CA] Millie Horowitz, PT 01/11/19 1138     Row Name 01/11/19 1600             General ROM    GENERAL ROM COMMENTS  -10-45 grossly measured in supine-too lethargic to assist  -JM      Recorded by [JM] Luisa Bingham, PTA 01/11/19 1651      Row Name 01/11/19 1600 01/11/19 1134          Therapeutic Exercise    Comment (Therapeutic Exercise)  TKR protocol x 25 reps w/assist , difficulty staying awake " and when awake not on task  (Significant)   -  AP x 15, attempted QS but unable to activate quad, unable to perform other ther-ex at this time  -CA     Recorded by [JM] Luisa Bingham, SREEKANTH 01/11/19 1651 [CA] Millie Horowitz, PT 01/11/19 1138     Row Name 01/11/19 1600 01/11/19 1134          Positioning and Restraints    Pre-Treatment Position  in bed  -  in bed  -CA     Post Treatment Position  --  bed  -CA     In Bed  fowlers;call light within reach;encouraged to call for assist;exit alarm on;with family/caregiver  -  supine;call light within reach;encouraged to call for assist;with family/caregiver;side rails up x2  -CA     Recorded by [JM] Luisa Bingham, SREEKANTH 01/11/19 1651 [CA] Millie Horowitz, PT 01/11/19 1138     Row Name 01/11/19 1600             Pain Scale: Numbers Pre/Post-Treatment    Pain Scale: Numbers, Post-Treatment  7/10  -      Pain Location - Side  Right  -      Pain Location  knee  -      Pain Intervention(s)  Medication (See MAR);Repositioned;Cold applied extremely lethargic but pnful-educ offered to wife on ? meds  -JM      Recorded by [JM] Luisa Bingham PTA 01/11/19 1651      Row Name                Wound 01/08/19 1023 Right knee incision    Wound - Properties Group Date first assessed: 01/08/19 [HH] Time first assessed: 1023 [HH] Side: Right [HH] Location: knee [HH] Type: incision [HH] Recorded by:  [HH] Destiny Bowen RN 01/08/19 1023      User Key  (r) = Recorded By, (t) = Taken By, (c) = Cosigned By    Initials Name Effective Dates Discipline     Desitny Bowen RN 06/16/16 -  Nurse    Luisa Malik, SREEKANTH 03/07/18 -  PT    CA Millie Horowitz, PT 06/13/18 -  PT          Wound 01/08/19 1023 Right knee incision (Active)   Dressing Appearance dried drainage 1/11/2019  4:00 AM   Closure SISSY 1/11/2019  4:00 AM   Base dressing in place, unable to visualize 1/11/2019  4:00 AM   Drainage Characteristics/Odor serosanguineous 1/11/2019  4:00 AM   Drainage Amount  small 1/11/2019  4:00 AM           Physical Therapy Education     Title: PT OT SLP Therapies (In Progress)     Topic: Physical Therapy (In Progress)     Point: Mobility training (In Progress)     Learning Progress Summary           Patient Acceptance, E, NR by CA at 1/11/2019 11:38 AM    Acceptance, E,TB,D, NR by ROSA at 1/10/2019  5:33 PM    Acceptance, E,TB,D, VU,NR by ROSA at 1/9/2019  3:27 PM    Acceptance, E, VU by MA at 1/8/2019  4:22 PM   Family Acceptance, E,TB,D, NR by ROSA at 1/10/2019  5:33 PM    Acceptance, E,TB,D, VU,NR by ROSA at 1/9/2019  3:27 PM                   Point: Home exercise program (In Progress)     Learning Progress Summary           Patient Acceptance, E, NR by CA at 1/11/2019 11:38 AM    Acceptance, E,TB,D, NR by ROSA at 1/10/2019  5:33 PM    Acceptance, E,TB,D, VU,NR by ROSA at 1/9/2019  3:27 PM    Acceptance, E, VU by MA at 1/8/2019  4:22 PM   Family Acceptance, E,TB,D, NR by ROSA at 1/10/2019  5:33 PM    Acceptance, E,TB,D, VU,NR by ROSA at 1/9/2019  3:27 PM                   Point: Body mechanics (In Progress)     Learning Progress Summary           Patient Acceptance, E, NR by CA at 1/11/2019 11:38 AM    Acceptance, E,TB,D, NR by ROSA at 1/10/2019  5:33 PM    Acceptance, E,TB,D, VU,NR by ROSA at 1/9/2019  3:27 PM    Acceptance, E, VU by MA at 1/8/2019  4:22 PM   Family Acceptance, E,TB,D, NR by ROSA at 1/10/2019  5:33 PM    Acceptance, E,TB,D, VU,NR by ROSA at 1/9/2019  3:27 PM                   Point: Precautions (In Progress)     Learning Progress Summary           Patient Acceptance, E, NR by CA at 1/11/2019 11:38 AM    Acceptance, E,TB,D, NR by ROSA at 1/10/2019  5:33 PM    Acceptance, E,TB,D, VU,NR by ROSA at 1/9/2019  3:27 PM    Acceptance, E, VU by MA at 1/8/2019  4:22 PM   Family Acceptance, E,TB,CHEY, NR by ROSA at 1/10/2019  5:33 PM    Acceptance, E,CHEY HENSON, MARLEN,NR by ROSA at 1/9/2019  3:27 PM                               User Key     Initials Effective Dates Name Provider Type Discipline    ROSA 03/07/18 -   Luisa Bingham PTA Physical Therapy Assistant PT    MA 04/03/18 -  Jovita Flores PT Physical Therapist PT    CA 06/13/18 -  Millie Horowitz, YOHAN Physical Therapist PT                PT Recommendation and Plan     Plan of Care Reviewed With: patient, spouse  Progress: improving  Outcome Summary: met wife in [place] after PT session; will discuss options w/husb for DC plan; pt req more assist today and is unsafe for home at DC at current status unless 2 physically strong persons to assist at home; educ on safety and importance of posture and knee ext to ensure safe amb  Outcome Measures     Row Name 01/11/19 1100 01/10/19 1700 01/09/19 1500       How much help from another person do you currently need...    Turning from your back to your side while in flat bed without using bedrails?  2  -CA  2  -JM  3  -JM    Moving from lying on back to sitting on the side of a flat bed without bedrails?  2  -CA  2  -JM  3  -JM    Moving to and from a bed to a chair (including a wheelchair)?  2  -CA  2  -JM  3  -JM    Standing up from a chair using your arms (e.g., wheelchair, bedside chair)?  2  -CA  2  -JM  2  -JM    Climbing 3-5 steps with a railing?  1  -CA  1  -JM  1  -JM    To walk in hospital room?  1  -CA  2  -JM  3  -JM    AM-PAC 6 Clicks Score  10  -CA  11  -JM  15  -JM       Functional Assessment    Outcome Measure Options  AM-PAC 6 Clicks Basic Mobility (PT)  -CA  --  --      User Key  (r) = Recorded By, (t) = Taken By, (c) = Cosigned By    Initials Name Provider Type    Luisa Malik PTA Physical Therapy Assistant    Millie Roberts, PT Physical Therapist         Time Calculation:   PT Charges     Row Name 01/11/19 1651 01/11/19 1140          Time Calculation    Start Time  1600  -  1111  -CA     Stop Time  1626  -  1135  -CA     Time Calculation (min)  26 min  -  24 min  -CA     PT Received On  01/11/19  -  01/11/19  -CA     PT - Next Appointment  01/12/19  -  01/11/19  -CA        Time  Calculation- PT    Total Timed Code Minutes- PT  26 minute(s)  -ROSA  24 minute(s)  -CA       User Key  (r) = Recorded By, (t) = Taken By, (c) = Cosigned By    Initials Name Provider Type    Luisa Malik PTA Physical Therapy Assistant    Millie Roberts, PT Physical Therapist        Therapy Suggested Charges     Code   Minutes Charges    None           Therapy Charges for Today     Code Description Service Date Service Provider Modifiers Qty    16466245510 HC PT THER PROC EA 15 MIN 1/10/2019 uLisa Bingham, SREEKANTH GP 3    19295318835 HC PT THER PROC GROUP 1/10/2019 Luisa Bingham, SREEKANTH GP 1    38943724372 HC PT THER PROC EA 15 MIN 1/11/2019 Luisa Bingham, SREEKANTH GP 2          PT G-Codes  Outcome Measure Options: AM-PAC 6 Clicks Basic Mobility (PT)  AM-PAC 6 Clicks Score: 10    Luisa Bingham PTA  1/11/2019

## 2019-01-11 NOTE — PROGRESS NOTES
63 y.o.   LOS: 3 days   Patient Care Team:  Wyatt Harmon MD as PCP - General (Family Medicine)  Shannan Singh APRN (Family Medicine)  Lisa Banerjee PA-C as Physician Assistant (Neurosurgery)  Kian Hoskins MD (Orthopedic Surgery)  Richard Prasad MD as Surgeon (Orthopedic Surgery)  Tim Gutierres MD as Surgeon (Neurosurgery)  Rahat Pepe MD as Consulting Physician (Cardiology)    Chief Complaint:  Uncontrolled type 2 diabetes mellitus and postoperative management    No chief complaint on file.      Subjective     HPI  Patient's blood sugars are in the 200 range  He still reports pain in the right lower extremity  He still on pain medication  He is eating reasonably well  He obviously needs more insulin    Interval History:    Patient is a 63-year-old -American male with a history of uncontrolled type 2 diabetes mellitus on insulin pump underwent elective right total knee arthroplasty yesterday  Postoperatively his diabetes needs to be managed hence the consultation     Patient is a known diabetic for more than 35 years  He reported that is currently using T Slim insulin pump  He was advised to use 80% of the basal rate as temporary basal for the time of surgery  He is currently using 100% basophil this morning  He also started using mealtime insulin  Pump settings were reviewed  Patient is on 0.85 units per hour basal  His carb ratio is 1-10 and his correction is 1-60 and his target is 120 mg blood sugar  Insulin action time is 4 hours     Uncontrolled type 2 diabetes mellitus with severe peripheral neuropathy  Patient denied any knowledge of diabetic retinopathy  He goes to see Dr. Qureshi  Patient also denied any knowledge of diabetic nephropathy  Hypoglycemia  Patient has occasional episodes of hypoglycemia  He reports that his home blood sugars are usually in the 150-200 range  At times they have been in the 250s for Yakov preceding surgery  His last known him (in the  8% range  Patient follows a nurse practitioner for diabetes management     Patient is currently receiving oral pain medication but he believes that he is able to handle the pump very well  The nursing staff concur with this assessment           Review of Systems:      Review of Systems   Respiratory: Negative.    Cardiovascular: Negative.    Gastrointestinal: Negative.    Musculoskeletal: Positive for joint swelling.   Neurological: Positive for numbness.   All other systems reviewed and are negative.    Objective     Vital Signs   Temp:  [98.8 °F (37.1 °C)-100.5 °F (38.1 °C)] 100.2 °F (37.9 °C)  Heart Rate:  [] 89  Resp:  [16-18] 16  BP: (141-167)/(73-86) 141/73    Physical Exam:  Physical Exam   Constitutional: He is oriented to person, place, and time.   Eyes: EOM are normal. Pupils are equal, round, and reactive to light.   Neck: Normal range of motion. Neck supple.   Cardiovascular: Normal rate, regular rhythm, normal heart sounds and intact distal pulses.   Pulmonary/Chest: Effort normal and breath sounds normal.   Abdominal: Soft. Bowel sounds are normal. He exhibits distension. There is no tenderness.   Musculoskeletal: He exhibits edema and tenderness.   Neurological: He is alert and oriented to person, place, and time.   Skin: Skin is warm and dry.   Psychiatric: He has a normal mood and affect. His behavior is normal.   Nursing note and vitals reviewed.  Results Review:     I reviewed the patient's new clinical results.      Glucose   Date/Time Value Ref Range Status   01/11/2019 0452 286 (H) 65 - 99 mg/dL Final   01/10/2019 0407 143 (H) 65 - 99 mg/dL Final   01/10/2019 0129 140 (H) 65 - 99 mg/dL Final   01/09/2019 0407 239 (H) 65 - 99 mg/dL Final     Lab Results (last 72 hours)     Procedure Component Value Units Date/Time    POC Glucose Once [[id number]]  (Abnormal) Collected:  01/11/19 1613    Specimen:  Blood Updated:  01/11/19 1614     Glucose 266 mg/dL     POC Glucose Once [[id number]]   (Abnormal) Collected:  01/11/19 1106    Specimen:  Blood Updated:  01/11/19 1107     Glucose 326 mg/dL     POC Glucose Once [[id number]]  (Abnormal) Collected:  01/11/19 0612    Specimen:  Blood Updated:  01/11/19 0614     Glucose 239 mg/dL     Basic Metabolic Panel [[id number]]  (Abnormal) Collected:  01/11/19 0452    Specimen:  Blood from Hand, Right Updated:  01/11/19 0536     Glucose 286 mg/dL      BUN 30 mg/dL      Creatinine 1.27 mg/dL      Sodium 131 mmol/L      Potassium 4.6 mmol/L      Chloride 98 mmol/L      CO2 22.5 mmol/L      Calcium 8.5 mg/dL      eGFR  African Amer 70 mL/min/1.73      BUN/Creatinine Ratio 23.6     Anion Gap 10.5 mmol/L     Narrative:       GFR Normal >60  Chronic Kidney Disease <60  Kidney Failure <15    CBC (No Diff) [[id number]]  (Abnormal) Collected:  01/11/19 0452    Specimen:  Blood from Hand, Right Updated:  01/11/19 0525     WBC 10.52 10*3/mm3      RBC 4.91 10*6/mm3      Hemoglobin 13.3 g/dL      Hematocrit 42.0 %      MCV 85.5 fL      MCH 27.1 pg      MCHC 31.7 g/dL      RDW 15.7 %      RDW-SD 49.7 fl      MPV 10.5 fL      Platelets 205 10*3/mm3     POC Glucose Once [[id number]]  (Abnormal) Collected:  01/10/19 2036    Specimen:  Blood Updated:  01/10/19 2038     Glucose 235 mg/dL     POC Glucose Once [[id number]]  (Abnormal) Collected:  01/10/19 1641    Specimen:  Blood Updated:  01/10/19 1643     Glucose 233 mg/dL     POC Glucose Once [[id number]]  (Abnormal) Collected:  01/10/19 1128    Specimen:  Blood Updated:  01/10/19 1132     Glucose 229 mg/dL     POC Glucose Once [[id number]]  (Abnormal) Collected:  01/10/19 0832    Specimen:  Blood Updated:  01/10/19 0844     Glucose 202 mg/dL     POC Glucose Once [[id number]]  (Abnormal) Collected:  01/10/19 0633    Specimen:  Blood Updated:  01/10/19 0634     Glucose 165 mg/dL     Basic Metabolic Panel [[id number]]  (Abnormal) Collected:  01/10/19 0407    Specimen:  Blood Updated:  01/10/19 0453     Glucose 143 mg/dL      BUN 32 mg/dL       Creatinine 1.51 mg/dL      Sodium 135 mmol/L      Potassium 4.1 mmol/L      Chloride 98 mmol/L      CO2 25.2 mmol/L      Calcium 8.7 mg/dL      eGFR  African Amer 57 mL/min/1.73      BUN/Creatinine Ratio 21.2     Anion Gap 11.8 mmol/L     Narrative:       GFR Normal >60  Chronic Kidney Disease <60  Kidney Failure <15    CBC & Differential [[id number]] Collected:  01/10/19 0407    Specimen:  Blood Updated:  01/10/19 0430    Narrative:       The following orders were created for panel order CBC & Differential.  Procedure                               Abnormality         Status                     ---------                               -----------         ------                     CBC Auto Differential[[id number]]        Abnormal            Final result                 Please view results for these tests on the individual orders.    CBC Auto Differential [[id number]]  (Abnormal) Collected:  01/10/19 0407    Specimen:  Blood Updated:  01/10/19 0430     WBC 12.24 10*3/mm3      RBC 5.32 10*6/mm3      Hemoglobin 14.6 g/dL      Hematocrit 45.6 %      MCV 85.7 fL      MCH 27.4 pg      MCHC 32.0 g/dL      RDW 15.6 %      RDW-SD 49.7 fl      MPV 10.0 fL      Platelets 206 10*3/mm3      Neutrophil % 72.4 %      Lymphocyte % 17.8 %      Monocyte % 7.8 %      Eosinophil % 1.6 %      Basophil % 0.2 %      Immature Grans % 0.2 %      Neutrophils, Absolute 8.84 10*3/mm3      Lymphocytes, Absolute 2.18 10*3/mm3      Monocytes, Absolute 0.96 10*3/mm3      Eosinophils, Absolute 0.20 10*3/mm3      Basophils, Absolute 0.03 10*3/mm3      Immature Grans, Absolute 0.03 10*3/mm3     Basic Metabolic Panel [[id number]]  (Abnormal) Collected:  01/10/19 0129    Specimen:  Blood Updated:  01/10/19 0217     Glucose 140 mg/dL      BUN 32 mg/dL      Creatinine 1.62 mg/dL      Sodium 136 mmol/L      Potassium 3.9 mmol/L      Chloride 97 mmol/L      CO2 26.1 mmol/L      Calcium 8.7 mg/dL      eGFR  African Amer 53 mL/min/1.73      BUN/Creatinine Ratio  19.8     Anion Gap 12.9 mmol/L     Narrative:       GFR Normal >60  Chronic Kidney Disease <60  Kidney Failure <15    Protime-INR [[id number]]  (Abnormal) Collected:  01/10/19 0129    Specimen:  Blood Updated:  01/10/19 0206     Protime 14.4 Seconds      INR 1.14    CBC & Differential [[id number]] Collected:  01/10/19 0129    Specimen:  Blood Updated:  01/10/19 0204    Narrative:       The following orders were created for panel order CBC & Differential.  Procedure                               Abnormality         Status                     ---------                               -----------         ------                     CBC Auto Differential[[id number]]        Abnormal            Final result                 Please view results for these tests on the individual orders.    CBC Auto Differential [[id number]]  (Abnormal) Collected:  01/10/19 0129    Specimen:  Blood Updated:  01/10/19 0204     WBC 12.43 10*3/mm3      RBC 5.50 10*6/mm3      Hemoglobin 15.5 g/dL      Hematocrit 47.8 %      MCV 86.9 fL      MCH 28.2 pg      MCHC 32.4 g/dL      RDW 15.7 %      RDW-SD 50.2 fl      MPV 9.9 fL      Platelets 210 10*3/mm3      Neutrophil % 62.9 %      Lymphocyte % 26.5 %      Monocyte % 8.0 %      Eosinophil % 2.1 %      Basophil % 0.3 %      Immature Grans % 0.2 %      Neutrophils, Absolute 7.82 10*3/mm3      Lymphocytes, Absolute 3.29 10*3/mm3      Monocytes, Absolute 0.99 10*3/mm3      Eosinophils, Absolute 0.26 10*3/mm3      Basophils, Absolute 0.04 10*3/mm3      Immature Grans, Absolute 0.03 10*3/mm3     Blood Gas, Arterial [[id number]]  (Abnormal) Collected:  01/10/19 0130    Specimen:  Arterial Blood Updated:  01/10/19 0133     Site Arterial: right radial     Bruce's Test Positive     pH, Arterial 7.396 pH units      pCO2, Arterial 41.7 mm Hg      pO2, Arterial 73.6 mm Hg      HCO3, Arterial 25.6 mmol/L      Base Excess, Arterial 0.5 mmol/L      O2 Saturation Calculated 94.5 %      Barometric Pressure for Blood Gas  758.6 mmHg      Modality Cannula     Flow Rate 3 lpm      Rate 20 Breaths/minute     POC Glucose Once [[id number]]  (Abnormal) Collected:  01/10/19 0102    Specimen:  Blood Updated:  01/10/19 0122     Glucose 203 mg/dL     POC Glucose Once [[id number]]  (Abnormal) Collected:  01/09/19 2108    Specimen:  Blood Updated:  01/09/19 2109     Glucose 179 mg/dL     POC Glucose Once [[id number]]  (Normal) Collected:  01/09/19 1920    Specimen:  Blood Updated:  01/09/19 1921     Glucose 127 mg/dL     POC Glucose Once [[id number]]  (Normal) Collected:  01/09/19 1813    Specimen:  Blood Updated:  01/09/19 1826     Glucose 83 mg/dL     POC Glucose Once [[id number]]  (Abnormal) Collected:  01/09/19 1729    Specimen:  Blood Updated:  01/09/19 1730     Glucose 68 mg/dL     POC Glucose Once [[id number]]  (Abnormal) Collected:  01/09/19 1626    Specimen:  Blood Updated:  01/09/19 1627     Glucose 68 mg/dL     POC Glucose Once [[id number]]  (Normal) Collected:  01/09/19 1110    Specimen:  Blood Updated:  01/09/19 1111     Glucose 103 mg/dL     POC Glucose Once [[id number]]  (Abnormal) Collected:  01/09/19 0625    Specimen:  Blood Updated:  01/09/19 0626     Glucose 184 mg/dL     Basic Metabolic Panel [[id number]]  (Abnormal) Collected:  01/09/19 0407    Specimen:  Blood Updated:  01/09/19 0458     Glucose 239 mg/dL      BUN 26 mg/dL      Creatinine 1.43 mg/dL      Sodium 133 mmol/L      Potassium 4.5 mmol/L      Chloride 100 mmol/L      CO2 21.4 mmol/L      Calcium 8.8 mg/dL      eGFR   Amer 61 mL/min/1.73      BUN/Creatinine Ratio 18.2     Anion Gap 11.6 mmol/L     Narrative:       GFR Normal >60  Chronic Kidney Disease <60  Kidney Failure <15    Hemoglobin & Hematocrit, Blood [[id number]]  (Normal) Collected:  01/09/19 0407    Specimen:  Blood Updated:  01/09/19 0430     Hemoglobin 15.2 g/dL      Hematocrit 46.6 %     POC Glucose Once [[id number]]  (Abnormal) Collected:  01/08/19 2101    Specimen:  Blood Updated:  01/08/19 2102      Glucose 288 mg/dL         Imaging Results (last 72 hours)     Procedure Component Value Units Date/Time    MRI Brain With & Without Contrast [[id number]] Collected:  01/10/19 1210     Updated:  01/11/19 1030    Narrative:       MRI EXAMINATION OF BRAIN WITH AND WITHOUT CONTRAST, MRA OF THE HEAD  WITHOUT CONTRAST AND MRA OF THE NECK WITH AND WITHOUT CONTRAST     HISTORY:  Stroke.     COMPARISON: None.     MRI EXAMINATION OF BRAIN WITH AND WITHOUT CONTRAST     A MRI examination of the brain was performed before and after the  intravenous administration of contrast utilizing sagittal T1, axial  diffusion, T1, T2, T2 FLAIR, susceptibility weighted imaging as well as  axial and coronal T1 postcontrast weighted sequences.     FINDINGS: There is no evidence of restricted diffusion to suggest acute  infarction. There is expected flow-void in the basilar artery and in the  distal aspect of the internal carotid arteries bilaterally on the axial  T2 sequence. The axial T2 FLAIR sequence demonstrates a mild degree of  increased signal intensity involving the white matter of the cerebral  hemispheres bilaterally, nonspecific but suggesting minimal small vessel  ischemic disease.     After contrast administration there was no evidence of abnormal  enhancement.       Impression:       No evidence of acute infarction, mass or of abnormal  enhancement. Minimal small vessel ischemic disease.        MRA OF THE HEAD WITHOUT CONTRAST     The study is hampered somewhat by patient motion. There is signal  present within the distal aspect of the vertebral arteries bilaterally.  The vertebral arteries are codominant. The basilar artery and the  proximal aspects of the posterior cerebral arteries appear unremarkable.     There is signal present within the distal aspect of the internal carotid  arteries bilaterally. The right A1 segment is moderately hypoplastic.  The proximal aspects of the anterior and middle cerebral arteries  appear  unremarkable.     IMPRESSION: The study is hampered somewhat by patient motion. There is  no evidence of a focal high-grade stenosis or aneurysm. The right A1  segment is hypoplastic.           MRA OF THE NECK WITH AND WITHOUT CONTRAST     The study is hampered by patient motion. The great vessels are arranged  in a classic configuration. There is 0% stenosis involving the carotid  bifurcations using NASCET criteria. There is signal loss at the proximal  aspect of the right common carotid artery and to a lesser extent the  left common carotid artery, likely artifactual.     IMPRESSION: The study is hampered by artifact. There is signal loss  involving the proximal aspects of the common carotid arteries  bilaterally. This is likely artifactual. Underlying stenosis cannot be  entirely excluded. Further evaluation could be performed with a CT  angiogram of the neck and head as indicated.     This report was finalized on 1/11/2019 10:27 AM by Dr. Andres Forte M.D.       MRI Angiogram Neck With & Without Contrast [[id number]] Collected:  01/10/19 1210     Updated:  01/11/19 1030    Narrative:       MRI EXAMINATION OF BRAIN WITH AND WITHOUT CONTRAST, MRA OF THE HEAD  WITHOUT CONTRAST AND MRA OF THE NECK WITH AND WITHOUT CONTRAST     HISTORY:  Stroke.     COMPARISON: None.     MRI EXAMINATION OF BRAIN WITH AND WITHOUT CONTRAST     A MRI examination of the brain was performed before and after the  intravenous administration of contrast utilizing sagittal T1, axial  diffusion, T1, T2, T2 FLAIR, susceptibility weighted imaging as well as  axial and coronal T1 postcontrast weighted sequences.     FINDINGS: There is no evidence of restricted diffusion to suggest acute  infarction. There is expected flow-void in the basilar artery and in the  distal aspect of the internal carotid arteries bilaterally on the axial  T2 sequence. The axial T2 FLAIR sequence demonstrates a mild degree of  increased signal intensity  involving the white matter of the cerebral  hemispheres bilaterally, nonspecific but suggesting minimal small vessel  ischemic disease.     After contrast administration there was no evidence of abnormal  enhancement.       Impression:       No evidence of acute infarction, mass or of abnormal  enhancement. Minimal small vessel ischemic disease.        MRA OF THE HEAD WITHOUT CONTRAST     The study is hampered somewhat by patient motion. There is signal  present within the distal aspect of the vertebral arteries bilaterally.  The vertebral arteries are codominant. The basilar artery and the  proximal aspects of the posterior cerebral arteries appear unremarkable.     There is signal present within the distal aspect of the internal carotid  arteries bilaterally. The right A1 segment is moderately hypoplastic.  The proximal aspects of the anterior and middle cerebral arteries appear  unremarkable.     IMPRESSION: The study is hampered somewhat by patient motion. There is  no evidence of a focal high-grade stenosis or aneurysm. The right A1  segment is hypoplastic.           MRA OF THE NECK WITH AND WITHOUT CONTRAST     The study is hampered by patient motion. The great vessels are arranged  in a classic configuration. There is 0% stenosis involving the carotid  bifurcations using NASCET criteria. There is signal loss at the proximal  aspect of the right common carotid artery and to a lesser extent the  left common carotid artery, likely artifactual.     IMPRESSION: The study is hampered by artifact. There is signal loss  involving the proximal aspects of the common carotid arteries  bilaterally. This is likely artifactual. Underlying stenosis cannot be  entirely excluded. Further evaluation could be performed with a CT  angiogram of the neck and head as indicated.     This report was finalized on 1/11/2019 10:27 AM by Dr. Andres Forte M.D.       MRI Angiogram Head Without Contrast [[id number]] Collected:  01/10/19  1210     Updated:  01/11/19 1030    Narrative:       MRI EXAMINATION OF BRAIN WITH AND WITHOUT CONTRAST, MRA OF THE HEAD  WITHOUT CONTRAST AND MRA OF THE NECK WITH AND WITHOUT CONTRAST     HISTORY:  Stroke.     COMPARISON: None.     MRI EXAMINATION OF BRAIN WITH AND WITHOUT CONTRAST     A MRI examination of the brain was performed before and after the  intravenous administration of contrast utilizing sagittal T1, axial  diffusion, T1, T2, T2 FLAIR, susceptibility weighted imaging as well as  axial and coronal T1 postcontrast weighted sequences.     FINDINGS: There is no evidence of restricted diffusion to suggest acute  infarction. There is expected flow-void in the basilar artery and in the  distal aspect of the internal carotid arteries bilaterally on the axial  T2 sequence. The axial T2 FLAIR sequence demonstrates a mild degree of  increased signal intensity involving the white matter of the cerebral  hemispheres bilaterally, nonspecific but suggesting minimal small vessel  ischemic disease.     After contrast administration there was no evidence of abnormal  enhancement.       Impression:       No evidence of acute infarction, mass or of abnormal  enhancement. Minimal small vessel ischemic disease.        MRA OF THE HEAD WITHOUT CONTRAST     The study is hampered somewhat by patient motion. There is signal  present within the distal aspect of the vertebral arteries bilaterally.  The vertebral arteries are codominant. The basilar artery and the  proximal aspects of the posterior cerebral arteries appear unremarkable.     There is signal present within the distal aspect of the internal carotid  arteries bilaterally. The right A1 segment is moderately hypoplastic.  The proximal aspects of the anterior and middle cerebral arteries appear  unremarkable.     IMPRESSION: The study is hampered somewhat by patient motion. There is  no evidence of a focal high-grade stenosis or aneurysm. The right A1  segment is  hypoplastic.           MRA OF THE NECK WITH AND WITHOUT CONTRAST     The study is hampered by patient motion. The great vessels are arranged  in a classic configuration. There is 0% stenosis involving the carotid  bifurcations using NASCET criteria. There is signal loss at the proximal  aspect of the right common carotid artery and to a lesser extent the  left common carotid artery, likely artifactual.     IMPRESSION: The study is hampered by artifact. There is signal loss  involving the proximal aspects of the common carotid arteries  bilaterally. This is likely artifactual. Underlying stenosis cannot be  entirely excluded. Further evaluation could be performed with a CT  angiogram of the neck and head as indicated.     This report was finalized on 1/11/2019 10:27 AM by Dr. Andres Forte M.D.       XR Chest 1 View [[id number]] Collected:  01/10/19 0526     Updated:  01/10/19 0530    Narrative:       PORTABLE CHEST RADIOGRAPH     HISTORY: Wheezing     COMPARISON: September 19, 2018     FINDINGS:  Cardiomegaly is identified. There is no evidence of vascular congestion.  Lung volumes are diminished, with bibasilar atelectasis noted. No  definite pneumothorax or pleural effusion is seen. There are changes of  prior median sternotomy with coronary artery bypass grafting.       Impression:       Overall diminished lung volumes with bibasilar atelectasis.     This report was finalized on 1/10/2019 5:27 AM by Dr. Genevieve Gonsalez M.D.             Medication Review:       Current Facility-Administered Medications:   •  acetaminophen (TYLENOL) tablet 325 mg, 325 mg, Oral, Q4H PRN, Kaylee Jorge, APRN, 325 mg at 01/10/19 0411  •  albuterol sulfate HFA (PROVENTIL HFA;VENTOLIN HFA;PROAIR HFA) inhaler 2 puff, 2 puff, Inhalation, Q6H PRN, Kaylee Jorge, APRN  •  amLODIPine (NORVASC) tablet 10 mg, 10 mg, Oral, Rohan GUPTA Jonathan, MD, 10 mg at 01/11/19 0971  •  aspirin EC tablet 81 mg, 81 mg, Oral, QAROBERT, Kaylee Jorge, APRN,  81 mg at 01/11/19 0748  •  atorvastatin (LIPITOR) tablet 40 mg, 40 mg, Oral, Daily, KayceeKaylee dillard, APRN, 40 mg at 01/11/19 0952  •  bisacodyl (DULCOLAX) EC tablet 10 mg, 10 mg, Oral, Daily PRN, KayceeKaylee idllard, APRN  •  bisacodyl (DULCOLAX) suppository 10 mg, 10 mg, Rectal, Daily PRN, Kaylee Jorge, APRN  •  calcium carbonate (TUMS) chewable tablet 500 mg (200 mg elemental), 2 tablet, Oral, TID PRN, Greg Madrigal MD  •  carvedilol (COREG) tablet 12.5 mg, 12.5 mg, Oral, BID With Meals, Kaylee Jorge, APRN, 12.5 mg at 01/11/19 1715  •  cetirizine (zyrTEC) tablet 10 mg, 10 mg, Oral, QAM, Kaylee Jorge, APRN, 10 mg at 01/11/19 0749  •  clopidogrel (PLAVIX) tablet 75 mg, 75 mg, Oral, QAM, KayceeKaylee dillard, APRN, 75 mg at 01/11/19 0750  •  cyclobenzaprine (FLEXERIL) tablet 10 mg, 10 mg, Oral, BID PRN, Kaylee Jorge, APRN, 10 mg at 01/08/19 2028  •  dextrose (D50W) 25 g/ 50mL Intravenous Solution 25 g, 25 g, Intravenous, Q15 Min PRN, Greg Madrigal MD  •  dextrose (GLUTOSE) oral gel 15 g, 15 g, Oral, Q15 Min PRN, Greg Madrigal MD  •  diphenhydrAMINE (BENADRYL) capsule 25 mg, 25 mg, Oral, Q6H PRN **OR** diphenhydrAMINE (BENADRYL) capsule 25 mg, 25 mg, Oral, Q6H PRN, Kaylee Jorge, APRN  •  docusate sodium (COLACE) capsule 100 mg, 100 mg, Oral, BID, KayceeKaylee dillard, APRN, 100 mg at 01/11/19 0952  •  famotidine (PEPCID) tablet 40 mg, 40 mg, Oral, Daily, Kaylee Jorge, APRN, 40 mg at 01/11/19 0952  •  furosemide (LASIX) tablet 40 mg, 40 mg, Oral, Nightly, Greg Madrigal MD, 40 mg at 01/10/19 2056  •  gabapentin (NEURONTIN) capsule 400 mg, 400 mg, Oral, TID, Rahat Interiano MD, 400 mg at 01/11/19 1715  •  glucagon (human recombinant) (GLUCAGEN DIAGNOSTIC) injection 1 mg, 1 mg, Subcutaneous, PRN, Greg Madrigal MD  •  HYDROmorphone (DILAUDID) injection 0.5 mg, 0.5 mg, Intravenous, Q2H PRN **AND** naloxone (NARCAN) injection 0.1 mg, 0.1 mg, Intravenous, Q5 Min PRN, Kaylee Jorge, APRN  •  insulin glargine (LANTUS)  injection 15 Units, 15 Units, Subcutaneous, Q12H, Kin Malloy MD  •  insulin lispro (humaLOG) injection 2-5 Units, 2-5 Units, Subcutaneous, 4x Daily AC & at Bedtime, Kin Malloy MD, 4 Units at 01/11/19 1715  •  insulin lispro (humaLOG) injection 8 Units, 8 Units, Subcutaneous, TID With Meals, Kin Malloy MD, 8 Units at 01/11/19 1715  •  isosorbide mononitrate (IMDUR) 24 hr tablet 60 mg, 60 mg, Oral, QAM, TrivoliKaylee dillard, APRN, 60 mg at 01/11/19 0952  •  ketorolac (TORADOL) injection 30 mg, 30 mg, Intravenous, Once, Andres Carter MD  •  lactated ringers infusion, 9 mL/hr, Intravenous, Continuous, Reba Sainz MD, Last Rate: 9 mL/hr at 01/08/19 0909, 9 mL/hr at 01/08/19 0909  •  losartan (COZAAR) tablet 50 mg, 50 mg, Oral, QAM, Greg Madrigal MD, 50 mg at 01/11/19 0951  •  magnesium hydroxide (MILK OF MAGNESIA) suspension 2400 mg/10mL 10 mL, 10 mL, Oral, Daily PRN, TrivoliKaylee dillard, APRN  •  ondansetron (ZOFRAN) tablet 4 mg, 4 mg, Oral, Q6H PRN **OR** ondansetron ODT (ZOFRAN-ODT) disintegrating tablet 4 mg, 4 mg, Oral, Q6H PRN **OR** ondansetron (ZOFRAN) injection 4 mg, 4 mg, Intravenous, Q6H PRN, TrivoliKaylee dillard, APRN  •  oxyCODONE-acetaminophen (PERCOCET)  MG per tablet 1 tablet, 1 tablet, Oral, Q4H PRN, 1 tablet at 01/11/19 1721 **OR** oxyCODONE-acetaminophen (PERCOCET)  MG per tablet 2 tablet, 2 tablet, Oral, Q4H PRN, Trivoli, Kaylee J, APRN, 2 tablet at 01/11/19 1237  •  pantoprazole (PROTONIX) EC tablet 40 mg, 40 mg, Oral, QAM, Kaylee Jorge APRN, 40 mg at 01/11/19 0951  •  polyethylene glycol 3350 powder (packet), 17 g, Oral, Daily, Kaylee Jorge APRN, 17 g at 01/11/19 0951  •  potassium chloride (MICRO-K) CR capsule 10 mEq, 10 mEq, Oral, Daily, Greg Madrigal MD, 10 mEq at 01/11/19 0951  •  promethazine (PHENERGAN) tablet 12.5 mg, 12.5 mg, Oral, Q6H PRN, Kaylee Jorge APRN    Assessment/Plan     Patient Active Problem List   Diagnosis   • Bulging lumbar disc  "  • Chronic pain   • Diabetic neuropathy (CMS/HCC)   • Low back pain   • Degenerative arthritis of lumbar spine   • Neuropathy involving both lower extremities   • Encounter for long-term (current) use of high-risk medication   • Postlaminectomy syndrome of lumbar region   • Syringomyelia and syringobulbia (CMS/HCC)   • Lumbar pseudoarthrosis   • Type 2 diabetes mellitus with neurologic complication, with long-term current use of insulin (CMS/HCC)   • Insulin pump in place   • Hx of decompressive lumbar laminectomy   • Bilateral carpal tunnel syndrome   • Degenerative cervical disc   • Acute pain of right knee   • Primary localized osteoarthrosis of right lower leg   • Arthritis of right knee   • Sacroiliac inflammation (CMS/HCC)   • Sacroiliac joint dysfunction   • Severe obesity (BMI 35.0-39.9)   • Chronic pain of right knee   • Tricompartment osteoarthritis of left knee   • Tricompartment osteoarthritis of right knee   • Arthritis of left knee   • Bilateral chronic knee pain   • Hypertension     Uncontrolled type 2 diabetes mellitus  Uncontrolled type 2 diabetes mellitus with neuropathy  Insulin pump status  History of hypoglycemia  Obesity    Insulin pump was discontinued yesterday  Patient is currently on basal and bolus with correction scale  Blood sugars are in the 200 range  I will increase the insulin to get the blood sugars more under 100 range  Patient verbalized understanding    The total floor time spent  was more than 35 min of which greater than 20 min of time (greater than 50% of the total time)  was spent face to face with the patient counseling and coordination of care on recommended evaluation and treatment options, instructions for management/treatment and /or follow up and importance of compliance with chosen management or treatment options    Kin Malloy MD FACE.  01/11/19  5:52 PM      EMR Dragon / transcription disclaimer:     \"Dictated utilizing Dragon dictation\".   "

## 2019-01-11 NOTE — PROGRESS NOTES
Orthopedic Total Joint Progress Note        Patient: Kian Mcdaniels    Date of Admission: 1/8/2019  7:01 AM    YOB: 1955    Medical Record Number: [id number]    Attending Physician: Andres Carter MD      POD # 2 Days Post-Op Procedure(s) (LRB):  RIGHT TOTAL KNEE ARTHROPLASTY WITH NEGRITA NAVIGATION (Right)       Systemic or Specific Complaints:  This morning patient is lying in bed, alert and oriented to person and place but not time. Mild confusion and forgetfulness.  Could not recall what brought him to the hospital without prompting.  Recognized me as a provider but not specifics.      Allergies:   Allergies   Allergen Reactions   • Erythromycin Nausea Only and Other (See Comments)     PT STATES ACUTE KIDNEY INJURY   • Lisinopril Other (See Comments)     7/2016 possibly caused pancreatitis per Dr Quinonez   • Metformin Nausea And Vomiting       Medications:   Current Medications:  Scheduled Meds:    amLODIPine 10 mg Oral QAM   aspirin 81 mg Oral QAM   atorvastatin 40 mg Oral Daily   carvedilol 12.5 mg Oral BID With Meals   cetirizine 10 mg Oral QAM   clopidogrel 75 mg Oral QAM   docusate sodium 100 mg Oral BID   famotidine 40 mg Oral Daily   furosemide 40 mg Oral Nightly   gabapentin 400 mg Oral TID   insulin glargine 15 Units Subcutaneous Q12H   insulin lispro 2-5 Units Subcutaneous 4x Daily AC & at Bedtime   insulin lispro 8 Units Subcutaneous TID With Meals   isosorbide mononitrate 60 mg Oral QAM   ketorolac 30 mg Intravenous Once   losartan 50 mg Oral QAM   pantoprazole 40 mg Oral QAM   polyethylene glycol 17 g Oral Daily   potassium chloride 10 mEq Oral Daily     Continuous Infusions:    lactated ringers 9 mL/hr Last Rate: 9 mL/hr (01/08/19 0909)     PRN Meds:.•  acetaminophen  •  albuterol sulfate HFA  •  bisacodyl  •  bisacodyl  •  calcium carbonate  •  cyclobenzaprine  •  dextrose  •  dextrose  •  diphenhydrAMINE **OR** diphenhydrAMINE  •  glucagon (human recombinant)  •   "HYDROmorphone **AND** naloxone  •  magnesium hydroxide  •  ondansetron **OR** ondansetron ODT **OR** ondansetron  •  oxyCODONE-acetaminophen **OR** oxyCODONE-acetaminophen  •  promethazine      Physical Exam: 63 y.o. male   Wt Readings from Last 3 Encounters:   01/08/19 126 kg (277 lb 8 oz)   01/07/19 126 kg (277 lb 6.4 oz)   01/03/19 130 kg (287 lb)     Ht Readings from Last 3 Encounters:   01/08/19 181.6 cm (71.5\")   01/07/19 179.1 cm (70.5\")   01/03/19 180.3 cm (71\")     Body mass index is 38.16 kg/m².  Facility age limit for growth percentiles is 20 years.    General Appearance:    alert and confused            Pain Relief: Patient reports good relief   Vitals:    01/10/19 2300 01/11/19 0336 01/11/19 0457 01/11/19 0733   BP: 152/78 159/86 154/80 150/80   BP Location: Left arm Right arm Left arm Right arm   Patient Position: Lying Lying Lying Sitting   Pulse: 83 111 90 89   Resp: 16 18  16   Temp: 99.7 °F (37.6 °C) 100.5 °F (38.1 °C) 99.1 °F (37.3 °C) 99.3 °F (37.4 °C)   TempSrc:  Oral  Oral   SpO2: 96% 93% 94%    Weight:       Height:              HEENT:    Normocephalic, without obvious abnormality, atraumatic.             Lungs:     Respirations regular, even and unlabored      Heart:    Regular controlled rate   Abdomen:     soft non-tender, non-distended       Extremities:   Operative extremity neurovascular status intact. ROM appropriate.  Incision intact w/out signs or symptoms of infection DEBBY dressing intact with small amount of blood.  No cyanosis, calf is soft and nontender.     Pulses:     Pulses palpable and equal bilaterally     Skin:     Skin Warm/Dry w/out cyanosis     Activity: Mobilizing Per P.T.   Weight Bearing: As Tolerated    Diagnostic Tests:   Admission on 01/08/2019   Component Date Value Ref Range Status   • Glucose 01/08/2019 93  70 - 130 mg/dL Final   • Glucose 01/08/2019 84  70 - 130 mg/dL Final   • Glucose 01/08/2019 186* 70 - 130 mg/dL Final   • Glucose 01/08/2019 288* 70 - 130 " mg/dL Final   • Glucose 01/09/2019 239* 65 - 99 mg/dL Final   • BUN 01/09/2019 26* 8 - 23 mg/dL Final   • Creatinine 01/09/2019 1.43* 0.76 - 1.27 mg/dL Final   • Sodium 01/09/2019 133* 136 - 145 mmol/L Final   • Potassium 01/09/2019 4.5  3.5 - 5.2 mmol/L Final   • Chloride 01/09/2019 100  98 - 107 mmol/L Final   • CO2 01/09/2019 21.4* 22.0 - 29.0 mmol/L Final   • Calcium 01/09/2019 8.8  8.6 - 10.5 mg/dL Final   • eGFR   Amer 01/09/2019 61  >60 mL/min/1.73 Final   • BUN/Creatinine Ratio 01/09/2019 18.2  7.0 - 25.0 Final   • Anion Gap 01/09/2019 11.6  mmol/L Final   • Hemoglobin 01/09/2019 15.2  13.7 - 17.6 g/dL Final   • Hematocrit 01/09/2019 46.6  40.4 - 52.2 % Final   • Glucose 01/09/2019 184* 70 - 130 mg/dL Final   • Glucose 01/09/2019 103  70 - 130 mg/dL Final   • Glucose 01/09/2019 68* 70 - 130 mg/dL Final   • Glucose 01/09/2019 68* 70 - 130 mg/dL Final   • Glucose 01/09/2019 83  70 - 130 mg/dL Final   • Glucose 01/09/2019 127  70 - 130 mg/dL Final   • Glucose 01/09/2019 179* 70 - 130 mg/dL Final   • Glucose 01/10/2019 203* 70 - 130 mg/dL Final   • Glucose 01/10/2019 140* 65 - 99 mg/dL Final   • BUN 01/10/2019 32* 8 - 23 mg/dL Final   • Creatinine 01/10/2019 1.62* 0.76 - 1.27 mg/dL Final   • Sodium 01/10/2019 136  136 - 145 mmol/L Final   • Potassium 01/10/2019 3.9  3.5 - 5.2 mmol/L Final   • Chloride 01/10/2019 97* 98 - 107 mmol/L Final   • CO2 01/10/2019 26.1  22.0 - 29.0 mmol/L Final   • Calcium 01/10/2019 8.7  8.6 - 10.5 mg/dL Final   • eGFR   Amer 01/10/2019 53* >60 mL/min/1.73 Final   • BUN/Creatinine Ratio 01/10/2019 19.8  7.0 - 25.0 Final   • Anion Gap 01/10/2019 12.9  mmol/L Final   • Protime 01/10/2019 14.4* 11.7 - 14.2 Seconds Final   • INR 01/10/2019 1.14* 0.90 - 1.10 Final   • WBC 01/10/2019 12.43* 4.50 - 10.70 10*3/mm3 Final   • RBC 01/10/2019 5.50  4.60 - 6.00 10*6/mm3 Final   • Hemoglobin 01/10/2019 15.5  13.7 - 17.6 g/dL Final   • Hematocrit 01/10/2019 47.8  40.4 - 52.2 % Final   •  MCV 01/10/2019 86.9  79.8 - 96.2 fL Final   • MCH 01/10/2019 28.2  27.0 - 32.7 pg Final   • MCHC 01/10/2019 32.4* 32.6 - 36.4 g/dL Final   • RDW 01/10/2019 15.7* 11.5 - 14.5 % Final   • RDW-SD 01/10/2019 50.2  37.0 - 54.0 fl Final   • MPV 01/10/2019 9.9  6.0 - 12.0 fL Final   • Platelets 01/10/2019 210  140 - 500 10*3/mm3 Final   • Neutrophil % 01/10/2019 62.9  42.7 - 76.0 % Final   • Lymphocyte % 01/10/2019 26.5  19.6 - 45.3 % Final   • Monocyte % 01/10/2019 8.0  5.0 - 12.0 % Final   • Eosinophil % 01/10/2019 2.1  0.3 - 6.2 % Final   • Basophil % 01/10/2019 0.3  0.0 - 1.5 % Final   • Immature Grans % 01/10/2019 0.2  0.0 - 0.5 % Final   • Neutrophils, Absolute 01/10/2019 7.82  1.90 - 8.10 10*3/mm3 Final   • Lymphocytes, Absolute 01/10/2019 3.29  0.90 - 4.80 10*3/mm3 Final   • Monocytes, Absolute 01/10/2019 0.99  0.20 - 1.20 10*3/mm3 Final   • Eosinophils, Absolute 01/10/2019 0.26  0.00 - 0.70 10*3/mm3 Final   • Basophils, Absolute 01/10/2019 0.04  0.00 - 0.20 10*3/mm3 Final   • Immature Grans, Absolute 01/10/2019 0.03  0.00 - 0.03 10*3/mm3 Final   • Site 01/10/2019 Arterial: right radial   Final   • Bruce's Test 01/10/2019 Positive   Final   • pH, Arterial 01/10/2019 7.396  7.350 - 7.450 pH units Final   • pCO2, Arterial 01/10/2019 41.7  35.0 - 45.0 mm Hg Final   • pO2, Arterial 01/10/2019 73.6* 80.0 - 100.0 mm Hg Final   • HCO3, Arterial 01/10/2019 25.6  22.0 - 28.0 mmol/L Final   • Base Excess, Arterial 01/10/2019 0.5  0.0 - 2.0 mmol/L Final   • O2 Saturation Calculated 01/10/2019 94.5  92.0 - 99.0 % Final   • Barometric Pressure for Blood Gas 01/10/2019 758.6  mmHg Final   • Modality 01/10/2019 Cannula   Final   • Flow Rate 01/10/2019 3  lpm Final   • Rate 01/10/2019 20  Breaths/minute Final   • Glucose 01/10/2019 143* 65 - 99 mg/dL Final   • BUN 01/10/2019 32* 8 - 23 mg/dL Final   • Creatinine 01/10/2019 1.51* 0.76 - 1.27 mg/dL Final   • Sodium 01/10/2019 135* 136 - 145 mmol/L Final   • Potassium 01/10/2019 4.1   3.5 - 5.2 mmol/L Final   • Chloride 01/10/2019 98  98 - 107 mmol/L Final   • CO2 01/10/2019 25.2  22.0 - 29.0 mmol/L Final   • Calcium 01/10/2019 8.7  8.6 - 10.5 mg/dL Final   • eGFR   Amer 01/10/2019 57* >60 mL/min/1.73 Final   • BUN/Creatinine Ratio 01/10/2019 21.2  7.0 - 25.0 Final   • Anion Gap 01/10/2019 11.8  mmol/L Final   • WBC 01/10/2019 12.24* 4.50 - 10.70 10*3/mm3 Final   • RBC 01/10/2019 5.32  4.60 - 6.00 10*6/mm3 Final   • Hemoglobin 01/10/2019 14.6  13.7 - 17.6 g/dL Final   • Hematocrit 01/10/2019 45.6  40.4 - 52.2 % Final   • MCV 01/10/2019 85.7  79.8 - 96.2 fL Final   • MCH 01/10/2019 27.4  27.0 - 32.7 pg Final   • MCHC 01/10/2019 32.0* 32.6 - 36.4 g/dL Final   • RDW 01/10/2019 15.6* 11.5 - 14.5 % Final   • RDW-SD 01/10/2019 49.7  37.0 - 54.0 fl Final   • MPV 01/10/2019 10.0  6.0 - 12.0 fL Final   • Platelets 01/10/2019 206  140 - 500 10*3/mm3 Final   • Neutrophil % 01/10/2019 72.4  42.7 - 76.0 % Final   • Lymphocyte % 01/10/2019 17.8* 19.6 - 45.3 % Final   • Monocyte % 01/10/2019 7.8  5.0 - 12.0 % Final   • Eosinophil % 01/10/2019 1.6  0.3 - 6.2 % Final   • Basophil % 01/10/2019 0.2  0.0 - 1.5 % Final   • Immature Grans % 01/10/2019 0.2  0.0 - 0.5 % Final   • Neutrophils, Absolute 01/10/2019 8.84* 1.90 - 8.10 10*3/mm3 Final   • Lymphocytes, Absolute 01/10/2019 2.18  0.90 - 4.80 10*3/mm3 Final   • Monocytes, Absolute 01/10/2019 0.96  0.20 - 1.20 10*3/mm3 Final   • Eosinophils, Absolute 01/10/2019 0.20  0.00 - 0.70 10*3/mm3 Final   • Basophils, Absolute 01/10/2019 0.03  0.00 - 0.20 10*3/mm3 Final   • Immature Grans, Absolute 01/10/2019 0.03  0.00 - 0.03 10*3/mm3 Final   • Glucose 01/10/2019 165* 70 - 130 mg/dL Final   • Glucose 01/10/2019 202* 70 - 130 mg/dL Final   • Glucose 01/10/2019 229* 70 - 130 mg/dL Final   • Glucose 01/10/2019 233* 70 - 130 mg/dL Final   • Glucose 01/10/2019 235* 70 - 130 mg/dL Final   • Glucose 01/11/2019 286* 65 - 99 mg/dL Final   • BUN 01/11/2019 30* 8 - 23 mg/dL  Final   • Creatinine 01/11/2019 1.27  0.76 - 1.27 mg/dL Final   • Sodium 01/11/2019 131* 136 - 145 mmol/L Final   • Potassium 01/11/2019 4.6  3.5 - 5.2 mmol/L Final   • Chloride 01/11/2019 98  98 - 107 mmol/L Final   • CO2 01/11/2019 22.5  22.0 - 29.0 mmol/L Final   • Calcium 01/11/2019 8.5* 8.6 - 10.5 mg/dL Final   • eGFR   Amer 01/11/2019 70  >60 mL/min/1.73 Final   • BUN/Creatinine Ratio 01/11/2019 23.6  7.0 - 25.0 Final   • Anion Gap 01/11/2019 10.5  mmol/L Final   • WBC 01/11/2019 10.52  4.50 - 10.70 10*3/mm3 Final   • RBC 01/11/2019 4.91  4.60 - 6.00 10*6/mm3 Final   • Hemoglobin 01/11/2019 13.3* 13.7 - 17.6 g/dL Final   • Hematocrit 01/11/2019 42.0  40.4 - 52.2 % Final   • MCV 01/11/2019 85.5  79.8 - 96.2 fL Final   • MCH 01/11/2019 27.1  27.0 - 32.7 pg Final   • MCHC 01/11/2019 31.7* 32.6 - 36.4 g/dL Final   • RDW 01/11/2019 15.7* 11.5 - 14.5 % Final   • RDW-SD 01/11/2019 49.7  37.0 - 54.0 fl Final   • MPV 01/11/2019 10.5  6.0 - 12.0 fL Final   • Platelets 01/11/2019 205  140 - 500 10*3/mm3 Final   • Glucose 01/11/2019 239* 70 - 130 mg/dL Final       Imaging Results (last 72 hours)     Procedure Component Value Units Date/Time    MRI Brain With & Without Contrast [[id number]] Collected:  01/10/19 1210     Updated:  01/10/19 1210    Narrative:       MRI EXAMINATION OF BRAIN WITH AND WITHOUT CONTRAST, MRA OF THE HEAD  WITHOUT CONTRAST AND MRA OF THE NECK WITH AND WITHOUT CONTRAST     HISTORY:  Stroke.     COMPARISON: None.     MRI EXAMINATION OF BRAIN WITH AND WITHOUT CONTRAST     A MRI examination of the brain was performed before and after the  intravenous administration of contrast utilizing sagittal T1, axial  diffusion, T1, T2, T2 FLAIR, susceptibility weighted imaging as well as  axial and coronal T1 postcontrast weighted sequences.     FINDINGS: There is no evidence of restricted diffusion to suggest acute  infarction. There is expected flow-void in the basilar artery and in the  distal aspect of  the internal carotid arteries bilaterally on the axial  T2 sequence. The axial T2 FLAIR sequence demonstrates a mild degree of  increased signal intensity involving the white matter of the cerebral  hemispheres bilaterally, nonspecific but suggesting minimal small vessel  ischemic disease.     After contrast administration there was no evidence of abnormal  enhancement.       Impression:       No evidence of acute infarction, mass or of abnormal  enhancement. Minimal small vessel ischemic disease.        MRA OF THE HEAD WITHOUT CONTRAST     The study is hampered somewhat by patient motion. There is signal  present within the distal aspect of the vertebral arteries bilaterally.  The vertebral arteries are codominant. The basilar artery and the  proximal aspects of the posterior cerebral arteries appear unremarkable.     There is signal present within the distal aspect of the internal carotid  arteries bilaterally. The right A1 segment is moderately hypoplastic.  The proximal aspects of the anterior and middle cerebral arteries appear  unremarkable.     IMPRESSION: The study is hampered somewhat by patient motion. There is  no evidence of a focal high-grade stenosis or aneurysm. The right A1  segment is hypoplastic.           MRA OF THE NECK WITH AND WITHOUT CONTRAST     The study is hampered by patient motion. The great vessels are arranged  in a classic configuration. There is 0% stenosis involving the carotid  bifurcations using NASCET criteria. There is signal loss at the proximal  aspect of the right common carotid artery and to a lesser extent the  left common carotid artery, likely artifactual.     IMPRESSION: The study is hampered by artifact. There is signal loss  involving the proximal aspects of the common carotid arteries  bilaterally. This is likely artifactual. Underlying stenosis cannot be  entirely excluded. Further evaluation could be performed with a CT  angiogram of the neck and head as indicated.        MRI Angiogram Head Without Contrast [[id number]] Collected:  01/10/19 1210     Updated:  01/10/19 1210    Narrative:       MRI EXAMINATION OF BRAIN WITH AND WITHOUT CONTRAST, MRA OF THE HEAD  WITHOUT CONTRAST AND MRA OF THE NECK WITH AND WITHOUT CONTRAST     HISTORY:  Stroke.     COMPARISON: None.     MRI EXAMINATION OF BRAIN WITH AND WITHOUT CONTRAST     A MRI examination of the brain was performed before and after the  intravenous administration of contrast utilizing sagittal T1, axial  diffusion, T1, T2, T2 FLAIR, susceptibility weighted imaging as well as  axial and coronal T1 postcontrast weighted sequences.     FINDINGS: There is no evidence of restricted diffusion to suggest acute  infarction. There is expected flow-void in the basilar artery and in the  distal aspect of the internal carotid arteries bilaterally on the axial  T2 sequence. The axial T2 FLAIR sequence demonstrates a mild degree of  increased signal intensity involving the white matter of the cerebral  hemispheres bilaterally, nonspecific but suggesting minimal small vessel  ischemic disease.     After contrast administration there was no evidence of abnormal  enhancement.       Impression:       No evidence of acute infarction, mass or of abnormal  enhancement. Minimal small vessel ischemic disease.        MRA OF THE HEAD WITHOUT CONTRAST     The study is hampered somewhat by patient motion. There is signal  present within the distal aspect of the vertebral arteries bilaterally.  The vertebral arteries are codominant. The basilar artery and the  proximal aspects of the posterior cerebral arteries appear unremarkable.     There is signal present within the distal aspect of the internal carotid  arteries bilaterally. The right A1 segment is moderately hypoplastic.  The proximal aspects of the anterior and middle cerebral arteries appear  unremarkable.     IMPRESSION: The study is hampered somewhat by patient motion. There is  no evidence of a  focal high-grade stenosis or aneurysm. The right A1  segment is hypoplastic.           MRA OF THE NECK WITH AND WITHOUT CONTRAST     The study is hampered by patient motion. The great vessels are arranged  in a classic configuration. There is 0% stenosis involving the carotid  bifurcations using NASCET criteria. There is signal loss at the proximal  aspect of the right common carotid artery and to a lesser extent the  left common carotid artery, likely artifactual.     IMPRESSION: The study is hampered by artifact. There is signal loss  involving the proximal aspects of the common carotid arteries  bilaterally. This is likely artifactual. Underlying stenosis cannot be  entirely excluded. Further evaluation could be performed with a CT  angiogram of the neck and head as indicated.       MRI Angiogram Neck With & Without Contrast [[id number]] Collected:  01/10/19 1210     Updated:  01/10/19 1210    Narrative:       MRI EXAMINATION OF BRAIN WITH AND WITHOUT CONTRAST, MRA OF THE HEAD  WITHOUT CONTRAST AND MRA OF THE NECK WITH AND WITHOUT CONTRAST     HISTORY:  Stroke.     COMPARISON: None.     MRI EXAMINATION OF BRAIN WITH AND WITHOUT CONTRAST     A MRI examination of the brain was performed before and after the  intravenous administration of contrast utilizing sagittal T1, axial  diffusion, T1, T2, T2 FLAIR, susceptibility weighted imaging as well as  axial and coronal T1 postcontrast weighted sequences.     FINDINGS: There is no evidence of restricted diffusion to suggest acute  infarction. There is expected flow-void in the basilar artery and in the  distal aspect of the internal carotid arteries bilaterally on the axial  T2 sequence. The axial T2 FLAIR sequence demonstrates a mild degree of  increased signal intensity involving the white matter of the cerebral  hemispheres bilaterally, nonspecific but suggesting minimal small vessel  ischemic disease.     After contrast administration there was no evidence of  abnormal  enhancement.       Impression:       No evidence of acute infarction, mass or of abnormal  enhancement. Minimal small vessel ischemic disease.        MRA OF THE HEAD WITHOUT CONTRAST     The study is hampered somewhat by patient motion. There is signal  present within the distal aspect of the vertebral arteries bilaterally.  The vertebral arteries are codominant. The basilar artery and the  proximal aspects of the posterior cerebral arteries appear unremarkable.     There is signal present within the distal aspect of the internal carotid  arteries bilaterally. The right A1 segment is moderately hypoplastic.  The proximal aspects of the anterior and middle cerebral arteries appear  unremarkable.     IMPRESSION: The study is hampered somewhat by patient motion. There is  no evidence of a focal high-grade stenosis or aneurysm. The right A1  segment is hypoplastic.           MRA OF THE NECK WITH AND WITHOUT CONTRAST     The study is hampered by patient motion. The great vessels are arranged  in a classic configuration. There is 0% stenosis involving the carotid  bifurcations using NASCET criteria. There is signal loss at the proximal  aspect of the right common carotid artery and to a lesser extent the  left common carotid artery, likely artifactual.     IMPRESSION: The study is hampered by artifact. There is signal loss  involving the proximal aspects of the common carotid arteries  bilaterally. This is likely artifactual. Underlying stenosis cannot be  entirely excluded. Further evaluation could be performed with a CT  angiogram of the neck and head as indicated.       XR Chest 1 View [[id number]] Collected:  01/10/19 0526     Updated:  01/10/19 0530    Narrative:       PORTABLE CHEST RADIOGRAPH     HISTORY: Wheezing     COMPARISON: September 19, 2018     FINDINGS:  Cardiomegaly is identified. There is no evidence of vascular congestion.  Lung volumes are diminished, with bibasilar atelectasis noted.  No  definite pneumothorax or pleural effusion is seen. There are changes of  prior median sternotomy with coronary artery bypass grafting.       Impression:       Overall diminished lung volumes with bibasilar atelectasis.     This report was finalized on 1/10/2019 5:27 AM by Dr. Genevieve Gonsalez M.D.       XR Knee 1 or 2 View Right [[id number]] Collected:  01/08/19 1214     Updated:  01/08/19 1218    Narrative:       Right knee radiograph     HISTORY: Postop     TECHNIQUE: AP and lateral radiograph the right knee     COMPARISON: Right knee radiographs 11/8/2017     FINDINGS:  Post surgical changes from recent right total knee arthroplasty are  present. The hardware is intact. There is no new periprosthetic  fracture.       Impression:       Postoperative changes from recent right total knee  arthroplasty without findings of immediate complication.     This report was finalized on 1/8/2019 12:15 PM by Dr. Elliot Laguna M.D.             Personally viewed ortho images and report chest xr reviewed previously.    Assessment: Oriented to person and place but mild confusion and forgetfulness, reoriented for safety precautions.  Doing well orthopedically POD  # 3 Days Post-Op following total joint replacement but weakness, can flex foot and squeeze gleuts but not lift right leg independently off the bed.  Acute Blood Loss Anemia, stable  Post-operative Pain well controlled with one Percocet every 4-6 hours.    Limited mobility, requires use of walker and assistance when OOB.    Patient Active Problem List   Diagnosis   • Bulging lumbar disc   • Chronic pain   • Diabetic neuropathy (CMS/HCC)   • Low back pain   • Degenerative arthritis of lumbar spine   • Neuropathy involving both lower extremities   • Encounter for long-term (current) use of high-risk medication   • Postlaminectomy syndrome of lumbar region   • Syringomyelia and syringobulbia (CMS/HCC)   • Lumbar pseudoarthrosis   • Type 2 diabetes mellitus with  neurologic complication, with long-term current use of insulin (CMS/HCC)   • Insulin pump in place   • Hx of decompressive lumbar laminectomy   • Bilateral carpal tunnel syndrome   • Degenerative cervical disc   • Acute pain of right knee   • Primary localized osteoarthrosis of right lower leg   • Arthritis of right knee   • Sacroiliac inflammation (CMS/HCC)   • Sacroiliac joint dysfunction   • Severe obesity (BMI 35.0-39.9)   • Chronic pain of right knee   • Tricompartment osteoarthritis of left knee   • Tricompartment osteoarthritis of right knee   • Arthritis of left knee   • Bilateral chronic knee pain   • Hypertension        Plan:  Dr. Malloy endocrinology, blood sugar management fluctuating.  Neuro consult for acute mental status change, improving.  [facility] for medical management.  Continue to monitor labs and/or v/s, for tolerance to post op blood loss.  Continue efforts to increase mobilization.  Continue Pain Control Measures: patient is on chronic pain management (oxycodone 10mg q6hr at home for his back) so do not stop all narcotics abruptly.   Continue incisional Care.  DVT prophylaxis:  ASA and Plavix  Follow up in office with Andres Carter M.D. In 2 weeks.  Due to chronic health issues needing continued management along with weakness and acute confusion will need rehab.    Discharge Plan: Rehab when approved.    Date: 1/11/2019  Kaylee Jorge, KATHLEEN

## 2019-01-11 NOTE — PROGRESS NOTES
Continued Stay Note  [facility]     Patient Name: Kian Mcdaniels  MRN: [id number]  Today's Date: 1/11/2019    Admit Date: 1/8/2019    Discharge Plan     Row Name 01/11/19 1107       Plan    Plan  [facility] referral    Patient/Family in Agreement with Plan  yes    Plan Comments  CCP notified that pt is not progressing as anticipated, will likely need subacute SNF upon d/c. CCP met with pt at bedside. Provided SNF list. Pt to discuss with spouse whom is not here. CCP then contacted pt's wife Felicia to discuss. 1st choice [facility]. Spouse pans to return to hospital shortly and review list of SNF's to provide subsequent choices. CCP to follow up. Referral to [facility], spoke with Marcy. -KENYATTA Holly         Discharge Codes    No documentation.       Expected Discharge Date and Time     Expected Discharge Date Expected Discharge Time    Jan 11, 2019             KENYATTA Holly

## 2019-01-11 NOTE — DISCHARGE PLACEMENT REQUEST
"MenchacaMandy fajardo (63 y.o. Male)     Date of Birth Social Security Number Address Home Phone MRN    1955  [id number] [place] [address] [place] [address] [phone] [id number]    Church Marital Status          None        Admission Date Admission Type Admitting Provider Attending Provider Department, Room/Bed    1/8/19 Elective Andres Carter MD Makk, Stephen P, MD [address], P795/1    Discharge Date Discharge Disposition Discharge Destination         Rehab Facility or Unit (DC - External)              Attending Provider:  Andres Carter MD    Allergies:  Erythromycin, Lisinopril, Metformin    Isolation:  None   Infection:  None   Code Status:  CPR    Ht:  181.6 cm (71.5\")   Wt:  126 kg (277 lb 8 oz)    Admission Cmt:  None   Principal Problem:  Arthritis of right knee [M17.11]                 Active Insurance as of 1/8/2019     Primary Coverage     Payor Plan Insurance Group Employer/Plan Group    [employer] MEDICAID [id number]     Payor Plan Address Payor Plan Phone Number Payor Plan Fax Number Effective Dates    PO  [phone]  4/1/2013 - None Entered    [facility] [id number]       Subscriber Name Subscriber Birth Date Member ID       MANDY MENCHACA 1955 [id number]                 Emergency Contacts      (Rel.) Home Phone Work Phone Mobile Phone    Felicia Menchaca (Spouse) [phone] -- [phone]    ArslanGertrude (Sister) -- -- [phone]              "

## 2019-01-11 NOTE — PLAN OF CARE
Problem: Patient Care Overview  Goal: Plan of Care Review   01/11/19 1138   OTHER   Outcome Summary Pt requires mod a x2 to amb 4 steps forward with extensive cues for upright posture, but pt begins sitting prior to returning to bed and has to be dependently transferred back to bed. Pt will need SNF at [place].   Coping/Psychosocial   Plan of Care Reviewed With patient   Plan of Care Review   Progress declining

## 2019-01-11 NOTE — PROGRESS NOTES
Continued Stay Note  [facility]     Patient Name: Kian Mcdaniels  MRN: [id number]  Today's Date: 1/11/2019    Admit Date: 1/8/2019    Discharge Plan     Row Name 01/11/19 1414       Plan    Plan  [facility] 1/12 at 1:30pm    Patient/Family in Agreement with Plan  yes    Plan Comments  CCP met with pt and spouse at bedside. Spouse anticipates pt will need EMS for transport. CCP explained that coverage for Ems by insurance could not be guaranteed, spouse verbalized understanding. CCP notified by Kali Ramos/Marcy that pt is accepted. CCP attempted to book EMS today but both [facility] and [facility] are booked solid for rest of day. [facility] arranged for 1/12 at 1:30pm. -KENYATTA Holly        Discharge Codes    No documentation.       Expected Discharge Date and Time     Expected Discharge Date Expected Discharge Time    Jan 11, 2019             KENYATTA Holly

## 2019-01-11 NOTE — THERAPY TREATMENT NOTE
Acute Care - Physical Therapy Treatment Note  [facility]     Patient Name: Kian Mcdaniels  : 1955  MRN: [id number]  Today's Date: 2019  Onset of Illness/Injury or Date of Surgery: 19  Date of Referral to PT: 19  Referring Physician: Dr. Carter    Admit Date: 2019    Visit Dx:    ICD-10-CM ICD-9-CM   1. Status post total right knee replacement Z96.651 V43.65   2. Arthritis of right knee M17.11 716.96     Patient Active Problem List   Diagnosis   • Bulging lumbar disc   • Chronic pain   • Diabetic neuropathy (CMS/HCC)   • Low back pain   • Degenerative arthritis of lumbar spine   • Neuropathy involving both lower extremities   • Encounter for long-term (current) use of high-risk medication   • Postlaminectomy syndrome of lumbar region   • Syringomyelia and syringobulbia (CMS/HCC)   • Lumbar pseudoarthrosis   • Type 2 diabetes mellitus with neurologic complication, with long-term current use of insulin (CMS/HCC)   • Insulin pump in place   • Hx of decompressive lumbar laminectomy   • Bilateral carpal tunnel syndrome   • Degenerative cervical disc   • Acute pain of right knee   • Primary localized osteoarthrosis of right lower leg   • Arthritis of right knee   • Sacroiliac inflammation (CMS/HCC)   • Sacroiliac joint dysfunction   • Severe obesity (BMI 35.0-39.9)   • Chronic pain of right knee   • Tricompartment osteoarthritis of left knee   • Tricompartment osteoarthritis of right knee   • Arthritis of left knee   • Bilateral chronic knee pain   • Hypertension       Therapy Treatment    Rehabilitation Treatment Summary     Row Name 19 1134             Treatment Time/Intention    Discipline  physical therapist  -CA      Document Type  therapy note (daily note)  -CA      Subjective Information  no complaints;weakness;pain  -CA      Mode of Treatment  physical therapy  -CA      Patient Effort  fair  -CA      Existing Precautions/Restrictions  fall  (Significant)   -CA      Recorded  by [CA] Millie Horowitz, PT 01/11/19 1138      Row Name 01/11/19 1134             Cognitive Assessment/Intervention- PT/OT    Orientation Status (Cognition)  oriented x 4  -CA      Follows Commands (Cognition)  WFL  -CA      Personal Safety Interventions  fall prevention program maintained;gait belt;nonskid shoes/slippers when out of bed  -CA      Recorded by [CA] Millie Horowitz, PT 01/11/19 1138      Row Name 01/11/19 1134             Mobility Assessment/Intervention    Right Lower Extremity (Weight-bearing Status)  weight-bearing as tolerated (WBAT)  -CA      Recorded by [CA] Millie Horowitz, PT 01/11/19 1138      Row Name 01/11/19 1134             Bed Mobility Assessment/Treatment    Supine-Sit [place] (Bed Mobility)  moderate assist (50% patient effort);verbal cues  -CA      Sit-Supine [place] (Bed Mobility)  dependent (less than 25% patient effort);2 person assist  -CA      Recorded by [CA] Millie Horowitz, PT 01/11/19 1138      Row Name 01/11/19 1134             Sit-Stand Transfer    Sit-Stand [place] (Transfers)  moderate assist (50% patient effort);maximum assist (25% patient effort);2 person assist  -CA      Assistive Device (Sit-Stand Transfers)  walker, front-wheeled  -CA      Recorded by [CA] Millie Horowitz, PT 01/11/19 1138      Row Name 01/11/19 1134             Stand-Sit Transfer    Stand-Sit [place] (Transfers)  2 person assist;dependent (less than 25% patient effort)  -CA      Assistive Device (Stand-Sit Transfers)  walker, front-wheeled  -CA      Recorded by [CA] Millie Horowitz, PT 01/11/19 1138      Row Name 01/11/19 1134             Gait/Stairs Assessment/Training    Gait/Stairs Assessment/Training  gait/ambulation independence  -CA      [place] Level (Gait)  verbal cues;nonverbal cues (demo/gesture);2 person assist;moderate assist (50% patient effort)  -CA      Assistive Device (Gait)  walker, front-wheeled  -CA      Distance in Feet (Gait)  4 step fwd  -CA       "Pattern (Gait)  step-to  -CA      Deviations/Abnormal Patterns (Gait)  antalgic;latanya decreased;stride length decreased  -CA      Bilateral Gait Deviations  forward flexed posture;weight shift ability decreased;knee buckling, bilateral  -CA      Comment (Gait/Stairs)  Pt with extremely fwd flexed posture and B knees buckling, maximal verbal cues throughout for patient to correct but pt reports \"I can't\". Without warning pt begins to sit down, pt instruced that he needs to stand up as there is no seat behind him and needs to first take backward steps, but pt reports \"I can't\" and begins to sit despite instruction to remain standing until bed is pulled fwd. Pt requires dependent assist x2 to remain standing while bed pulled into place and dependence x 2 to place pt sitting EOB.  -CA      Recorded by [CA] Millie Horowitz, PT 01/11/19 1138      Row Name 01/11/19 1134             Therapeutic Exercise    Comment (Therapeutic Exercise)  AP x 15, attempted QS but unable to activate quad, unable to perform other ther-ex at this time  -CA      Recorded by [CA] Millie Horowitz, PT 01/11/19 1138      Row Name 01/11/19 1134             Positioning and Restraints    Pre-Treatment Position  in bed  -CA      Post Treatment Position  bed  -CA      In Bed  supine;call light within reach;encouraged to call for assist;with family/caregiver;side rails up x2  -CA      Recorded by [CA] Millie Horowitz, PT 01/11/19 1138      Row Name                Wound 01/08/19 1023 Right knee incision    Wound - Properties Group Date first assessed: 01/08/19 [HH] Time first assessed: 1023 [HH] Side: Right [HH] Location: knee [HH] Type: incision [HH] Recorded by:  [HH] Destiny Bowen RN 01/08/19 1023      User Key  (r) = Recorded By, (t) = Taken By, (c) = Cosigned By    Initials Name Effective Dates Discipline     Destiny Bowen RN 06/16/16 -  Nurse    Millie Roberts, PT 06/13/18 -  PT          Wound 01/08/19 1023 Right knee incision " (Active)   Dressing Appearance dried drainage 1/11/2019  4:00 AM   Closure SISSY 1/11/2019  4:00 AM   Base dressing in place, unable to visualize 1/11/2019  4:00 AM   Drainage Characteristics/Odor serosanguineous 1/11/2019  4:00 AM   Drainage Amount small 1/11/2019  4:00 AM           Physical Therapy Education     Title: PT OT SLP Therapies (In Progress)     Topic: Physical Therapy (In Progress)     Point: Mobility training (In Progress)     Learning Progress Summary           Patient Acceptance, E, NR by CA at 1/11/2019 11:38 AM    Acceptance, E,TB,D, NR by ROSA at 1/10/2019  5:33 PM    Acceptance, E,TB,D, VU,NR by ROSA at 1/9/2019  3:27 PM    Acceptance, E, VU by MA at 1/8/2019  4:22 PM   Family Acceptance, E,TB,D, NR by ROSA at 1/10/2019  5:33 PM    Acceptance, E,TB,D, VU,NR by ROSA at 1/9/2019  3:27 PM                   Point: Home exercise program (In Progress)     Learning Progress Summary           Patient Acceptance, E, NR by CA at 1/11/2019 11:38 AM    Acceptance, E,TB,D, NR by ROSA at 1/10/2019  5:33 PM    Acceptance, E,TB,D, VU,NR by ROSA at 1/9/2019  3:27 PM    Acceptance, E, VU by MA at 1/8/2019  4:22 PM   Family Acceptance, E,TB,D, NR by ROSA at 1/10/2019  5:33 PM    Acceptance, E,TB,D, VU,NR by ROSA at 1/9/2019  3:27 PM                   Point: Body mechanics (In Progress)     Learning Progress Summary           Patient Acceptance, E, NR by CA at 1/11/2019 11:38 AM    Acceptance, E,TB,D, NR by ROSA at 1/10/2019  5:33 PM    Acceptance, E,TB,D, VU,NR by ROSA at 1/9/2019  3:27 PM    Acceptance, E, VU by MA at 1/8/2019  4:22 PM   Family Acceptance, E,TB,D, NR by ROSA at 1/10/2019  5:33 PM    Acceptance, E,TB,D, VU,NR by ROSA at 1/9/2019  3:27 PM                   Point: Precautions (In Progress)     Learning Progress Summary           Patient Acceptance, INDY, NR by CA at 1/11/2019 11:38 AM    AcceptanceINDY TB, D, ERIC by ROSA at 1/10/2019  5:33 PM    AcceptanceINDY TB, D, VU,NR by ROSA at 1/9/2019  3:27 PM    AcceptanceINDY VU by MA at  1/8/2019  4:22 PM   Family Acceptance, E,TB,D, NR by  at 1/10/2019  5:33 PM    Acceptance, E,TB,D, VU,NR by  at 1/9/2019  3:27 PM                               User Key     Initials Effective Dates Name Provider Type Discipline     03/07/18 -  Luisa Bingham, SREEKANTH Physical Therapy Assistant PT    MA 04/03/18 -  Jovita Flores PT Physical Therapist PT    CA 06/13/18 -  Millie Horowitz PT Physical Therapist PT                PT Recommendation and Plan     Plan of Care Reviewed With: patient  Progress: declining  Outcome Summary: Pt requires mod a x2 to amb 4 steps forward with extensive cues for upright posture, but pt begins sitting prior to returning to bed and has to be dependently transferred back to bed. Pt will need SNF at [place].  Outcome Measures     Row Name 01/11/19 1100 01/10/19 1700 01/09/19 1500       How much help from another person do you currently need...    Turning from your back to your side while in flat bed without using bedrails?  2  -CA  2  -JM  3  -JM    Moving from lying on back to sitting on the side of a flat bed without bedrails?  2  -CA  2  -JM  3  -JM    Moving to and from a bed to a chair (including a wheelchair)?  2  -CA  2  -JM  3  -JM    Standing up from a chair using your arms (e.g., wheelchair, bedside chair)?  2  -CA  2  -JM  2  -JM    Climbing 3-5 steps with a railing?  1  -CA  1  -JM  1  -JM    To walk in hospital room?  1  -CA  2  -JM  3  -JM    AM-PAC 6 Clicks Score  10  -CA  11  -JM  15  -JM       Functional Assessment    Outcome Measure Options  AM-PAC 6 Clicks Basic Mobility (PT)  -CA  --  --    Row Name 01/08/19 1600             How much help from another person do you currently need...    Turning from your back to your side while in flat bed without using bedrails?  2  -MA      Moving from lying on back to sitting on the side of a flat bed without bedrails?  2  -MA      Moving to and from a bed to a chair (including a wheelchair)?  3  -MA      Standing up from a  chair using your arms (e.g., wheelchair, bedside chair)?  3  -MA      Climbing 3-5 steps with a railing?  1  -MA      To walk in hospital room?  1  -MA      AM-PAC 6 Clicks Score  12  -MA         Functional Assessment    Outcome Measure Options  AM-PAC 6 Clicks Basic Mobility (PT)  -MA        User Key  (r) = Recorded By, (t) = Taken By, (c) = Cosigned By    Initials Name Provider Type    Luisa Malik, PTA Physical Therapy Assistant    Jovita Bobby, PT Physical Therapist    Millie Roberts, PT Physical Therapist         Time Calculation:   PT Charges     Row Name 01/11/19 1140             Time Calculation    Start Time  1111  -CA      Stop Time  1135  -CA      Time Calculation (min)  24 min  -CA      PT Received On  01/11/19  -CA      PT - Next Appointment  01/11/19  -CA         Time Calculation- PT    Total Timed Code Minutes- PT  24 minute(s)  -CA        User Key  (r) = Recorded By, (t) = Taken By, (c) = Cosigned By    Initials Name Provider Type    Millie Roberts, PT Physical Therapist        Therapy Suggested Charges     Code   Minutes Charges    None           Therapy Charges for Today     Code Description Service Date Service Provider Modifiers Qty    34052106320 HC PT THER PROC EA 15 MIN 1/11/2019 Millie Horowitz, PT GP 2    24797713891 HC PT THER SUPP EA 15 MIN 1/11/2019 Millie Horowitz, PT GP 2          PT G-Codes  Outcome Measure Options: AM-PAC 6 Clicks Basic Mobility (PT)  AM-PAC 6 Clicks Score: 10    Millie Horowitz, PT  1/11/2019

## 2019-01-11 NOTE — PROGRESS NOTES
63 y.o.   LOS: 3 days   Patient Care Team:  Wyatt Harmon MD as PCP - General (Family Medicine)  Shannan Singh APRN (Family Medicine)  Lisa Banerjee PA-C as Physician Assistant (Neurosurgery)  Kian Hoskins MD (Orthopedic Surgery)  Richard Prasad MD as Surgeon (Orthopedic Surgery)  Tim Gutierres MD as Surgeon (Neurosurgery)  Rahat Pepe MD as Consulting Physician (Cardiology)    Chief Complaint: Uncontrolled type 2 diabetes mellitus with complications    No chief complaint on file.      Subjective     HPI  Patient had an episode of hypoglycemia overnight  Pump was discontinued  Patient wants to be managed by basal and bolus subcutaneous hospital regimen  Advised the patient to get the insulin pump  home for safety  Will monitor Accu-Cheks and then adjust insulin as needed during the stay    Interval History:    Patient is a 63-year-old -American male with a history of uncontrolled type 2 diabetes mellitus on insulin pump underwent elective right total knee arthroplasty yesterday  Postoperatively his diabetes needs to be managed hence the consultation     Patient is a known diabetic for more than 35 years  He reported that is currently using T Slim insulin pump  He was advised to use 80% of the basal rate as temporary basal for the time of surgery  He is currently using 100% basophil this morning  He also started using mealtime insulin  Pump settings were reviewed  Patient is on 0.85 units per hour basal  His carb ratio is 1-10 and his correction is 1-60 and his target is 120 mg blood sugar  Insulin action time is 4 hours     Uncontrolled type 2 diabetes mellitus with severe peripheral neuropathy  Patient denied any knowledge of diabetic retinopathy  He goes to see Dr. Qureshi  Patient also denied any knowledge of diabetic nephropathy  Hypoglycemia  Patient has occasional episodes of hypoglycemia  He reports that his home blood sugars are usually in the 150-200 range  At  times they have been in the 250s for Yakov preceding surgery  His last known him (in the 8% range  Patient follows a nurse practitioner for diabetes management     Patient is currently receiving oral pain medication but he believes that he is able to handle the pump very well  The nursing staff concur with this assessment        Review of Systems:      Review of Systems   Constitutional: Positive for fatigue.   Respiratory: Negative.    Cardiovascular: Negative.    Gastrointestinal: Negative.    Musculoskeletal: Positive for joint swelling.   Neurological: Positive for numbness.   All other systems reviewed and are negative.    Objective     Vital Signs   Temp:  [98.8 °F (37.1 °C)-100.5 °F (38.1 °C)] 100.2 °F (37.9 °C)  Heart Rate:  [] 89  Resp:  [16-18] 16  BP: (141-167)/(73-86) 141/73    Physical Exam:  Physical Exam   Constitutional: He is oriented to person, place, and time.   Eyes: EOM are normal. Pupils are equal, round, and reactive to light.   Neck: Normal range of motion. Neck supple. No thyromegaly present.   Cardiovascular: Normal rate, regular rhythm, normal heart sounds and intact distal pulses.   Pulmonary/Chest: Effort normal and breath sounds normal.   Abdominal: Soft. Bowel sounds are normal. He exhibits distension. There is no tenderness.   Musculoskeletal: He exhibits edema and tenderness.   Neurological: He is alert and oriented to person, place, and time.   Skin: Skin is warm and dry.   Psychiatric: He has a normal mood and affect. His behavior is normal.   Results Review:     I reviewed the patient's new clinical results.      Glucose   Date/Time Value Ref Range Status   01/11/2019 0452 286 (H) 65 - 99 mg/dL Final   01/10/2019 0407 143 (H) 65 - 99 mg/dL Final   01/10/2019 0129 140 (H) 65 - 99 mg/dL Final   01/09/2019 0407 239 (H) 65 - 99 mg/dL Final     Lab Results (last 72 hours)     Procedure Component Value Units Date/Time    POC Glucose Once [[id number]]  (Abnormal) Collected:   01/11/19 1613    Specimen:  Blood Updated:  01/11/19 1614     Glucose 266 mg/dL     POC Glucose Once [[id number]]  (Abnormal) Collected:  01/11/19 1106    Specimen:  Blood Updated:  01/11/19 1107     Glucose 326 mg/dL     POC Glucose Once [[id number]]  (Abnormal) Collected:  01/11/19 0612    Specimen:  Blood Updated:  01/11/19 0614     Glucose 239 mg/dL     Basic Metabolic Panel [[id number]]  (Abnormal) Collected:  01/11/19 0452    Specimen:  Blood from Hand, Right Updated:  01/11/19 0536     Glucose 286 mg/dL      BUN 30 mg/dL      Creatinine 1.27 mg/dL      Sodium 131 mmol/L      Potassium 4.6 mmol/L      Chloride 98 mmol/L      CO2 22.5 mmol/L      Calcium 8.5 mg/dL      eGFR  African Amer 70 mL/min/1.73      BUN/Creatinine Ratio 23.6     Anion Gap 10.5 mmol/L     Narrative:       GFR Normal >60  Chronic Kidney Disease <60  Kidney Failure <15    CBC (No Diff) [[id number]]  (Abnormal) Collected:  01/11/19 0452    Specimen:  Blood from Hand, Right Updated:  01/11/19 0525     WBC 10.52 10*3/mm3      RBC 4.91 10*6/mm3      Hemoglobin 13.3 g/dL      Hematocrit 42.0 %      MCV 85.5 fL      MCH 27.1 pg      MCHC 31.7 g/dL      RDW 15.7 %      RDW-SD 49.7 fl      MPV 10.5 fL      Platelets 205 10*3/mm3     POC Glucose Once [[id number]]  (Abnormal) Collected:  01/10/19 2036    Specimen:  Blood Updated:  01/10/19 2038     Glucose 235 mg/dL     POC Glucose Once [[id number]]  (Abnormal) Collected:  01/10/19 1641    Specimen:  Blood Updated:  01/10/19 1643     Glucose 233 mg/dL     POC Glucose Once [[id number]]  (Abnormal) Collected:  01/10/19 1128    Specimen:  Blood Updated:  01/10/19 1132     Glucose 229 mg/dL     POC Glucose Once [[id number]]  (Abnormal) Collected:  01/10/19 0832    Specimen:  Blood Updated:  01/10/19 0844     Glucose 202 mg/dL     POC Glucose Once [[id number]]  (Abnormal) Collected:  01/10/19 0633    Specimen:  Blood Updated:  01/10/19 0634     Glucose 165 mg/dL     Basic Metabolic Panel [[id number]]  (Abnormal)  Collected:  01/10/19 0407    Specimen:  Blood Updated:  01/10/19 0453     Glucose 143 mg/dL      BUN 32 mg/dL      Creatinine 1.51 mg/dL      Sodium 135 mmol/L      Potassium 4.1 mmol/L      Chloride 98 mmol/L      CO2 25.2 mmol/L      Calcium 8.7 mg/dL      eGFR  African Amer 57 mL/min/1.73      BUN/Creatinine Ratio 21.2     Anion Gap 11.8 mmol/L     Narrative:       GFR Normal >60  Chronic Kidney Disease <60  Kidney Failure <15    CBC & Differential [[id number]] Collected:  01/10/19 0407    Specimen:  Blood Updated:  01/10/19 0430    Narrative:       The following orders were created for panel order CBC & Differential.  Procedure                               Abnormality         Status                     ---------                               -----------         ------                     CBC Auto Differential[[id number]]        Abnormal            Final result                 Please view results for these tests on the individual orders.    CBC Auto Differential [[id number]]  (Abnormal) Collected:  01/10/19 0407    Specimen:  Blood Updated:  01/10/19 0430     WBC 12.24 10*3/mm3      RBC 5.32 10*6/mm3      Hemoglobin 14.6 g/dL      Hematocrit 45.6 %      MCV 85.7 fL      MCH 27.4 pg      MCHC 32.0 g/dL      RDW 15.6 %      RDW-SD 49.7 fl      MPV 10.0 fL      Platelets 206 10*3/mm3      Neutrophil % 72.4 %      Lymphocyte % 17.8 %      Monocyte % 7.8 %      Eosinophil % 1.6 %      Basophil % 0.2 %      Immature Grans % 0.2 %      Neutrophils, Absolute 8.84 10*3/mm3      Lymphocytes, Absolute 2.18 10*3/mm3      Monocytes, Absolute 0.96 10*3/mm3      Eosinophils, Absolute 0.20 10*3/mm3      Basophils, Absolute 0.03 10*3/mm3      Immature Grans, Absolute 0.03 10*3/mm3     Basic Metabolic Panel [[id number]]  (Abnormal) Collected:  01/10/19 0129    Specimen:  Blood Updated:  01/10/19 0217     Glucose 140 mg/dL      BUN 32 mg/dL      Creatinine 1.62 mg/dL      Sodium 136 mmol/L      Potassium 3.9 mmol/L      Chloride 97  mmol/L      CO2 26.1 mmol/L      Calcium 8.7 mg/dL      eGFR  African Amer 53 mL/min/1.73      BUN/Creatinine Ratio 19.8     Anion Gap 12.9 mmol/L     Narrative:       GFR Normal >60  Chronic Kidney Disease <60  Kidney Failure <15    Protime-INR [[id number]]  (Abnormal) Collected:  01/10/19 0129    Specimen:  Blood Updated:  01/10/19 0206     Protime 14.4 Seconds      INR 1.14    CBC & Differential [[id number]] Collected:  01/10/19 0129    Specimen:  Blood Updated:  01/10/19 0204    Narrative:       The following orders were created for panel order CBC & Differential.  Procedure                               Abnormality         Status                     ---------                               -----------         ------                     CBC Auto Differential[[id number]]        Abnormal            Final result                 Please view results for these tests on the individual orders.    CBC Auto Differential [[id number]]  (Abnormal) Collected:  01/10/19 0129    Specimen:  Blood Updated:  01/10/19 0204     WBC 12.43 10*3/mm3      RBC 5.50 10*6/mm3      Hemoglobin 15.5 g/dL      Hematocrit 47.8 %      MCV 86.9 fL      MCH 28.2 pg      MCHC 32.4 g/dL      RDW 15.7 %      RDW-SD 50.2 fl      MPV 9.9 fL      Platelets 210 10*3/mm3      Neutrophil % 62.9 %      Lymphocyte % 26.5 %      Monocyte % 8.0 %      Eosinophil % 2.1 %      Basophil % 0.3 %      Immature Grans % 0.2 %      Neutrophils, Absolute 7.82 10*3/mm3      Lymphocytes, Absolute 3.29 10*3/mm3      Monocytes, Absolute 0.99 10*3/mm3      Eosinophils, Absolute 0.26 10*3/mm3      Basophils, Absolute 0.04 10*3/mm3      Immature Grans, Absolute 0.03 10*3/mm3     Blood Gas, Arterial [[id number]]  (Abnormal) Collected:  01/10/19 0130    Specimen:  Arterial Blood Updated:  01/10/19 0133     Site Arterial: right radial     Bruce's Test Positive     pH, Arterial 7.396 pH units      pCO2, Arterial 41.7 mm Hg      pO2, Arterial 73.6 mm Hg      HCO3, Arterial 25.6 mmol/L       Base Excess, Arterial 0.5 mmol/L      O2 Saturation Calculated 94.5 %      Barometric Pressure for Blood Gas 758.6 mmHg      Modality Cannula     Flow Rate 3 lpm      Rate 20 Breaths/minute     POC Glucose Once [[id number]]  (Abnormal) Collected:  01/10/19 0102    Specimen:  Blood Updated:  01/10/19 0122     Glucose 203 mg/dL     POC Glucose Once [[id number]]  (Abnormal) Collected:  01/09/19 2108    Specimen:  Blood Updated:  01/09/19 2109     Glucose 179 mg/dL     POC Glucose Once [[id number]]  (Normal) Collected:  01/09/19 1920    Specimen:  Blood Updated:  01/09/19 1921     Glucose 127 mg/dL     POC Glucose Once [[id number]]  (Normal) Collected:  01/09/19 1813    Specimen:  Blood Updated:  01/09/19 1826     Glucose 83 mg/dL     POC Glucose Once [[id number]]  (Abnormal) Collected:  01/09/19 1729    Specimen:  Blood Updated:  01/09/19 1730     Glucose 68 mg/dL     POC Glucose Once [[id number]]  (Abnormal) Collected:  01/09/19 1626    Specimen:  Blood Updated:  01/09/19 1627     Glucose 68 mg/dL     POC Glucose Once [[id number]]  (Normal) Collected:  01/09/19 1110    Specimen:  Blood Updated:  01/09/19 1111     Glucose 103 mg/dL     POC Glucose Once [[id number]]  (Abnormal) Collected:  01/09/19 0625    Specimen:  Blood Updated:  01/09/19 0626     Glucose 184 mg/dL     Basic Metabolic Panel [[id number]]  (Abnormal) Collected:  01/09/19 0407    Specimen:  Blood Updated:  01/09/19 0458     Glucose 239 mg/dL      BUN 26 mg/dL      Creatinine 1.43 mg/dL      Sodium 133 mmol/L      Potassium 4.5 mmol/L      Chloride 100 mmol/L      CO2 21.4 mmol/L      Calcium 8.8 mg/dL      eGFR   Amer 61 mL/min/1.73      BUN/Creatinine Ratio 18.2     Anion Gap 11.6 mmol/L     Narrative:       GFR Normal >60  Chronic Kidney Disease <60  Kidney Failure <15    Hemoglobin & Hematocrit, Blood [[id number]]  (Normal) Collected:  01/09/19 0407    Specimen:  Blood Updated:  01/09/19 0430     Hemoglobin 15.2 g/dL      Hematocrit 46.6 %     POC  Glucose Once [[id number]]  (Abnormal) Collected:  01/08/19 2101    Specimen:  Blood Updated:  01/08/19 2102     Glucose 288 mg/dL         Imaging Results (last 72 hours)     Procedure Component Value Units Date/Time    MRI Brain With & Without Contrast [[id number]] Collected:  01/10/19 1210     Updated:  01/11/19 1030    Narrative:       MRI EXAMINATION OF BRAIN WITH AND WITHOUT CONTRAST, MRA OF THE HEAD  WITHOUT CONTRAST AND MRA OF THE NECK WITH AND WITHOUT CONTRAST     HISTORY:  Stroke.     COMPARISON: None.     MRI EXAMINATION OF BRAIN WITH AND WITHOUT CONTRAST     A MRI examination of the brain was performed before and after the  intravenous administration of contrast utilizing sagittal T1, axial  diffusion, T1, T2, T2 FLAIR, susceptibility weighted imaging as well as  axial and coronal T1 postcontrast weighted sequences.     FINDINGS: There is no evidence of restricted diffusion to suggest acute  infarction. There is expected flow-void in the basilar artery and in the  distal aspect of the internal carotid arteries bilaterally on the axial  T2 sequence. The axial T2 FLAIR sequence demonstrates a mild degree of  increased signal intensity involving the white matter of the cerebral  hemispheres bilaterally, nonspecific but suggesting minimal small vessel  ischemic disease.     After contrast administration there was no evidence of abnormal  enhancement.       Impression:       No evidence of acute infarction, mass or of abnormal  enhancement. Minimal small vessel ischemic disease.        MRA OF THE HEAD WITHOUT CONTRAST     The study is hampered somewhat by patient motion. There is signal  present within the distal aspect of the vertebral arteries bilaterally.  The vertebral arteries are codominant. The basilar artery and the  proximal aspects of the posterior cerebral arteries appear unremarkable.     There is signal present within the distal aspect of the internal carotid  arteries bilaterally. The right A1  segment is moderately hypoplastic.  The proximal aspects of the anterior and middle cerebral arteries appear  unremarkable.     IMPRESSION: The study is hampered somewhat by patient motion. There is  no evidence of a focal high-grade stenosis or aneurysm. The right A1  segment is hypoplastic.           MRA OF THE NECK WITH AND WITHOUT CONTRAST     The study is hampered by patient motion. The great vessels are arranged  in a classic configuration. There is 0% stenosis involving the carotid  bifurcations using NASCET criteria. There is signal loss at the proximal  aspect of the right common carotid artery and to a lesser extent the  left common carotid artery, likely artifactual.     IMPRESSION: The study is hampered by artifact. There is signal loss  involving the proximal aspects of the common carotid arteries  bilaterally. This is likely artifactual. Underlying stenosis cannot be  entirely excluded. Further evaluation could be performed with a CT  angiogram of the neck and head as indicated.     This report was finalized on 1/11/2019 10:27 AM by Dr. Andres Forte M.D.       MRI Angiogram Neck With & Without Contrast [[id number]] Collected:  01/10/19 1210     Updated:  01/11/19 1030    Narrative:       MRI EXAMINATION OF BRAIN WITH AND WITHOUT CONTRAST, MRA OF THE HEAD  WITHOUT CONTRAST AND MRA OF THE NECK WITH AND WITHOUT CONTRAST     HISTORY:  Stroke.     COMPARISON: None.     MRI EXAMINATION OF BRAIN WITH AND WITHOUT CONTRAST     A MRI examination of the brain was performed before and after the  intravenous administration of contrast utilizing sagittal T1, axial  diffusion, T1, T2, T2 FLAIR, susceptibility weighted imaging as well as  axial and coronal T1 postcontrast weighted sequences.     FINDINGS: There is no evidence of restricted diffusion to suggest acute  infarction. There is expected flow-void in the basilar artery and in the  distal aspect of the internal carotid arteries bilaterally on the axial  T2  sequence. The axial T2 FLAIR sequence demonstrates a mild degree of  increased signal intensity involving the white matter of the cerebral  hemispheres bilaterally, nonspecific but suggesting minimal small vessel  ischemic disease.     After contrast administration there was no evidence of abnormal  enhancement.       Impression:       No evidence of acute infarction, mass or of abnormal  enhancement. Minimal small vessel ischemic disease.        MRA OF THE HEAD WITHOUT CONTRAST     The study is hampered somewhat by patient motion. There is signal  present within the distal aspect of the vertebral arteries bilaterally.  The vertebral arteries are codominant. The basilar artery and the  proximal aspects of the posterior cerebral arteries appear unremarkable.     There is signal present within the distal aspect of the internal carotid  arteries bilaterally. The right A1 segment is moderately hypoplastic.  The proximal aspects of the anterior and middle cerebral arteries appear  unremarkable.     IMPRESSION: The study is hampered somewhat by patient motion. There is  no evidence of a focal high-grade stenosis or aneurysm. The right A1  segment is hypoplastic.           MRA OF THE NECK WITH AND WITHOUT CONTRAST     The study is hampered by patient motion. The great vessels are arranged  in a classic configuration. There is 0% stenosis involving the carotid  bifurcations using NASCET criteria. There is signal loss at the proximal  aspect of the right common carotid artery and to a lesser extent the  left common carotid artery, likely artifactual.     IMPRESSION: The study is hampered by artifact. There is signal loss  involving the proximal aspects of the common carotid arteries  bilaterally. This is likely artifactual. Underlying stenosis cannot be  entirely excluded. Further evaluation could be performed with a CT  angiogram of the neck and head as indicated.     This report was finalized on 1/11/2019 10:27 AM by   Andres Forte M.D.       MRI Angiogram Head Without Contrast [[id number]] Collected:  01/10/19 1210     Updated:  01/11/19 1030    Narrative:       MRI EXAMINATION OF BRAIN WITH AND WITHOUT CONTRAST, MRA OF THE HEAD  WITHOUT CONTRAST AND MRA OF THE NECK WITH AND WITHOUT CONTRAST     HISTORY:  Stroke.     COMPARISON: None.     MRI EXAMINATION OF BRAIN WITH AND WITHOUT CONTRAST     A MRI examination of the brain was performed before and after the  intravenous administration of contrast utilizing sagittal T1, axial  diffusion, T1, T2, T2 FLAIR, susceptibility weighted imaging as well as  axial and coronal T1 postcontrast weighted sequences.     FINDINGS: There is no evidence of restricted diffusion to suggest acute  infarction. There is expected flow-void in the basilar artery and in the  distal aspect of the internal carotid arteries bilaterally on the axial  T2 sequence. The axial T2 FLAIR sequence demonstrates a mild degree of  increased signal intensity involving the white matter of the cerebral  hemispheres bilaterally, nonspecific but suggesting minimal small vessel  ischemic disease.     After contrast administration there was no evidence of abnormal  enhancement.       Impression:       No evidence of acute infarction, mass or of abnormal  enhancement. Minimal small vessel ischemic disease.        MRA OF THE HEAD WITHOUT CONTRAST     The study is hampered somewhat by patient motion. There is signal  present within the distal aspect of the vertebral arteries bilaterally.  The vertebral arteries are codominant. The basilar artery and the  proximal aspects of the posterior cerebral arteries appear unremarkable.     There is signal present within the distal aspect of the internal carotid  arteries bilaterally. The right A1 segment is moderately hypoplastic.  The proximal aspects of the anterior and middle cerebral arteries appear  unremarkable.     IMPRESSION: The study is hampered somewhat by patient motion.  There is  no evidence of a focal high-grade stenosis or aneurysm. The right A1  segment is hypoplastic.           MRA OF THE NECK WITH AND WITHOUT CONTRAST     The study is hampered by patient motion. The great vessels are arranged  in a classic configuration. There is 0% stenosis involving the carotid  bifurcations using NASCET criteria. There is signal loss at the proximal  aspect of the right common carotid artery and to a lesser extent the  left common carotid artery, likely artifactual.     IMPRESSION: The study is hampered by artifact. There is signal loss  involving the proximal aspects of the common carotid arteries  bilaterally. This is likely artifactual. Underlying stenosis cannot be  entirely excluded. Further evaluation could be performed with a CT  angiogram of the neck and head as indicated.     This report was finalized on 1/11/2019 10:27 AM by Dr. Andres Forte M.D.       XR Chest 1 View [[id number]] Collected:  01/10/19 0526     Updated:  01/10/19 0530    Narrative:       PORTABLE CHEST RADIOGRAPH     HISTORY: Wheezing     COMPARISON: September 19, 2018     FINDINGS:  Cardiomegaly is identified. There is no evidence of vascular congestion.  Lung volumes are diminished, with bibasilar atelectasis noted. No  definite pneumothorax or pleural effusion is seen. There are changes of  prior median sternotomy with coronary artery bypass grafting.       Impression:       Overall diminished lung volumes with bibasilar atelectasis.     This report was finalized on 1/10/2019 5:27 AM by Dr. Genevieve Gonsalez M.D.             Medication Review:       Current Facility-Administered Medications:   •  acetaminophen (TYLENOL) tablet 325 mg, 325 mg, Oral, Q4H PRN, Kaylee Jorge, APRN, 325 mg at 01/10/19 0411  •  albuterol sulfate HFA (PROVENTIL HFA;VENTOLIN HFA;PROAIR HFA) inhaler 2 puff, 2 puff, Inhalation, Q6H PRN, Kaylee Jorge, APRN  •  amLODIPine (NORVASC) tablet 10 mg, 10 mg, Oral, QAROBERT, Greg Madrigal MD, 10  mg at 01/11/19 0952  •  aspirin EC tablet 81 mg, 81 mg, Oral, QAM, Burnham, Kaylee J, APRN, 81 mg at 01/11/19 0748  •  atorvastatin (LIPITOR) tablet 40 mg, 40 mg, Oral, Daily, Burnham, Kaylee J, APRN, 40 mg at 01/11/19 0952  •  bisacodyl (DULCOLAX) EC tablet 10 mg, 10 mg, Oral, Daily PRN, Burnham, Kaylee J, APRN  •  bisacodyl (DULCOLAX) suppository 10 mg, 10 mg, Rectal, Daily PRN, Burnham, Kaylee J, APRN  •  calcium carbonate (TUMS) chewable tablet 500 mg (200 mg elemental), 2 tablet, Oral, TID PRN, Greg Madrigal MD  •  carvedilol (COREG) tablet 12.5 mg, 12.5 mg, Oral, BID With Meals, Burnham, Kaylee J, APRN, 12.5 mg at 01/11/19 1715  •  cetirizine (zyrTEC) tablet 10 mg, 10 mg, Oral, QAM, Burnham, Kaylee J, APRN, 10 mg at 01/11/19 0749  •  clopidogrel (PLAVIX) tablet 75 mg, 75 mg, Oral, QAM, Burnham, Kaylee J, APRN, 75 mg at 01/11/19 0750  •  cyclobenzaprine (FLEXERIL) tablet 10 mg, 10 mg, Oral, BID PRN, Burnham, Kaylee J, APRN, 10 mg at 01/08/19 2028  •  dextrose (D50W) 25 g/ 50mL Intravenous Solution 25 g, 25 g, Intravenous, Q15 Min PRN, Greg Madrigal MD  •  dextrose (GLUTOSE) oral gel 15 g, 15 g, Oral, Q15 Min PRN, Greg Madrigal MD  •  diphenhydrAMINE (BENADRYL) capsule 25 mg, 25 mg, Oral, Q6H PRN **OR** diphenhydrAMINE (BENADRYL) capsule 25 mg, 25 mg, Oral, Q6H PRN, BurnhamKaylee dillard, APRN  •  docusate sodium (COLACE) capsule 100 mg, 100 mg, Oral, BID, BurnhamBryan dillardn J, APRN, 100 mg at 01/11/19 0952  •  famotidine (PEPCID) tablet 40 mg, 40 mg, Oral, Daily, Kaylee Jorge APRN, 40 mg at 01/11/19 0952  •  furosemide (LASIX) tablet 40 mg, 40 mg, Oral, Nightly, Greg Madrigal MD, 40 mg at 01/10/19 2056  •  gabapentin (NEURONTIN) capsule 400 mg, 400 mg, Oral, TID, Rahat Interiano MD, 400 mg at 01/11/19 1715  •  glucagon (human recombinant) (GLUCAGEN DIAGNOSTIC) injection 1 mg, 1 mg, Subcutaneous, PRN, Greg Madrigal MD  •  HYDROmorphone (DILAUDID) injection 0.5 mg, 0.5 mg, Intravenous, Q2H PRN **AND** naloxone (NARCAN) injection  0.1 mg, 0.1 mg, Intravenous, Q5 Min PRN, Kaylee Jorge, APRN  •  insulin glargine (LANTUS) injection 15 Units, 15 Units, Subcutaneous, Q12H, Kin Malloy MD  •  insulin lispro (humaLOG) injection 2-5 Units, 2-5 Units, Subcutaneous, 4x Daily AC & at Bedtime, Kin Malloy MD, 4 Units at 01/11/19 1715  •  insulin lispro (humaLOG) injection 8 Units, 8 Units, Subcutaneous, TID With Meals, Kin Malloy MD, 8 Units at 01/11/19 1715  •  isosorbide mononitrate (IMDUR) 24 hr tablet 60 mg, 60 mg, Oral, QAM, Kaylee Jorge, APRN, 60 mg at 01/11/19 0952  •  ketorolac (TORADOL) injection 30 mg, 30 mg, Intravenous, Once, Andres Carter MD  •  lactated ringers infusion, 9 mL/hr, Intravenous, Continuous, Reba Sainz MD, Last Rate: 9 mL/hr at 01/08/19 0909, 9 mL/hr at 01/08/19 0909  •  losartan (COZAAR) tablet 50 mg, 50 mg, Oral, QAM, Greg Madrigal MD, 50 mg at 01/11/19 0951  •  magnesium hydroxide (MILK OF MAGNESIA) suspension 2400 mg/10mL 10 mL, 10 mL, Oral, Daily PRN, Kaylee Jorge, APRN  •  ondansetron (ZOFRAN) tablet 4 mg, 4 mg, Oral, Q6H PRN **OR** ondansetron ODT (ZOFRAN-ODT) disintegrating tablet 4 mg, 4 mg, Oral, Q6H PRN **OR** ondansetron (ZOFRAN) injection 4 mg, 4 mg, Intravenous, Q6H PRN, Kaylee Jorge, APRN  •  oxyCODONE-acetaminophen (PERCOCET)  MG per tablet 1 tablet, 1 tablet, Oral, Q4H PRN, 1 tablet at 01/11/19 1721 **OR** oxyCODONE-acetaminophen (PERCOCET)  MG per tablet 2 tablet, 2 tablet, Oral, Q4H PRN, Kaylee Jorge, KATHLEEN, 2 tablet at 01/11/19 1237  •  pantoprazole (PROTONIX) EC tablet 40 mg, 40 mg, Oral, QAM, Kaylee Jorge, APRCUCO, 40 mg at 01/11/19 0951  •  polyethylene glycol 3350 powder (packet), 17 g, Oral, Daily, Kaylee Jorge APRN, 17 g at 01/11/19 0951  •  potassium chloride (MICRO-K) CR capsule 10 mEq, 10 mEq, Oral, Daily, Greg Madrigal MD, 10 mEq at 01/11/19 0951  •  promethazine (PHENERGAN) tablet 12.5 mg, 12.5 mg, Oral, Q6H PRN, Kaylee Jorge,  APRN    Assessment/Plan     Patient Active Problem List   Diagnosis   • Bulging lumbar disc   • Chronic pain   • Diabetic neuropathy (CMS/HCC)   • Low back pain   • Degenerative arthritis of lumbar spine   • Neuropathy involving both lower extremities   • Encounter for long-term (current) use of high-risk medication   • Postlaminectomy syndrome of lumbar region   • Syringomyelia and syringobulbia (CMS/HCC)   • Lumbar pseudoarthrosis   • Type 2 diabetes mellitus with neurologic complication, with long-term current use of insulin (CMS/HCC)   • Insulin pump in place   • Hx of decompressive lumbar laminectomy   • Bilateral carpal tunnel syndrome   • Degenerative cervical disc   • Acute pain of right knee   • Primary localized osteoarthrosis of right lower leg   • Arthritis of right knee   • Sacroiliac inflammation (CMS/HCC)   • Sacroiliac joint dysfunction   • Severe obesity (BMI 35.0-39.9)   • Chronic pain of right knee   • Tricompartment osteoarthritis of left knee   • Tricompartment osteoarthritis of right knee   • Arthritis of left knee   • Bilateral chronic knee pain   • Hypertension   Uncontrolled type 2 diabetes mellitus  Uncontrolled type 2 diabetes mellitus with neuropathy  Insulin pump status  History of hypoglycemia  Obesity     Patient's T-slim pump settings reviewed  Patient is adequate amount of insulin  He does take the insulin pump set this morning and he usually keeps it for 2 days  Basal settings are 0.85 units per hour continuously  Mealtime ratios are 1 unit for every 10 carbs and one unit for every 60/120 for correction    Patient apparently had an episode of hypoglycemia  Pump was discontinued  Patient is comfortable with the basal and bolus insulin regimen along with correction scale during this hospitalization  I will start him on Lantus every 12 hours and Humalog before each meal and sliding scale  Advised the patient to get the insulin pump to home for safety  We will continue to monitor the  "Accu-Cheks and then adjust insulin as needed  Patient verbalized understanding    The total floor time spent  was more than 35 min of which greater than 20 min of time (greater than 50% of the total time)  was spent face to face with the patient counseling and coordination of care on recommended evaluation and treatment options, instructions for management/treatment and /or follow up and importance of compliance with chosen management or treatment options    Kin Malloy MD FACE.  01/11/19  6:37 PM      EMR Dragon / transcription disclaimer:     \"Dictated utilizing Dragon dictation\".   "

## 2019-01-11 NOTE — PROGRESS NOTES
[facility]     LOS: 3 days     Subjective:    CC:No chief complaint on file.    DIET:  Diet Order   Procedures   • Diet Regular; Consistent Carbohydrate, Cardiac       Objective:    Vital Signs:  Temp:  [98.8 °F (37.1 °C)-100.5 °F (38.1 °C)] 100.2 °F (37.9 °C)  Heart Rate:  [] 89  Resp:  [16-18] 16  BP: (141-167)/(73-86) 141/73    SpO2:  [93 %-96 %] 93 %  on   ;   Device (Oxygen Therapy): room air  Body mass index is 38.16 kg/m².    Physical Exam   Constitutional: He appears well-developed and well-nourished.   HENT:   Head: Normocephalic and atraumatic.   Cardiovascular: Exam reveals no friction rub.   No murmur heard.  Pulmonary/Chest: Effort normal and breath sounds normal.   Abdominal: Soft. Bowel sounds are normal. He exhibits no distension. There is no tenderness.   Musculoskeletal:   Right knee is dressed   Neurological: He is alert.   Skin: Skin is warm and dry.       Results Review:    Glucose   Date Value Ref Range Status   01/11/2019 286 (H) 65 - 99 mg/dL Final   01/10/2019 143 (H) 65 - 99 mg/dL Final   01/10/2019 140 (H) 65 - 99 mg/dL Final   01/09/2019 239 (H) 65 - 99 mg/dL Final     Results from last 7 days   Lab Units  01/11/19   0452   WBC 10*3/mm3  10.52   HEMOGLOBIN g/dL  13.3*   HEMATOCRIT %  42.0   PLATELETS 10*3/mm3  205     Results from last 7 days   Lab Units  01/11/19   0452   SODIUM mmol/L  131*   POTASSIUM mmol/L  4.6   CHLORIDE mmol/L  98   CO2 mmol/L  22.5   BUN mg/dL  30*   CREATININE mg/dL  1.27   CALCIUM mg/dL  8.5*   GLUCOSE mg/dL  286*     Results from last 7 days   Lab Units  01/10/19   0129   INR   1.14*             Cultures:       I have reviewed daily medications and changes in CPOE    Scheduled meds    amLODIPine 10 mg Oral QAM   aspirin 81 mg Oral QAM   atorvastatin 40 mg Oral Daily   carvedilol 12.5 mg Oral BID With Meals   cetirizine 10 mg Oral QAM   clopidogrel 75 mg Oral QAM   docusate sodium 100 mg Oral BID   famotidine 40 mg Oral Daily    furosemide 40 mg Oral Nightly   gabapentin 400 mg Oral TID   insulin glargine 15 Units Subcutaneous Q12H   insulin lispro 2-5 Units Subcutaneous 4x Daily AC & at Bedtime   insulin lispro 8 Units Subcutaneous TID With Meals   isosorbide mononitrate 60 mg Oral QAM   ketorolac 30 mg Intravenous Once   losartan 50 mg Oral QAM   pantoprazole 40 mg Oral QAM   polyethylene glycol 17 g Oral Daily   potassium chloride 10 mEq Oral Daily         lactated ringers 9 mL/hr Last Rate: 9 mL/hr (01/08/19 0909)     PRN meds  •  acetaminophen  •  albuterol sulfate HFA  •  bisacodyl  •  bisacodyl  •  calcium carbonate  •  cyclobenzaprine  •  dextrose  •  dextrose  •  diphenhydrAMINE **OR** diphenhydrAMINE  •  glucagon (human recombinant)  •  HYDROmorphone **AND** naloxone  •  magnesium hydroxide  •  ondansetron **OR** ondansetron ODT **OR** ondansetron  •  oxyCODONE-acetaminophen **OR** oxyCODONE-acetaminophen  •  promethazine        Arthritis of right knee    Diabetic neuropathy (CMS/HCC)    Type 2 diabetes mellitus with neurologic complication, with long-term current use of insulin (CMS/HCC)    Severe obesity (BMI 35.0-39.9)    Bilateral chronic knee pain    Hypertension        Assessment/Plan:      Arthritis of right knee      Diabetic neuropathy (CMS/HCC) /   Type 2 diabetes mellitus with neurologic complication, with long-term current use of insulin (CMS/HCC)  -endocrinology is following, bs are elevated  -lantus and SSI; meal time insulin      Severe obesity (BMI 35.0-39.9)      Bilateral chronic knee pain      Hypertension  -norvasc  -coreg  -Bp is controlled    DVT PPX: scd      Andres Lennon MD  01/11/19  4:55 PM

## 2019-01-11 NOTE — DISCHARGE SUMMARY
Orthopedic Discharge Summary      Patient: Kian Mcdaniels  YOB: 1955  Medical Record Number: [id number]    Attending Physician: Andres Carter MD  Consulting Physician(s): Dr. Interiano, Neuro, Dr. Malloy, Endocrine, Dr. Madrigal, Internal Medicine  Agree with additional diagnoses per Consultant notes.    Date of Admission: 1/8/2019  7:01 AM  Date of Discharge: 1/11/19    Discharge Diagnosis: RIGHT TOTAL KNEE ARTHROPLASTY WITH NEGRITA NAVIGATION,   Acute Blood Loss Anemia, stable  Post-operative Pain  Limited mobility, requires use of walker and assistance when OOB.    Presenting Problem/History of Present Illness: Arthritis of right knee [M17.11]  Bilateral chronic knee pain [M25.561, M25.562, G89.29]  Arthritis of right knee [M17.11]        Allergies:   Allergies   Allergen Reactions   • Erythromycin Nausea Only and Other (See Comments)     PT STATES ACUTE KIDNEY INJURY   • Lisinopril Other (See Comments)     7/2016 possibly caused pancreatitis per Dr Quinonez   • Metformin Nausea And Vomiting       Discharge Medications       Discharge Medications      New Medications      Instructions Start Date   bisacodyl 5 MG EC tablet  Commonly known as:  DULCOLAX   10 mg, Oral, Daily PRN      oxyCODONE-acetaminophen  MG per tablet  Commonly known as:  PERCOCET   1-2 tabs po q 4 hr prn severe pain, wean as tolerated      polyethylene glycol pack packet  Commonly known as:  MIRALAX   17 g, Oral, Daily      promethazine 12.5 MG tablet  Commonly known as:  PHENERGAN   12.5 mg, Oral, Every 6 Hours PRN         Changes to Medications      Instructions Start Date   aspirin 81 MG EC tablet  What changed:  additional instructions   81 mg, Oral, Every Morning         Continue These Medications      Instructions Start Date   amLODIPine 10 MG tablet  Commonly known as:  NORVASC   10 mg, Oral, Every Morning      atorvastatin 40 MG tablet  Commonly known as:  LIPITOR   40 mg, Oral, Daily      carvedilol 12.5 MG  tablet  Commonly known as:  COREG   12.5 mg, Oral, 2 Times Daily With Meals      cetirizine 10 MG tablet  Commonly known as:  zyrTEC   10 mg, Oral, Every Morning      clopidogrel 75 MG tablet  Commonly known as:  PLAVIX   75 mg, Oral, Every Morning, HOLDING FOR SURGERY      cyclobenzaprine 10 MG tablet  Commonly known as:  FLEXERIL   10 mg, Oral, 2 Times Daily PRN      docusate sodium 100 MG capsule  Commonly known as:  COLACE   100 mg, Oral, 2 Times Daily      FREESTYLE FREEDOM LITE w/Device kit   Does not apply      freestyle lancets   Test 4 times per day      FREESTYLE LITE test strip  Generic drug:  glucose blood   Test 4 times per day      furosemide 40 MG tablet  Commonly known as:  LASIX   40 mg, Oral, Nightly      gabapentin 400 MG capsule  Commonly known as:  NEURONTIN   800 mg, Oral, 3 Times Daily      HUMULIN R 500 UNIT/ML CONCENTRATED injection  Generic drug:  insulin regular   500 Units, Subcutaneous, Continuous, Via insulin pump basal rate 0.68units/hr from 0000 to 1000 1000 to 1600 is 0.7units/hr 1600 to 0000 0.75units/hr Plus sliding scale based on carb intake      isosorbide mononitrate 60 MG 24 hr tablet  Commonly known as:  IMDUR   60 mg, Oral, Every Morning      losartan 50 MG tablet  Commonly known as:  COZAAR   50 mg, Oral, Every Morning      NARCAN 4 MG/0.1ML nasal spray  Generic drug:  naloxone   No dose, route, or frequency recorded.      pantoprazole 40 MG EC tablet  Commonly known as:  PROTONIX   40 mg, Oral, Every Morning      potassium chloride 10 MEQ CR tablet  Commonly known as:  K-DUR   10 mEq, Oral, Daily      raNITIdine 300 MG tablet  Commonly known as:  ZANTAC   300 mg, Oral, Nightly      T:SLIM INSULIN PUMP device   Does not apply      TRESIBA FLEXTOUCH 100 UNIT/ML solution pen-injector injection  Generic drug:  insulin degludec   45 Units, Subcutaneous, Every Morning      VENTOLIN  (90 Base) MCG/ACT inhaler  Generic drug:  albuterol sulfate HFA   1-2 puffs, Inhalation,  Every 6 Hours PRN         Stop These Medications    diclofenac 1 % gel gel  Commonly known as:  VOLTAREN     Multiple Vitamin tablet     NON FORMULARY     oxyCODONE 10 MG tablet  Commonly known as:  ROXICODONE     TESTOSTERONE CYPIONATE IM     VITAMIN C PO     vitamin D 78214 units capsule capsule  Commonly known as:  ERGOCALCIFEROL              Past Medical History:   Diagnosis Date   • Arteriosclerotic cardiovascular disease    • Arthritis    • At risk for sleep apnea    • COPD (chronic obstructive pulmonary disease) (CMS/HCC)    • Coronary artery disease    • Diabetes mellitus (CMS/HCC)    • Diabetic peripheral neuropathy (CMS/HCC)    • Disease of thyroid gland     NODULE PRESENT   • ED (erectile dysfunction) of non-organic origin    • GERD (gastroesophageal reflux disease)    • Headache disorder     DUE TO NECK   • Hyperlipidemia    • Hypertension    • Insulin pump in place    • Knee pain, bilateral    • Low back pain    • Myocardial infarction (CMS/HCC)    • Neck pain    • Spinal stenosis    • TIA (transient ischemic attack)     LEFT EYE   • Type 2 diabetes mellitus (CMS/HCC)    • Upper back pain    • Wears dentures     UPPER PLATE        Past Surgical History:   Procedure Laterality Date   • AMPUTATION FOOT / TOE Left     GREAT TOE   • BACK SURGERY  10/26/2015    Bilateral L4-L5 laminectomy -Dr. Gutierres   • CARDIAC CATHETERIZATION     • CARDIAC SURGERY      CABG X 6    • CHOLECYSTECTOMY     • CORONARY ARTERY BYPASS GRAFT      X 6   • EPIDURAL BLOCK     • EYE SURGERY      CATARACT EXTRACTION   • HAND SURGERY  04/28/2016   • LUMBAR DISCECTOMY FUSION INSTRUMENTATION N/A 12/12/2016    Procedure: L 4-5 FUSION WITH INSTRUMENTATION WITH BMP, WITH REMOVAL OF INSTRUMENTATION ;  Surgeon: Rowdy Spencer MD;  Location: [facility] OR;  Service:    • LUMBAR LAMINECTOMY DISCECTOMY DECOMPRESSION N/A 12/12/2016    Procedure: L4-5 REPEAT LAMINECTOMY ;  Surgeon: Tim Gutierres MD;  Location: [facility] OR;  Service:    •  LUMBAR LAMINECTOMY DISCECTOMY DECOMPRESSION N/A 2016    Procedure: EVACUATION OF LUMBAR HEMATOMA;  Surgeon: Rowdy Spencer MD;  Location: [facility] OR;  Service:    • MULTIPLE TOOTH EXTRACTIONS      UPPER TEETH EXTRACTED   • ORTHOPEDIC SURGERY     • PENIS SURGERY      RECONSTRUCTION   • SCROTUM EXPLORATION      scrotum surgery   • SPINAL CORD STIMULATOR IMPLANT N/A 2018    Procedure: SPINAL CORD STIMULATOR Phase 1 - [employer];  Surgeon: Mulugeta Guzman MD;  Location: [facility] OR;  Service: Pain Management   • SPINE SURGERY     • TOTAL KNEE ARTHROPLASTY Right 2019    Procedure: RIGHT TOTAL KNEE ARTHROPLASTY WITH NEGRITA NAVIGATION;  Surgeon: Andres Carter MD;  Location: [facility] OR;  Service: Orthopedics        Social History     Occupational History   • Not on file   Tobacco Use   • Smoking status: Former Smoker     Packs/day: 1.00     Years: 25.00     Pack years: 25.00     Types: Cigarettes     Start date:      Last attempt to quit: 2000     Years since quittin.0   • Smokeless tobacco: Never Used   Substance and Sexual Activity   • Alcohol use: No     Comment: no alcohol since    • Drug use: No   • Sexual activity: Defer      Social History     Social History Narrative   • Not on file        Family History   Problem Relation Age of Onset   • Diabetes Other    • Heart disease Other    • Hypertension Other    • Kidney disease Other         renal failure   • Heart disease Maternal Grandmother    • Hypertension Maternal Grandmother    • Malig Hyperthermia Neg Hx          Physical Exam: 63 y.o. male   Body mass index is 38.16 kg/m².  Facility age limit for growth percentiles is 20 years.  Vitals:    19 0733   BP: 150/80   Pulse: 89   Resp: 16   Temp: 99.3 °F (37.4 °C)   SpO2:          General Appearance:    Alert, cooperative, in no acute distress                      Vitals:    01/10/19 2300 19 0336 19 0457 19 0733   BP: 152/78 159/86 154/80  150/80   BP Location: Left arm Right arm Left arm Right arm   Patient Position: Lying Lying Lying Sitting   Pulse: 83 111 90 89   Resp: 16 18  16   Temp: 99.7 °F (37.6 °C) 100.5 °F (38.1 °C) 99.1 °F (37.3 °C) 99.3 °F (37.4 °C)   TempSrc:  Oral  Oral   SpO2: 96% 93% 94%    Weight:       Height:            DIAGNOSTIC TESTS:   Admission on 01/08/2019   Component Date Value Ref Range Status   • Glucose 01/08/2019 93  70 - 130 mg/dL Final   • Glucose 01/08/2019 84  70 - 130 mg/dL Final   • Glucose 01/08/2019 186* 70 - 130 mg/dL Final   • Glucose 01/08/2019 288* 70 - 130 mg/dL Final   • Glucose 01/09/2019 239* 65 - 99 mg/dL Final   • BUN 01/09/2019 26* 8 - 23 mg/dL Final   • Creatinine 01/09/2019 1.43* 0.76 - 1.27 mg/dL Final   • Sodium 01/09/2019 133* 136 - 145 mmol/L Final   • Potassium 01/09/2019 4.5  3.5 - 5.2 mmol/L Final   • Chloride 01/09/2019 100  98 - 107 mmol/L Final   • CO2 01/09/2019 21.4* 22.0 - 29.0 mmol/L Final   • Calcium 01/09/2019 8.8  8.6 - 10.5 mg/dL Final   • eGFR   Amer 01/09/2019 61  >60 mL/min/1.73 Final   • BUN/Creatinine Ratio 01/09/2019 18.2  7.0 - 25.0 Final   • Anion Gap 01/09/2019 11.6  mmol/L Final   • Hemoglobin 01/09/2019 15.2  13.7 - 17.6 g/dL Final   • Hematocrit 01/09/2019 46.6  40.4 - 52.2 % Final   • Glucose 01/09/2019 184* 70 - 130 mg/dL Final   • Glucose 01/09/2019 103  70 - 130 mg/dL Final   • Glucose 01/09/2019 68* 70 - 130 mg/dL Final   • Glucose 01/09/2019 68* 70 - 130 mg/dL Final   • Glucose 01/09/2019 83  70 - 130 mg/dL Final   • Glucose 01/09/2019 127  70 - 130 mg/dL Final   • Glucose 01/09/2019 179* 70 - 130 mg/dL Final   • Glucose 01/10/2019 203* 70 - 130 mg/dL Final   • Glucose 01/10/2019 140* 65 - 99 mg/dL Final   • BUN 01/10/2019 32* 8 - 23 mg/dL Final   • Creatinine 01/10/2019 1.62* 0.76 - 1.27 mg/dL Final   • Sodium 01/10/2019 136  136 - 145 mmol/L Final   • Potassium 01/10/2019 3.9  3.5 - 5.2 mmol/L Final   • Chloride 01/10/2019 97* 98 - 107 mmol/L Final   •  CO2 01/10/2019 26.1  22.0 - 29.0 mmol/L Final   • Calcium 01/10/2019 8.7  8.6 - 10.5 mg/dL Final   • eGFR   Amer 01/10/2019 53* >60 mL/min/1.73 Final   • BUN/Creatinine Ratio 01/10/2019 19.8  7.0 - 25.0 Final   • Anion Gap 01/10/2019 12.9  mmol/L Final   • Protime 01/10/2019 14.4* 11.7 - 14.2 Seconds Final   • INR 01/10/2019 1.14* 0.90 - 1.10 Final   • WBC 01/10/2019 12.43* 4.50 - 10.70 10*3/mm3 Final   • RBC 01/10/2019 5.50  4.60 - 6.00 10*6/mm3 Final   • Hemoglobin 01/10/2019 15.5  13.7 - 17.6 g/dL Final   • Hematocrit 01/10/2019 47.8  40.4 - 52.2 % Final   • MCV 01/10/2019 86.9  79.8 - 96.2 fL Final   • MCH 01/10/2019 28.2  27.0 - 32.7 pg Final   • MCHC 01/10/2019 32.4* 32.6 - 36.4 g/dL Final   • RDW 01/10/2019 15.7* 11.5 - 14.5 % Final   • RDW-SD 01/10/2019 50.2  37.0 - 54.0 fl Final   • MPV 01/10/2019 9.9  6.0 - 12.0 fL Final   • Platelets 01/10/2019 210  140 - 500 10*3/mm3 Final   • Neutrophil % 01/10/2019 62.9  42.7 - 76.0 % Final   • Lymphocyte % 01/10/2019 26.5  19.6 - 45.3 % Final   • Monocyte % 01/10/2019 8.0  5.0 - 12.0 % Final   • Eosinophil % 01/10/2019 2.1  0.3 - 6.2 % Final   • Basophil % 01/10/2019 0.3  0.0 - 1.5 % Final   • Immature Grans % 01/10/2019 0.2  0.0 - 0.5 % Final   • Neutrophils, Absolute 01/10/2019 7.82  1.90 - 8.10 10*3/mm3 Final   • Lymphocytes, Absolute 01/10/2019 3.29  0.90 - 4.80 10*3/mm3 Final   • Monocytes, Absolute 01/10/2019 0.99  0.20 - 1.20 10*3/mm3 Final   • Eosinophils, Absolute 01/10/2019 0.26  0.00 - 0.70 10*3/mm3 Final   • Basophils, Absolute 01/10/2019 0.04  0.00 - 0.20 10*3/mm3 Final   • Immature Grans, Absolute 01/10/2019 0.03  0.00 - 0.03 10*3/mm3 Final   • Site 01/10/2019 Arterial: right radial   Final   • Bruce's Test 01/10/2019 Positive   Final   • pH, Arterial 01/10/2019 7.396  7.350 - 7.450 pH units Final   • pCO2, Arterial 01/10/2019 41.7  35.0 - 45.0 mm Hg Final   • pO2, Arterial 01/10/2019 73.6* 80.0 - 100.0 mm Hg Final   • HCO3, Arterial 01/10/2019  25.6  22.0 - 28.0 mmol/L Final   • Base Excess, Arterial 01/10/2019 0.5  0.0 - 2.0 mmol/L Final   • O2 Saturation Calculated 01/10/2019 94.5  92.0 - 99.0 % Final   • Barometric Pressure for Blood Gas 01/10/2019 758.6  mmHg Final   • Modality 01/10/2019 Cannula   Final   • Flow Rate 01/10/2019 3  lpm Final   • Rate 01/10/2019 20  Breaths/minute Final   • Glucose 01/10/2019 143* 65 - 99 mg/dL Final   • BUN 01/10/2019 32* 8 - 23 mg/dL Final   • Creatinine 01/10/2019 1.51* 0.76 - 1.27 mg/dL Final   • Sodium 01/10/2019 135* 136 - 145 mmol/L Final   • Potassium 01/10/2019 4.1  3.5 - 5.2 mmol/L Final   • Chloride 01/10/2019 98  98 - 107 mmol/L Final   • CO2 01/10/2019 25.2  22.0 - 29.0 mmol/L Final   • Calcium 01/10/2019 8.7  8.6 - 10.5 mg/dL Final   • eGFR   Amer 01/10/2019 57* >60 mL/min/1.73 Final   • BUN/Creatinine Ratio 01/10/2019 21.2  7.0 - 25.0 Final   • Anion Gap 01/10/2019 11.8  mmol/L Final   • WBC 01/10/2019 12.24* 4.50 - 10.70 10*3/mm3 Final   • RBC 01/10/2019 5.32  4.60 - 6.00 10*6/mm3 Final   • Hemoglobin 01/10/2019 14.6  13.7 - 17.6 g/dL Final   • Hematocrit 01/10/2019 45.6  40.4 - 52.2 % Final   • MCV 01/10/2019 85.7  79.8 - 96.2 fL Final   • MCH 01/10/2019 27.4  27.0 - 32.7 pg Final   • MCHC 01/10/2019 32.0* 32.6 - 36.4 g/dL Final   • RDW 01/10/2019 15.6* 11.5 - 14.5 % Final   • RDW-SD 01/10/2019 49.7  37.0 - 54.0 fl Final   • MPV 01/10/2019 10.0  6.0 - 12.0 fL Final   • Platelets 01/10/2019 206  140 - 500 10*3/mm3 Final   • Neutrophil % 01/10/2019 72.4  42.7 - 76.0 % Final   • Lymphocyte % 01/10/2019 17.8* 19.6 - 45.3 % Final   • Monocyte % 01/10/2019 7.8  5.0 - 12.0 % Final   • Eosinophil % 01/10/2019 1.6  0.3 - 6.2 % Final   • Basophil % 01/10/2019 0.2  0.0 - 1.5 % Final   • Immature Grans % 01/10/2019 0.2  0.0 - 0.5 % Final   • Neutrophils, Absolute 01/10/2019 8.84* 1.90 - 8.10 10*3/mm3 Final   • Lymphocytes, Absolute 01/10/2019 2.18  0.90 - 4.80 10*3/mm3 Final   • Monocytes, Absolute  01/10/2019 0.96  0.20 - 1.20 10*3/mm3 Final   • Eosinophils, Absolute 01/10/2019 0.20  0.00 - 0.70 10*3/mm3 Final   • Basophils, Absolute 01/10/2019 0.03  0.00 - 0.20 10*3/mm3 Final   • Immature Grans, Absolute 01/10/2019 0.03  0.00 - 0.03 10*3/mm3 Final   • Glucose 01/10/2019 165* 70 - 130 mg/dL Final   • Glucose 01/10/2019 202* 70 - 130 mg/dL Final   • Glucose 01/10/2019 229* 70 - 130 mg/dL Final   • Glucose 01/10/2019 233* 70 - 130 mg/dL Final   • Glucose 01/10/2019 235* 70 - 130 mg/dL Final   • Glucose 01/11/2019 286* 65 - 99 mg/dL Final   • BUN 01/11/2019 30* 8 - 23 mg/dL Final   • Creatinine 01/11/2019 1.27  0.76 - 1.27 mg/dL Final   • Sodium 01/11/2019 131* 136 - 145 mmol/L Final   • Potassium 01/11/2019 4.6  3.5 - 5.2 mmol/L Final   • Chloride 01/11/2019 98  98 - 107 mmol/L Final   • CO2 01/11/2019 22.5  22.0 - 29.0 mmol/L Final   • Calcium 01/11/2019 8.5* 8.6 - 10.5 mg/dL Final   • eGFR   Amer 01/11/2019 70  >60 mL/min/1.73 Final   • BUN/Creatinine Ratio 01/11/2019 23.6  7.0 - 25.0 Final   • Anion Gap 01/11/2019 10.5  mmol/L Final   • WBC 01/11/2019 10.52  4.50 - 10.70 10*3/mm3 Final   • RBC 01/11/2019 4.91  4.60 - 6.00 10*6/mm3 Final   • Hemoglobin 01/11/2019 13.3* 13.7 - 17.6 g/dL Final   • Hematocrit 01/11/2019 42.0  40.4 - 52.2 % Final   • MCV 01/11/2019 85.5  79.8 - 96.2 fL Final   • MCH 01/11/2019 27.1  27.0 - 32.7 pg Final   • MCHC 01/11/2019 31.7* 32.6 - 36.4 g/dL Final   • RDW 01/11/2019 15.7* 11.5 - 14.5 % Final   • RDW-SD 01/11/2019 49.7  37.0 - 54.0 fl Final   • MPV 01/11/2019 10.5  6.0 - 12.0 fL Final   • Platelets 01/11/2019 205  140 - 500 10*3/mm3 Final   • Glucose 01/11/2019 239* 70 - 130 mg/dL Final       Imaging Results (last 72 hours)     Procedure Component Value Units Date/Time    MRI Brain With & Without Contrast [[id number]] Collected:  01/10/19 1210     Updated:  01/10/19 1210    Narrative:       MRI EXAMINATION OF BRAIN WITH AND WITHOUT CONTRAST, MRA OF THE HEAD  WITHOUT  CONTRAST AND MRA OF THE NECK WITH AND WITHOUT CONTRAST     HISTORY:  Stroke.     COMPARISON: None.     MRI EXAMINATION OF BRAIN WITH AND WITHOUT CONTRAST     A MRI examination of the brain was performed before and after the  intravenous administration of contrast utilizing sagittal T1, axial  diffusion, T1, T2, T2 FLAIR, susceptibility weighted imaging as well as  axial and coronal T1 postcontrast weighted sequences.     FINDINGS: There is no evidence of restricted diffusion to suggest acute  infarction. There is expected flow-void in the basilar artery and in the  distal aspect of the internal carotid arteries bilaterally on the axial  T2 sequence. The axial T2 FLAIR sequence demonstrates a mild degree of  increased signal intensity involving the white matter of the cerebral  hemispheres bilaterally, nonspecific but suggesting minimal small vessel  ischemic disease.     After contrast administration there was no evidence of abnormal  enhancement.       Impression:       No evidence of acute infarction, mass or of abnormal  enhancement. Minimal small vessel ischemic disease.        MRA OF THE HEAD WITHOUT CONTRAST     The study is hampered somewhat by patient motion. There is signal  present within the distal aspect of the vertebral arteries bilaterally.  The vertebral arteries are codominant. The basilar artery and the  proximal aspects of the posterior cerebral arteries appear unremarkable.     There is signal present within the distal aspect of the internal carotid  arteries bilaterally. The right A1 segment is moderately hypoplastic.  The proximal aspects of the anterior and middle cerebral arteries appear  unremarkable.     IMPRESSION: The study is hampered somewhat by patient motion. There is  no evidence of a focal high-grade stenosis or aneurysm. The right A1  segment is hypoplastic.           MRA OF THE NECK WITH AND WITHOUT CONTRAST     The study is hampered by patient motion. The great vessels are  arranged  in a classic configuration. There is 0% stenosis involving the carotid  bifurcations using NASCET criteria. There is signal loss at the proximal  aspect of the right common carotid artery and to a lesser extent the  left common carotid artery, likely artifactual.     IMPRESSION: The study is hampered by artifact. There is signal loss  involving the proximal aspects of the common carotid arteries  bilaterally. This is likely artifactual. Underlying stenosis cannot be  entirely excluded. Further evaluation could be performed with a CT  angiogram of the neck and head as indicated.       MRI Angiogram Head Without Contrast [[id number]] Collected:  01/10/19 1210     Updated:  01/10/19 1210    Narrative:       MRI EXAMINATION OF BRAIN WITH AND WITHOUT CONTRAST, MRA OF THE HEAD  WITHOUT CONTRAST AND MRA OF THE NECK WITH AND WITHOUT CONTRAST     HISTORY:  Stroke.     COMPARISON: None.     MRI EXAMINATION OF BRAIN WITH AND WITHOUT CONTRAST     A MRI examination of the brain was performed before and after the  intravenous administration of contrast utilizing sagittal T1, axial  diffusion, T1, T2, T2 FLAIR, susceptibility weighted imaging as well as  axial and coronal T1 postcontrast weighted sequences.     FINDINGS: There is no evidence of restricted diffusion to suggest acute  infarction. There is expected flow-void in the basilar artery and in the  distal aspect of the internal carotid arteries bilaterally on the axial  T2 sequence. The axial T2 FLAIR sequence demonstrates a mild degree of  increased signal intensity involving the white matter of the cerebral  hemispheres bilaterally, nonspecific but suggesting minimal small vessel  ischemic disease.     After contrast administration there was no evidence of abnormal  enhancement.       Impression:       No evidence of acute infarction, mass or of abnormal  enhancement. Minimal small vessel ischemic disease.        MRA OF THE HEAD WITHOUT CONTRAST     The study is  hampered somewhat by patient motion. There is signal  present within the distal aspect of the vertebral arteries bilaterally.  The vertebral arteries are codominant. The basilar artery and the  proximal aspects of the posterior cerebral arteries appear unremarkable.     There is signal present within the distal aspect of the internal carotid  arteries bilaterally. The right A1 segment is moderately hypoplastic.  The proximal aspects of the anterior and middle cerebral arteries appear  unremarkable.     IMPRESSION: The study is hampered somewhat by patient motion. There is  no evidence of a focal high-grade stenosis or aneurysm. The right A1  segment is hypoplastic.           MRA OF THE NECK WITH AND WITHOUT CONTRAST     The study is hampered by patient motion. The great vessels are arranged  in a classic configuration. There is 0% stenosis involving the carotid  bifurcations using NASCET criteria. There is signal loss at the proximal  aspect of the right common carotid artery and to a lesser extent the  left common carotid artery, likely artifactual.     IMPRESSION: The study is hampered by artifact. There is signal loss  involving the proximal aspects of the common carotid arteries  bilaterally. This is likely artifactual. Underlying stenosis cannot be  entirely excluded. Further evaluation could be performed with a CT  angiogram of the neck and head as indicated.       MRI Angiogram Neck With & Without Contrast [[id number]] Collected:  01/10/19 1210     Updated:  01/10/19 1210    Narrative:       MRI EXAMINATION OF BRAIN WITH AND WITHOUT CONTRAST, MRA OF THE HEAD  WITHOUT CONTRAST AND MRA OF THE NECK WITH AND WITHOUT CONTRAST     HISTORY:  Stroke.     COMPARISON: None.     MRI EXAMINATION OF BRAIN WITH AND WITHOUT CONTRAST     A MRI examination of the brain was performed before and after the  intravenous administration of contrast utilizing sagittal T1, axial  diffusion, T1, T2, T2 FLAIR, susceptibility weighted  imaging as well as  axial and coronal T1 postcontrast weighted sequences.     FINDINGS: There is no evidence of restricted diffusion to suggest acute  infarction. There is expected flow-void in the basilar artery and in the  distal aspect of the internal carotid arteries bilaterally on the axial  T2 sequence. The axial T2 FLAIR sequence demonstrates a mild degree of  increased signal intensity involving the white matter of the cerebral  hemispheres bilaterally, nonspecific but suggesting minimal small vessel  ischemic disease.     After contrast administration there was no evidence of abnormal  enhancement.       Impression:       No evidence of acute infarction, mass or of abnormal  enhancement. Minimal small vessel ischemic disease.        MRA OF THE HEAD WITHOUT CONTRAST     The study is hampered somewhat by patient motion. There is signal  present within the distal aspect of the vertebral arteries bilaterally.  The vertebral arteries are codominant. The basilar artery and the  proximal aspects of the posterior cerebral arteries appear unremarkable.     There is signal present within the distal aspect of the internal carotid  arteries bilaterally. The right A1 segment is moderately hypoplastic.  The proximal aspects of the anterior and middle cerebral arteries appear  unremarkable.     IMPRESSION: The study is hampered somewhat by patient motion. There is  no evidence of a focal high-grade stenosis or aneurysm. The right A1  segment is hypoplastic.           MRA OF THE NECK WITH AND WITHOUT CONTRAST     The study is hampered by patient motion. The great vessels are arranged  in a classic configuration. There is 0% stenosis involving the carotid  bifurcations using NASCET criteria. There is signal loss at the proximal  aspect of the right common carotid artery and to a lesser extent the  left common carotid artery, likely artifactual.     IMPRESSION: The study is hampered by artifact. There is signal  loss  involving the proximal aspects of the common carotid arteries  bilaterally. This is likely artifactual. Underlying stenosis cannot be  entirely excluded. Further evaluation could be performed with a CT  angiogram of the neck and head as indicated.       XR Chest 1 View [[id number]] Collected:  01/10/19 0526     Updated:  01/10/19 0530    Narrative:       PORTABLE CHEST RADIOGRAPH     HISTORY: Wheezing     COMPARISON: September 19, 2018     FINDINGS:  Cardiomegaly is identified. There is no evidence of vascular congestion.  Lung volumes are diminished, with bibasilar atelectasis noted. No  definite pneumothorax or pleural effusion is seen. There are changes of  prior median sternotomy with coronary artery bypass grafting.       Impression:       Overall diminished lung volumes with bibasilar atelectasis.     This report was finalized on 1/10/2019 5:27 AM by Dr. Genevieve Gonsalez M.D.       XR Knee 1 or 2 View Right [[id number]] Collected:  01/08/19 1214     Updated:  01/08/19 1218    Narrative:       Right knee radiograph     HISTORY: Postop     TECHNIQUE: AP and lateral radiograph the right knee     COMPARISON: Right knee radiographs 11/8/2017     FINDINGS:  Post surgical changes from recent right total knee arthroplasty are  present. The hardware is intact. There is no new periprosthetic  fracture.       Impression:       Postoperative changes from recent right total knee  arthroplasty without findings of immediate complication.     This report was finalized on 1/8/2019 12:15 PM by Dr. Elliot Laguna M.D.             Hospital Course:  63 y.o. male admitted to [facility] to services of Andres Carter MD with Arthritis of right knee [M17.11]  Bilateral chronic knee pain [M25.561, M25.562, G89.29]  Arthritis of right knee [M17.11] on 1/8/2019 and underwent RIGHT TOTAL KNEE ARTHROPLASTY WITH NEGRITA NAVIGATION  Per Andres Carter MD. Antibiotic and VTE prophylaxis were per SCIP protocols.  Post-operatively the patient transferred to the post-operative floor where the patient underwent mobilization therapy that included active as well as passive ROM exercises. Dr. Interiano, Neuro, Dr. Malloy, Endocrine, and Dr. Madrigal, Internal Medicine were consulted for inpatient care.  Patient became acutely confused POD #2 and worked up.  Is improving, but remains mildly confused.  Opioids were titrated to achieve appropriate pain management to allow for participation in mobilization exercises. Vital signs are now stable. On the day of discharge the wound was clean, dry and intact and calf was soft and non tender and Homans sign was negative. Operative extremity neurovascular status remains intact.   Appropriate education re: incision care, activity levels, medications, and follow up visits was completed and all questions were answered. The patient is now deemed stable for discharge.    Condition on Discharge:  Stable    Discharge Instructions: Patient is to continue with physical therapy exercises twice daily and continue working with the physical therapist as ordered. Patient may weight bear as tolerated unless otherwise specified. Continue to ice regularly. Patient instructed on frequent calf pumping exercises.  Patient also instructed on incentive spirometer during hospitalization and encouraged to continue to use at home regularly.  See wound care discharge instructions.    Follow up Instructions:  Follow up in the office with Dr. Andres Carter in 2 weeks - If already scheduled see below (Future Appointments) for date and time, if not yet scheduled, patient to call the office at [phone] to schedule. Prescriptions were given for pain medication, constipation, and blood thinner therapy.    Future Appointments   Date Time Provider Department Center   1/22/2019  1:00 PM Sonia Delvalle APRN [facility] None   1/22/2019  3:10 PM Kaylee Jorge APRN [facility] None   8/20/2019 12:00 PM Tim Gutierres  MD [facility] None     Additional Instructions for the Follow-ups that You Need to Schedule     You may shower on post op day 5 after surgery, but follow the DEBBY dressing instructions for care (unhook pump and cover tubing end with tape during shower then reconnect tubing after shower).  Leave the DEBBY dressing in place and do not remove until follow up appointment in the office.  If dressing becomes saturated, have your home health nurse contact Heidi Echeverria at [facility] [phone] for further instructions. DO NOT REMOVE.  Your dressing removal will be done in the office at your 2 week follow-up visit.         Discharge Follow-up with Specified Provider: Andres Carter M.D. at [facility] [phone]; 2 Weeks   As directed      To:  Andres Carter M.D. at [facility] [phone]    Follow Up:  2 Weeks         Dressing Change Instructions   As directed      V. INCISION CARE: You may shower on post op day 5 after surgery, but follow the DEBBY dressing instructions for care (unhook pump and cover tubing end with tape during shower then reconnect tubing after shower).  Leave the DEBBY dressing in place and do not remove until follow up appointment in the office.  If dressing becomes saturated, have your home health nurse contact Heidi Echeverria at [facility] [phone] for further instructions. DO NOT REMOVE.  Your dressing removal will be done in the office at your 2 week follow-up visit.     Notify our office if you have an increase in overall body temperature (greater than 100.5 degrees) Make sure you are doing your incentive spirometer and deep breathing exercises every day while awake as this is the most frequent cause of low grade fever post operatively.    Your staple removal will be done in the office at your follow-up visit.                  Discharge Disposition Plan:today to rehab    Date: 1/11/2019    Andres Carter MD

## 2019-01-12 VITALS
WEIGHT: 277.5 LBS | SYSTOLIC BLOOD PRESSURE: 131 MMHG | HEIGHT: 72 IN | TEMPERATURE: 98.3 F | HEART RATE: 80 BPM | BODY MASS INDEX: 37.59 KG/M2 | DIASTOLIC BLOOD PRESSURE: 72 MMHG | RESPIRATION RATE: 18 BRPM | OXYGEN SATURATION: 97 %

## 2019-01-12 LAB
GLUCOSE BLDC GLUCOMTR-MCNC: 246 MG/DL (ref 70–130)
GLUCOSE BLDC GLUCOMTR-MCNC: 311 MG/DL (ref 70–130)
GLUCOSE BLDC GLUCOMTR-MCNC: 332 MG/DL (ref 70–130)

## 2019-01-12 PROCEDURE — 63710000001 INSULIN LISPRO (HUMAN) PER 5 UNITS: Performed by: INTERNAL MEDICINE

## 2019-01-12 PROCEDURE — 82962 GLUCOSE BLOOD TEST: CPT

## 2019-01-12 PROCEDURE — 63710000001 INSULIN GLARGINE PER 5 UNITS: Performed by: INTERNAL MEDICINE

## 2019-01-12 PROCEDURE — 97110 THERAPEUTIC EXERCISES: CPT

## 2019-01-12 RX ADMIN — ASPIRIN 81 MG: 81 TABLET, DELAYED RELEASE ORAL at 09:06

## 2019-01-12 RX ADMIN — OXYCODONE HYDROCHLORIDE AND ACETAMINOPHEN 1 TABLET: 10; 325 TABLET ORAL at 01:54

## 2019-01-12 RX ADMIN — ISOSORBIDE MONONITRATE 60 MG: 30 TABLET ORAL at 09:04

## 2019-01-12 RX ADMIN — ATORVASTATIN CALCIUM 40 MG: 20 TABLET, FILM COATED ORAL at 09:03

## 2019-01-12 RX ADMIN — OXYCODONE HYDROCHLORIDE AND ACETAMINOPHEN 1 TABLET: 10; 325 TABLET ORAL at 06:51

## 2019-01-12 RX ADMIN — INSULIN GLARGINE 15 UNITS: 100 INJECTION, SOLUTION SUBCUTANEOUS at 09:03

## 2019-01-12 RX ADMIN — AMLODIPINE BESYLATE 10 MG: 10 TABLET ORAL at 09:05

## 2019-01-12 RX ADMIN — OXYCODONE HYDROCHLORIDE AND ACETAMINOPHEN 2 TABLET: 10; 325 TABLET ORAL at 12:05

## 2019-01-12 RX ADMIN — CARVEDILOL 12.5 MG: 3.12 TABLET, FILM COATED ORAL at 09:05

## 2019-01-12 RX ADMIN — FAMOTIDINE 40 MG: 20 TABLET, FILM COATED ORAL at 09:03

## 2019-01-12 RX ADMIN — INSULIN LISPRO 3 UNITS: 100 INJECTION, SOLUTION INTRAVENOUS; SUBCUTANEOUS at 06:52

## 2019-01-12 RX ADMIN — DOCUSATE SODIUM 100 MG: 100 CAPSULE, LIQUID FILLED ORAL at 09:05

## 2019-01-12 RX ADMIN — POTASSIUM CHLORIDE 10 MEQ: 750 CAPSULE, EXTENDED RELEASE ORAL at 09:11

## 2019-01-12 RX ADMIN — POLYETHYLENE GLYCOL 3350 17 G: 17 POWDER, FOR SOLUTION ORAL at 09:05

## 2019-01-12 RX ADMIN — INSULIN LISPRO 5 UNITS: 100 INJECTION, SOLUTION INTRAVENOUS; SUBCUTANEOUS at 11:59

## 2019-01-12 RX ADMIN — GABAPENTIN 400 MG: 400 CAPSULE ORAL at 09:05

## 2019-01-12 RX ADMIN — INSULIN LISPRO 8 UNITS: 100 INJECTION, SOLUTION INTRAVENOUS; SUBCUTANEOUS at 12:00

## 2019-01-12 RX ADMIN — INSULIN LISPRO 8 UNITS: 100 INJECTION, SOLUTION INTRAVENOUS; SUBCUTANEOUS at 09:06

## 2019-01-12 RX ADMIN — LOSARTAN POTASSIUM 50 MG: 50 TABLET, FILM COATED ORAL at 09:04

## 2019-01-12 RX ADMIN — PANTOPRAZOLE SODIUM 40 MG: 40 TABLET, DELAYED RELEASE ORAL at 06:51

## 2019-01-12 RX ADMIN — CLOPIDOGREL 75 MG: 75 TABLET, FILM COATED ORAL at 09:03

## 2019-01-12 RX ADMIN — CETIRIZINE HYDROCHLORIDE 10 MG: 10 TABLET, FILM COATED ORAL at 09:04

## 2019-01-12 NOTE — THERAPY TREATMENT NOTE
Acute Care - Physical Therapy Treatment Note  [facility]     Patient Name: Kian Mcdaniels  : 1955  MRN: [id number]  Today's Date: 2019  Onset of Illness/Injury or Date of Surgery: 19  Date of Referral to PT: 19  Referring Physician: Dr. Carter    Admit Date: 2019    Visit Dx:    ICD-10-CM ICD-9-CM   1. Status post total right knee replacement Z96.651 V43.65   2. Arthritis of right knee M17.11 716.96     Patient Active Problem List   Diagnosis   • Bulging lumbar disc   • Chronic pain   • Diabetic neuropathy (CMS/HCC)   • Low back pain   • Degenerative arthritis of lumbar spine   • Neuropathy involving both lower extremities   • Encounter for long-term (current) use of high-risk medication   • Postlaminectomy syndrome of lumbar region   • Syringomyelia and syringobulbia (CMS/HCC)   • Lumbar pseudoarthrosis   • Type 2 diabetes mellitus with neurologic complication, with long-term current use of insulin (CMS/HCC)   • Insulin pump in place   • Hx of decompressive lumbar laminectomy   • Bilateral carpal tunnel syndrome   • Degenerative cervical disc   • Acute pain of right knee   • Primary localized osteoarthrosis of right lower leg   • Arthritis of right knee   • Sacroiliac inflammation (CMS/HCC)   • Sacroiliac joint dysfunction   • Severe obesity (BMI 35.0-39.9)   • Chronic pain of right knee   • Tricompartment osteoarthritis of left knee   • Tricompartment osteoarthritis of right knee   • Arthritis of left knee   • Bilateral chronic knee pain   • Hypertension       Therapy Treatment    Rehabilitation Treatment Summary     Row Name 19 1147             Treatment Time/Intention    Discipline  physical therapist  -MA      Document Type  therapy note (daily note)  -MA      Subjective Information  complains of;weakness;fatigue;pain  -MA      Mode of Treatment  physical therapy  -MA      Patient/Family Observations  Supine in bed with HOB elevated, soiled- needing to get up to chair   -MA      Care Plan Review  patient/other agree to care plan  -MA      Therapy Frequency (PT Clinical Impression)  2 times/day  -MA      Patient Effort  fair  -MA      Comment  Lethargic at times, distracted  -MA      Existing Precautions/Restrictions  fall  -MA      Recorded by [MA] Jovita Flores, PT 01/12/19 1154      Row Name 01/12/19 1147             Vital Signs    O2 Delivery Pre Treatment  room air  -MA      Recorded by [MA] Jovita Flores, PT 01/12/19 1154      Row Name 01/12/19 1147             Cognitive Assessment/Intervention- PT/OT    Orientation Status (Cognition)  oriented x 3  -MA      Follows Commands (Cognition)  WFL  -MA      Safety Deficit (Cognitive)  mild deficit  -MA      Personal Safety Interventions  fall prevention program maintained;gait belt;nonskid shoes/slippers when out of bed  -MA      Recorded by [MA] Jovita Flores, PT 01/12/19 1154      Row Name 01/12/19 1147             Safety Issues, Functional Mobility    Impairments Affecting Function (Mobility)  pain;strength;range of motion (ROM);endurance/activity tolerance  -MA      Recorded by [MA] Jovita Flores, PT 01/12/19 1154      Row Name 01/12/19 1147             Mobility Assessment/Intervention    Extremity Weight-bearing Status  right lower extremity  -MA      Right Lower Extremity (Weight-bearing Status)  weight-bearing as tolerated (WBAT)  -MA      Recorded by [MA] Jovita Flores, PT 01/12/19 1154      Row Name 01/12/19 1147             Bed Mobility Assessment/Treatment    Supine-Sit Rich (Bed Mobility)  moderate assist (50% patient effort);maximum assist (25% patient effort);verbal cues;nonverbal cues (demo/gesture)  -MA      Sit-Supine Rich (Bed Mobility)  not tested  -MA      Bed Mobility, Safety Issues  decreased use of legs for bridging/pushing;impaired trunk control for bed mobility  -MA      Assistive Device (Bed Mobility)  bed rails;head of bed elevated  -MA      Recorded by [MA]  Jovita Flores, PT 01/12/19 1154      Row Name 01/12/19 1147             Transfer Assessment/Treatment    Transfer Assessment/Treatment  sit-stand transfer;stand-sit transfer  -MA      Recorded by [MA] Jovita Flores, PT 01/12/19 1154      Row Name 01/12/19 1147             Sit-Stand Transfer    Sit-Stand [place] (Transfers)  moderate assist (50% patient effort);2 person assist;verbal cues;nonverbal cues (demo/gesture)  -MA      Assistive Device (Sit-Stand Transfers)  walker, front-wheeled  -MA      Recorded by [MA] Jovita Flores, PT 01/12/19 1154      Row Name 01/12/19 1147             Stand-Sit Transfer    Stand-Sit [place] (Transfers)  moderate assist (50% patient effort);2 person assist;verbal cues;nonverbal cues (demo/gesture)  -MA      Assistive Device (Stand-Sit Transfers)  walker, front-wheeled  -MA      Recorded by [MA] Jovita Flores, PT 01/12/19 1154      Row Name 01/12/19 1147             Gait/Stairs Assessment/Training    Gait/Stairs Assessment/Training  gait/ambulation independence  -MA      [place] Level (Gait)  minimum assist (75% patient effort);2 person assist;verbal cues;nonverbal cues (demo/gesture)  -MA      Assistive Device (Gait)  walker, front-wheeled  -MA      Distance in Feet (Gait)  8  -MA      Pattern (Gait)  step-to  -MA      Deviations/Abnormal Patterns (Gait)  antalgic;latanya decreased;gait speed decreased  -MA      Bilateral Gait Deviations  forward flexed posture  -MA      Comment (Gait/Stairs)  Chair pulled behind patient to ensure safety  -MA      Recorded by [MA] Jovita Flores, PT 01/12/19 1154      Row Name 01/12/19 1147             Therapeutic Exercise    Comment (Therapeutic Exercise)  Limited protocol exs- knee flexion in sitting, LAQ- required heavy assist for completion. Recommend sitting UIC with knee in flexion to promote range while eating meals.  -MA      Recorded by [MA] Jovita Flores, PT 01/12/19 1154      Row Name  01/12/19 1147             Positioning and Restraints    Pre-Treatment Position  in bed  -MA      Post Treatment Position  chair  -MA      In Chair  sitting;call light within reach;encouraged to call for assist;with nsg  -MA      Recorded by [MA] Jovita Flores, PT 01/12/19 1154      Row Name 01/12/19 1147             Pain Scale: Numbers Pre/Post-Treatment    Pain Scale: Numbers, Post-Treatment  9/10  -MA      Pain Location - Side  Right  -MA      Pain Location - Orientation  incisional  -MA      Pain Location  knee  -MA      Pain Intervention(s)  Cold applied;Repositioned;Ambulation/increased activity;Rest  -MA      Recorded by [MA] Jovita Flores, PT 01/12/19 1154      Row Name 01/12/19 1147             Vision Assessment/Intervention    Visual Impairment/Limitations  WFL  -MA      Recorded by [MA] Joviat Flores, PT 01/12/19 1154      Row Name                Wound 01/08/19 1023 Right knee incision    Wound - Properties Group Date first assessed: 01/08/19 [HH] Time first assessed: 1023 [HH] Side: Right [HH] Location: knee [HH] Type: incision [HH] Recorded by:  [HH] Destiny Bowen RN 01/08/19 1023    Row Name 01/12/19 1147             Plan of Care Review    Plan of Care Reviewed With  patient  -MA      Recorded by [MA] Jovita Flores, PT 01/12/19 1154      Row Name 01/12/19 1147             Outcome Summary/Treatment Plan (PT)    Daily Summary of Progress (PT)  progress toward functional goals is gradual  -MA      Anticipated Discharge Disposition (PT)  skilled nursing facility  -MA      Recorded by [MA] Jovita Flores, PT 01/12/19 1157        User Key  (r) = Recorded By, (t) = Taken By, (c) = Cosigned By    Initials Name Effective Dates Discipline    HH Destiny Bowen RN 06/16/16 -  Nurse    Jovita Bobby, PT 04/03/18 -  PT          Wound 01/08/19 1023 Right knee incision (Active)   Dressing Appearance dried drainage;intact 1/12/2019  8:55 AM   Closure SISSY 1/11/2019  4:25 PM    Base dressing in place, unable to visualize 1/11/2019  9:27 PM   Drainage Characteristics/Odor serosanguineous 1/11/2019  4:25 PM   Drainage Amount scant 1/11/2019  9:27 PM   Dressing Care, Wound other (see comments) 1/11/2019  9:27 PM           Physical Therapy Education     Title: PT OT SLP Therapies (In Progress)     Topic: Physical Therapy (In Progress)     Point: Mobility training (In Progress)     Learning Progress Summary           Patient Acceptance, E, NR by MA at 1/12/2019 11:56 AM    Acceptance, E, NR by CA at 1/11/2019 11:38 AM    Acceptance, E,TB,D, NR by ROSA at 1/10/2019  5:33 PM    Acceptance, E,TB,D, VU,NR by ROSA at 1/9/2019  3:27 PM    Acceptance, E, VU by MA at 1/8/2019  4:22 PM   Family Acceptance, E,TB,D, NR by ROSA at 1/10/2019  5:33 PM    Acceptance, E,TB,D, VU,NR by ROSA at 1/9/2019  3:27 PM                   Point: Home exercise program (In Progress)     Learning Progress Summary           Patient Acceptance, E, NR by MA at 1/12/2019 11:56 AM    Acceptance, E, NR by CA at 1/11/2019 11:38 AM    Acceptance, E,TB,D, NR by ROSA at 1/10/2019  5:33 PM    Acceptance, E,TB,D, VU,NR by ROSA at 1/9/2019  3:27 PM    Acceptance, E, VU by MA at 1/8/2019  4:22 PM   Family Acceptance, E,TB,D, NR by ROSA at 1/10/2019  5:33 PM    Acceptance, E,TB,D, VU,NR by ROSA at 1/9/2019  3:27 PM                   Point: Body mechanics (In Progress)     Learning Progress Summary           Patient Acceptance, E, NR by MA at 1/12/2019 11:56 AM    Acceptance, E, NR by CA at 1/11/2019 11:38 AM    Acceptance, E,TB,D, NR by ROSA at 1/10/2019  5:33 PM    Acceptance, E,TB,D, VU,NR by ROSA at 1/9/2019  3:27 PM    Acceptance, E, VU by MA at 1/8/2019  4:22 PM   Family Acceptance, E,TB,D, NR by ROSA at 1/10/2019  5:33 PM    Acceptance, NATIVIDAD PUTNAM D, MARLEN,NR by ROSA at 1/9/2019  3:27 PM                   Point: Precautions (In Progress)     Learning Progress Summary           Patient Acceptance, INDY NR by MA at 1/12/2019 11:56 AM    Acceptance, ERIC PUTNAM by CA at  1/11/2019 11:38 AM    Acceptance, E,TB,D, NR by ROSA at 1/10/2019  5:33 PM    Acceptance, E,TB,D, VU,NR by ROSA at 1/9/2019  3:27 PM    Acceptance, E, VU by MA at 1/8/2019  4:22 PM   Family Acceptance, E,TB,D, NR by ROSA at 1/10/2019  5:33 PM    Acceptance, E,TB,D, VU,NR by ROSA at 1/9/2019  3:27 PM                               User Key     Initials Effective Dates Name Provider Type Discipline     03/07/18 -  Luisa Bingham, PTA Physical Therapy Assistant PT    MA 04/03/18 -  Jovita Flores PT Physical Therapist PT    CA 06/13/18 -  Millie Horowitz, PT Physical Therapist PT                PT Recommendation and Plan  Anticipated Discharge Disposition (PT): skilled nursing facility  Planned Therapy Interventions (PT Eval): balance training, bed mobility training, gait training, home exercise program, patient/family education, postural re-education, ROM (range of motion), stair training, strengthening, transfer training  Therapy Frequency (PT Clinical Impression): 2 times/day  Outcome Summary/Treatment Plan (PT)  Daily Summary of Progress (PT): progress toward functional goals is gradual  Anticipated Discharge Disposition (PT): skilled nursing facility  Plan of Care Reviewed With: patient  Outcome Summary: Progressing gradually toward PT goals- ambulated 8' minAx2. Patient continues to require mod-maxA for transfers- patient will benefit from DCing to SNF for continued rehabilitation.  Outcome Measures     Row Name 01/12/19 1100 01/11/19 1100 01/10/19 1700       How much help from another person do you currently need...    Turning from your back to your side while in flat bed without using bedrails?  2  -MA  2  -CA  2  -JM    Moving from lying on back to sitting on the side of a flat bed without bedrails?  2  -MA  2  -CA  2  -JM    Moving to and from a bed to a chair (including a wheelchair)?  2  -MA  2  -CA  2  -JM    Standing up from a chair using your arms (e.g., wheelchair, bedside chair)?  2  -MA  2  -CA  2   -JM    Climbing 3-5 steps with a railing?  1  -MA  1  -CA  1  -JM    To walk in hospital room?  1  -MA  1  -CA  2  -JM    AM-PAC 6 Clicks Score  10  -MA  10  -CA  11  -JM       Functional Assessment    Outcome Measure Options  AM-PAC 6 Clicks Basic Mobility (PT)  -MA  AM-PAC 6 Clicks Basic Mobility (PT)  -CA  --    Row Name 01/09/19 1500             How much help from another person do you currently need...    Turning from your back to your side while in flat bed without using bedrails?  3  -JM      Moving from lying on back to sitting on the side of a flat bed without bedrails?  3  -JM      Moving to and from a bed to a chair (including a wheelchair)?  3  -JM      Standing up from a chair using your arms (e.g., wheelchair, bedside chair)?  2  -JM      Climbing 3-5 steps with a railing?  1  -JM      To walk in hospital room?  3  -JM      AM-PAC 6 Clicks Score  15  -JM        User Key  (r) = Recorded By, (t) = Taken By, (c) = Cosigned By    Initials Name Provider Type    Luisa Malik, PTA Physical Therapy Assistant    Jovita Bobby, PT Physical Therapist    Millie Roberts, PT Physical Therapist         Time Calculation:   PT Charges     Row Name 01/12/19 1156 01/12/19 1146          Time Calculation    Start Time  1142  -MA  1142  -MA     Stop Time  1159  -MA  --     Time Calculation (min)  17 min  -MA  --     PT Received On  01/12/19  -MA  --       User Key  (r) = Recorded By, (t) = Taken By, (c) = Cosigned By    Initials Name Provider Type    Jovita Bobby, PT Physical Therapist        Therapy Suggested Charges     Code   Minutes Charges    None           Therapy Charges for Today     Code Description Service Date Service Provider Modifiers Qty    82933575735 HC PT THER PROC EA 15 MIN 1/12/2019 Jovita Flores, PT GP 1          PT G-Codes  Outcome Measure Options: AM-PAC 6 Clicks Basic Mobility (PT)  AM-PAC 6 Clicks Score: 10    Jovita Flores PT  1/12/2019

## 2019-01-12 NOTE — PROGRESS NOTES
"/86 (BP Location: Right arm, Patient Position: Lying)   Pulse 90   Temp 97.6 °F (36.4 °C) (Oral)   Resp 16   Ht 181.6 cm (71.5\")   Wt 126 kg (277 lb 8 oz)   SpO2 95%   BMI 38.16 kg/m²     Lab Results (last 24 hours)     Procedure Component Value Units Date/Time    POC Glucose Once [[id number]]  (Abnormal) Collected:  01/12/19 0553    Specimen:  Blood Updated:  01/12/19 0555     Glucose 246 mg/dL     POC Glucose Once [[id number]]  (Abnormal) Collected:  01/11/19 2051    Specimen:  Blood Updated:  01/11/19 2052     Glucose 244 mg/dL     POC Glucose Once [[id number]]  (Abnormal) Collected:  01/11/19 1613    Specimen:  Blood Updated:  01/11/19 1614     Glucose 266 mg/dL     POC Glucose Once [[id number]]  (Abnormal) Collected:  01/11/19 1106    Specimen:  Blood Updated:  01/11/19 1107     Glucose 326 mg/dL           Imaging Results (last 24 hours)     Procedure Component Value Units Date/Time    MRI Brain With & Without Contrast [[id number]] Collected:  01/10/19 1210     Updated:  01/11/19 1030    Narrative:       MRI EXAMINATION OF BRAIN WITH AND WITHOUT CONTRAST, MRA OF THE HEAD  WITHOUT CONTRAST AND MRA OF THE NECK WITH AND WITHOUT CONTRAST     HISTORY:  Stroke.     COMPARISON: None.     MRI EXAMINATION OF BRAIN WITH AND WITHOUT CONTRAST     A MRI examination of the brain was performed before and after the  intravenous administration of contrast utilizing sagittal T1, axial  diffusion, T1, T2, T2 FLAIR, susceptibility weighted imaging as well as  axial and coronal T1 postcontrast weighted sequences.     FINDINGS: There is no evidence of restricted diffusion to suggest acute  infarction. There is expected flow-void in the basilar artery and in the  distal aspect of the internal carotid arteries bilaterally on the axial  T2 sequence. The axial T2 FLAIR sequence demonstrates a mild degree of  increased signal intensity involving the white matter of the cerebral  hemispheres bilaterally, nonspecific but suggesting " minimal small vessel  ischemic disease.     After contrast administration there was no evidence of abnormal  enhancement.       Impression:       No evidence of acute infarction, mass or of abnormal  enhancement. Minimal small vessel ischemic disease.        MRA OF THE HEAD WITHOUT CONTRAST     The study is hampered somewhat by patient motion. There is signal  present within the distal aspect of the vertebral arteries bilaterally.  The vertebral arteries are codominant. The basilar artery and the  proximal aspects of the posterior cerebral arteries appear unremarkable.     There is signal present within the distal aspect of the internal carotid  arteries bilaterally. The right A1 segment is moderately hypoplastic.  The proximal aspects of the anterior and middle cerebral arteries appear  unremarkable.     IMPRESSION: The study is hampered somewhat by patient motion. There is  no evidence of a focal high-grade stenosis or aneurysm. The right A1  segment is hypoplastic.           MRA OF THE NECK WITH AND WITHOUT CONTRAST     The study is hampered by patient motion. The great vessels are arranged  in a classic configuration. There is 0% stenosis involving the carotid  bifurcations using NASCET criteria. There is signal loss at the proximal  aspect of the right common carotid artery and to a lesser extent the  left common carotid artery, likely artifactual.     IMPRESSION: The study is hampered by artifact. There is signal loss  involving the proximal aspects of the common carotid arteries  bilaterally. This is likely artifactual. Underlying stenosis cannot be  entirely excluded. Further evaluation could be performed with a CT  angiogram of the neck and head as indicated.     This report was finalized on 1/11/2019 10:27 AM by Dr. Andres Forte M.D.       MRI Angiogram Neck With & Without Contrast [[id number]] Collected:  01/10/19 1210     Updated:  01/11/19 1030    Narrative:       MRI EXAMINATION OF BRAIN WITH AND  WITHOUT CONTRAST, MRA OF THE HEAD  WITHOUT CONTRAST AND MRA OF THE NECK WITH AND WITHOUT CONTRAST     HISTORY:  Stroke.     COMPARISON: None.     MRI EXAMINATION OF BRAIN WITH AND WITHOUT CONTRAST     A MRI examination of the brain was performed before and after the  intravenous administration of contrast utilizing sagittal T1, axial  diffusion, T1, T2, T2 FLAIR, susceptibility weighted imaging as well as  axial and coronal T1 postcontrast weighted sequences.     FINDINGS: There is no evidence of restricted diffusion to suggest acute  infarction. There is expected flow-void in the basilar artery and in the  distal aspect of the internal carotid arteries bilaterally on the axial  T2 sequence. The axial T2 FLAIR sequence demonstrates a mild degree of  increased signal intensity involving the white matter of the cerebral  hemispheres bilaterally, nonspecific but suggesting minimal small vessel  ischemic disease.     After contrast administration there was no evidence of abnormal  enhancement.       Impression:       No evidence of acute infarction, mass or of abnormal  enhancement. Minimal small vessel ischemic disease.        MRA OF THE HEAD WITHOUT CONTRAST     The study is hampered somewhat by patient motion. There is signal  present within the distal aspect of the vertebral arteries bilaterally.  The vertebral arteries are codominant. The basilar artery and the  proximal aspects of the posterior cerebral arteries appear unremarkable.     There is signal present within the distal aspect of the internal carotid  arteries bilaterally. The right A1 segment is moderately hypoplastic.  The proximal aspects of the anterior and middle cerebral arteries appear  unremarkable.     IMPRESSION: The study is hampered somewhat by patient motion. There is  no evidence of a focal high-grade stenosis or aneurysm. The right A1  segment is hypoplastic.           MRA OF THE NECK WITH AND WITHOUT CONTRAST     The study is hampered by  patient motion. The great vessels are arranged  in a classic configuration. There is 0% stenosis involving the carotid  bifurcations using NASCET criteria. There is signal loss at the proximal  aspect of the right common carotid artery and to a lesser extent the  left common carotid artery, likely artifactual.     IMPRESSION: The study is hampered by artifact. There is signal loss  involving the proximal aspects of the common carotid arteries  bilaterally. This is likely artifactual. Underlying stenosis cannot be  entirely excluded. Further evaluation could be performed with a CT  angiogram of the neck and head as indicated.     This report was finalized on 1/11/2019 10:27 AM by Dr. Andres Forte M.D.       MRI Angiogram Head Without Contrast [[id number]] Collected:  01/10/19 1210     Updated:  01/11/19 1030    Narrative:       MRI EXAMINATION OF BRAIN WITH AND WITHOUT CONTRAST, MRA OF THE HEAD  WITHOUT CONTRAST AND MRA OF THE NECK WITH AND WITHOUT CONTRAST     HISTORY:  Stroke.     COMPARISON: None.     MRI EXAMINATION OF BRAIN WITH AND WITHOUT CONTRAST     A MRI examination of the brain was performed before and after the  intravenous administration of contrast utilizing sagittal T1, axial  diffusion, T1, T2, T2 FLAIR, susceptibility weighted imaging as well as  axial and coronal T1 postcontrast weighted sequences.     FINDINGS: There is no evidence of restricted diffusion to suggest acute  infarction. There is expected flow-void in the basilar artery and in the  distal aspect of the internal carotid arteries bilaterally on the axial  T2 sequence. The axial T2 FLAIR sequence demonstrates a mild degree of  increased signal intensity involving the white matter of the cerebral  hemispheres bilaterally, nonspecific but suggesting minimal small vessel  ischemic disease.     After contrast administration there was no evidence of abnormal  enhancement.       Impression:       No evidence of acute infarction, mass or of  abnormal  enhancement. Minimal small vessel ischemic disease.        MRA OF THE HEAD WITHOUT CONTRAST     The study is hampered somewhat by patient motion. There is signal  present within the distal aspect of the vertebral arteries bilaterally.  The vertebral arteries are codominant. The basilar artery and the  proximal aspects of the posterior cerebral arteries appear unremarkable.     There is signal present within the distal aspect of the internal carotid  arteries bilaterally. The right A1 segment is moderately hypoplastic.  The proximal aspects of the anterior and middle cerebral arteries appear  unremarkable.     IMPRESSION: The study is hampered somewhat by patient motion. There is  no evidence of a focal high-grade stenosis or aneurysm. The right A1  segment is hypoplastic.           MRA OF THE NECK WITH AND WITHOUT CONTRAST     The study is hampered by patient motion. The great vessels are arranged  in a classic configuration. There is 0% stenosis involving the carotid  bifurcations using NASCET criteria. There is signal loss at the proximal  aspect of the right common carotid artery and to a lesser extent the  left common carotid artery, likely artifactual.     IMPRESSION: The study is hampered by artifact. There is signal loss  involving the proximal aspects of the common carotid arteries  bilaterally. This is likely artifactual. Underlying stenosis cannot be  entirely excluded. Further evaluation could be performed with a CT  angiogram of the neck and head as indicated.     This report was finalized on 1/11/2019 10:27 AM by Dr. Andres Forte M.D.             Patient Care Team:  Waytt Harmon MD as PCP - General (Family Medicine)  Shannan Singh APRN (Family Medicine)  Lisa Banerjee PA-C as Physician Assistant (Neurosurgery)  Kian Hoskins MD (Orthopedic Surgery)  Richard Prasad MD as Surgeon (Orthopedic Surgery)  Tim Gutierres MD as Surgeon (Neurosurgery)  Rahat Pepe  MD PANFILO as Consulting Physician (Cardiology)    SUBJECTIVE  Alert now, but has been confused still over last 24 hours  PHYSICAL EXAM   Wound fine    Arthritis of right knee    Diabetic neuropathy (CMS/HCC)    Type 2 diabetes mellitus with neurologic complication, with long-term current use of insulin (CMS/[facility])    Severe obesity (BMI 35.0-39.9)    Bilateral chronic knee pain    Hypertension      PLAN / DISPOSITION:  REhab discharge pending  Ryan Whelan MD  01/12/19  7:34 AM

## 2019-01-12 NOTE — PLAN OF CARE
Problem: Patient Care Overview  Goal: Plan of Care Review   01/12/19 1154   OTHER   Outcome Summary Progressing gradually toward PT goals- ambulated 8' minAx2. Patient continues to require mod-maxA for transfers- patient will benefit from DCing to SNF for continued rehabilitation.   Coping/Psychosocial   Plan of Care Reviewed With patient

## 2019-01-14 NOTE — PAYOR COMM NOTE
"DISCHARGED        Mandy Menchaca (63 y.o. Male)     Date of Birth Social Security Number Address Home Phone MRN    1955  [id number] [facility] SUDHA HERNANDEZ [address] [place] [address] [phone] [id number]    Yazidi Marital Status          None        Admission Date Admission Type Admitting Provider Attending Provider Department, Room/Bed    1/8/19 Elective Andres Carter MD  [address], P795/1    Discharge Date Discharge Disposition Discharge Destination        1/12/2019 Rehab Facility or Unit (DC - External)              Attending Provider:  (none)   Allergies:  Erythromycin, Lisinopril, Metformin    Isolation:  None   Infection:  None   Code Status:  Prior    Ht:  181.6 cm (71.5\")   Wt:  126 kg (277 lb 8 oz)    Admission Cmt:  None   Principal Problem:  Arthritis of right knee [M17.11]                 Active Insurance as of 1/8/2019     Primary Coverage     Payor Plan Insurance Group Employer/Plan Group    [employer] MED[id number]     Payor Plan Address Payor Plan Phone Number Payor Plan Fax Number Effective Dates    PO  [phone]  4/1/2013 - None Entered    [facility] [id number]       Subscriber Name Subscriber Birth Date Member ID       MANDY MENCHACA 1955 [id number]                 Emergency Contacts      (Rel.) Home Phone Work Phone Mobile Phone    Felicia Menchaca (Spouse) [phone] -- [phone]    MenchacaGertrude gli (Sister) -- -- [phone]          "

## 2019-01-18 ENCOUNTER — TELEPHONE (OUTPATIENT)
Dept: ORTHOPEDIC SURGERY | Facility: CLINIC | Age: 64
End: 2019-01-18

## 2019-01-18 NOTE — TELEPHONE ENCOUNTER
Talked with Kian Duarte's wife.  She is concerned about his pain and confusion and lack of progress.  She states the DEBBY dressing is in place and his is able to walk, bear weight and bend the knee.  His confusion makes this harder.  The confusion onset was post op at the hospital and is not yet resolved.  Discussed having the rehab facility give him his Perc 10/325 1 every 6 hours instead of every 4 to see if this makes any improvement.  His progress sounds normal for the circumstances.  Reassured Felicia and enc her to keep going and enc Kian to walk every 2 hours during the day and continue to push rom with physical therapy.  She thanked me for the advice.  F/u appt is on Tues.

## 2019-01-22 ENCOUNTER — OFFICE VISIT (OUTPATIENT)
Dept: ORTHOPEDIC SURGERY | Facility: CLINIC | Age: 64
End: 2019-01-22

## 2019-01-22 ENCOUNTER — OFFICE VISIT (OUTPATIENT)
Dept: PAIN MEDICINE | Facility: CLINIC | Age: 64
End: 2019-01-22

## 2019-01-22 VITALS
HEIGHT: 71 IN | BODY MASS INDEX: 38.17 KG/M2 | TEMPERATURE: 98.4 F | HEART RATE: 70 BPM | DIASTOLIC BLOOD PRESSURE: 69 MMHG | RESPIRATION RATE: 15 BRPM | OXYGEN SATURATION: 99 % | SYSTOLIC BLOOD PRESSURE: 119 MMHG

## 2019-01-22 VITALS — WEIGHT: 277 LBS | TEMPERATURE: 98.2 F | BODY MASS INDEX: 37.52 KG/M2 | HEIGHT: 72 IN

## 2019-01-22 DIAGNOSIS — M96.1 POSTLAMINECTOMY SYNDROME OF LUMBAR REGION: ICD-10-CM

## 2019-01-22 DIAGNOSIS — Z79.899 ENCOUNTER FOR LONG-TERM (CURRENT) USE OF HIGH-RISK MEDICATION: ICD-10-CM

## 2019-01-22 DIAGNOSIS — M50.30 DEGENERATIVE CERVICAL DISC: ICD-10-CM

## 2019-01-22 DIAGNOSIS — G89.29 OTHER CHRONIC PAIN: Primary | ICD-10-CM

## 2019-01-22 DIAGNOSIS — M54.5 LOW BACK PAIN, UNSPECIFIED BACK PAIN LATERALITY, UNSPECIFIED CHRONICITY, WITH SCIATICA PRESENCE UNSPECIFIED: ICD-10-CM

## 2019-01-22 DIAGNOSIS — Z96.651 S/P TOTAL KNEE ARTHROPLASTY, RIGHT: Primary | ICD-10-CM

## 2019-01-22 PROCEDURE — 99024 POSTOP FOLLOW-UP VISIT: CPT | Performed by: NURSE PRACTITIONER

## 2019-01-22 PROCEDURE — 99214 OFFICE O/P EST MOD 30 MIN: CPT | Performed by: NURSE PRACTITIONER

## 2019-01-22 PROCEDURE — 73560 X-RAY EXAM OF KNEE 1 OR 2: CPT | Performed by: NURSE PRACTITIONER

## 2019-01-22 RX ORDER — OXYCODONE HYDROCHLORIDE 10 MG/1
10 TABLET ORAL EVERY 6 HOURS PRN
Qty: 120 TABLET | Refills: 0 | Status: SHIPPED | OUTPATIENT
Start: 2019-01-22 | End: 2019-02-26 | Stop reason: SDUPTHER

## 2019-01-22 RX ORDER — OXYCODONE AND ACETAMINOPHEN 10; 325 MG/1; MG/1
TABLET ORAL
Qty: 36 TABLET | Refills: 0 | Status: SHIPPED | OUTPATIENT
Start: 2019-01-22 | End: 2019-03-21

## 2019-01-22 NOTE — PATIENT INSTRUCTIONS
Kian Mcdaniels : 1955 MRN: [id number] DATE: 2019  Body mass index is 38.1 kg/m².  Vitals:    19 1520   Temp: 98.2 °F (36.8 °C)       DIAGNOSIS: 2 week follow up right total knee arthroplasty    SUBJECTIVE:Patient returns today for 2 week follow up of right total knee replacement. Patient reports doing well with no unusual complaints. Appears to be progressing appropriately. Is progressing and doing well at rehab may be discharged this weekend to home. Confusion is cleared up as well.     OBJECTIVE:   Exam:. The incision is healing appropriately. No sign of infection. Range of motion is progressing as expected 15/92. The calf is soft and nontender with a negative Homans sign.    DIAGNOSTIC STUDIES  2V AP&Lat of the right knee were done in the office today    Indication, findings and comparison: images were reviewed for evaluation of recent knee replacement. They demonstrate a well positioned, well aligned knee replacement without complicating factors noted. In comparison with previous films there has been interval implant placement.    ASSESSMENT: 2 week status post right total knee replacement expected healing.    PLAN: 1) Staples removed and steri strips applied   2) Order given for PT and pain medicine (as needed)   3) Continue ice PRN   4) You may now resume any preoperative medications or supplements that you were taking prior to surgery.    5) Follow up in 4 weeks with repeat Xrays of right knee (2 views)   6) Continue with antibiotic prophylaxis with dental procedures for 2 yrs post total joint replacement.    Kaylee Jorge, APRN  2019     Pain Medications utilized after surgery are narcotics and the law requires that the following information be given to all patients that are prescribed narcotics:    CLASSIFICATION: Pain medications are called Opioids and are narcotics  LEGALITIES: It is illegal to share narcotics with others and to drive within 4 hours of taking  narcotics  POTENTIAL SIDE EFFECTS: Potential side effects of opioids include: nausea, vomiting, itching, dizziness, drowsiness, dry mouth, constipation, and difficulty urinating.  POTENTIAL ADVERSE EFFECTS:   Opioid tolerance can develop with use of pain medications and this simply means that it requires more and more of the medication to control pain; however, this is seen more in patients that use opioids for longer periods of time.  Opioid dependence can develop with use of Opioids and this simply means that to stop the medication can cause withdrawal symptoms so wean gradually (do not stop all at once); however, this is seen more with patients that use opioids for longer periods of time.  Opioid addiction can develop with use of Opioids and the incidence of this is very unlikely in patients who take the medications as ordered and stop the medications as instructed.  Opioid overdose can be dangerous, but is unlikely when the medication is taken as ordered and stopped when ordered. It is important not to mix opioids with alcohol or with and type of sedative such as Benadryl as this can lead to over sedation and respiratory difficulty.    DOSAGE:   Pain medications will need to be taken consistently for the first week to decrease pain and promote adequate pain relief and participation in physical therapy.    After the initial surgical pain begins to resolve, you may begin to decrease the pain medication. By the end of 8 weeks, you should be off of pain medications.    Refills will not be given by the office during evening hours, on weekends.  To seek refills on pain medications during the initial 6 week post-operative period, you must call the office 48 hours in advance to request the refill. The office will then notify you when to  the prescription. DO NOT wait until you are out of the medication to request a refill.    A DAVID check will be made on-line, and will be repeated if prescription is renewed  after a 90 day period. The patient agrees to adhering to the medication regimen as prescribed.

## 2019-01-22 NOTE — TELEPHONE ENCOUNTER
Please e-prescribe. He had to leave quickly today to get to ortho for his post op follow up (right TKA 1/8/19)

## 2019-01-22 NOTE — PROGRESS NOTES
Kian Mcdaniels : 1955 MRN: [id number] DATE: 2019  Body mass index is 38.1 kg/m².  Vitals:    19 1520   Temp: 98.2 °F (36.8 °C)       DIAGNOSIS: 2 week follow up right total knee arthroplasty    SUBJECTIVE:Patient returns today for 2 week follow up of right total knee replacement. Patient reports doing well with no unusual complaints. Appears to be progressing appropriately. Is progressing and doing well at rehab may be discharged this weekend to home. Confusion is cleared up as well.     OBJECTIVE:   Exam:. The incision is healing appropriately. No sign of infection. Range of motion is progressing as expected 15/92. The calf is soft and nontender with a negative Homans sign.    DIAGNOSTIC STUDIES  2V AP&Lat of the right knee were done in the office today    Indication, findings and comparison: images were reviewed for evaluation of recent knee replacement. They demonstrate a well positioned, well aligned knee replacement without complicating factors noted. In comparison with previous films there has been interval implant placement.    ASSESSMENT: 2 week status post right total knee replacement expected healing.    PLAN: 1) Staples removed and steri strips applied   2) Order given for PT and pain medicine (as needed)   3) Continue ice PRN   4) You may now resume any preoperative medications or supplements that you were taking prior to surgery.    5) Follow up in 4 weeks with repeat Xrays of right knee (2 views)   6) Continue with antibiotic prophylaxis with dental procedures for 2 yrs post total joint replacement.    Kaylee Jorge, APRN  2019    Dr. Guzman is his pain management specialist and is aware of current surgery and prescriptions.      Pain Medications utilized after surgery are narcotics and the law requires that the following information be given to all patients that are prescribed narcotics:    CLASSIFICATION: Pain medications are called Opioids and are narcotics  LEGALITIES:  It is illegal to share narcotics with others and to drive within 4 hours of taking narcotics  POTENTIAL SIDE EFFECTS: Potential side effects of opioids include: nausea, vomiting, itching, dizziness, drowsiness, dry mouth, constipation, and difficulty urinating.  POTENTIAL ADVERSE EFFECTS:   Opioid tolerance can develop with use of pain medications and this simply means that it requires more and more of the medication to control pain; however, this is seen more in patients that use opioids for longer periods of time.  Opioid dependence can develop with use of Opioids and this simply means that to stop the medication can cause withdrawal symptoms so wean gradually (do not stop all at once); however, this is seen more with patients that use opioids for longer periods of time.  Opioid addiction can develop with use of Opioids and the incidence of this is very unlikely in patients who take the medications as ordered and stop the medications as instructed.  Opioid overdose can be dangerous, but is unlikely when the medication is taken as ordered and stopped when ordered. It is important not to mix opioids with alcohol or with and type of sedative such as Benadryl as this can lead to over sedation and respiratory difficulty.    DOSAGE:   Pain medications will need to be taken consistently for the first week to decrease pain and promote adequate pain relief and participation in physical therapy.    After the initial surgical pain begins to resolve, you may begin to decrease the pain medication. By the end of 8 weeks, you should be off of pain medications.    Refills will not be given by the office during evening hours, on weekends.  To seek refills on pain medications during the initial 6 week post-operative period, you must call the office 48 hours in advance to request the refill. The office will then notify you when to  the prescription. DO NOT wait until you are out of the medication to request a refill.    A  DAVID check will be made on-line, and will be repeated if prescription is renewed after a 90 day period. The patient agrees to adhering to the medication regimen as prescribed.

## 2019-01-22 NOTE — PROGRESS NOTES
"CHIEF COMPLAINT  F/U back pain. Pt also c/o right knee pain. He is currently in rehab post TRK replacement.     Subjective   Kian Mcdaniels is a 63 y.o. male  who presents to the office for follow-up.He has a history of back and neck pain.    In terms of back pain, failed [employer] SCS trial.  Dr. Guzman recommends consideration of Nevro trial in the future.      In terms of neck pain. Nothing surgical needed. Monitoring syringomyelia, cannot consider SCS in this area for this reason.  Has failed cervical epidural injections.  Order for cervical MBB previoulsy placed.  These were denied by insurance, appeal has been sent.       Complains of pain in his neck, back and right knee. Today his pain is 8/10VAS.  Continues with Oxycodone 10 mg 4/day, gabapentin 800 mg 3/day and tizanidine 4mg 1-2/night. The regimen helps decrease his pain moderately. No adverse reactions.       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 Reports that he has been in some severe pain since surgery.  He is in inpatient rehab currently in [facility]. Has been taking percocet 10/325 every 6 hours.  Reports discharge date of Friday or Saturday of this week.      Back Pain   This is a chronic problem. The current episode started more than 1 year ago. The problem occurs daily. The problem has been waxing and waning since onset. The pain is present in the sacro-iliac. The quality of the pain is described as aching, shooting and stabbing. The pain is at a severity of 8/10. The pain is moderate. The pain is the same all the time. The symptoms are aggravated by bending, sitting and standing. Stiffness is present all day. Associated symptoms include leg pain, numbness (legs bilat.), paresthesias, tingling and weakness. Pertinent negatives include no abdominal pain (\"sides\"), chest pain, dysuria, fever or headaches (\"frequent headaches\"). Risk factors include lack of exercise, obesity and sedentary " "lifestyle. He has tried analgesics for the symptoms. The treatment provided moderate relief.   Neck Pain    This is a chronic problem. The current episode started more than 1 month ago. The problem occurs daily. The problem has been unchanged. The pain is associated with nothing. The pain is present in the left side, right side and midline. The quality of the pain is described as cramping. The pain is at a severity of 7/10. The pain is moderate. The symptoms are aggravated by sneezing and twisting. The pain is same all the time. Associated symptoms include leg pain, numbness (legs bilat.), tingling and weakness. Pertinent negatives include no chest pain, fever or headaches (\"frequent headaches\"). He has tried muscle relaxants, oral narcotics and bed rest for the symptoms. The treatment provided moderate relief.      PEG Assessment   What number best describes your pain on average in the past week?9  What number best describes how, during the past week, pain has interfered with your enjoyment of life?9  What number best describes how, during the past week, pain has interfered with your general activity?  9    The following portions of the patient's history were reviewed and updated as appropriate: allergies, current medications, past family history, past medical history, past social history, past surgical history and problem list.    Review of Systems   Constitutional: Negative for activity change, chills, fatigue and fever.   HENT: Negative for congestion.    Eyes: Negative for visual disturbance.   Respiratory: Negative for apnea, cough (has seen ENT), shortness of breath and wheezing.    Cardiovascular: Positive for leg swelling. Negative for chest pain and palpitations.   Gastrointestinal: Negative for abdominal pain (\"sides\"), constipation and diarrhea.   Genitourinary: Negative for difficulty urinating and dysuria.   Musculoskeletal: Positive for back pain, joint swelling (bilateral ankles) and neck pain. " "Arthralgias: right knee    Skin: Negative for rash and wound.   Allergic/Immunologic: Negative for immunocompromised state.   Neurological: Positive for tingling, weakness, numbness (legs bilat.) and paresthesias. Negative for dizziness, light-headedness and headaches (\"frequent headaches\").   Hematological: Bruises/bleeds easily (Plavix).   Psychiatric/Behavioral: Positive for sleep disturbance (\"little bit\"). Negative for agitation, confusion, hallucinations and suicidal ideas. The patient is not nervous/anxious.      Vitals:    01/22/19 1309   BP: 119/69   Pulse: 70   Resp: 15   Temp: 98.4 °F (36.9 °C)   SpO2: 99%   Weight: Comment: pt unable to get on scale in wheelchair   Height: 181.6 cm (71.5\")   PainSc:   8   PainLoc: Knee  Comment: and back     Objective   Physical Exam   Constitutional: He is oriented to person, place, and time. Vital signs are normal. He appears well-developed and well-nourished. He is cooperative. No distress.   HENT:   Head: Normocephalic and atraumatic.   Nose: Nose normal.   Eyes: Conjunctivae and lids are normal.   Neck: Trachea normal. Neck supple. Muscular tenderness present. No spinous process tenderness present. Decreased range of motion present.   Cardiovascular: Normal rate.   Pulmonary/Chest: Effort normal. No respiratory distress.   Abdominal:   obese   Musculoskeletal:        Right knee: He exhibits decreased range of motion and swelling. Tenderness found.        Left knee: Tenderness found.        Cervical back: He exhibits decreased range of motion, tenderness and pain. He exhibits no spasm.        Lumbar back: He exhibits decreased range of motion, tenderness and pain.   Surgical dressing right knee - old blood staining present, otherwise dry and intact.     Neurological: He is alert and oriented to person, place, and time. He has normal strength. Gait (wheelchair) abnormal.   Skin: Skin is warm, dry and intact. He is not diaphoretic.   Psychiatric: He has a normal mood " and affect. His speech is normal and behavior is normal. Cognition and memory are normal.   Nursing note and vitals reviewed.      Assessment/Plan   Kian was seen today for back pain and knee pain.    Diagnoses and all orders for this visit:    Other chronic pain    Low back pain, unspecified back pain laterality, unspecified chronicity, with sciatica presence unspecified    Postlaminectomy syndrome of lumbar region    Degenerative cervical disc    Encounter for long-term (current) use of high-risk medication      --- Refill Oxycodone at previous dose. Not to refill until after discharged from rehab (this Friday or Saturday).  He will have discharge summary sent to us.  Patient appears stable with current regimen. No adverse effects. Regarding continuation of opioids, there is no evidence of aberrant behavior or any red flags.  The patient continues with appropriate response to opioid therapy. ADL's remain intact by self.   --- The urine drug screen confirmation from 8/5/18 has been reviewed and the result is appropriate based on patient history and DAVID report  --- Proceed with cervical MBB once recovered from knee surgery   --- Follow-up 1 month          DAVID REPORT    As part of the patient's treatment plan, I am prescribing controlled substances. The patient has been made aware of appropriate use of such medications, including potential risk of somnolence, limited ability to drive and/or work safely, and the potential for dependence or overdose. It has also bee made clear that these medications are for use by this patient only, without concomitant use of alcohol or other substances unless prescribed.     Patient has completed prescribing agreement detailing terms of continued prescribing of controlled substances, including monitoring DAVID reports, urine drug screening, and pill counts if necessary. The patient is aware that inappropriate use will results in cessation of prescribing such  medications.    DAVID report has been reviewed and scanned into the patient's chart.    As the clinician, I personally reviewed the DAVID from 1/17/19 while the patient was in the office today.    History and physical exam exhibit continued safe and appropriate use of controlled substances.    EMR Dragon/Transcription disclaimer:   Much of this encounter note is an electronic transcription/translation of spoken language to printed text. The electronic translation of spoken language may permit erroneous, or at times, nonsensical words or phrases to be inadvertently transcribed; Although I have reviewed the note for such errors, some may still exist.

## 2019-02-04 ENCOUNTER — TELEPHONE (OUTPATIENT)
Dept: ORTHOPEDIC SURGERY | Facility: CLINIC | Age: 64
End: 2019-02-04

## 2019-02-04 NOTE — TELEPHONE ENCOUNTER
"Just FYI: [facility] nurse Sharon called stating patient was discharged from skilled nursing rehab on Saturday 02/02/19 - patient wishes to begin in-home PT & OT. Per verbal conversation with AMB, Marsha directed \"ok to use the Home health order entered 01/08/19\" as \"that is good for one year\" Thanks /srh  "

## 2019-02-26 ENCOUNTER — OFFICE VISIT (OUTPATIENT)
Dept: PAIN MEDICINE | Facility: CLINIC | Age: 64
End: 2019-02-26

## 2019-02-26 VITALS
HEIGHT: 72 IN | OXYGEN SATURATION: 96 % | BODY MASS INDEX: 34.89 KG/M2 | WEIGHT: 257.6 LBS | TEMPERATURE: 98.8 F | SYSTOLIC BLOOD PRESSURE: 155 MMHG | RESPIRATION RATE: 16 BRPM | HEART RATE: 88 BPM | DIASTOLIC BLOOD PRESSURE: 74 MMHG

## 2019-02-26 DIAGNOSIS — M54.5 LOW BACK PAIN, UNSPECIFIED BACK PAIN LATERALITY, UNSPECIFIED CHRONICITY, WITH SCIATICA PRESENCE UNSPECIFIED: ICD-10-CM

## 2019-02-26 DIAGNOSIS — M25.561 CHRONIC PAIN OF RIGHT KNEE: ICD-10-CM

## 2019-02-26 DIAGNOSIS — G89.29 CHRONIC PAIN OF RIGHT KNEE: ICD-10-CM

## 2019-02-26 DIAGNOSIS — M96.1 POSTLAMINECTOMY SYNDROME OF LUMBAR REGION: ICD-10-CM

## 2019-02-26 DIAGNOSIS — M50.30 DEGENERATIVE CERVICAL DISC: ICD-10-CM

## 2019-02-26 DIAGNOSIS — G89.29 OTHER CHRONIC PAIN: Primary | ICD-10-CM

## 2019-02-26 DIAGNOSIS — Z79.899 ENCOUNTER FOR LONG-TERM (CURRENT) USE OF HIGH-RISK MEDICATION: ICD-10-CM

## 2019-02-26 PROCEDURE — 99214 OFFICE O/P EST MOD 30 MIN: CPT | Performed by: NURSE PRACTITIONER

## 2019-02-26 RX ORDER — OXYCODONE HYDROCHLORIDE 10 MG/1
10 TABLET ORAL EVERY 6 HOURS PRN
Qty: 120 TABLET | Refills: 0 | Status: SHIPPED | OUTPATIENT
Start: 2019-02-26 | End: 2019-03-21 | Stop reason: SDUPTHER

## 2019-02-26 NOTE — PROGRESS NOTES
CHIEF COMPLAINT  Back pain   Knee pain    Subjective   Kian Mcdaniels is a 63 y.o. male  who presents to the office for follow-up.He has a history of chronic neck, back, knee pain.    In terms of back pain, failed [employer] SCS trial.  Dr. Guzman recommends consideration of Nevro trial in the future.      In terms of neck pain. Nothing surgical needed. Monitoring syringomyelia, cannot consider SCS in this area for this reason.  Has failed cervical epidural injections.  Order for cervical MBB previoulsy placed, patient was unable to schedule due to knee surgery.       Complains of pain in his neck, back and right knee. Today his pain is 7/10VAS.  Continues with Oxycodone 10 mg 4/day, gabapentin 800 mg 3/day and tizanidine 4mg 1-2/night. The regimen helps decrease his pain moderately. No adverse reactions.       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  He is in PT.  Says he is having trouble moving his left leg, weakness, having to use wheelchair. Says he is trying to get back in to see Dr. Spencer who did his back surgery.  Says he has always had weakness since surgery but seems to be worse, wife thinks it is unchanged really.      Back Pain   This is a chronic problem. The current episode started more than 1 year ago. The problem occurs daily. The problem has been waxing and waning since onset. The pain is present in the sacro-iliac. The quality of the pain is described as aching, shooting and stabbing. The pain is at a severity of 7/10. The pain is moderate. The pain is the same all the time. The symptoms are aggravated by bending, sitting and standing. Stiffness is present all day. Associated symptoms include leg pain, numbness, paresthesias, tingling and weakness. Pertinent negatives include no abdominal pain, chest pain, dysuria, fever or headaches. Risk factors include lack of exercise, obesity and sedentary lifestyle. He has tried  analgesics for the symptoms. The treatment provided moderate relief.   Neck Pain    This is a chronic problem. The current episode started more than 1 month ago. The problem occurs daily. The problem has been unchanged. The pain is associated with nothing. The pain is present in the left side, right side and midline. The quality of the pain is described as cramping. The pain is at a severity of 7/10. The pain is moderate. The symptoms are aggravated by sneezing and twisting. The pain is same all the time. Associated symptoms include leg pain, numbness, tingling and weakness. Pertinent negatives include no chest pain, fever or headaches. He has tried muscle relaxants, oral narcotics and bed rest for the symptoms. The treatment provided moderate relief.      PEG Assessment   What number best describes your pain on average in the past week?7  What number best describes how, during the past week, pain has interfered with your enjoyment of life?7  What number best describes how, during the past week, pain has interfered with your general activity?  7    The following portions of the patient's history were reviewed and updated as appropriate: allergies, current medications, past family history, past medical history, past social history, past surgical history and problem list.    Review of Systems   Constitutional: Positive for fatigue. Negative for chills and fever.   HENT: Negative for congestion.    Eyes: Negative for visual disturbance.   Respiratory: Negative for chest tightness, shortness of breath and wheezing.    Cardiovascular: Negative for chest pain and leg swelling.   Gastrointestinal: Negative for abdominal pain, constipation and diarrhea.   Genitourinary: Negative for difficulty urinating and dysuria.   Musculoskeletal: Positive for back pain and neck pain.   Neurological: Positive for tingling, weakness, numbness and paresthesias. Negative for dizziness and headaches.   Psychiatric/Behavioral: Negative for  "agitation, confusion and suicidal ideas.     Vitals:    02/26/19 1354   BP: 155/74   Pulse: 88   Resp: 16   Temp: 98.8 °F (37.1 °C)   SpO2: 96%   Weight: 117 kg (257 lb 9.6 oz)   Height: 181.6 cm (71.5\")       Objective   Physical Exam   Constitutional: He is oriented to person, place, and time. Vital signs are normal. He appears well-developed and well-nourished. He is cooperative. No distress.   HENT:   Head: Normocephalic and atraumatic.   Nose: Nose normal.   Eyes: Conjunctivae and lids are normal.   Neck: Trachea normal. Neck supple. Muscular tenderness present. No spinous process tenderness present. Decreased range of motion present.   Cardiovascular: Normal rate.   Pulmonary/Chest: Effort normal. No respiratory distress.   Abdominal:   obese   Musculoskeletal:        Right knee: He exhibits decreased range of motion and swelling. Tenderness found.        Left knee: Tenderness found.        Cervical back: He exhibits decreased range of motion, tenderness and pain. He exhibits no spasm.        Lumbar back: He exhibits decreased range of motion, tenderness and pain.   Neurological: He is alert and oriented to person, place, and time. He has normal strength. Gait (wheelchair, antalgia) abnormal.   4/5 left quadricept strength    Skin: Skin is warm, dry and intact. He is not diaphoretic.   Psychiatric: He has a normal mood and affect. His speech is normal and behavior is normal. Cognition and memory are normal.   Nursing note and vitals reviewed.    Assessment/Plan   Kian was seen today for back pain and knee pain.    Diagnoses and all orders for this visit:    Other chronic pain    Low back pain, unspecified back pain laterality, unspecified chronicity, with sciatica presence unspecified  -     Cancel: MRI Lumbar Spine Without Contrast; Future  -     MRI Lumbar Spine With & Without Contrast; Future    Chronic pain of right knee    Degenerative cervical disc    Encounter for long-term (current) use of high-risk " medication    Postlaminectomy syndrome of lumbar region      --- Proceed with cervical MBB once patient has fully recovered from knee surgery  --- He needs to get into outpatient PT ASAP   --- Refill Oxycodone. Patient appears stable with current regimen. No adverse effects. Regarding continuation of opioids, there is no evidence of aberrant behavior or any red flags.  The patient continues with appropriate response to opioid therapy. ADL's remain intact by self.   --- Patient should no longer be taking medication from ortho, he is now 7 weeks out from surgery   --- The urine drug screen confirmation from 8/5/18 has been reviewed and the result is appropriate based on patient history and DAVID report.  UPDATE NEXT OFFICE VISIT.    --- MRI lumbar spine   --- Follow-up 1 month          DAVID REPORT  As part of the patient's treatment plan, I am prescribing controlled substances. The patient has been made aware of appropriate use of such medications, including potential risk of somnolence, limited ability to drive and/or work safely, and the potential for dependence or overdose. It has also bee made clear that these medications are for use by this patient only, without concomitant use of alcohol or other substances unless prescribed.     Patient has completed prescribing agreement detailing terms of continued prescribing of controlled substances, including monitoring DAVID reports, urine drug screening, and pill counts if necessary. The patient is aware that inappropriate use will results in cessation of prescribing such medications.    DAVID report has been reviewed and scanned into the patient's chart.    As the clinician, I personally reviewed the DAVID from 2/25/19 while the patient was in the office today.    History and physical exam exhibit continued safe and appropriate use of controlled substances.    EMR Dragon/Transcription disclaimer:   Much of this encounter note is an electronic  transcription/translation of spoken language to printed text. The electronic translation of spoken language may permit erroneous, or at times, nonsensical words or phrases to be inadvertently transcribed; Although I have reviewed the note for such errors, some may still exist.

## 2019-03-05 ENCOUNTER — OFFICE VISIT (OUTPATIENT)
Dept: ORTHOPEDIC SURGERY | Facility: CLINIC | Age: 64
End: 2019-03-05

## 2019-03-05 VITALS — TEMPERATURE: 97.2 F | HEIGHT: 72 IN | WEIGHT: 255 LBS | BODY MASS INDEX: 34.54 KG/M2

## 2019-03-05 DIAGNOSIS — Z96.651 S/P TOTAL KNEE ARTHROPLASTY, RIGHT: Primary | ICD-10-CM

## 2019-03-05 PROCEDURE — 99024 POSTOP FOLLOW-UP VISIT: CPT | Performed by: NURSE PRACTITIONER

## 2019-03-05 PROCEDURE — 73560 X-RAY EXAM OF KNEE 1 OR 2: CPT | Performed by: NURSE PRACTITIONER

## 2019-03-05 NOTE — PATIENT INSTRUCTIONS
Chief Complaint and HPI: 8 weeks follow up right total knee    SUBJECTIVE:Patient returns today for follow up of total joint replacement. Patient reports doing well with no unusual complaints. Appears to be progressing appropriately. Has lost 20 lbs!      OBJECTIVE:   Exam:. The incision is healing appropriately. No sign of infection. Range of motion is progressing as expected -10/113. The calf is soft and nontender with a negative Homans sign.    DIAGNOSTIC STUDIES  Imaging done today, images were personally viewed and discussed with the patient:    Indication, findings and comparison: 2V AP & Lat of the operative joint were done in the office today  images were reviewed for evaluation of recent joint replacement. They demonstrate a well positioned, well aligned total joint replacement without complicating factors noted. In comparison with previous films there has been no alignment change.    ASSESSMENT: status post right total knee replacement expected healing.    PLAN: 1) Continue with PT exercises as prescribed   2) Follow up 3 MONTHS  WITH XRAYS (2 views of same joint).   3) Continue with antibiotic prophylaxis with dental procedures for 2 yrs post total joint replacement.   4) May discontinue anticoagulant therapy (such as aspirin or xarelto) from surgery at this time and resume medications, vitamins, and supplements you were taking before surgery.    5) Dr. Guzman is taking over on pain control medications again.     Kaylee Jorge, APRN  3/5/2019

## 2019-03-05 NOTE — PROGRESS NOTES
Kian Mcdaniels : 1955 MRN: [id number] DATE: 3/5/2019  Body mass index is 35.07 kg/m².  Vitals:    19 1526   Temp: 97.2 °F (36.2 °C)       Chief Complaint and HPI: 8 weeks follow up right total knee    SUBJECTIVE:Patient returns today for follow up of total joint replacement. Patient reports doing well with no unusual complaints. Appears to be progressing appropriately. Has lost 20 lbs!      OBJECTIVE:   Exam:. The incision is healing appropriately. No sign of infection. Range of motion is progressing as expected -10/113. The calf is soft and nontender with a negative Homans sign.    DIAGNOSTIC STUDIES  Imaging done today, images were personally viewed and discussed with the patient:    Indication, findings and comparison: 2V AP & Lat of the operative joint were done in the office today  images were reviewed for evaluation of recent joint replacement. They demonstrate a well positioned, well aligned total joint replacement without complicating factors noted. In comparison with previous films there has been no alignment change.    ASSESSMENT: status post right total knee replacement expected healing.    PLAN: 1) Continue with PT exercises as prescribed   2) Follow up 3 MONTHS  WITH XRAYS (2 views of same joint).   3) Continue with antibiotic prophylaxis with dental procedures for 2 yrs post total joint replacement.   4) May discontinue anticoagulant therapy (such as aspirin or xarelto) from surgery at this time and resume medications, vitamins, and supplements you were taking before surgery.    5) Dr. Guzman is taking over on pain control medications again.     Kaylee Jorge, APRN  3/5/2019

## 2019-03-12 ENCOUNTER — APPOINTMENT (OUTPATIENT)
Dept: PHYSICAL THERAPY | Facility: HOSPITAL | Age: 64
End: 2019-03-12

## 2019-03-13 ENCOUNTER — HOSPITAL ENCOUNTER (OUTPATIENT)
Dept: PHYSICAL THERAPY | Facility: HOSPITAL | Age: 64
Setting detail: THERAPIES SERIES
Discharge: HOME | End: 2019-03-13

## 2019-03-13 DIAGNOSIS — R29.898 WEAKNESS OF BOTH LEGS: ICD-10-CM

## 2019-03-13 DIAGNOSIS — M25.661 DECREASED RANGE OF MOTION OF RIGHT KNEE: ICD-10-CM

## 2019-03-13 DIAGNOSIS — R26.2 DIFFICULTY WALKING: ICD-10-CM

## 2019-03-13 DIAGNOSIS — Z78.9 DIFFICULTY NAVIGATING STAIRS: ICD-10-CM

## 2019-03-13 DIAGNOSIS — Z96.651 S/P TOTAL KNEE ARTHROPLASTY, RIGHT: ICD-10-CM

## 2019-03-13 DIAGNOSIS — M25.561 ACUTE PAIN OF RIGHT KNEE: Primary | ICD-10-CM

## 2019-03-13 PROCEDURE — 97162 PT EVAL MOD COMPLEX 30 MIN: CPT | Performed by: PHYSICAL THERAPIST

## 2019-03-13 PROCEDURE — 97110 THERAPEUTIC EXERCISES: CPT | Performed by: PHYSICAL THERAPIST

## 2019-03-13 NOTE — THERAPY EVALUATION
Outpatient Physical Therapy Ortho Initial Evaluation  [facility]     Patient Name: Kian Mcdaniels  : 1955  MRN: [id number]  Today's Date: 3/13/2019      Visit Date: 2019    Patient Active Problem List   Diagnosis   • Bulging lumbar disc   • Chronic pain   • Diabetic neuropathy (CMS/HCC)   • Low back pain   • Degenerative arthritis of lumbar spine   • Neuropathy involving both lower extremities   • Encounter for long-term (current) use of high-risk medication   • Postlaminectomy syndrome of lumbar region   • Syringomyelia and syringobulbia (CMS/HCC)   • Lumbar pseudoarthrosis   • Type 2 diabetes mellitus with neurologic complication, with long-term current use of insulin (CMS/HCC)   • Insulin pump in place   • Hx of decompressive lumbar laminectomy   • Bilateral carpal tunnel syndrome   • Degenerative cervical disc   • Acute pain of right knee   • Primary localized osteoarthrosis of right lower leg   • Arthritis of right knee   • Sacroiliac inflammation (CMS/HCC)   • Sacroiliac joint dysfunction   • Severe obesity (BMI 35.0-39.9)   • Chronic pain of right knee   • Tricompartment osteoarthritis of left knee   • Tricompartment osteoarthritis of right knee   • Arthritis of left knee   • Bilateral chronic knee pain   • Hypertension   • S/P total knee arthroplasty, right        Past Medical History:   Diagnosis Date   • Arteriosclerotic cardiovascular disease    • Arthritis    • At risk for sleep apnea    • COPD (chronic obstructive pulmonary disease) (CMS/HCC)    • Coronary artery disease    • Diabetes mellitus (CMS/HCC)    • Diabetic peripheral neuropathy (CMS/HCC)    • Disease of thyroid gland     NODULE PRESENT   • ED (erectile dysfunction) of non-organic origin    • GERD (gastroesophageal reflux disease)    • Headache disorder     DUE TO NECK   • Hyperlipidemia    • Hypertension    • Insulin pump in place    • Knee pain, bilateral    • Low back pain    • Myocardial infarction (CMS/HCC)    •  Neck pain    • Spinal stenosis    • TIA (transient ischemic attack)     LEFT EYE   • Type 2 diabetes mellitus (CMS/HCC)    • Upper back pain    • Wears dentures     UPPER PLATE        Past Surgical History:   Procedure Laterality Date   • AMPUTATION FOOT / TOE Left     GREAT TOE   • BACK SURGERY  10/26/2015    Bilateral L4-L5 laminectomy -Dr. Gutierres   • CARDIAC CATHETERIZATION     • CARDIAC SURGERY      CABG X 6    • CHOLECYSTECTOMY     • CORONARY ARTERY BYPASS GRAFT      X 6   • EPIDURAL BLOCK     • EYE SURGERY      CATARACT EXTRACTION   • HAND SURGERY  04/28/2016   • LUMBAR DISCECTOMY FUSION INSTRUMENTATION N/A 12/12/2016    Procedure: L 4-5 FUSION WITH INSTRUMENTATION WITH BMP, WITH REMOVAL OF INSTRUMENTATION ;  Surgeon: Rowdy Spencer MD;  Location: [facility] OR;  Service:    • LUMBAR LAMINECTOMY DISCECTOMY DECOMPRESSION N/A 12/12/2016    Procedure: L4-5 REPEAT LAMINECTOMY ;  Surgeon: Tim Gutierres MD;  Location: [facility] OR;  Service:    • LUMBAR LAMINECTOMY DISCECTOMY DECOMPRESSION N/A 12/14/2016    Procedure: EVACUATION OF LUMBAR HEMATOMA;  Surgeon: Rowdy Spencer MD;  Location: [facility] OR;  Service:    • MULTIPLE TOOTH EXTRACTIONS      UPPER TEETH EXTRACTED   • ORTHOPEDIC SURGERY     • PENIS SURGERY      RECONSTRUCTION   • SCROTUM EXPLORATION      scrotum surgery   • SPINAL CORD STIMULATOR IMPLANT N/A 9/24/2018    Procedure: SPINAL CORD STIMULATOR Phase 1 - [employer];  Surgeon: Mulugeta Guzman MD;  Location: [facility];  Service: Pain Management   • SPINE SURGERY     • TOTAL KNEE ARTHROPLASTY Right 1/8/2019    Procedure: RIGHT TOTAL KNEE ARTHROPLASTY WITH NEGRITA NAVIGATION;  Surgeon: Andres Carter MD;  Location: [facility];  Service: Orthopedics       Visit Dx:     ICD-10-CM ICD-9-CM   1. Acute pain of right knee M25.561 719.46   2. Difficulty walking R26.2 719.7   3. Difficulty navigating stairs R68.89 V62.89   4. Weakness of both legs R29.898 729.89   5. Decreased  range of motion of right knee M25.661 719.56   6. S/P total knee arthroplasty, right Z96.651 V43.65       Patient History     Row Name 03/13/19 1300             History    Chief Complaint  Pain;Muscle weakness;Difficulty with daily activities;Difficulty Walking;Joint swelling;Swelling;Numbness;Fatigue/poor endurance  -MP      Type of Pain  Knee pain;Lower Extremity / Leg  -MP      Date Current Problem(s) Began  01/08/19  -MP      Brief Description of Current Complaint  Kian reports having chronic right knee issues for several years which led to diminishing function.  Eventually that led to this recent R TKA.  He is experiencing pain that is intermittent.  Peak pain is 7/10 and he reports numbness around the knee.  He has difficutly walking, navigating stairs and he is requiring some help to get in/out of the shower from his wife.  He has a shower chair.   PMH:  R knee OA, Three lumbar spine surgeries, lamincetomy 2013 and then required a second surgery with instrumentation and then a thrid surgery to remove hardware.  HTN controlled by medication, IDDM controlled with an insulin pump, hyperlipidemia controlled by medication.  He reports that on the L foot his great toe was amputated.  Open heart surgery with six vessel by pass.  Cataract surgery B.  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  He enjoys reading the bible and has recently received his Master's in theology.  He enjoys travelling.   -MP      Patient seeing anyone else for problem(s)?  SIMA Gonsalez M.D.  -MP         Fall Risk Assessment    Any falls in the past year:  No  -MP         Services    Are you currently  receiving Home Health services  No  -MP         Daily Activities    Primary Language  English  -MP      How does patient learn best?  Listening;Reading;Demonstration;Pictures/Video  -MP      Pt Participated in POC and Goals  Yes  -MP         Safety    Are you being hurt, hit, or frightened by anyone at home or in your life?  No  -MP      Are you being neglected by a caregiver  No  -MP        User Key  (r) = Recorded By, (t) = Taken By, (c) = Cosigned By    Initials Name Provider Type    Joao Rosa PT Physical Therapist          PT Ortho     Row Name 03/13/19 1400       General ROM    GENERAL ROM COMMENTS  In supine, R knee AROM,  degrees of flexion  -MP       MMT (Manual Muscle Testing)    General MMT Comments  Knee flexion L 4 to 4+/5, R 4-/5 and knee extension L 4+/5 and R 4-/5  -MP       Girth    Girth Measured?  Right Lower Extremity;Left Lower Extremity  -MP       RLE Quick Girth (cm)    Mid patella  48.5 cm  -MP       LLE Quick Girth (cm)    Mid patella  45 cm  -MP       Gait/Stairs Assessment/Training    Comment (Gait/Stairs)  He ambulated on level surface, 100+ feet, front wheeled walker, displaying an antalgic pattern with kyphotic posturing.    -MP      User Key  (r) = Recorded By, (t) = Taken By, (c) = Cosigned By    Initials Name Provider Type    Joao Rosa PT Physical Therapist        Therapy Education  Education Details: HEP from previous therapy at home was reviewed.  He was asked to perform heel slides, quad sets, short arcs, long arcs and seated knee flexion with scoot.  He was also advised to avoid SLRs due to his low back history.  Given: HEP, Symptoms/condition management  Program: New  How Provided: Written  Provided to: Patient  Level of Understanding: Teach back education performed     PT OP Goals     Row Name 03/13/19 1600          PT Short Term Goals    STG Date to Achieve  04/12/19  -MP     STG 1  Kian begins low intensity closed chain exercises to include TKE with  theraband and step ups.  -MP     STG 1 Progress  New  -MP     STG 2  AROM of the R knee improves by 10 degrees.  -MP     STG 2 Progress  New  -MP     STG 3  KOS disabiity improves by 15%.  -MP     STG 3 Progress  New  -MP     STG 4  Mid patella circumference improves by one cm.  -MP     STG 4 Progress  New  -MP        Long Term Goals    LTG Date to Achieve  06/11/19  -MP     LTG 1  AROM of the R knee, supine, measures 0-120 degrees of flexion.  -MP     LTG 1 Progress  New  -MP     LTG 2  MMT of the R knee equals 4+/5 for extension and flexion.  -MP     LTG 2 Progress  New  -MP     LTG 3  Kian is ambulating on all surfaces with a standard cane independently with a normal gait pattern  -MP     LTG 3 Progress  New  -MP     LTG 4  KOS disability improves by 30%  -MP     LTG 4 Progress  New  -MP     LTG 5  He is independent with a HEP and self care education.  -MP     LTG 5 Progress  New  -MP        Time Calculation    PT Goal Re-Cert Due Date  04/12/19  -MP       User Key  (r) = Recorded By, (t) = Taken By, (c) = Cosigned By    Initials Name Provider Type    Joao Rosa, PT Physical Therapist          PT Assessment/Plan     Row Name 03/13/19 1647 03/13/19 1644       PT Assessment    Functional Limitations  --  Impaired gait;Impaired locomotion;Limitations in functional capacity and performance;Decreased safety during functional activities  -MP    Impairments  --  Gait;Muscle strength;Range of motion;Posture;Pain;Locomotion;Edema;Balance  -MP    Assessment Comments  Kian Mcdaniels is a 63 y.o. year-old male referred to physical therapy for right total knee replacement. He presents with an evolving clinical presentation.  He has comorbidities osteoarthritis of other joints, chronic lumbar spine and leg weakness associated with that.  He has no known personal factors  that may affect his progress in the plan of care.  Signs and symptoms are consistent with physical therapy diagnosis of knee pain, loss of  AROM/strength, abnormal gait and lack of knowledge for self care.   -MP  --    Please refer to paper survey for additional self-reported information  --  Yes  -MP    Rehab Potential  --  Good  -MP    Patient/caregiver participated in establishment of treatment plan and goals  --  Yes  -MP    Patient would benefit from skilled therapy intervention  --  Yes  -MP       PT Plan    PT Frequency  --  2x/week  -MP    Predicted Duration of Therapy Intervention (Therapy Eval)  --  6-8 weeks  -MP    Planned CPT's?  --  PT EVAL MOD COMPLELITY: 44529;PT THER PROC EA 15 MIN: 20506;PT THER ACT EA 15 MIN: 47910;PT MANUAL THERAPY EA 15 MIN: 94933;PT GAIT TRAINING EA 15 MIN: 00067;PT SELF CARE/HOME MGMT/TRAIN EA 15: 59104;PT AQUATIC THERAPY EA 15 MIN: 61833;PT ELECTRICAL STIM UNATTEND: ;PT ULTRASOUND EA 15 MIN: 35280  -MP    PT Plan Comments  --  Progress to low level closed chain exercises and progress with manual therapy  -MP      User Key  (r) = Recorded By, (t) = Taken By, (c) = Cosigned By    Initials Name Provider Type    MP Joao Armendariz, PT Physical Therapist            Exercises     Row Name 03/13/19 1600 03/13/19 1500          Total Minutes    18087 - PT Therapeutic Exercise Minutes  10  -MP  --     20457 - PT Manual Therapy Minutes  --  5  -MP        Exercise 1    Exercise Name 1  Kian performed 10 heel slides, 10 quad sets, 10 short arcs, 10 ankle pumps and 10 long arc quads with the R LE.  -MP  --     Cueing 1  Verbal;Tactile  -MP  --     Additional Comments  Cues provided for exercise technique.  -MP  --       User Key  (r) = Recorded By, (t) = Taken By, (c) = Cosigned By    Initials Name Provider Type    Joao Rosa, PT Physical Therapist           Manual Rx (last 36 hours)      Manual Treatments     Row Name 03/13/19 1500             Total Minutes    33927 - PT Manual Therapy Minutes  5  -MP         Manual Rx 1    Manual Rx 1 Location  R knee  -MP      Manual Rx 1 Type  supine quadriceps stretch  -MP       Manual Rx 1 Duration  20-30s hold, multiple repetitions  -BLADE        User Key  (r) = Recorded By, (t) = Taken By, (c) = Cosigned By    Initials Name Provider Type    Joao Rosa PT Physical Therapist         Outcome Measure Options: Knee Outcome Score- ADL  Knee Outcome Score  Knee Outcome Score Comments: 25/80, 69% disability      Time Calculation:     Therapy Suggested Charges     Code   Minutes Charges    76167 (CPT®) Hc Pt Neuromusc Re Education Ea 15 Min      02010 (CPT®) Hc Pt Ther Proc Ea 15 Min 10 1    71086 (CPT®) Hc Gait Training Ea 15 Min      30625 (CPT®) Hc Pt Therapeutic Act Ea 15 Min      35725 (CPT®) Hc Pt Manual Therapy Ea 15 Min 5     43397 (CPT®) Hc Pt Ther Massage- Per 15 Min      26464 (CPT®) Hc Pt Iontophoresis Ea 15 Min      74082 (CPT®) Hc Pt Elec Stim Ea-Per 15 Min      75540 (CPT®) Hc Pt Ultrasound Ea 15 Min      61174 (CPT®) Hc Pt Self Care/Mgmt/Train Ea 15 Min      29304 (CPT®) Hc Pt Prosthetic (S) Train Initial Encounter, Each 15 Min      37154 (CPT®) Hc Orthotic(S) Mgmt/Train Initial Encounter, Each 15min      23881 (CPT®) Hc Pt Aquatic Therapy Ea 15 Min      54596 (CPT®) Hc Pt Orthotic(S)/Prosthetic(S) Encounter, Each 15 Min       (CPT®) Hc Pt Electrical Stim Unattended      Total  15 1          Start Time: 1345  Stop Time: 1430  Time Calculation (min): 45 min     Therapy Charges for Today     Code Description Service Date Service Provider Modifiers Qty    63976233510 HC PT THER PROC EA 15 MIN 3/13/2019 Joao Armendariz, PT GP 1    54454723628 HC PT EVAL MOD COMPLEXITY 2 3/13/2019 Joao Armendariz, PT GP 1          PT G-Codes  Outcome Measure Options: Knee Outcome Score- ADL         Joao Armendariz PT  3/13/2019

## 2019-03-18 ENCOUNTER — HOSPITAL ENCOUNTER (OUTPATIENT)
Dept: PHYSICAL THERAPY | Facility: HOSPITAL | Age: 64
Setting detail: THERAPIES SERIES
Discharge: HOME | End: 2019-03-18

## 2019-03-18 ENCOUNTER — APPOINTMENT (OUTPATIENT)
Dept: PHYSICAL THERAPY | Facility: HOSPITAL | Age: 64
End: 2019-03-18

## 2019-03-18 DIAGNOSIS — R26.2 DIFFICULTY WALKING: ICD-10-CM

## 2019-03-18 DIAGNOSIS — Z96.651 S/P TOTAL KNEE ARTHROPLASTY, RIGHT: ICD-10-CM

## 2019-03-18 DIAGNOSIS — G89.29 CHRONIC PAIN OF RIGHT KNEE: ICD-10-CM

## 2019-03-18 DIAGNOSIS — M25.561 ACUTE PAIN OF RIGHT KNEE: Primary | ICD-10-CM

## 2019-03-18 DIAGNOSIS — Z78.9 DIFFICULTY NAVIGATING STAIRS: ICD-10-CM

## 2019-03-18 DIAGNOSIS — R29.898 WEAKNESS OF BOTH LEGS: ICD-10-CM

## 2019-03-18 DIAGNOSIS — M25.661 DECREASED RANGE OF MOTION OF RIGHT KNEE: ICD-10-CM

## 2019-03-18 DIAGNOSIS — M25.561 CHRONIC PAIN OF RIGHT KNEE: ICD-10-CM

## 2019-03-18 PROCEDURE — 97110 THERAPEUTIC EXERCISES: CPT | Performed by: PHYSICAL THERAPIST

## 2019-03-18 NOTE — THERAPY TREATMENT NOTE
Outpatient Physical Therapy Ortho Treatment Note  [facility]     Patient Name: Kian Mcdaniels  : 1955  MRN: [id number]  Today's Date: 3/18/2019      Visit Date: 2019    Visit Dx:    ICD-10-CM ICD-9-CM   1. Acute pain of right knee M25.561 719.46   2. Difficulty walking R26.2 719.7   3. Difficulty navigating stairs R68.89 V62.89   4. Weakness of both legs R29.898 729.89   5. Decreased range of motion of right knee M25.661 719.56   6. S/P total knee arthroplasty, right Z96.651 V43.65   7. Chronic pain of right knee M25.561 719.46    G89.29 338.29       Patient Active Problem List   Diagnosis   • Bulging lumbar disc   • Chronic pain   • Diabetic neuropathy (CMS/[facility])   • Low back pain   • Degenerative arthritis of lumbar spine   • Neuropathy involving both lower extremities   • Encounter for long-term (current) use of high-risk medication   • Postlaminectomy syndrome of lumbar region   • Syringomyelia and syringobulbia (CMS/[facility])   • Lumbar pseudoarthrosis   • Type 2 diabetes mellitus with neurologic complication, with long-term current use of insulin (CMS/[facility])   • Insulin pump in place   • Hx of decompressive lumbar laminectomy   • Bilateral carpal tunnel syndrome   • Degenerative cervical disc   • Acute pain of right knee   • Primary localized osteoarthrosis of right lower leg   • Arthritis of right knee   • Sacroiliac inflammation (CMS/[facility])   • Sacroiliac joint dysfunction   • Severe obesity (BMI 35.0-39.9)   • Chronic pain of right knee   • Tricompartment osteoarthritis of left knee   • Tricompartment osteoarthritis of right knee   • Arthritis of left knee   • Bilateral chronic knee pain   • Hypertension   • S/P total knee arthroplasty, right        Past Medical History:   Diagnosis Date   • Arteriosclerotic cardiovascular disease    • Arthritis    • At risk for sleep apnea    • COPD (chronic obstructive pulmonary disease) (CMS/[facility])    • Coronary artery disease    • Diabetes mellitus  (CMS/HCC)    • Diabetic peripheral neuropathy (CMS/HCC)    • Disease of thyroid gland     NODULE PRESENT   • ED (erectile dysfunction) of non-organic origin    • GERD (gastroesophageal reflux disease)    • Headache disorder     DUE TO NECK   • Hyperlipidemia    • Hypertension    • Insulin pump in place    • Knee pain, bilateral    • Low back pain    • Myocardial infarction (CMS/HCC)    • Neck pain    • Spinal stenosis    • TIA (transient ischemic attack)     LEFT EYE   • Type 2 diabetes mellitus (CMS/HCC)    • Upper back pain    • Wears dentures     UPPER PLATE        Past Surgical History:   Procedure Laterality Date   • AMPUTATION FOOT / TOE Left     GREAT TOE   • BACK SURGERY  10/26/2015    Bilateral L4-L5 laminectomy -Dr. Gutierres   • CARDIAC CATHETERIZATION     • CARDIAC SURGERY      CABG X 6    • CHOLECYSTECTOMY     • CORONARY ARTERY BYPASS GRAFT      X 6   • EPIDURAL BLOCK     • EYE SURGERY      CATARACT EXTRACTION   • HAND SURGERY  04/28/2016   • LUMBAR DISCECTOMY FUSION INSTRUMENTATION N/A 12/12/2016    Procedure: L 4-5 FUSION WITH INSTRUMENTATION WITH BMP, WITH REMOVAL OF INSTRUMENTATION ;  Surgeon: Rowdy Spencer MD;  Location: [facility] OR;  Service:    • LUMBAR LAMINECTOMY DISCECTOMY DECOMPRESSION N/A 12/12/2016    Procedure: L4-5 REPEAT LAMINECTOMY ;  Surgeon: Tim Gutierres MD;  Location: [facility] OR;  Service:    • LUMBAR LAMINECTOMY DISCECTOMY DECOMPRESSION N/A 12/14/2016    Procedure: EVACUATION OF LUMBAR HEMATOMA;  Surgeon: Rowdy Spencer MD;  Location: [facility] OR;  Service:    • MULTIPLE TOOTH EXTRACTIONS      UPPER TEETH EXTRACTED   • ORTHOPEDIC SURGERY     • PENIS SURGERY      RECONSTRUCTION   • SCROTUM EXPLORATION      scrotum surgery   • SPINAL CORD STIMULATOR IMPLANT N/A 9/24/2018    Procedure: SPINAL CORD STIMULATOR Phase 1 - [employer];  Surgeon: Mulugeta Guzman MD;  Location: [facility];  Service: Pain Management   • SPINE SURGERY     • TOTAL KNEE ARTHROPLASTY  Right 1/8/2019    Procedure: RIGHT TOTAL KNEE ARTHROPLASTY WITH NEGRITA NAVIGATION;  Surgeon: Andres Carter MD;  Location: [facility]  Service: Orthopedics                       PT Assessment/Plan     Row Name 03/18/19 1131          PT Assessment    Assessment Comments  First treatment session completed today. Kian presents with significant gait antalgia and tends to drag both his R Le and L Le. Worked on heel strike at initial contact and trying to maintain constant movement of the RW for less reliance on unweighting his legs. He tolerated knee flexion PROM well with manual stretching but significantly lacks knee extension ROM. Encouraged propped knee extension stretching at home.  -       User Key  (r) = Recorded By, (t) = Taken By, (c) = Cosigned By    Initials Name Provider Type    Radha Jasso, YOHAN Physical Therapist              Exercises     Row Name 03/18/19 1100             Subjective Comments    Subjective Comments  My experience with home health and inpatient rehab was awful but I've had a good experience here so far  -KH         Subjective Pain    Able to rate subjective pain?  yes  -KH      Pre-Treatment Pain Level  6  -KH      Post-Treatment Pain Level  6  -KH         Total Minutes    36326 - PT Therapeutic Exercise Minutes  40  -KH         Exercise 1    Exercise Name 1  see exercise flowsheet  -      Cueing 1  Verbal;Tactile  -        User Key  (r) = Recorded By, (t) = Taken By, (c) = Cosigned By    Initials Name Provider Type    Radha Jasso, PT Physical Therapist                                            Time Calculation:   Start Time: 1045  Stop Time: 1130  Time Calculation (min): 45 min  Therapy Suggested Charges     Code   Minutes Charges    27570 (CPT®) Hc Pt Neuromusc Re Education Ea 15 Min      25880 (CPT®) Hc Pt Ther Proc Ea 15 Min 40 3    80858 (CPT®) Hc Gait Training Ea 15 Min      41261 (CPT®) Hc Pt Therapeutic Act Ea 15 Min      82411 (CPT®) Hc Pt Manual Therapy Ea 15  Min      50655 (CPT®) Hc Pt Ther Massage- Per 15 Min      73966 (CPT®) Hc Pt Iontophoresis Ea 15 Min      04586 (CPT®) Hc Pt Elec Stim Ea-Per 15 Min      25227 (CPT®) Hc Pt Ultrasound Ea 15 Min      43611 (CPT®) Hc Pt Self Care/Mgmt/Train Ea 15 Min      47349 (CPT®) Hc Pt Prosthetic (S) Train Initial Encounter, Each 15 Min      56667 (CPT®) Hc Orthotic(S) Mgmt/Train Initial Encounter, Each 15min      52125 (CPT®) Hc Pt Aquatic Therapy Ea 15 Min      16769 (CPT®) Hc Pt Orthotic(S)/Prosthetic(S) Encounter, Each 15 Min       (CPT®) Hc Pt Electrical Stim Unattended      Total  40 3        Therapy Charges for Today     Code Description Service Date Service Provider Modifiers Qty    67408242023 HC PT THER PROC EA 15 MIN 3/18/2019 Radha Garrett, PT GP 3                    Radha Garrett, PT  3/18/2019

## 2019-03-21 ENCOUNTER — HOSPITAL ENCOUNTER (OUTPATIENT)
Dept: PHYSICAL THERAPY | Facility: HOSPITAL | Age: 64
Setting detail: THERAPIES SERIES
Discharge: HOME | End: 2019-03-21

## 2019-03-21 ENCOUNTER — TELEPHONE (OUTPATIENT)
Dept: PAIN MEDICINE | Facility: CLINIC | Age: 64
End: 2019-03-21

## 2019-03-21 DIAGNOSIS — M25.661 DECREASED RANGE OF MOTION OF RIGHT KNEE: ICD-10-CM

## 2019-03-21 DIAGNOSIS — Z96.651 S/P TOTAL KNEE ARTHROPLASTY, RIGHT: ICD-10-CM

## 2019-03-21 DIAGNOSIS — Z78.9 DIFFICULTY NAVIGATING STAIRS: ICD-10-CM

## 2019-03-21 DIAGNOSIS — R29.898 WEAKNESS OF BOTH LEGS: ICD-10-CM

## 2019-03-21 DIAGNOSIS — R26.2 DIFFICULTY WALKING: ICD-10-CM

## 2019-03-21 DIAGNOSIS — M25.561 ACUTE PAIN OF RIGHT KNEE: Primary | ICD-10-CM

## 2019-03-21 PROCEDURE — 97110 THERAPEUTIC EXERCISES: CPT | Performed by: PHYSICAL THERAPIST

## 2019-03-21 RX ORDER — OXYCODONE HYDROCHLORIDE 10 MG/1
10 TABLET ORAL EVERY 6 HOURS PRN
Qty: 120 TABLET | Refills: 0 | Status: SHIPPED | OUTPATIENT
Start: 2019-03-21 | End: 2019-04-09 | Stop reason: SDUPTHER

## 2019-03-21 RX ORDER — OXYCODONE HYDROCHLORIDE 10 MG/1
10 TABLET ORAL EVERY 6 HOURS PRN
Qty: 120 TABLET | Refills: 0 | Status: SHIPPED | OUTPATIENT
Start: 2019-03-21 | End: 2019-03-21 | Stop reason: SDUPTHER

## 2019-03-21 NOTE — TELEPHONE ENCOUNTER
Due to a family emergency, patient's appointment had to be rescheduled by our office.  Appt has been rescheduled for 4/4, but patient will need a partial refill of Oxycodone.

## 2019-03-21 NOTE — THERAPY TREATMENT NOTE
Outpatient Physical Therapy Ortho Treatment Note  [facility]     Patient Name: Kian Mcdaniels  : 1955  MRN: [id number]  Today's Date: 3/21/2019      Visit Date: 2019    Visit Dx:    ICD-10-CM ICD-9-CM   1. Acute pain of right knee M25.561 719.46   2. Difficulty walking R26.2 719.7   3. Difficulty navigating stairs R68.89 V62.89   4. Weakness of both legs R29.898 729.89   5. Decreased range of motion of right knee M25.661 719.56   6. S/P total knee arthroplasty, right Z96.651 V43.65       Patient Active Problem List   Diagnosis   • Bulging lumbar disc   • Chronic pain   • Diabetic neuropathy (CMS/[facility])   • Low back pain   • Degenerative arthritis of lumbar spine   • Neuropathy involving both lower extremities   • Encounter for long-term (current) use of high-risk medication   • Postlaminectomy syndrome of lumbar region   • Syringomyelia and syringobulbia (CMS/[facility])   • Lumbar pseudoarthrosis   • Type 2 diabetes mellitus with neurologic complication, with long-term current use of insulin (CMS/[facility])   • Insulin pump in place   • Hx of decompressive lumbar laminectomy   • Bilateral carpal tunnel syndrome   • Degenerative cervical disc   • Acute pain of right knee   • Primary localized osteoarthrosis of right lower leg   • Arthritis of right knee   • Sacroiliac inflammation (CMS/[facility])   • Sacroiliac joint dysfunction   • Severe obesity (BMI 35.0-39.9)   • Chronic pain of right knee   • Tricompartment osteoarthritis of left knee   • Tricompartment osteoarthritis of right knee   • Arthritis of left knee   • Bilateral chronic knee pain   • Hypertension   • S/P total knee arthroplasty, right        Past Medical History:   Diagnosis Date   • Arteriosclerotic cardiovascular disease    • Arthritis    • At risk for sleep apnea    • COPD (chronic obstructive pulmonary disease) (CMS/[facility])    • Coronary artery disease    • Diabetes mellitus (CMS/HCC)    • Diabetic peripheral neuropathy (CMS/[facility])    • Disease of  thyroid gland     NODULE PRESENT   • ED (erectile dysfunction) of non-organic origin    • GERD (gastroesophageal reflux disease)    • Headache disorder     DUE TO NECK   • Hyperlipidemia    • Hypertension    • Insulin pump in place    • Knee pain, bilateral    • Low back pain    • Myocardial infarction (CMS/HCC)    • Neck pain    • Spinal stenosis    • TIA (transient ischemic attack)     LEFT EYE   • Type 2 diabetes mellitus (CMS/HCC)    • Upper back pain    • Wears dentures     UPPER PLATE        Past Surgical History:   Procedure Laterality Date   • AMPUTATION FOOT / TOE Left     GREAT TOE   • BACK SURGERY  10/26/2015    Bilateral L4-L5 laminectomy -Dr. Gutierres   • CARDIAC CATHETERIZATION     • CARDIAC SURGERY      CABG X 6    • CHOLECYSTECTOMY     • CORONARY ARTERY BYPASS GRAFT      X 6   • EPIDURAL BLOCK     • EYE SURGERY      CATARACT EXTRACTION   • HAND SURGERY  04/28/2016   • LUMBAR DISCECTOMY FUSION INSTRUMENTATION N/A 12/12/2016    Procedure: L 4-5 FUSION WITH INSTRUMENTATION WITH BMP, WITH REMOVAL OF INSTRUMENTATION ;  Surgeon: Rowdy Spencer MD;  Location: [facility] OR;  Service:    • LUMBAR LAMINECTOMY DISCECTOMY DECOMPRESSION N/A 12/12/2016    Procedure: L4-5 REPEAT LAMINECTOMY ;  Surgeon: Tim Gutierres MD;  Location: [facility] OR;  Service:    • LUMBAR LAMINECTOMY DISCECTOMY DECOMPRESSION N/A 12/14/2016    Procedure: EVACUATION OF LUMBAR HEMATOMA;  Surgeon: Rowdy Spencer MD;  Location: [facility] OR;  Service:    • MULTIPLE TOOTH EXTRACTIONS      UPPER TEETH EXTRACTED   • ORTHOPEDIC SURGERY     • PENIS SURGERY      RECONSTRUCTION   • SCROTUM EXPLORATION      scrotum surgery   • SPINAL CORD STIMULATOR IMPLANT N/A 9/24/2018    Procedure: SPINAL CORD STIMULATOR Phase 1 - [employer];  Surgeon: Mulugeta Guzman MD;  Location: [facility];  Service: Pain Management   • SPINE SURGERY     • TOTAL KNEE ARTHROPLASTY Right 1/8/2019    Procedure: RIGHT TOTAL KNEE ARTHROPLASTY WITH NEGRITA  NAVIGATION;  Surgeon: Andres Carter MD;  Location: [facility];  Service: Orthopedics                       PT Assessment/Plan     Row Name 03/21/19 1657          PT Assessment    Assessment Comments  Patient continues to demonstrate significant lack of terminal right knee extension, therefore focused on manual stretching with some gentle joint mobs. He also presents with poor quad activation with quad sets and SAQ's, and may benefit from use of ESTIM to help with muscle activation. Gait continues to be antalgic and left foot drag may be related to chronic low back issues  -       User Key  (r) = Recorded By, (t) = Taken By, (c) = Cosigned By    Initials Name Provider Type    Radha Jasso, PT Physical Therapist            Exercises     Row Name 03/21/19 1600             Subjective Comments    Subjective Comments  I was a little sore from our last session  -         Subjective Pain    Able to rate subjective pain?  yes  -KH      Pre-Treatment Pain Level  6  -KH      Post-Treatment Pain Level  6  -KH         Total Minutes    85531 - PT Therapeutic Exercise Minutes  40  -KH         Exercise 1    Exercise Name 1  see exercise flowsheet  -      Cueing 1  Verbal;Tactile  -        User Key  (r) = Recorded By, (t) = Taken By, (c) = Cosigned By    Initials Name Provider Type    Radha Jasso, PT Physical Therapist                      Manual Rx (last 36 hours)      Manual Treatments     Row Name 03/21/19 1500             Manual Rx 1    Manual Rx 1 Location  R knee  -      Manual Rx 1 Type  joint mobs to promote knee extension (leg straight)  -      Manual Rx 1 Grade  II  -KH      Manual Rx 1 Duration  2 x 5   -        User Key  (r) = Recorded By, (t) = Taken By, (c) = Cosigned By    Initials Name Provider Type    Radha Jasso, PT Physical Therapist                             Time Calculation:   Start Time: 1610  Stop Time: 1655  Time Calculation (min): 45 min  Therapy Charges for Today     Code  Description Service Date Service Provider Modifiers Qty    [id number]  PT THER PROC EA 15 MIN 3/21/2019 Radha Garrett, PT GP 3                    Radha Garrett, PT  3/21/2019

## 2019-03-25 ENCOUNTER — HOSPITAL ENCOUNTER (OUTPATIENT)
Dept: PHYSICAL THERAPY | Facility: HOSPITAL | Age: 64
Setting detail: THERAPIES SERIES
Discharge: HOME | End: 2019-03-25

## 2019-03-25 DIAGNOSIS — R29.898 WEAKNESS OF BOTH LEGS: ICD-10-CM

## 2019-03-25 DIAGNOSIS — Z96.651 S/P TOTAL KNEE ARTHROPLASTY, RIGHT: ICD-10-CM

## 2019-03-25 DIAGNOSIS — R26.2 DIFFICULTY WALKING: ICD-10-CM

## 2019-03-25 DIAGNOSIS — Z78.9 DIFFICULTY NAVIGATING STAIRS: ICD-10-CM

## 2019-03-25 DIAGNOSIS — M25.561 ACUTE PAIN OF RIGHT KNEE: Primary | ICD-10-CM

## 2019-03-25 DIAGNOSIS — M25.661 DECREASED RANGE OF MOTION OF RIGHT KNEE: ICD-10-CM

## 2019-03-25 PROCEDURE — 97110 THERAPEUTIC EXERCISES: CPT | Performed by: PHYSICAL THERAPIST

## 2019-03-28 ENCOUNTER — HOSPITAL ENCOUNTER (OUTPATIENT)
Dept: PHYSICAL THERAPY | Facility: HOSPITAL | Age: 64
Setting detail: THERAPIES SERIES
Discharge: HOME | End: 2019-03-28

## 2019-03-28 DIAGNOSIS — M25.561 ACUTE PAIN OF RIGHT KNEE: Primary | ICD-10-CM

## 2019-03-28 DIAGNOSIS — M25.661 DECREASED RANGE OF MOTION OF RIGHT KNEE: ICD-10-CM

## 2019-03-28 DIAGNOSIS — Z78.9 DIFFICULTY NAVIGATING STAIRS: ICD-10-CM

## 2019-03-28 DIAGNOSIS — Z96.651 S/P TOTAL KNEE ARTHROPLASTY, RIGHT: ICD-10-CM

## 2019-03-28 DIAGNOSIS — R26.2 DIFFICULTY WALKING: ICD-10-CM

## 2019-03-28 DIAGNOSIS — R29.898 WEAKNESS OF BOTH LEGS: ICD-10-CM

## 2019-03-28 PROCEDURE — 97110 THERAPEUTIC EXERCISES: CPT | Performed by: PHYSICAL THERAPIST

## 2019-03-28 NOTE — THERAPY TREATMENT NOTE
Outpatient Physical Therapy Ortho Treatment Note  [facility]     Patient Name: Kian Mcdaniels  : 1955  MRN: [id number]  Today's Date: 3/28/2019      Visit Date: 2019    Visit Dx:    ICD-10-CM ICD-9-CM   1. Acute pain of right knee M25.561 719.46   2. Difficulty walking R26.2 719.7   3. Difficulty navigating stairs R68.89 V62.89   4. Weakness of both legs R29.898 729.89   5. Decreased range of motion of right knee M25.661 719.56   6. S/P total knee arthroplasty, right Z96.651 V43.65       Patient Active Problem List   Diagnosis   • Bulging lumbar disc   • Chronic pain   • Diabetic neuropathy (CMS/[facility])   • Low back pain   • Degenerative arthritis of lumbar spine   • Neuropathy involving both lower extremities   • Encounter for long-term (current) use of high-risk medication   • Postlaminectomy syndrome of lumbar region   • Syringomyelia and syringobulbia (CMS/[facility])   • Lumbar pseudoarthrosis   • Type 2 diabetes mellitus with neurologic complication, with long-term current use of insulin (CMS/[facility])   • Insulin pump in place   • Hx of decompressive lumbar laminectomy   • Bilateral carpal tunnel syndrome   • Degenerative cervical disc   • Acute pain of right knee   • Primary localized osteoarthrosis of right lower leg   • Arthritis of right knee   • Sacroiliac inflammation (CMS/[facility])   • Sacroiliac joint dysfunction   • Severe obesity (BMI 35.0-39.9)   • Chronic pain of right knee   • Tricompartment osteoarthritis of left knee   • Tricompartment osteoarthritis of right knee   • Arthritis of left knee   • Bilateral chronic knee pain   • Hypertension   • S/P total knee arthroplasty, right        Past Medical History:   Diagnosis Date   • Arteriosclerotic cardiovascular disease    • Arthritis    • At risk for sleep apnea    • COPD (chronic obstructive pulmonary disease) (CMS/[facility])    • Coronary artery disease    • Diabetes mellitus (CMS/HCC)    • Diabetic peripheral neuropathy (CMS/[facility])    • Disease of  thyroid gland     NODULE PRESENT   • ED (erectile dysfunction) of non-organic origin    • GERD (gastroesophageal reflux disease)    • Headache disorder     DUE TO NECK   • Hyperlipidemia    • Hypertension    • Insulin pump in place    • Knee pain, bilateral    • Low back pain    • Myocardial infarction (CMS/HCC)    • Neck pain    • Spinal stenosis    • TIA (transient ischemic attack)     LEFT EYE   • Type 2 diabetes mellitus (CMS/HCC)    • Upper back pain    • Wears dentures     UPPER PLATE        Past Surgical History:   Procedure Laterality Date   • AMPUTATION FOOT / TOE Left     GREAT TOE   • BACK SURGERY  10/26/2015    Bilateral L4-L5 laminectomy -Dr. Gutierres   • CARDIAC CATHETERIZATION     • CARDIAC SURGERY      CABG X 6    • CHOLECYSTECTOMY     • CORONARY ARTERY BYPASS GRAFT      X 6   • EPIDURAL BLOCK     • EYE SURGERY      CATARACT EXTRACTION   • HAND SURGERY  04/28/2016   • LUMBAR DISCECTOMY FUSION INSTRUMENTATION N/A 12/12/2016    Procedure: L 4-5 FUSION WITH INSTRUMENTATION WITH BMP, WITH REMOVAL OF INSTRUMENTATION ;  Surgeon: Rowdy Spencer MD;  Location: [facility] OR;  Service:    • LUMBAR LAMINECTOMY DISCECTOMY DECOMPRESSION N/A 12/12/2016    Procedure: L4-5 REPEAT LAMINECTOMY ;  Surgeon: Tim Gutierres MD;  Location: [facility] OR;  Service:    • LUMBAR LAMINECTOMY DISCECTOMY DECOMPRESSION N/A 12/14/2016    Procedure: EVACUATION OF LUMBAR HEMATOMA;  Surgeon: Rowdy Spencer MD;  Location: [facility] OR;  Service:    • MULTIPLE TOOTH EXTRACTIONS      UPPER TEETH EXTRACTED   • ORTHOPEDIC SURGERY     • PENIS SURGERY      RECONSTRUCTION   • SCROTUM EXPLORATION      scrotum surgery   • SPINAL CORD STIMULATOR IMPLANT N/A 9/24/2018    Procedure: SPINAL CORD STIMULATOR Phase 1 - [employer];  Surgeon: Mulugeta Guzman MD;  Location: [facility];  Service: Pain Management   • SPINE SURGERY     • TOTAL KNEE ARTHROPLASTY Right 1/8/2019    Procedure: RIGHT TOTAL KNEE ARTHROPLASTY WITH NEGRITA  NAVIGATION;  Surgeon: Andres Carter MD;  Location: [facility] OR;  Service: Orthopedics             PT Assessment/Plan     Row Name 03/28/19 1627          PT Assessment    Assessment Comments  Patient continues to demonstrate significant R and L foot drag as well as poor quad activation during his exercises. Worked on NMES with [place] current to try and increase quad strength  -       User Key  (r) = Recorded By, (t) = Taken By, (c) = Cosigned By    Initials Name Provider Type    Radha Jasso, PT Physical Therapist          Modalities     Row Name 03/28/19 1600             ELECTRICAL STIMULATION    Attended/Unattended  Attended  -      Stimulation Type  Russian  -      Max mAmp  70  -KH      Location/Electrode Placement/Other  rectus femoris   -      86229 - PT E-Stim Attended (Manual) Minutes  10  -KH        User Key  (r) = Recorded By, (t) = Taken By, (c) = Cosigned By    Initials Name Provider Type    Radha Jasso, PT Physical Therapist        Exercises     Row Name 03/28/19 1600             Subjective Comments    Subjective Comments  I am tired today, I've been walking a lot  -         Subjective Pain    Able to rate subjective pain?  yes  -KH      Pre-Treatment Pain Level  6  -KH      Post-Treatment Pain Level  6  -KH         Total Minutes    18330 - PT Therapeutic Exercise Minutes  40  -KH         Exercise 1    Exercise Name 1  NuStep- Seat 13 L10 (5 min), SAQs with [place] Current for quad activation (10 min), 1K Leg press (140 lbs, 3/15), PROM (5 min), Quad sets (2 x 10)  -      Cueing 1  Verbal;Tactile  -        User Key  (r) = Recorded By, (t) = Taken By, (c) = Cosigned By    Initials Name Provider Type    Radha Jasso, PT Physical Therapist                Time Calculation:   Start Time: 1620  Stop Time: 1700  Time Calculation (min): 40 min  Therapy Charges for Today     Code Description Service Date Service Provider Modifiers Qty    [id number]  PT THER PROC EA 15 MIN  3/28/2019 Radha Garrett, PT GP 3                    Radha Garrett, PT  3/28/2019

## 2019-04-01 ENCOUNTER — HOSPITAL ENCOUNTER (OUTPATIENT)
Dept: PHYSICAL THERAPY | Facility: HOSPITAL | Age: 64
Setting detail: THERAPIES SERIES
Discharge: HOME | End: 2019-04-01

## 2019-04-01 DIAGNOSIS — M25.561 ACUTE PAIN OF RIGHT KNEE: Primary | ICD-10-CM

## 2019-04-01 DIAGNOSIS — Z78.9 DIFFICULTY NAVIGATING STAIRS: ICD-10-CM

## 2019-04-01 DIAGNOSIS — M25.661 DECREASED RANGE OF MOTION OF RIGHT KNEE: ICD-10-CM

## 2019-04-01 DIAGNOSIS — R26.2 DIFFICULTY WALKING: ICD-10-CM

## 2019-04-01 DIAGNOSIS — R29.898 WEAKNESS OF BOTH LEGS: ICD-10-CM

## 2019-04-01 PROCEDURE — 97110 THERAPEUTIC EXERCISES: CPT | Performed by: PHYSICAL THERAPIST

## 2019-04-01 NOTE — THERAPY TREATMENT NOTE
Outpatient Physical Therapy Ortho Treatment Note  [facility]     Patient Name: Kian Mcdaniels  : 1955  MRN: [id number]  Today's Date: 2019      Visit Date: 2019    Visit Dx:    ICD-10-CM ICD-9-CM   1. Acute pain of right knee M25.561 719.46   2. Difficulty walking R26.2 719.7   3. Difficulty navigating stairs R68.89 V62.89   4. Weakness of both legs R29.898 729.89   5. Decreased range of motion of right knee M25.661 719.56       Patient Active Problem List   Diagnosis   • Bulging lumbar disc   • Chronic pain   • Diabetic neuropathy (CMS/HCC)   • Low back pain   • Degenerative arthritis of lumbar spine   • Neuropathy involving both lower extremities   • Encounter for long-term (current) use of high-risk medication   • Postlaminectomy syndrome of lumbar region   • Syringomyelia and syringobulbia (CMS/HCC)   • Lumbar pseudoarthrosis   • Type 2 diabetes mellitus with neurologic complication, with long-term current use of insulin (CMS/HCC)   • Insulin pump in place   • Hx of decompressive lumbar laminectomy   • Bilateral carpal tunnel syndrome   • Degenerative cervical disc   • Acute pain of right knee   • Primary localized osteoarthrosis of right lower leg   • Arthritis of right knee   • Sacroiliac inflammation (CMS/HCC)   • Sacroiliac joint dysfunction   • Severe obesity (BMI 35.0-39.9)   • Chronic pain of right knee   • Tricompartment osteoarthritis of left knee   • Tricompartment osteoarthritis of right knee   • Arthritis of left knee   • Bilateral chronic knee pain   • Hypertension   • S/P total knee arthroplasty, right        Past Medical History:   Diagnosis Date   • Arteriosclerotic cardiovascular disease    • Arthritis    • At risk for sleep apnea    • COPD (chronic obstructive pulmonary disease) (CMS/HCC)    • Coronary artery disease    • Diabetes mellitus (CMS/HCC)    • Diabetic peripheral neuropathy (CMS/HCC)    • Disease of thyroid gland     NODULE PRESENT   • ED (erectile  dysfunction) of non-organic origin    • GERD (gastroesophageal reflux disease)    • Headache disorder     DUE TO NECK   • Hyperlipidemia    • Hypertension    • Insulin pump in place    • Knee pain, bilateral    • Low back pain    • Myocardial infarction (CMS/HCC)    • Neck pain    • Spinal stenosis    • TIA (transient ischemic attack)     LEFT EYE   • Type 2 diabetes mellitus (CMS/HCC)    • Upper back pain    • Wears dentures     UPPER PLATE        Past Surgical History:   Procedure Laterality Date   • AMPUTATION FOOT / TOE Left     GREAT TOE   • BACK SURGERY  10/26/2015    Bilateral L4-L5 laminectomy -Dr. Gutierres   • CARDIAC CATHETERIZATION     • CARDIAC SURGERY      CABG X 6    • CHOLECYSTECTOMY     • CORONARY ARTERY BYPASS GRAFT      X 6   • EPIDURAL BLOCK     • EYE SURGERY      CATARACT EXTRACTION   • HAND SURGERY  04/28/2016   • LUMBAR DISCECTOMY FUSION INSTRUMENTATION N/A 12/12/2016    Procedure: L 4-5 FUSION WITH INSTRUMENTATION WITH BMP, WITH REMOVAL OF INSTRUMENTATION ;  Surgeon: Rowdy Spencer MD;  Location: [facility];  Service:    • LUMBAR LAMINECTOMY DISCECTOMY DECOMPRESSION N/A 12/12/2016    Procedure: L4-5 REPEAT LAMINECTOMY ;  Surgeon: Tim Gutierres MD;  Location: [facility] OR;  Service:    • LUMBAR LAMINECTOMY DISCECTOMY DECOMPRESSION N/A 12/14/2016    Procedure: EVACUATION OF LUMBAR HEMATOMA;  Surgeon: Rowdy Spencer MD;  Location: [facility] OR;  Service:    • MULTIPLE TOOTH EXTRACTIONS      UPPER TEETH EXTRACTED   • ORTHOPEDIC SURGERY     • PENIS SURGERY      RECONSTRUCTION   • SCROTUM EXPLORATION      scrotum surgery   • SPINAL CORD STIMULATOR IMPLANT N/A 9/24/2018    Procedure: SPINAL CORD STIMULATOR Phase 1 - [employer];  Surgeon: Mulugeta Guzman MD;  Location: [facility];  Service: Pain Management   • SPINE SURGERY     • TOTAL KNEE ARTHROPLASTY Right 1/8/2019    Procedure: RIGHT TOTAL KNEE ARTHROPLASTY WITH NEGRITA NAVIGATION;  Surgeon: Andres Carter MD;   Location: [facility] OR;  Service: Orthopedics       PT Ortho     Row Name 04/01/19 1700       General ROM    GENERAL ROM COMMENTS  Post treatment, R knee AROM 7-113 degrees of flexion  -MP       RLE Quick Girth (cm)    Mid patella  47.7 cm  -MP      User Key  (r) = Recorded By, (t) = Taken By, (c) = Cosigned By    Initials Name Provider Type    MP Joao Armendariz, PT Physical Therapist            PT Assessment/Plan     Row Name 04/01/19 7145          PT Assessment    Assessment Comments  Kian has met his initial goal for AROM in supine for the R knee.  The added instruction to his wife to help mobilize the knee for extension should be helpful over time.  -MP        PT Plan    PT Plan Comments  Continue progressing as indicated.  -MP       User Key  (r) = Recorded By, (t) = Taken By, (c) = Cosigned By    Initials Name Provider Type    MP Joao Armendariz, PT Physical Therapist            Exercises     Row Name 04/01/19 1700             Subjective Comments    Subjective Comments  Kian reported that his knee felt less pain and was looser after treatment.  -MP         Subjective Pain    Able to rate subjective pain?  yes  -MP      Pre-Treatment Pain Level  6  -MP      Post-Treatment Pain Level  4  -MP         Total Minutes    46194 - PT Therapeutic Exercise Minutes  40  -MP      23071 - PT Manual Therapy Minutes  5  -MP         Exercise 1    Exercise Name 1  [place] leg press, 140, 145 and 145 lbs. each x 15, step up/through, 2 inch book, 10 x 2, [place] knee extension for quad stretch, 30s x 5, heel slides x 10 and quad sets, roll at ankle x 10.    -MP      Cueing 1  Verbal;Tactile  -MP      Additional Comments  Cues for exercise technique.  -MP        User Key  (r) = Recorded By, (t) = Taken By, (c) = Cosigned By    Initials Name Provider Type    MP Joao Armendariz, PT Physical Therapist             Manual Rx (last 36 hours)      Manual Treatments     Row Name 04/01/19 1700             Total Minutes    04440 - PT Manual  Therapy Minutes  5  -MP         Manual Rx 1    Manual Rx 1 Location  R knee  -MP      Manual Rx 1 Type  quad stretching, supine  -MP      Manual Rx 1 Duration  30s hold x 3  -MP         Manual Rx 2    Manual Rx 2 Location  R knee  -MP      Manual Rx 2 Type  overpressure for heel slides  -MP      Manual Rx 2 Duration  x 5  -MP         Manual Rx 3    Manual Rx 3 Location  R knee  -MP      Manual Rx 3 Type  hamstring stretching, supine  -MP      Manual Rx 3 Duration  30s x 3  -MP        User Key  (r) = Recorded By, (t) = Taken By, (c) = Cosigned By    Initials Name Provider Type    Joao Rosa PT Physical Therapist          PT OP Goals     Row Name 04/01/19 1700          PT Short Term Goals    STG Date to Achieve  04/12/19  -MP     STG 1  Kian begins low intensity closed chain exercises to include TKE with theraband and step ups.  -MP     STG 1 Progress  Met  -MP     STG 2  AROM of the R knee improves by 10 degrees.  -MP     STG 2 Progress  Met  -MP     STG 3  KOS disabiity improves by 15%.  -MP     STG 3 Progress  Ongoing  -MP     STG 4  Mid patella circumference improves by one cm.  -MP     STG 4 Progress  Ongoing  -MP        Long Term Goals    LTG Date to Achieve  06/11/19  -MP     LTG 1  AROM of the R knee, supine, measures 0-120 degrees of flexion.  -MP     LTG 1 Progress  Ongoing  -MP     LTG 2  MMT of the R knee equals 4+/5 for extension and flexion.  -MP     LTG 2 Progress  Ongoing  -MP     LTG 3  Kian is ambulating on all surfaces with a standard cane independently with a normal gait pattern  -MP     LTG 3 Progress  Ongoing  -MP     LTG 4  KOS disability improves by 30%  -MP     LTG 4 Progress  Ongoing  -MP     LTG 5  He is independent with a HEP and self care education.  -MP     LTG 5 Progress  Ongoing  -MP       User Key  (r) = Recorded By, (t) = Taken By, (c) = Cosigned By    Initials Name Provider Type    Joao Rosa PT Physical Therapist          Therapy Education  Education Details: I  demonstrated to Kian's wife how to give a stretch to the hamstrings in supine to help him improve knee extension.  Given: HEP, Symptoms/condition management  Program: New  How Provided: Demonstration  Provided to: Patient, Caregiver  Level of Understanding: Teach back education performed     Time Calculation:   Start Time: 1645  Stop Time: 1730  Time Calculation (min): 45 min  Therapy Charges for Today     Code Description Service Date Service Provider Modifiers Qty    [id number] HC PT THER PROC EA 15 MIN 4/1/2019 Joao Armendariz, PT GP 3          Joao Armendariz, PT  4/1/2019

## 2019-04-03 ENCOUNTER — HOSPITAL ENCOUNTER (OUTPATIENT)
Dept: PHYSICAL THERAPY | Facility: HOSPITAL | Age: 64
Setting detail: THERAPIES SERIES
Discharge: HOME | End: 2019-04-03

## 2019-04-03 DIAGNOSIS — Z96.651 S/P TOTAL KNEE ARTHROPLASTY, RIGHT: ICD-10-CM

## 2019-04-03 DIAGNOSIS — R29.898 WEAKNESS OF BOTH LEGS: ICD-10-CM

## 2019-04-03 DIAGNOSIS — Z78.9 DIFFICULTY NAVIGATING STAIRS: ICD-10-CM

## 2019-04-03 DIAGNOSIS — R26.2 DIFFICULTY WALKING: ICD-10-CM

## 2019-04-03 DIAGNOSIS — M25.661 DECREASED RANGE OF MOTION OF RIGHT KNEE: ICD-10-CM

## 2019-04-03 DIAGNOSIS — M25.561 ACUTE PAIN OF RIGHT KNEE: Primary | ICD-10-CM

## 2019-04-03 PROCEDURE — 97110 THERAPEUTIC EXERCISES: CPT | Performed by: PHYSICAL THERAPIST

## 2019-04-03 NOTE — THERAPY TREATMENT NOTE
Outpatient Physical Therapy Ortho Treatment Note  [facility]     Patient Name: Kian Mcdaniels  : 1955  MRN: [id number]  Today's Date: 4/3/2019      Visit Date: 2019    Visit Dx:    ICD-10-CM ICD-9-CM   1. Acute pain of right knee M25.561 719.46   2. Difficulty walking R26.2 719.7   3. Difficulty navigating stairs R68.89 V62.89   4. Weakness of both legs R29.898 729.89   5. Decreased range of motion of right knee M25.661 719.56   6. S/P total knee arthroplasty, right Z96.651 V43.65       Patient Active Problem List   Diagnosis   • Bulging lumbar disc   • Chronic pain   • Diabetic neuropathy (CMS/[facility])   • Low back pain   • Degenerative arthritis of lumbar spine   • Neuropathy involving both lower extremities   • Encounter for long-term (current) use of high-risk medication   • Postlaminectomy syndrome of lumbar region   • Syringomyelia and syringobulbia (CMS/[facility])   • Lumbar pseudoarthrosis   • Type 2 diabetes mellitus with neurologic complication, with long-term current use of insulin (CMS/[facility])   • Insulin pump in place   • Hx of decompressive lumbar laminectomy   • Bilateral carpal tunnel syndrome   • Degenerative cervical disc   • Acute pain of right knee   • Primary localized osteoarthrosis of right lower leg   • Arthritis of right knee   • Sacroiliac inflammation (CMS/[facility])   • Sacroiliac joint dysfunction   • Severe obesity (BMI 35.0-39.9)   • Chronic pain of right knee   • Tricompartment osteoarthritis of left knee   • Tricompartment osteoarthritis of right knee   • Arthritis of left knee   • Bilateral chronic knee pain   • Hypertension   • S/P total knee arthroplasty, right        Past Medical History:   Diagnosis Date   • Arteriosclerotic cardiovascular disease    • Arthritis    • At risk for sleep apnea    • COPD (chronic obstructive pulmonary disease) (CMS/[facility])    • Coronary artery disease    • Diabetes mellitus (CMS/HCC)    • Diabetic peripheral neuropathy (CMS/[facility])    • Disease of  thyroid gland     NODULE PRESENT   • ED (erectile dysfunction) of non-organic origin    • GERD (gastroesophageal reflux disease)    • Headache disorder     DUE TO NECK   • Hyperlipidemia    • Hypertension    • Insulin pump in place    • Knee pain, bilateral    • Low back pain    • Myocardial infarction (CMS/HCC)    • Neck pain    • Spinal stenosis    • TIA (transient ischemic attack)     LEFT EYE   • Type 2 diabetes mellitus (CMS/HCC)    • Upper back pain    • Wears dentures     UPPER PLATE        Past Surgical History:   Procedure Laterality Date   • AMPUTATION FOOT / TOE Left     GREAT TOE   • BACK SURGERY  10/26/2015    Bilateral L4-L5 laminectomy -Dr. Gutierres   • CARDIAC CATHETERIZATION     • CARDIAC SURGERY      CABG X 6    • CHOLECYSTECTOMY     • CORONARY ARTERY BYPASS GRAFT      X 6   • EPIDURAL BLOCK     • EYE SURGERY      CATARACT EXTRACTION   • HAND SURGERY  04/28/2016   • LUMBAR DISCECTOMY FUSION INSTRUMENTATION N/A 12/12/2016    Procedure: L 4-5 FUSION WITH INSTRUMENTATION WITH BMP, WITH REMOVAL OF INSTRUMENTATION ;  Surgeon: Rowdy Spencer MD;  Location: [facility] OR;  Service:    • LUMBAR LAMINECTOMY DISCECTOMY DECOMPRESSION N/A 12/12/2016    Procedure: L4-5 REPEAT LAMINECTOMY ;  Surgeon: Tim Gutierres MD;  Location: [facility] OR;  Service:    • LUMBAR LAMINECTOMY DISCECTOMY DECOMPRESSION N/A 12/14/2016    Procedure: EVACUATION OF LUMBAR HEMATOMA;  Surgeon: Rowdy Spencer MD;  Location: [facility] OR;  Service:    • MULTIPLE TOOTH EXTRACTIONS      UPPER TEETH EXTRACTED   • ORTHOPEDIC SURGERY     • PENIS SURGERY      RECONSTRUCTION   • SCROTUM EXPLORATION      scrotum surgery   • SPINAL CORD STIMULATOR IMPLANT N/A 9/24/2018    Procedure: SPINAL CORD STIMULATOR Phase 1 - [employer];  Surgeon: Mulugeta Guzman MD;  Location: [facility];  Service: Pain Management   • SPINE SURGERY     • TOTAL KNEE ARTHROPLASTY Right 1/8/2019    Procedure: RIGHT TOTAL KNEE ARTHROPLASTY WITH NEGRITA  NAVIGATION;  Surgeon: Andres Carter MD;  Location: [facility];  Service: Orthopedics       PT Ortho     Row Name 04/01/19 1700       General ROM    GENERAL ROM COMMENTS  Post treatment, R knee AROM 7-113 degrees of flexion  -MP       RLE Quick Girth (cm)    Mid patella  47.7 cm  -MP      User Key  (r) = Recorded By, (t) = Taken By, (c) = Cosigned By    Initials Name Provider Type    Joao Rosa, PT Physical Therapist          PT Assessment/Plan     Row Name 04/03/19 1815          PT Assessment    Assessment Comments  Kian has deficits with eccentric control of the R knee with step ups.  He may benefit from functional electrical stimulation for the R quadriceps at some point.  -MP        PT Plan    PT Plan Comments  Monitor the effects of today's treatment and progress accordingly.  -MP       User Key  (r) = Recorded By, (t) = Taken By, (c) = Cosigned By    Initials Name Provider Type    Joao Rosa PT Physical Therapist            Exercises     Row Name 04/03/19 1800 04/03/19 1700          Subjective Pain    Able to rate subjective pain?  yes  -MP  --     Pre-Treatment Pain Level  6  -MP  --     Post-Treatment Pain Level  4  -MP  --        Total Minutes    01069 - PT Therapeutic Exercise Minutes  40  -MP  --     63508 - PT Manual Therapy Minutes  --  5  -MP        Exercise 1    Exercise Name 1  Refer to land flow sheet  -MP  --     Cueing 1  Verbal;Tactile  -MP  --     Additional Comments  Cues for exercise technique for the leg press to attempt to extend the R knee as much as possible and for step ups for eccentric control of the R knee.  -MP  --       User Key  (r) = Recorded By, (t) = Taken By, (c) = Cosigned By    Initials Name Provider Type    Joao Rosa, PT Physical Therapist             Manual Rx (last 36 hours)      Manual Treatments     Row Name 04/03/19 1700             Total Minutes    15664 - PT Manual Therapy Minutes  5  -MP         Manual Rx 1    Manual Rx 1 Location  R knee   -MP      Manual Rx 1 Type  quad stretching, supine  -MP      Manual Rx 1 Duration  30s hold x 3  -MP         Manual Rx 2    Manual Rx 2 Location  R knee  -MP      Manual Rx 2 Type  hamstring stretching  -MP      Manual Rx 2 Duration  30s hold x 3  -MP        User Key  (r) = Recorded By, (t) = Taken By, (c) = Cosigned By    Initials Name Provider Type    Joao Rosa PT Physical Therapist          PT OP Goals     Row Name 04/03/19 1800          PT Short Term Goals    STG Date to Achieve  04/12/19  -MP     STG 1  Kian begins low intensity closed chain exercises to include TKE with theraband and step ups.  -MP     STG 1 Progress  Met  -MP     STG 2  AROM of the R knee improves by 10 degrees.  -MP     STG 2 Progress  Met  -MP     STG 3  KOS disabiity improves by 15%.  -MP     STG 3 Progress  Ongoing  -MP     STG 4  Mid patella circumference improves by one cm.  -MP     STG 4 Progress  Ongoing  -MP        Long Term Goals    LTG Date to Achieve  06/11/19  -MP     LTG 1  AROM of the R knee, supine, measures 0-120 degrees of flexion.  -MP     LTG 1 Progress  Ongoing  -MP     LTG 2  MMT of the R knee equals 4+/5 for extension and flexion.  -MP     LTG 2 Progress  Ongoing  -MP     LTG 3  Kian is ambulating on all surfaces with a standard cane independently with a normal gait pattern  -MP     LTG 3 Progress  Ongoing  -MP     LTG 4  KOS disability improves by 30%  -MP     LTG 4 Progress  Ongoing  -MP     LTG 5  He is independent with a HEP and self care education.  -MP     LTG 5 Progress  Ongoing  -MP       User Key  (r) = Recorded By, (t) = Taken By, (c) = Cosigned By    Initials Name Provider Type    Joao Rosa PT Physical Therapist          Therapy Education  Given: HEP, Symptoms/condition management  Program: Reinforced  How Provided: Verbal  Provided to: Patient  Level of Understanding: Verbalized       Time Calculation:   Start Time: 1605  Stop Time: 1650  Time Calculation (min): 45 min  Therapy Charges for  Today     Code Description Service Date Service Provider Modifiers Qty    65872789053 HC PT THER PROC EA 15 MIN 4/3/2019 Joao Armendariz, PT GP 3          Joao Armendariz, PT  4/3/2019

## 2019-04-08 ENCOUNTER — TELEPHONE (OUTPATIENT)
Dept: ORTHOPEDIC SURGERY | Facility: CLINIC | Age: 64
End: 2019-04-08

## 2019-04-08 ENCOUNTER — APPOINTMENT (OUTPATIENT)
Dept: PHYSICAL THERAPY | Facility: HOSPITAL | Age: 64
End: 2019-04-08

## 2019-04-09 RX ORDER — OXYCODONE HYDROCHLORIDE 10 MG/1
10 TABLET ORAL EVERY 6 HOURS PRN
Qty: 120 TABLET | Refills: 0 | Status: SHIPPED | OUTPATIENT
Start: 2019-04-09 | End: 2019-05-02 | Stop reason: SDUPTHER

## 2019-04-09 NOTE — TELEPHONE ENCOUNTER
Pt is set for a medication is on the 1st of may   He was r/s from 4/18 to 5/2 due to provider being out     Listed below is medication information     oxyCODONE (ROXICODONE) 10 MG tablet       Original Order:  oxyCODONE (ROXICODONE) 10 MG tablet [[id number]]      Pharmacy:  [facility] [address] [place] [address] [address] AT [address] - [phone]  - [phone] FX Phone:  [phone] Fax:  [phone]    Address:  [address], [facility] [address]

## 2019-04-09 NOTE — TELEPHONE ENCOUNTER
Pt reported he was unable to find a ride today but wants to know if there is another day he can be worked in

## 2019-04-09 NOTE — TELEPHONE ENCOUNTER
Medication Refill Request    Date of phone call: 19    Medication being requested: Oxycodone 10mg  si tab q 6 hrs prn  Qty: 120    Date of last visit: 19    Date of last refill: 3/21/19    DAVID up to date?: yes    Next Follow up?: 19    Any new pertinent information? (i.e, new medication allergies, new use of medications, change in patient's health or condition, non-compliance or inconsistency with prescribing agreement?): Patient was rescheduled. Will need a partial refill until his appt. It looks like he will be out on .

## 2019-04-10 ENCOUNTER — HOSPITAL ENCOUNTER (OUTPATIENT)
Dept: PHYSICAL THERAPY | Facility: HOSPITAL | Age: 64
Setting detail: THERAPIES SERIES
Discharge: HOME | End: 2019-04-10

## 2019-04-10 DIAGNOSIS — M25.661 DECREASED RANGE OF MOTION OF RIGHT KNEE: ICD-10-CM

## 2019-04-10 DIAGNOSIS — M25.561 ACUTE PAIN OF RIGHT KNEE: Primary | ICD-10-CM

## 2019-04-10 DIAGNOSIS — R26.2 DIFFICULTY WALKING: ICD-10-CM

## 2019-04-10 DIAGNOSIS — Z78.9 DIFFICULTY NAVIGATING STAIRS: ICD-10-CM

## 2019-04-10 DIAGNOSIS — R29.898 WEAKNESS OF BOTH LEGS: ICD-10-CM

## 2019-04-10 PROCEDURE — 97110 THERAPEUTIC EXERCISES: CPT | Performed by: PHYSICAL THERAPIST

## 2019-04-10 PROCEDURE — 97140 MANUAL THERAPY 1/> REGIONS: CPT | Performed by: PHYSICAL THERAPIST

## 2019-04-10 PROCEDURE — G0283 ELEC STIM OTHER THAN WOUND: HCPCS | Performed by: PHYSICAL THERAPIST

## 2019-04-10 NOTE — THERAPY PROGRESS REPORT/RE-CERT
Outpatient Physical Therapy Ortho Re-Assessment  [facility]     Patient Name: Kian Mcdaniels  : 1955  MRN: [id number]  Today's Date: 4/10/2019      Visit Date: 04/10/2019    Patient Active Problem List   Diagnosis   • Bulging lumbar disc   • Chronic pain   • Diabetic neuropathy (CMS/HCC)   • Low back pain   • Degenerative arthritis of lumbar spine   • Neuropathy involving both lower extremities   • Encounter for long-term (current) use of high-risk medication   • Postlaminectomy syndrome of lumbar region   • Syringomyelia and syringobulbia (CMS/HCC)   • Lumbar pseudoarthrosis   • Type 2 diabetes mellitus with neurologic complication, with long-term current use of insulin (CMS/HCC)   • Insulin pump in place   • Hx of decompressive lumbar laminectomy   • Bilateral carpal tunnel syndrome   • Degenerative cervical disc   • Acute pain of right knee   • Primary localized osteoarthrosis of right lower leg   • Arthritis of right knee   • Sacroiliac inflammation (CMS/HCC)   • Sacroiliac joint dysfunction   • Severe obesity (BMI 35.0-39.9)   • Chronic pain of right knee   • Tricompartment osteoarthritis of left knee   • Tricompartment osteoarthritis of right knee   • Arthritis of left knee   • Bilateral chronic knee pain   • Hypertension   • S/P total knee arthroplasty, right        Past Medical History:   Diagnosis Date   • Arteriosclerotic cardiovascular disease    • Arthritis    • At risk for sleep apnea    • COPD (chronic obstructive pulmonary disease) (CMS/HCC)    • Coronary artery disease    • Diabetes mellitus (CMS/HCC)    • Diabetic peripheral neuropathy (CMS/HCC)    • Disease of thyroid gland     NODULE PRESENT   • ED (erectile dysfunction) of non-organic origin    • GERD (gastroesophageal reflux disease)    • Headache disorder     DUE TO NECK   • Hyperlipidemia    • Hypertension    • Insulin pump in place    • Knee pain, bilateral    • Low back pain    • Myocardial infarction (CMS/HCC)    • Neck  pain    • Spinal stenosis    • TIA (transient ischemic attack)     LEFT EYE   • Type 2 diabetes mellitus (CMS/HCC)    • Upper back pain    • Wears dentures     UPPER PLATE        Past Surgical History:   Procedure Laterality Date   • AMPUTATION FOOT / TOE Left     GREAT TOE   • BACK SURGERY  10/26/2015    Bilateral L4-L5 laminectomy -Dr. Gutierres   • CARDIAC CATHETERIZATION     • CARDIAC SURGERY      CABG X 6    • CHOLECYSTECTOMY     • CORONARY ARTERY BYPASS GRAFT      X 6   • EPIDURAL BLOCK     • EYE SURGERY      CATARACT EXTRACTION   • HAND SURGERY  04/28/2016   • LUMBAR DISCECTOMY FUSION INSTRUMENTATION N/A 12/12/2016    Procedure: L 4-5 FUSION WITH INSTRUMENTATION WITH BMP, WITH REMOVAL OF INSTRUMENTATION ;  Surgeon: Rowdy Spenecr MD;  Location: [facility] OR;  Service:    • LUMBAR LAMINECTOMY DISCECTOMY DECOMPRESSION N/A 12/12/2016    Procedure: L4-5 REPEAT LAMINECTOMY ;  Surgeon: Tim Gutierres MD;  Location: [facility] OR;  Service:    • LUMBAR LAMINECTOMY DISCECTOMY DECOMPRESSION N/A 12/14/2016    Procedure: EVACUATION OF LUMBAR HEMATOMA;  Surgeon: Rowdy Spencer MD;  Location: [facility] OR;  Service:    • MULTIPLE TOOTH EXTRACTIONS      UPPER TEETH EXTRACTED   • ORTHOPEDIC SURGERY     • PENIS SURGERY      RECONSTRUCTION   • SCROTUM EXPLORATION      scrotum surgery   • SPINAL CORD STIMULATOR IMPLANT N/A 9/24/2018    Procedure: SPINAL CORD STIMULATOR Phase 1 - [employer];  Surgeon: Mulugeta Guzman MD;  Location: [facility] OR;  Service: Pain Management   • SPINE SURGERY     • TOTAL KNEE ARTHROPLASTY Right 1/8/2019    Procedure: RIGHT TOTAL KNEE ARTHROPLASTY WITH NEGRITA NAVIGATION;  Surgeon: Andres Carter MD;  Location: [facility];  Service: Orthopedics       Visit Dx:     ICD-10-CM ICD-9-CM   1. Acute pain of right knee M25.561 719.46   2. Difficulty walking R26.2 719.7   3. Difficulty navigating stairs R68.89 V62.89   4. Weakness of both legs R29.898 729.89   5. Decreased range of  motion of right knee M25.661 719.56       PT Ortho     Row Name 04/10/19 1800       General ROM    GENERAL ROM COMMENTS  R knee AROM, post manual therapy and exercises, 6-112 degrees of flexion in supine.  -MP       RLE Quick Girth (cm)    Mid patella  -- 48.8 cm. before and 47.8 cm. after treatment  -MP      User Key  (r) = Recorded By, (t) = Taken By, (c) = Cosigned By    Initials Name Provider Type    Joao Rosa PT Physical Therapist          [unfilled]    Therapy Education  Given: HEP, Symptoms/condition management  Program: Reinforced  How Provided: Verbal  Provided to: Patient  Level of Understanding: Verbalized     PT OP Goals     Row Name 04/10/19 1800          PT Short Term Goals    STG Date to Achieve  04/12/19  -MP     STG 1  Kian begins low intensity closed chain exercises to include TKE with theraband and step ups.  -MP     STG 1 Progress  Met  -MP     STG 2  AROM of the R knee improves by 10 degrees.  -MP     STG 2 Progress  Met  -MP     STG 3  KOS disabiity improves by 15%.  -MP     STG 3 Progress  Ongoing  -MP     STG 4  Mid patella circumference improves by one cm.  -MP     STG 4 Progress  Ongoing  -MP        Long Term Goals    LTG Date to Achieve  06/11/19  -MP     LTG 1  AROM of the R knee, supine, measures 0-120 degrees of flexion.  -MP     LTG 1 Progress  Ongoing  -MP     LTG 2  MMT of the R knee equals 4+/5 for extension and flexion.  -MP     LTG 2 Progress  Ongoing  -MP     LTG 3  Kian is ambulating on all surfaces with a standard cane independently with a normal gait pattern  -MP     LTG 3 Progress  Ongoing  -MP     LTG 4  KOS disability improves by 30%  -MP     LTG 4 Progress  Ongoing  -MP     LTG 5  He is independent with a HEP and self care education.  -MP     LTG 5 Progress  Ongoing  -MP       User Key  (r) = Recorded By, (t) = Taken By, (c) = Cosigned By    Initials Name Provider Type    Joao Rosa PT Physical Therapist          PT Assessment/Plan     Row Name  04/10/19 1821          PT Assessment    Functional Limitations  Impaired gait;Impaired locomotion;Decreased safety during functional activities;Limitations in community activities;Performance in leisure activities;Limitations in functional capacity and performance  -MP     Impairments  Range of motion;Muscle strength;Motor function;Pain;Posture;Poor body mechanics;Locomotion;Endurance;Edema;Gait;Impaired flexibility;Impaired muscle endurance  -MP     Assessment Comments  Kian Mcdaniels has been seen for eight skilled physical therapy sessions for right total knee replacement.  Treatment has included manual therapy, therapeutic exercises, electrical stimulation, cryotherapy, gait training and education for self care. Progress to physical therapy goals is gradual and compromised recently due to two falls.  He will benefit from continued skilled physical therapy to address remaining impairments and functional limitations.   -MP     Please refer to paper survey for additional self-reported information  No  -MP     Rehab Potential  Good  -MP     Patient/caregiver participated in establishment of treatment plan and goals  Yes  -MP     Patient would benefit from skilled therapy intervention  Yes  -MP        PT Plan    PT Frequency  2x/week  -MP     Predicted Duration of Therapy Intervention (Therapy Eval)  4 weeks  -MP     Planned CPT's?  PT EVAL MOD COMPLELITY: 94564;PT RE-EVAL: 43896;PT THER PROC EA 15 MIN: 63069;PT THER ACT EA 15 MIN: 52193;PT MANUAL THERAPY EA 15 MIN: 71792;PT GAIT TRAINING EA 15 MIN: 62473;PT AQUATIC THERAPY EA 15 MIN: 70516;PT SELF CARE/HOME MGMT/TRAIN EA 15: 43840;PT HOT OR COLD PACK TREAT [facility];PT ELECTRICAL STIM UNATTEND: ;PT ULTRASOUND EA 15 MIN: 48826  -MP       User Key  (r) = Recorded By, (t) = Taken By, (c) = Cosigned By    Initials Name Provider Type    Joao Rosa, PT Physical Therapist          Modalities     Row Name 04/10/19 5780             Ice    Ice Applied  Yes  -MP       Location  R knee during IFC  -MP         ELECTRICAL STIMULATION    Attended/Unattended  Unattended  -MP      Stimulation Type  IFC  -MP      Max mAmp  56  -MP      Location/Electrode Placement/Other  medial and lateral, katrina cross pattern  -MP       PT E-Stim Unattended (Manual) Minutes  15  -MP        User Key  (r) = Recorded By, (t) = Taken By, (c) = Cosigned By    Initials Name Provider Type    MP Joao Armendariz PT Physical Therapist        Exercises     Row Name 04/10/19 1800 04/10/19 1700          Subjective Comments    Subjective Comments  Kian reports falling Monday of this week causing him to miss that appointment and he had an MRI for the lumbar spine today and fell again due to awkward situtation due to not having his walker.    -MP  --        Subjective Pain    Able to rate subjective pain?  yes  -MP  --     Pre-Treatment Pain Level  8  -MP  --     Post-Treatment Pain Level  6  -MP  --        Total Minutes    81602 - PT Therapeutic Exercise Minutes  15  -MP  --     75298 - PT Manual Therapy Minutes  --  15  -MP        Exercise 1    Exercise Name 1  He warmed up with the NuStep x 5 minutes, then did heel slides x 10 and 15 quad sets, roll at ankle, the last 5 with overpressure to increase extension.  -MP  --     Cueing 1  Verbal;Tactile  -MP  --     Additional Comments  Cues for exercises performance  -MP  --       User Key  (r) = Recorded By, (t) = Taken By, (c) = Cosigned By    Initials Name Provider Type    MP Joao Armendariz, PT Physical Therapist           Manual Rx (last 36 hours)      Manual Treatments     Row Name 04/10/19 1700             Total Minutes    82378 - PT Manual Therapy Minutes  15  -MP         Manual Rx 1    Manual Rx 1 Location  R knee  -MP      Manual Rx 1 Type  STM, scar massage  -MP         Manual Rx 2    Manual Rx 2 Location  R knee  -MP      Manual Rx 2 Type  stretching for flexion and extension  -MP        User Key  (r) = Recorded By, (t) = Taken By, (c) = Cosigned By     Initials Name Provider Type    MP Joao Armendariz, PT Physical Therapist         Time Calculation:     Start Time: 1650  Stop Time: 1735  Time Calculation (min): 45 min     Therapy Charges for Today     Code Description Service Date Service Provider Modifiers Qty    01968124589 HC PT THER PROC EA 15 MIN 4/10/2019 Joao Armendariz, PT GP 1    87058652424 HC PT MANUAL THERAPY EA 15 MIN 4/10/2019 Joao Armendariz, PT GP 1    31766958621 HC PT ELECTRICAL STIM UNATTENDED 4/10/2019 Joao Armendariz, PT  1          Joao Armendariz PT  4/10/2019

## 2019-04-11 ENCOUNTER — OFFICE VISIT (OUTPATIENT)
Dept: ORTHOPEDIC SURGERY | Facility: CLINIC | Age: 64
End: 2019-04-11

## 2019-04-11 VITALS — WEIGHT: 265 LBS | BODY MASS INDEX: 37.1 KG/M2 | HEIGHT: 71 IN | TEMPERATURE: 98.6 F

## 2019-04-11 DIAGNOSIS — M17.12 ARTHRITIS OF LEFT KNEE: ICD-10-CM

## 2019-04-11 DIAGNOSIS — Z96.651 STATUS POST RIGHT KNEE REPLACEMENT: Primary | ICD-10-CM
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
## 2019-04-11 DIAGNOSIS — S80.212A ABRASION OF LEFT KNEE, INITIAL ENCOUNTER: ICD-10-CM

## 2019-04-11 PROBLEM — Z96.652 S/P TOTAL KNEE ARTHROPLASTY, LEFT: Status: ACTIVE | Noted: 2019-01-22

## 2019-04-11 PROCEDURE — 99213 OFFICE O/P EST LOW 20 MIN: CPT | Performed by: NURSE PRACTITIONER

## 2019-04-11 PROCEDURE — 20610 DRAIN/INJ JOINT/BURSA W/O US: CPT | Performed by: NURSE PRACTITIONER

## 2019-04-11 PROCEDURE — 73560 X-RAY EXAM OF KNEE 1 OR 2: CPT | Performed by: NURSE PRACTITIONER

## 2019-04-11 RX ORDER — LIDOCAINE HYDROCHLORIDE 20 MG/ML
4 INJECTION, SOLUTION EPIDURAL; INFILTRATION; INTRACAUDAL; PERINEURAL
Status: COMPLETED | OUTPATIENT
Start: 2019-04-11 | End: 2019-04-11

## 2019-04-11 RX ORDER — METHYLPREDNISOLONE ACETATE 80 MG/ML
80 INJECTION, SUSPENSION INTRA-ARTICULAR; INTRALESIONAL; INTRAMUSCULAR; SOFT TISSUE
Status: COMPLETED | OUTPATIENT
Start: 2019-04-11 | End: 2019-04-11

## 2019-04-11 RX ADMIN — LIDOCAINE HYDROCHLORIDE 4 ML: 20 INJECTION, SOLUTION EPIDURAL; INFILTRATION; INTRACAUDAL; PERINEURAL at 11:44

## 2019-04-11 RX ADMIN — METHYLPREDNISOLONE ACETATE 80 MG: 80 INJECTION, SUSPENSION INTRA-ARTICULAR; INTRALESIONAL; INTRAMUSCULAR; SOFT TISSUE at 11:44

## 2019-04-11 NOTE — PROGRESS NOTES
NAME: Kian Mcdaniels  : 1955   MRN: [id number]   DATE: 2019    CC: 3 months Follow up status post total joint replacement, now injury to left knee  SUBJECTIVE:    HPI:  3 months Follow up status post total joint replacement, now injury to left knee.  For the right knee, Patient returns today for a follow up of total joint replacement. Patient reports doing well with no unusual complaints. Appears to be progressing appropriately. Left knee gave out going down incline yesterday with sudden slipping went down with scrape on front of left knee and bruised his back as well.  Sees Dr. Spencer for his back.  Is walking with slow gait with walker today.   HPI    This problem is new to this examiner.     Allergies:   Allergies   Allergen Reactions   • Erythromycin Nausea Only and Other (See Comments)     PT STATES ACUTE KIDNEY INJURY   • Lisinopril Other (See Comments)     2016 possibly caused pancreatitis per Dr Quinonez   • Metformin Nausea And Vomiting       Medications:   Home Medications:  Current Outpatient Medications on File Prior to Visit   Medication Sig   • albuterol (VENTOLIN HFA) 108 (90 BASE) MCG/ACT inhaler Inhale 1-2 puffs Every 6 (Six) Hours As Needed for wheezing (RESCUE INHALER).   • amLODIPine (NORVASC) 10 MG tablet Take 10 mg by mouth Every Morning.   • aspirin 81 MG EC tablet Take 1 tablet by mouth Every Morning. (Patient taking differently: Take 81 mg by mouth Every Morning. HOLD FOR SURGERY)   • atorvastatin (LIPITOR) 40 MG tablet Take 40 mg by mouth Daily.   • bisacodyl (DULCOLAX) 5 MG EC tablet Take 2 tablets by mouth Daily As Needed for Constipation.   • Blood Glucose Monitoring Suppl (FREESTYLE FREEDOM LITE) W/DEVICE kit    • carvedilol (COREG) 12.5 MG tablet Take 12.5 mg by mouth 2 (Two) Times a Day With Meals.   • cetirizine (ZyrTEC) 10 MG tablet Take 10 mg by mouth Every Morning.   • clopidogrel (PLAVIX) 75 MG tablet Take 75 mg by mouth Every Morning. HOLDING FOR SURGERY   •  cyclobenzaprine (FLEXERIL) 10 MG tablet Take 1 tablet by mouth 2 (Two) Times a Day As Needed for Muscle Spasms.   • docusate sodium (COLACE) 100 MG capsule Take 100 mg by mouth 2 (Two) Times a Day.   • furosemide (LASIX) 40 MG tablet Take 40 mg by mouth Every Night.   • gabapentin (NEURONTIN) 400 MG capsule Take 1 capsule by mouth 3 (Three) Times a Day.   • glucose blood (FREESTYLE LITE) test strip Test 4 times per day   • HUMULIN R 500 UNIT/ML CONCENTRATED injection Inject 500 Units under the skin Continuous. Via insulin pump basal rate 0.68units/hr from 0000 to 1000  1000 to 1600 is 0.7units/hr  1600 to 0000 0.75units/hr  Plus sliding scale based on carb intake   • insulin degludec (TRESIBA FLEXTOUCH) 100 UNIT/ML solution pen-injector injection Inject 45 Units under the skin into the appropriate area as directed Every Morning.   • Insulin Infusion Pump (T:SLIM INSULIN PUMP) device    • isosorbide mononitrate (IMDUR) 60 MG 24 hr tablet Take 60 mg by mouth Every Morning.   • Lancets (FREESTYLE) lancets Test 4 times per day   • losartan (COZAAR) 50 MG tablet Take 50 mg by mouth Every Morning.   • NARCAN 4 MG/0.1ML nasal spray    • oxyCODONE (ROXICODONE) 10 MG tablet Take 1 tablet by mouth Every 6 (Six) Hours As Needed for Severe Pain .   • pantoprazole (PROTONIX) 40 MG EC tablet Take 40 mg by mouth Every Morning.   • polyethylene glycol (MIRALAX) pack packet Take 17 g by mouth Daily.   • potassium chloride (K-DUR) 10 MEQ CR tablet Take 10 mEq by mouth Daily.   • promethazine (PHENERGAN) 12.5 MG tablet Take 1 tablet by mouth Every 6 (Six) Hours As Needed for Nausea or Vomiting.   • raNITIdine (ZANTAC) 300 MG tablet Take 300 mg by mouth Every Night.     No current facility-administered medications on file prior to visit.        Current Medications:  Scheduled Meds:  Continuous Infusions:  No current facility-administered medications for this visit.   PRN Meds:.    I have reviewed the patient's medical history in detail  and updated the computerized patient record.  Review and summarization of old records include:    Past Medical History:   Diagnosis Date   • Arteriosclerotic cardiovascular disease    • Arthritis    • At risk for sleep apnea    • COPD (chronic obstructive pulmonary disease) (CMS/[facility])    • Coronary artery disease    • Diabetes mellitus (CMS/HCC)    • Diabetic peripheral neuropathy (CMS/HCC)    • Disease of thyroid gland     NODULE PRESENT   • ED (erectile dysfunction) of non-organic origin    • GERD (gastroesophageal reflux disease)    • Headache disorder     DUE TO NECK   • Hyperlipidemia    • Hypertension    • Insulin pump in place    • Knee pain, bilateral    • Low back pain    • Myocardial infarction (CMS/HCC)    • Neck pain    • Spinal stenosis    • TIA (transient ischemic attack)     LEFT EYE   • Type 2 diabetes mellitus (CMS/[facility])    • Upper back pain    • Wears dentures     UPPER PLATE        Past Surgical History:   Procedure Laterality Date   • AMPUTATION FOOT / TOE Left     GREAT TOE   • BACK SURGERY  10/26/2015    Bilateral L4-L5 laminectomy -Dr. Gutierres   • CARDIAC CATHETERIZATION     • CARDIAC SURGERY      CABG X 6    • CHOLECYSTECTOMY     • CORONARY ARTERY BYPASS GRAFT      X 6   • EPIDURAL BLOCK     • EYE SURGERY      CATARACT EXTRACTION   • HAND SURGERY  04/28/2016   • LUMBAR DISCECTOMY FUSION INSTRUMENTATION N/A 12/12/2016    Procedure: L 4-5 FUSION WITH INSTRUMENTATION WITH BMP, WITH REMOVAL OF INSTRUMENTATION ;  Surgeon: Rowdy Spencer MD;  Location: [facility] OR;  Service:    • LUMBAR LAMINECTOMY DISCECTOMY DECOMPRESSION N/A 12/12/2016    Procedure: L4-5 REPEAT LAMINECTOMY ;  Surgeon: Tim Gutierres MD;  Location: [facility] OR;  Service:    • LUMBAR LAMINECTOMY DISCECTOMY DECOMPRESSION N/A 12/14/2016    Procedure: EVACUATION OF LUMBAR HEMATOMA;  Surgeon: oRwdy Spencer MD;  Location: [facility] OR;  Service:    • MULTIPLE TOOTH EXTRACTIONS      UPPER TEETH EXTRACTED   • ORTHOPEDIC SURGERY      • PENIS SURGERY      RECONSTRUCTION   • SCROTUM EXPLORATION      scrotum surgery   • SPINAL CORD STIMULATOR IMPLANT N/A 2018    Procedure: SPINAL CORD STIMULATOR Phase 1 - [employer];  Surgeon: Mulugeta Guzman MD;  Location: [facility];  Service: Pain Management   • SPINE SURGERY     • TOTAL KNEE ARTHROPLASTY Right 2019    Procedure: RIGHT TOTAL KNEE ARTHROPLASTY WITH NEGRITA NAVIGATION;  Surgeon: Andres Carter MD;  Location: [facility] OR;  Service: Orthopedics        Social History     Occupational History   • Not on file   Tobacco Use   • Smoking status: Former Smoker     Packs/day: 1.00     Years: 25.00     Pack years: 25.00     Types: Cigarettes     Start date:      Last attempt to quit: 2000     Years since quittin.2   • Smokeless tobacco: Never Used   Substance and Sexual Activity   • Alcohol use: No     Comment: no alcohol since    • Drug use: No   • Sexual activity: Defer    Social History     Social History Narrative   • Not on file        Family History   Problem Relation Age of Onset   • Diabetes Other    • Heart disease Other    • Hypertension Other    • Kidney disease Other         renal failure   • Heart disease Maternal Grandmother    • Hypertension Maternal Grandmother    • Malig Hyperthermia Neg Hx        ROS: 14 point review of systems was performed and was negative except for documented findings in HPI and today's encounter.     Allergies:   Allergies   Allergen Reactions   • Erythromycin Nausea Only and Other (See Comments)     PT STATES ACUTE KIDNEY INJURY   • Lisinopril Other (See Comments)     2016 possibly caused pancreatitis per Dr Quinonez   • Metformin Nausea And Vomiting     Constitutional:  Denies fever, shaking or chills   Eyes:  Denies change in visual acuity   HENT:  Denies nasal congestion or sore throat   Respiratory:  Denies cough or shortness of breath   Cardiovascular:  Denies chest pain or severe LE edema   GI:  Denies abdominal pain,  "nausea, vomiting, bloody stools or diarrhea   Musculoskeletal:  Denies numbness tingling or loss of motor function except as outlined above in history of present illness.  : Denies painful urination or hematuria  Integument:  Denies rash, lesion or ulceration   Neurologic:  Denies headache or focal weakness  Endocrine:  Denies lymphadenopathy  Psych:  Denies confusion or change in mental status   Hem:  Denies active bleeding        OBJECTIVE:     Physical Exam:  Vital Signs:    Wt Readings from Last 3 Encounters:   04/11/19 120 kg (265 lb)   03/05/19 116 kg (255 lb)   02/26/19 117 kg (257 lb 9.6 oz)     Ht Readings from Last 3 Encounters:   04/11/19 180.3 cm (71\")   03/05/19 181.6 cm (71.5\")   02/26/19 181.6 cm (71.5\")     Body mass index is 36.96 kg/m².  Facility age limit for growth percentiles is 20 years.  Vitals:    04/11/19 1126   Temp: 98.6 °F (37 °C)       Constitutional: Awake alert and oriented x3, well developed, well nourished, no acute distress, non-toxic appearance.  HEENT:  Normocephalic, Atraumatic, Bilateral external ears normal, Oropharynx moist, No oral exudates, Nose normal.   Respiratory:  No respiratory distress, No wheezing  CV: No palpitations  Vascular:  Brisk cap refill, Intact distal pulses, No cyanosis, all compartments soft with no signs or symptoms of compartment syndrome or DVT.  Neurologic: Sensation grossly intact to light touch throughout the involved extremity and bilaterally symmetric, deep tendon reflexes are 2+ and bilaterally symmetric, No focal deficits noted.   Neck:  Normal range of motion, No tenderness, Supple, Negative Spurlings.  Integument: Well hydrated, no rash, warm, dry, no lesions or ulceration.   Musculoskeletal:  Affected extremity post op incision is healed appropriately. No sign of infection. Range of motion is progressing as expected. The calf is soft and nontender with a negative Homans sign. Distal pulses intact. Normal amount of swelling present for " recovery time.     Left knee with fresh scab distal to patella and synovitis swelling and crepitation decrease rom with flexion.    Large Joint Arthrocentesis: L knee  Date/Time: 4/11/2019 11:44 AM  Consent given by: patient  Site marked: site marked  Timeout: Immediately prior to procedure a time out was called to verify the correct patient, procedure, equipment, support staff and site/side marked as required   Supporting Documentation  Indications: pain and joint swelling   Procedure Details  Location: knee - L knee  Preparation: Patient was prepped and draped in the usual sterile fashion  Needle size: 22 G  Approach: anterolateral  Medications administered: 80 mg methylPREDNISolone acetate 80 MG/ML; 4 mL lidocaine PF 2% 2 %  Patient tolerance: patient tolerated the procedure well with no immediate complications            DIAGNOSTIC STUDIES  Imaging done today, images were personally viewed and discussed with the patient:  Indication, findings and comparison: 2V AP&Lat of the operative joint that also show the left knee, viewed for evaluation of past joint replacement and discussed with patient. They demonstrate a well positioned, well aligned total joint replacement without complicating factors noted. In comparison with the film from the last office visit, there has been no alignment change. The left knee shows arthritis without evidence of fracture or change from prior xr done in the office.     ASSESSMENT: Status post right total knee replacement with expected healing, left knee arthritis and contusion with abrasion from new fall.    PLAN: 1) Continue home PT exercises as needed.   2) Continue with antibiotic prophylaxis with dental procedures for 2 yrs post total joint replacement.   3) Follow up 3 months for R TKA f/u and left knee reevaluation of pain.    4) Cortisone inj of left knee, ice, rest, do PT strengthening exercises for this knee as well.     4/11/2019  Patient was seen by KATHLEEN Stewart in the  office today.

## 2019-04-15 ENCOUNTER — HOSPITAL ENCOUNTER (OUTPATIENT)
Dept: PHYSICAL THERAPY | Facility: HOSPITAL | Age: 64
Setting detail: THERAPIES SERIES
Discharge: HOME | End: 2019-04-15

## 2019-04-15 DIAGNOSIS — R29.898 WEAKNESS OF BOTH LEGS: ICD-10-CM

## 2019-04-15 DIAGNOSIS — Z96.651 S/P TOTAL KNEE ARTHROPLASTY, RIGHT: ICD-10-CM

## 2019-04-15 DIAGNOSIS — R26.2 DIFFICULTY WALKING: ICD-10-CM

## 2019-04-15 DIAGNOSIS — M25.661 DECREASED RANGE OF MOTION OF RIGHT KNEE: ICD-10-CM

## 2019-04-15 DIAGNOSIS — Z78.9 DIFFICULTY NAVIGATING STAIRS: ICD-10-CM

## 2019-04-15 DIAGNOSIS — M25.561 ACUTE PAIN OF RIGHT KNEE: Primary | ICD-10-CM

## 2019-04-15 PROCEDURE — 97140 MANUAL THERAPY 1/> REGIONS: CPT | Performed by: PHYSICAL THERAPIST

## 2019-04-15 PROCEDURE — 97110 THERAPEUTIC EXERCISES: CPT | Performed by: PHYSICAL THERAPIST

## 2019-04-15 NOTE — PROGRESS NOTES
Cervical Epidural Steroid Injection @ C5-C6  [facility]    PREOPERATIVE DIAGNOSIS:   Cervical degenerative disc disease, cervical radicular pain (which is now L>R).  Plavix use.  POSTOPERATIVE DIAGNOSIS:  Same as preop diagnosis    PROCEDURE:   Cervical Epidural Steroid Injection, Therapeutic Translaminar Injection, with epidurogram, at  C5-C6 level    PRE-PROCEDURE DISCUSSION WITH PATIENT:    Risks and complications were discussed with the patient prior to starting the procedure and informed consent was obtained.  We discussed various topics including but not limited to bleeding, infection, injury, paralysis, nerve injury, dural puncture, coma, death, worsening of clinical picture, lack of pain relief, and postprocedural soreness.    SURGEON:  Mulugeta Guzman MD    REASON FOR PROCEDURE:    Previous injection was rightward at this same level on 8-29-17.  He has had improvement on the right but overall feels that he is no better despite resolving right radicular symptoms.  We will enter on the left today.  Plavix held a week.     SEDATION:  Versed 2mg & Fentanyl 100 mcg IV  ANESTHETIC:  Marcaine 0.25%  STEROID:   Methylprednisolone (DEPO MEDROL) 80mg/ml    DESCRIPTON OF PROCEDURE:    After obtaining informed consent, I.V. was started in the preop area.   The patient was taken to the operating room and placed in the prone position.  EKG, blood pressure, and pulse oximeter were monitored throughout, and sedation was provided as needed by the RN under my guidance. All pressure points were well padded.  The cervicothoracic spine area was prepped with Chloraprep and draped in a sterile fashion.  Under fluoroscopic guidance, the aforementioned interlaminar space was identified. Skin and subcutaneous tissues were anesthetized with 1% lidocaine in the middle of the space. A 20-gauge Tuohy needle was introduced through the skin and advanced to this interlaminar space and into the epidural space under  Discharge Summary    Name: Tania Lopez  :  1960   MRN:  [id number]    Primary Care Doctor:  Vinita Madsen MD  Admit date:  2019    Discharge date:  4/15/2019    Admitting Physician: Fariba Milton MD   Discharge Physician: Fariba Milton fluoroscopic guidance and verified with loss-of-resistance technique to air.  After confirming the position of the needle with the fluoroscope with all the views, and after aspiration was confirmed negative for blood and CSF, 1.5 mL of Omnipaque was injected.  After seeing appropriate epidurogram with lateral and PA views, a total of 3 cc solution was injected, consisting of 1cc of local anesthetic as above, with normal saline and injectable steroid as above.     ESTIMATED BLOOD LOSS:  <5 mL  SPECIMENS:  None    COMPLICATIONS:     There was no indication of vascular uptake on live injection of contrast dye. and There was no evidence of dural puncture.      TOLERANCE & DISCHARGE CONDITION:    The patient tolerated the procedure well.  The patient was transported to the recovery area without difficulties.  The patient was discharged to home under the care of family in stable and satisfactory condition.    PLAN OF CARE:  1. The patient was given our standard instruction sheet.  2. The patient will Return to clinic 3 wks and resume Plavix no sooner than 6 hrs after the procedure..  3. The patient will resume all medications as per the medication reconciliation sheet.         pulmonology Dr. Mark Robbins. Stephen Dumont    Discharge Medications:       VY Ghosh/ Homer Castro 81 Medication Instructions GY    

## 2019-04-15 NOTE — THERAPY TREATMENT NOTE
Outpatient Physical Therapy Ortho Treatment Note  [facility]     Patient Name: Kian Mcdaniels  : 1955  MRN: [id number]  Today's Date: 4/15/2019      Visit Date: 04/15/2019    Visit Dx:    ICD-10-CM ICD-9-CM   1. Acute pain of right knee M25.561 719.46   2. Difficulty walking R26.2 719.7   3. Difficulty navigating stairs R68.89 V62.89   4. Weakness of both legs R29.898 729.89   5. Decreased range of motion of right knee M25.661 719.56   6. S/P total knee arthroplasty, right Z96.651 V43.65       Patient Active Problem List   Diagnosis   • Bulging lumbar disc   • Chronic pain   • Diabetic neuropathy (CMS/HCC)   • Low back pain   • Degenerative arthritis of lumbar spine   • Neuropathy involving both lower extremities   • Encounter for long-term (current) use of high-risk medication   • Postlaminectomy syndrome of lumbar region   • Syringomyelia and syringobulbia (CMS/[facility])   • Lumbar pseudoarthrosis   • Type 2 diabetes mellitus with neurologic complication, with long-term current use of insulin (CMS/HCC)   • Insulin pump in place   • Hx of decompressive lumbar laminectomy   • Bilateral carpal tunnel syndrome   • Degenerative cervical disc   • Acute pain of right knee   • Primary localized osteoarthrosis of right lower leg   • Arthritis of right knee   • Sacroiliac inflammation (CMS/HCC)   • Sacroiliac joint dysfunction   • Severe obesity (BMI 35.0-39.9)   • Chronic pain of right knee   • Tricompartment osteoarthritis of left knee   • Tricompartment osteoarthritis of right knee   • Arthritis of left knee   • Bilateral chronic knee pain   • Hypertension   • S/P total knee arthroplasty, left   • Fall (on)(from) incline, initial encounter   • Abrasion of left knee        Past Medical History:   Diagnosis Date   • Arteriosclerotic cardiovascular disease    • Arthritis    • At risk for sleep apnea    • COPD (chronic obstructive pulmonary disease) (CMS/[facility])    • Coronary artery disease    • Diabetes mellitus  (CMS/HCC)    • Diabetic peripheral neuropathy (CMS/HCC)    • Disease of thyroid gland     NODULE PRESENT   • ED (erectile dysfunction) of non-organic origin    • GERD (gastroesophageal reflux disease)    • Headache disorder     DUE TO NECK   • Hyperlipidemia    • Hypertension    • Insulin pump in place    • Knee pain, bilateral    • Low back pain    • Myocardial infarction (CMS/HCC)    • Neck pain    • Spinal stenosis    • TIA (transient ischemic attack)     LEFT EYE   • Type 2 diabetes mellitus (CMS/HCC)    • Upper back pain    • Wears dentures     UPPER PLATE        Past Surgical History:   Procedure Laterality Date   • AMPUTATION FOOT / TOE Left     GREAT TOE   • BACK SURGERY  10/26/2015    Bilateral L4-L5 laminectomy -Dr. Gutierres   • CARDIAC CATHETERIZATION     • CARDIAC SURGERY      CABG X 6    • CHOLECYSTECTOMY     • CORONARY ARTERY BYPASS GRAFT      X 6   • EPIDURAL BLOCK     • EYE SURGERY      CATARACT EXTRACTION   • HAND SURGERY  04/28/2016   • LUMBAR DISCECTOMY FUSION INSTRUMENTATION N/A 12/12/2016    Procedure: L 4-5 FUSION WITH INSTRUMENTATION WITH BMP, WITH REMOVAL OF INSTRUMENTATION ;  Surgeon: Rowdy Spencer MD;  Location: [facility] OR;  Service:    • LUMBAR LAMINECTOMY DISCECTOMY DECOMPRESSION N/A 12/12/2016    Procedure: L4-5 REPEAT LAMINECTOMY ;  Surgeon: Tim Gutierres MD;  Location: [facility] OR;  Service:    • LUMBAR LAMINECTOMY DISCECTOMY DECOMPRESSION N/A 12/14/2016    Procedure: EVACUATION OF LUMBAR HEMATOMA;  Surgeon: Rowdy Spencer MD;  Location: [facility] OR;  Service:    • MULTIPLE TOOTH EXTRACTIONS      UPPER TEETH EXTRACTED   • ORTHOPEDIC SURGERY     • PENIS SURGERY      RECONSTRUCTION   • SCROTUM EXPLORATION      scrotum surgery   • SPINAL CORD STIMULATOR IMPLANT N/A 9/24/2018    Procedure: SPINAL CORD STIMULATOR Phase 1 - MicroTransponder;  Surgeon: Mulugeta Guzman MD;  Location: [facility];  Service: Pain Management   • SPINE SURGERY     • TOTAL KNEE ARTHROPLASTY  Right 1/8/2019    Procedure: RIGHT TOTAL KNEE ARTHROPLASTY WITH NEGRITA NAVIGATION;  Surgeon: Andres Carter MD;  Location: [facility];  Service: Orthopedics       PT Ortho     Row Name 04/15/19 1700       General ROM    GENERAL ROM COMMENTS  R knee AROM, supine, post treatment, 5-110 degrees of flexion  -MP      User Key  (r) = Recorded By, (t) = Taken By, (c) = Cosigned By    Initials Name Provider Type    MP Joao Armendariz, PT Physical Therapist          PT Assessment/Plan     Row Name 04/15/19 1757          PT Assessment    Assessment Comments  Kian's lumbar spine history and issues with the L knee are preventing us from using closed chain exercises for significant strengthening of the R LE.    -MP        PT Plan    PT Plan Comments  Continue to progress therapeutic exercises as able.  -MP       User Key  (r) = Recorded By, (t) = Taken By, (c) = Cosigned By    Initials Name Provider Type    MP Joao Armendariz, PT Physical Therapist            Exercises     Row Name 04/15/19 1700             Subjective Comments    Subjective Comments  Kian reports that his body is still sore and bruised from the falls in the recent past.  -MP         Subjective Pain    Able to rate subjective pain?  yes  -MP      Pre-Treatment Pain Level  7  -MP      Post-Treatment Pain Level  5  -MP         Total Minutes    53302 - PT Therapeutic Exercise Minutes  30  -MP      73222 - PT Manual Therapy Minutes  15  -MP         Exercise 1    Exercise Name 1  Refer to land flow sheet  -MP      Cueing 1  Verbal  -MP      Additional Comments  Cues for exercise technique.  He was able to do more exercise today to include the long arcs x 10, short arcs x 10, manual leg press x 10 and did 10 step ups with the R knee before his back and L knee started to bother him.    -MP        User Key  (r) = Recorded By, (t) = Taken By, (c) = Cosigned By    Initials Name Provider Type    Joao Rosa, PT Physical Therapist             Manual Rx (last 36  hours)      Manual Treatments     Row Name 04/15/19 1700             Total Minutes    62753 - PT Manual Therapy Minutes  15  -MP         Manual Rx 1    Manual Rx 1 Location  R knee  -MP      Manual Rx 1 Type  STM, scar massage  -MP         Manual Rx 2    Manual Rx 2 Location  R knee  -MP      Manual Rx 2 Type  stretching for flexion and extension  -MP        User Key  (r) = Recorded By, (t) = Taken By, (c) = Cosigned By    Initials Name Provider Type    MP Joao Armendariz PT Physical Therapist          PT OP Goals     Row Name 04/15/19 1700          PT Short Term Goals    STG Date to Achieve  04/12/19  -MP     STG 1  Kian begins low intensity closed chain exercises to include TKE with theraband and step ups.  -MP     STG 1 Progress  Met  -MP     STG 2  AROM of the R knee improves by 10 degrees.  -MP     STG 2 Progress  Met  -MP     STG 3  KOS disabiity improves by 15%.  -MP     STG 3 Progress  Ongoing  -MP     STG 4  Mid patella circumference improves by one cm.  -MP     STG 4 Progress  Ongoing  -MP        Long Term Goals    LTG Date to Achieve  06/11/19  -MP     LTG 1  AROM of the R knee, supine, measures 0-120 degrees of flexion.  -MP     LTG 1 Progress  Ongoing  -MP     LTG 2  MMT of the R knee equals 4+/5 for extension and flexion.  -MP     LTG 2 Progress  Ongoing  -MP     LTG 3  Kian is ambulating on all surfaces with a standard cane independently with a normal gait pattern  -MP     LTG 3 Progress  Ongoing  -MP     LTG 4  KOS disability improves by 30%  -MP     LTG 4 Progress  Ongoing  -MP     LTG 5  He is independent with a HEP and self care education.  -MP     LTG 5 Progress  Ongoing  -MP       User Key  (r) = Recorded By, (t) = Taken By, (c) = Cosigned By    Initials Name Provider Type    Joao Rosa PT Physical Therapist          Therapy Education  Given: HEP, Symptoms/condition management  Program: Reinforced  How Provided: Verbal  Provided to: Patient  Level of Understanding: Verbalized     Time  Calculation:   Start Time: 1645  Stop Time: 1730  Time Calculation (min): 45 min  Therapy Charges for Today     Code Description Service Date Service Provider Modifiers Qty    16761480702 HC PT THER PROC EA 15 MIN 4/15/2019 Joao Armendariz, PT GP 2    19543391034 HC PT MANUAL THERAPY EA 15 MIN 4/15/2019 Joao Armendariz, PT GP 1          Joao Armendariz, PT  4/15/2019

## 2019-04-17 ENCOUNTER — HOSPITAL ENCOUNTER (OUTPATIENT)
Dept: PHYSICAL THERAPY | Facility: HOSPITAL | Age: 64
Setting detail: THERAPIES SERIES
Discharge: HOME | End: 2019-04-17

## 2019-04-17 DIAGNOSIS — M25.561 ACUTE PAIN OF RIGHT KNEE: Primary | ICD-10-CM

## 2019-04-17 DIAGNOSIS — R29.898 WEAKNESS OF BOTH LEGS: ICD-10-CM

## 2019-04-17 DIAGNOSIS — Z96.651 S/P TOTAL KNEE ARTHROPLASTY, RIGHT: ICD-10-CM

## 2019-04-17 DIAGNOSIS — Z78.9 DIFFICULTY NAVIGATING STAIRS: ICD-10-CM

## 2019-04-17 DIAGNOSIS — R26.2 DIFFICULTY WALKING: ICD-10-CM

## 2019-04-17 DIAGNOSIS — M25.661 DECREASED RANGE OF MOTION OF RIGHT KNEE: ICD-10-CM

## 2019-04-17 PROCEDURE — 97110 THERAPEUTIC EXERCISES: CPT

## 2019-04-17 PROCEDURE — 97140 MANUAL THERAPY 1/> REGIONS: CPT

## 2019-04-17 NOTE — THERAPY TREATMENT NOTE
Outpatient Physical Therapy Ortho Treatment Note  [facility]     Patient Name: Kian Mcdaniels  : 1955  MRN: [id number]  Today's Date: 2019      Visit Date: 2019    Visit Dx:    ICD-10-CM ICD-9-CM   1. Acute pain of right knee M25.561 719.46   2. Difficulty walking R26.2 719.7   3. Difficulty navigating stairs R68.89 V62.89   4. Weakness of both legs R29.898 729.89   5. Decreased range of motion of right knee M25.661 719.56   6. S/P total knee arthroplasty, right Z96.651 V43.65       Patient Active Problem List   Diagnosis   • Bulging lumbar disc   • Chronic pain   • Diabetic neuropathy (CMS/HCC)   • Low back pain   • Degenerative arthritis of lumbar spine   • Neuropathy involving both lower extremities   • Encounter for long-term (current) use of high-risk medication   • Postlaminectomy syndrome of lumbar region   • Syringomyelia and syringobulbia (CMS/[facility])   • Lumbar pseudoarthrosis   • Type 2 diabetes mellitus with neurologic complication, with long-term current use of insulin (CMS/HCC)   • Insulin pump in place   • Hx of decompressive lumbar laminectomy   • Bilateral carpal tunnel syndrome   • Degenerative cervical disc   • Acute pain of right knee   • Primary localized osteoarthrosis of right lower leg   • Arthritis of right knee   • Sacroiliac inflammation (CMS/HCC)   • Sacroiliac joint dysfunction   • Severe obesity (BMI 35.0-39.9)   • Chronic pain of right knee   • Tricompartment osteoarthritis of left knee   • Tricompartment osteoarthritis of right knee   • Arthritis of left knee   • Bilateral chronic knee pain   • Hypertension   • S/P total knee arthroplasty, left   • Fall (on)(from) incline, initial encounter   • Abrasion of left knee        Past Medical History:   Diagnosis Date   • Arteriosclerotic cardiovascular disease    • Arthritis    • At risk for sleep apnea    • COPD (chronic obstructive pulmonary disease) (CMS/[facility])    • Coronary artery disease    • Diabetes mellitus  (CMS/HCC)    • Diabetic peripheral neuropathy (CMS/HCC)    • Disease of thyroid gland     NODULE PRESENT   • ED (erectile dysfunction) of non-organic origin    • GERD (gastroesophageal reflux disease)    • Headache disorder     DUE TO NECK   • Hyperlipidemia    • Hypertension    • Insulin pump in place    • Knee pain, bilateral    • Low back pain    • Myocardial infarction (CMS/HCC)    • Neck pain    • Spinal stenosis    • TIA (transient ischemic attack)     LEFT EYE   • Type 2 diabetes mellitus (CMS/HCC)    • Upper back pain    • Wears dentures     UPPER PLATE        Past Surgical History:   Procedure Laterality Date   • AMPUTATION FOOT / TOE Left     GREAT TOE   • BACK SURGERY  10/26/2015    Bilateral L4-L5 laminectomy -Dr. Gutierres   • CARDIAC CATHETERIZATION     • CARDIAC SURGERY      CABG X 6    • CHOLECYSTECTOMY     • CORONARY ARTERY BYPASS GRAFT      X 6   • EPIDURAL BLOCK     • EYE SURGERY      CATARACT EXTRACTION   • HAND SURGERY  04/28/2016   • LUMBAR DISCECTOMY FUSION INSTRUMENTATION N/A 12/12/2016    Procedure: L 4-5 FUSION WITH INSTRUMENTATION WITH BMP, WITH REMOVAL OF INSTRUMENTATION ;  Surgeon: Rowdy Spencer MD;  Location: [facility] OR;  Service:    • LUMBAR LAMINECTOMY DISCECTOMY DECOMPRESSION N/A 12/12/2016    Procedure: L4-5 REPEAT LAMINECTOMY ;  Surgeon: Tim Gutierres MD;  Location: [facility] OR;  Service:    • LUMBAR LAMINECTOMY DISCECTOMY DECOMPRESSION N/A 12/14/2016    Procedure: EVACUATION OF LUMBAR HEMATOMA;  Surgeon: Rowdy Spencer MD;  Location: [facility] OR;  Service:    • MULTIPLE TOOTH EXTRACTIONS      UPPER TEETH EXTRACTED   • ORTHOPEDIC SURGERY     • PENIS SURGERY      RECONSTRUCTION   • SCROTUM EXPLORATION      scrotum surgery   • SPINAL CORD STIMULATOR IMPLANT N/A 9/24/2018    Procedure: SPINAL CORD STIMULATOR Phase 1 - [employer];  Surgeon: Mulugeta Guzman MD;  Location: [facility];  Service: Pain Management   • SPINE SURGERY     • TOTAL KNEE ARTHROPLASTY  Right 1/8/2019    Procedure: RIGHT TOTAL KNEE ARTHROPLASTY WITH NEGRITA NAVIGATION;  Surgeon: Andres Carter MD;  Location: [facility];  Service: Orthopedics       PT Ortho     Row Name 04/17/19 1700       General ROM    GENERAL ROM COMMENTS  R knee AAROM 10-95 deg  -CA    Row Name 04/15/19 1700       General ROM    GENERAL ROM COMMENTS  R knee AROM, supine, post treatment, 5-110 degrees of flexion  -MP      User Key  (r) = Recorded By, (t) = Taken By, (c) = Cosigned By    Initials Name Provider Type    MP Joao Armendariz, PT Physical Therapist    Millie Roberts, PT Physical Therapist                      PT Assessment/Plan     Row Name 04/17/19 1700          PT Assessment    Assessment Comments  Progressed back to CKC activities today as pt reports decreased back pain and L knee pain today. Pt tolerates this gradual progression well. Cont to be limited in extension  -CA        PT Plan    PT Plan Comments  Continue to progress therapeutic exercises as able.  -CA       User Key  (r) = Recorded By, (t) = Taken By, (c) = Cosigned By    Initials Name Provider Type    CA Millie Horowitz, PT Physical Therapist            Exercises     Row Name 04/17/19 1700             Subjective Comments    Subjective Comments  Kian reports he is feeling a littel better today, and is up to trying some of the machienes again  -CA         Subjective Pain    Able to rate subjective pain?  yes  -CA      Pre-Treatment Pain Level  5  -CA         Total Minutes    27675 - PT Therapeutic Exercise Minutes  30  -CA      17255 - PT Manual Therapy Minutes  10  -CA         Exercise 1    Exercise Name 1  Refer to land flow sheet  -CA      Cueing 1  Verbal  -CA        User Key  (r) = Recorded By, (t) = Taken By, (c) = Cosigned By    Initials Name Provider Type    Millie Roberts, PT Physical Therapist                      Manual Rx (last 36 hours)      Manual Treatments     Row Name 04/17/19 1700             Total Minutes    21089 - PT  Manual Therapy Minutes  10  -CA         Manual Rx 2    Manual Rx 2 Location  R knee  -CA      Manual Rx 2 Type  stretching for flexion and extension  -CA        User Key  (r) = Recorded By, (t) = Taken By, (c) = Cosigned By    Initials Name Provider Type    Millie Roberts, PT Physical Therapist                             Time Calculation:   Start Time: 1640  Stop Time: 1720  Time Calculation (min): 40 min  Total Timed Code Minutes- PT: 40 minute(s)  Therapy Charges for Today     Code Description Service Date Service Provider Modifiers Qty    91139698342  PT THER PROC EA 15 MIN 4/17/2019 Millie Horowitz, PT GP 2    [id number]  PT MANUAL THERAPY EA 15 MIN 4/17/2019 Millie Horowitz, PT GP 1                    Milile Horowitz, PT  4/17/2019

## 2019-04-23 ENCOUNTER — HOSPITAL ENCOUNTER (OUTPATIENT)
Dept: PHYSICAL THERAPY | Facility: HOSPITAL | Age: 64
Setting detail: THERAPIES SERIES
Discharge: HOME | End: 2019-04-23

## 2019-04-23 DIAGNOSIS — M25.661 DECREASED RANGE OF MOTION OF RIGHT KNEE: ICD-10-CM

## 2019-04-23 DIAGNOSIS — Z96.651 S/P TOTAL KNEE ARTHROPLASTY, RIGHT: ICD-10-CM

## 2019-04-23 DIAGNOSIS — M25.561 ACUTE PAIN OF RIGHT KNEE: Primary | ICD-10-CM

## 2019-04-23 DIAGNOSIS — R29.898 WEAKNESS OF BOTH LEGS: ICD-10-CM

## 2019-04-23 DIAGNOSIS — Z78.9 DIFFICULTY NAVIGATING STAIRS: ICD-10-CM

## 2019-04-23 DIAGNOSIS — R26.2 DIFFICULTY WALKING: ICD-10-CM

## 2019-04-23 PROCEDURE — 97110 THERAPEUTIC EXERCISES: CPT | Performed by: PHYSICAL THERAPIST

## 2019-04-23 PROCEDURE — 97140 MANUAL THERAPY 1/> REGIONS: CPT | Performed by: PHYSICAL THERAPIST

## 2019-04-23 NOTE — THERAPY TREATMENT NOTE
Outpatient Physical Therapy Ortho Treatment Note  [facility]     Patient Name: Kian Mcdaniels  : 1955  MRN: [id number]  Today's Date: 2019      Visit Date: 2019    Visit Dx:    ICD-10-CM ICD-9-CM   1. Acute pain of right knee M25.561 719.46   2. Difficulty walking R26.2 719.7   3. Difficulty navigating stairs R68.89 V62.89   4. Weakness of both legs R29.898 729.89   5. Decreased range of motion of right knee M25.661 719.56   6. S/P total knee arthroplasty, right Z96.651 V43.65       Patient Active Problem List   Diagnosis   • Bulging lumbar disc   • Chronic pain   • Diabetic neuropathy (CMS/HCC)   • Low back pain   • Degenerative arthritis of lumbar spine   • Neuropathy involving both lower extremities   • Encounter for long-term (current) use of high-risk medication   • Postlaminectomy syndrome of lumbar region   • Syringomyelia and syringobulbia (CMS/[facility])   • Lumbar pseudoarthrosis   • Type 2 diabetes mellitus with neurologic complication, with long-term current use of insulin (CMS/HCC)   • Insulin pump in place   • Hx of decompressive lumbar laminectomy   • Bilateral carpal tunnel syndrome   • Degenerative cervical disc   • Acute pain of right knee   • Primary localized osteoarthrosis of right lower leg   • Arthritis of right knee   • Sacroiliac inflammation (CMS/HCC)   • Sacroiliac joint dysfunction   • Severe obesity (BMI 35.0-39.9)   • Chronic pain of right knee   • Tricompartment osteoarthritis of left knee   • Tricompartment osteoarthritis of right knee   • Arthritis of left knee   • Bilateral chronic knee pain   • Hypertension   • S/P total knee arthroplasty, left   • Fall (on)(from) incline, initial encounter   • Abrasion of left knee        Past Medical History:   Diagnosis Date   • Arteriosclerotic cardiovascular disease    • Arthritis    • At risk for sleep apnea    • COPD (chronic obstructive pulmonary disease) (CMS/[facility])    • Coronary artery disease    • Diabetes mellitus  (CMS/HCC)    • Diabetic peripheral neuropathy (CMS/HCC)    • Disease of thyroid gland     NODULE PRESENT   • ED (erectile dysfunction) of non-organic origin    • GERD (gastroesophageal reflux disease)    • Headache disorder     DUE TO NECK   • Hyperlipidemia    • Hypertension    • Insulin pump in place    • Knee pain, bilateral    • Low back pain    • Myocardial infarction (CMS/HCC)    • Neck pain    • Spinal stenosis    • TIA (transient ischemic attack)     LEFT EYE   • Type 2 diabetes mellitus (CMS/HCC)    • Upper back pain    • Wears dentures     UPPER PLATE        Past Surgical History:   Procedure Laterality Date   • AMPUTATION FOOT / TOE Left     GREAT TOE   • BACK SURGERY  10/26/2015    Bilateral L4-L5 laminectomy -Dr. Gutierres   • CARDIAC CATHETERIZATION     • CARDIAC SURGERY      CABG X 6    • CHOLECYSTECTOMY     • CORONARY ARTERY BYPASS GRAFT      X 6   • EPIDURAL BLOCK     • EYE SURGERY      CATARACT EXTRACTION   • HAND SURGERY  04/28/2016   • LUMBAR DISCECTOMY FUSION INSTRUMENTATION N/A 12/12/2016    Procedure: L 4-5 FUSION WITH INSTRUMENTATION WITH BMP, WITH REMOVAL OF INSTRUMENTATION ;  Surgeon: Rowdy Spencer MD;  Location: [facility] OR;  Service:    • LUMBAR LAMINECTOMY DISCECTOMY DECOMPRESSION N/A 12/12/2016    Procedure: L4-5 REPEAT LAMINECTOMY ;  Surgeon: Tim Gutierres MD;  Location: [facility] OR;  Service:    • LUMBAR LAMINECTOMY DISCECTOMY DECOMPRESSION N/A 12/14/2016    Procedure: EVACUATION OF LUMBAR HEMATOMA;  Surgeon: Rowdy Spencer MD;  Location: [facility] OR;  Service:    • MULTIPLE TOOTH EXTRACTIONS      UPPER TEETH EXTRACTED   • ORTHOPEDIC SURGERY     • PENIS SURGERY      RECONSTRUCTION   • SCROTUM EXPLORATION      scrotum surgery   • SPINAL CORD STIMULATOR IMPLANT N/A 9/24/2018    Procedure: SPINAL CORD STIMULATOR Phase 1 - DOCUSYS;  Surgeon: Mulugeta Guzman MD;  Location: [facility];  Service: Pain Management   • SPINE SURGERY     • TOTAL KNEE ARTHROPLASTY  Right 1/8/2019    Procedure: RIGHT TOTAL KNEE ARTHROPLASTY WITH NEGRITA NAVIGATION;  Surgeon: Andres Carter MD;  Location: [facility];  Service: Orthopedics         PT Assessment/Plan     Row Name 04/23/19 3851          PT Assessment    Assessment Comments  Kian tolerated treatment well.  He was able to do low level step ups using his walker for assistance from the arms to help lighten the load for both the LE and his lumbar spine.  -MP        PT Plan    PT Plan Comments  Monitor the effect of today's treatment and progress accordingly.  -MP       User Key  (r) = Recorded By, (t) = Taken By, (c) = Cosigned By    Initials Name Provider Type    MP Joao Armendariz, PT Physical Therapist            Exercises     Row Name 04/23/19 1700             Subjective Comments    Subjective Comments  Kian arrived five minutes late for therapy.  He reported that he was stiff and sore before treatment.  -MP         Subjective Pain    Able to rate subjective pain?  yes  -MP      Pre-Treatment Pain Level  7  -MP      Post-Treatment Pain Level  5  -MP         Total Minutes    36592 - PT Therapeutic Exercise Minutes  35  -MP      21696 - PT Manual Therapy Minutes  10  -MP         Exercise 1    Exercise Name 1  Refer to land flow sheet  -MP      Cueing 1  Verbal  -MP      Additional Comments  Cues for step ups using his walker for support and taking pressure off of the lumbar spine.  Cues for the [place] leg press, unilateral to improve extension for the R knee.  -MP        User Key  (r) = Recorded By, (t) = Taken By, (c) = Cosigned By    Initials Name Provider Type    MP Joao Armendariz, PT Physical Therapist             Manual Rx (last 36 hours)      Manual Treatments     Row Name 04/23/19 1700             Total Minutes    62116 - PT Manual Therapy Minutes  10  -MP         Manual Rx 1    Manual Rx 1 Location  R knee and periphery  -MP      Manual Rx 1 Type  STM, scar massage  -MP      Manual Rx 1 Duration  7 minutes  -MP          Manual Rx 2    Manual Rx 2 Location  R knee  -MP      Manual Rx 2 Type  Static extension stretching  -MP      Manual Rx 2 Duration  30s x 5  -MP        User Key  (r) = Recorded By, (t) = Taken By, (c) = Cosigned By    Initials Name Provider Type    Joao Rosa PT Physical Therapist          PT OP Goals     Row Name 04/23/19 1700          PT Short Term Goals    STG Date to Achieve  04/12/19  -MP     STG 1  Kian begins low intensity closed chain exercises to include TKE with theraband and step ups.  -MP     STG 1 Progress  Met  -MP     STG 2  AROM of the R knee improves by 10 degrees.  -MP     STG 2 Progress  Met  -MP     STG 3  KOS disabiity improves by 15%.  -MP     STG 3 Progress  Ongoing  -MP     STG 4  Mid patella circumference improves by one cm.  -MP     STG 4 Progress  Ongoing  -MP        Long Term Goals    LTG Date to Achieve  06/11/19  -MP     LTG 1  AROM of the R knee, supine, measures 0-120 degrees of flexion.  -MP     LTG 1 Progress  Ongoing  -MP     LTG 2  MMT of the R knee equals 4+/5 for extension and flexion.  -MP     LTG 2 Progress  Ongoing  -MP     LTG 3  Kian is ambulating on all surfaces with a standard cane independently with a normal gait pattern  -MP     LTG 3 Progress  Ongoing  -MP     LTG 4  KOS disability improves by 30%  -MP     LTG 4 Progress  Ongoing  -MP     LTG 5  He is independent with a HEP and self care education.  -MP     LTG 5 Progress  Ongoing  -MP       User Key  (r) = Recorded By, (t) = Taken By, (c) = Cosigned By    Initials Name Provider Type    Joao Rosa PT Physical Therapist          Therapy Education  Given: HEP, Symptoms/condition management  Program: Reinforced  How Provided: Verbal  Provided to: Patient  Level of Understanding: Verbalized     Time Calculation:   Start Time: 1650  Stop Time: 1735  Time Calculation (min): 45 min  Therapy Charges for Today     Code Description Service Date Service Provider Modifiers Qty    [id number]  PT THER PROC EA 15  MIN 4/23/2019 Joao Armendariz, PT GP 2    [id number] HC PT MANUAL THERAPY EA 15 MIN 4/23/2019 Joao Armendariz, PT GP 1          Joao Armendariz, PT  4/23/2019

## 2019-04-25 ENCOUNTER — HOSPITAL ENCOUNTER (OUTPATIENT)
Dept: PHYSICAL THERAPY | Facility: HOSPITAL | Age: 64
Setting detail: THERAPIES SERIES
Discharge: HOME | End: 2019-04-25

## 2019-04-25 PROCEDURE — 97110 THERAPEUTIC EXERCISES: CPT | Performed by: PHYSICAL THERAPIST

## 2019-04-25 PROCEDURE — 97140 MANUAL THERAPY 1/> REGIONS: CPT | Performed by: PHYSICAL THERAPIST

## 2019-04-25 NOTE — THERAPY TREATMENT NOTE
Outpatient Physical Therapy Ortho Treatment Note  [facility]     Patient Name: Kian Mcdaniels  : 1955  MRN: [id number]  Today's Date: 2019      Visit Date: 2019    Visit Dx:  No diagnosis found.    Patient Active Problem List   Diagnosis   • Bulging lumbar disc   • Chronic pain   • Diabetic neuropathy (CMS/HCC)   • Low back pain   • Degenerative arthritis of lumbar spine   • Neuropathy involving both lower extremities   • Encounter for long-term (current) use of high-risk medication   • Postlaminectomy syndrome of lumbar region   • Syringomyelia and syringobulbia (CMS/HCC)   • Lumbar pseudoarthrosis   • Type 2 diabetes mellitus with neurologic complication, with long-term current use of insulin (CMS/HCC)   • Insulin pump in place   • Hx of decompressive lumbar laminectomy   • Bilateral carpal tunnel syndrome   • Degenerative cervical disc   • Acute pain of right knee   • Primary localized osteoarthrosis of right lower leg   • Arthritis of right knee   • Sacroiliac inflammation (CMS/HCC)   • Sacroiliac joint dysfunction   • Severe obesity (BMI 35.0-39.9)   • Chronic pain of right knee   • Tricompartment osteoarthritis of left knee   • Tricompartment osteoarthritis of right knee   • Arthritis of left knee   • Bilateral chronic knee pain   • Hypertension   • S/P total knee arthroplasty, left   • Fall (on)(from) incline, initial encounter   • Abrasion of left knee        Past Medical History:   Diagnosis Date   • Arteriosclerotic cardiovascular disease    • Arthritis    • At risk for sleep apnea    • COPD (chronic obstructive pulmonary disease) (CMS/HCC)    • Coronary artery disease    • Diabetes mellitus (CMS/HCC)    • Diabetic peripheral neuropathy (CMS/HCC)    • Disease of thyroid gland     NODULE PRESENT   • ED (erectile dysfunction) of non-organic origin    • GERD (gastroesophageal reflux disease)    • Headache disorder     DUE TO NECK   • Hyperlipidemia    • Hypertension    • Insulin  pump in place    • Knee pain, bilateral    • Low back pain    • Myocardial infarction (CMS/HCC)    • Neck pain    • Spinal stenosis    • TIA (transient ischemic attack)     LEFT EYE   • Type 2 diabetes mellitus (CMS/HCC)    • Upper back pain    • Wears dentures     UPPER PLATE        Past Surgical History:   Procedure Laterality Date   • AMPUTATION FOOT / TOE Left     GREAT TOE   • BACK SURGERY  10/26/2015    Bilateral L4-L5 laminectomy -Dr. Gutierres   • CARDIAC CATHETERIZATION     • CARDIAC SURGERY      CABG X 6    • CHOLECYSTECTOMY     • CORONARY ARTERY BYPASS GRAFT      X 6   • EPIDURAL BLOCK     • EYE SURGERY      CATARACT EXTRACTION   • HAND SURGERY  04/28/2016   • LUMBAR DISCECTOMY FUSION INSTRUMENTATION N/A 12/12/2016    Procedure: L 4-5 FUSION WITH INSTRUMENTATION WITH BMP, WITH REMOVAL OF INSTRUMENTATION ;  Surgeon: Rowdy Spencer MD;  Location: [facility];  Service:    • LUMBAR LAMINECTOMY DISCECTOMY DECOMPRESSION N/A 12/12/2016    Procedure: L4-5 REPEAT LAMINECTOMY ;  Surgeon: Tim Gutierres MD;  Location: [facility] OR;  Service:    • LUMBAR LAMINECTOMY DISCECTOMY DECOMPRESSION N/A 12/14/2016    Procedure: EVACUATION OF LUMBAR HEMATOMA;  Surgeon: Rowdy Spencer MD;  Location: [facility] OR;  Service:    • MULTIPLE TOOTH EXTRACTIONS      UPPER TEETH EXTRACTED   • ORTHOPEDIC SURGERY     • PENIS SURGERY      RECONSTRUCTION   • SCROTUM EXPLORATION      scrotum surgery   • SPINAL CORD STIMULATOR IMPLANT N/A 9/24/2018    Procedure: SPINAL CORD STIMULATOR Phase 1 - [employer];  Surgeon: Mulugeta Guzman MD;  Location: [facility];  Service: Pain Management   • SPINE SURGERY     • TOTAL KNEE ARTHROPLASTY Right 1/8/2019    Procedure: RIGHT TOTAL KNEE ARTHROPLASTY WITH NEGRITA NAVIGATION;  Surgeon: Andres Carter MD;  Location: [facility];  Service: Orthopedics       PT Ortho     Row Name 04/25/19 1700       General ROM    GENERAL ROM COMMENTS  PROM of the R knee seated, 3-110 degrees of  flexion, sitting after exercises and manual therapy.  -MP      User Key  (r) = Recorded By, (t) = Taken By, (c) = Cosigned By    Initials Name Provider Type    Joao Rosa PT Physical Therapist          PT Assessment/Plan     Row Name 04/25/19 1715          PT Assessment    Assessment Comments  Kian is slowly progressing with tolerance to therapeutic exercise with some modification.  With the step ups he has to use his arms to help unload the low back and legs to perform better with the use of the walker.    -MP        PT Plan    PT Plan Comments  Continue as indicated.  -MP       User Key  (r) = Recorded By, (t) = Taken By, (c) = Cosigned By    Initials Name Provider Type    Joao Rosa PT Physical Therapist            Exercises     Row Name 04/25/19 1700             Subjective Comments    Subjective Comments  Kian said that his knee was feeling better today.  -MP         Subjective Pain    Able to rate subjective pain?  yes  -MP      Pre-Treatment Pain Level  5  -MP      Post-Treatment Pain Level  3  -MP         Total Minutes    99027 - PT Therapeutic Exercise Minutes  35  -MP      70544 - PT Manual Therapy Minutes  10  -MP         Exercise 1    Exercise Name 1  Refer to land flow sheet  -MP      Cueing 1  Verbal  -MP      Additional Comments  Cues for exercise technique.  He began the [place] hip abduction, 45 lbs. 10 x 2  -MP        User Key  (r) = Recorded By, (t) = Taken By, (c) = Cosigned By    Initials Name Provider Type    Joao Rosa PT Physical Therapist             Manual Rx (last 36 hours)      Manual Treatments     Row Name 04/25/19 1700             Total Minutes    57753 - PT Manual Therapy Minutes  10  -MP         Manual Rx 1    Manual Rx 1 Location  R knee and periphery  -MP      Manual Rx 1 Type  STM, scar massage  -MP      Manual Rx 1 Duration  7 minutes  -MP         Manual Rx 2    Manual Rx 2 Location  R knee  -MP      Manual Rx 2 Type  Static extension stretching  -MP       Manual Rx 2 Duration  30s x 5  -MP        User Key  (r) = Recorded By, (t) = Taken By, (c) = Cosigned By    Initials Name Provider Type    Joao Rosa PT Physical Therapist          PT OP Goals     Row Name 04/25/19 1700          PT Short Term Goals    STG Date to Achieve  04/12/19  -MP     STG 1  Kian begins low intensity closed chain exercises to include TKE with theraband and step ups.  -MP     STG 1 Progress  Met  -MP     STG 2  AROM of the R knee improves by 10 degrees.  -MP     STG 2 Progress  Met  -MP     STG 3  KOS disabiity improves by 15%.  -MP     STG 3 Progress  Ongoing  -MP     STG 4  Mid patella circumference improves by one cm.  -MP     STG 4 Progress  Ongoing  -MP        Long Term Goals    LTG Date to Achieve  06/11/19  -MP     LTG 1  AROM of the R knee, supine, measures 0-120 degrees of flexion.  -MP     LTG 1 Progress  Ongoing  -MP     LTG 2  MMT of the R knee equals 4+/5 for extension and flexion.  -MP     LTG 2 Progress  Ongoing  -MP     LTG 3  Kian is ambulating on all surfaces with a standard cane independently with a normal gait pattern  -MP     LTG 3 Progress  Ongoing  -MP     LTG 4  KOS disability improves by 30%  -MP     LTG 4 Progress  Ongoing  -MP     LTG 5  He is independent with a HEP and self care education.  -MP     LTG 5 Progress  Ongoing  -MP       User Key  (r) = Recorded By, (t) = Taken By, (c) = Cosigned By    Initials Name Provider Type    Joao Rosa PT Physical Therapist          Therapy Education  Given: HEP, Symptoms/condition management  Program: Reinforced  How Provided: Verbal  Provided to: Patient  Level of Understanding: Verbalized     Time Calculation:   Start Time: 1615  Stop Time: 1700  Time Calculation (min): 45 min  Therapy Charges for Today     Code Description Service Date Service Provider Modifiers Qty    89542577867 HC PT THER PROC EA 15 MIN 4/25/2019 Joao Armendariz PT GP 2    [id number] HC PT MANUAL THERAPY EA 15 MIN 4/25/2019 Joao Armendariz PT  GP 1          Joao Armendariz, PT  4/25/2019

## 2019-04-30 ENCOUNTER — HOSPITAL ENCOUNTER (OUTPATIENT)
Dept: PHYSICAL THERAPY | Facility: HOSPITAL | Age: 64
Setting detail: THERAPIES SERIES
Discharge: HOME | End: 2019-04-30

## 2019-04-30 DIAGNOSIS — R26.2 DIFFICULTY WALKING: ICD-10-CM

## 2019-04-30 DIAGNOSIS — Z78.9 DIFFICULTY NAVIGATING STAIRS: ICD-10-CM

## 2019-04-30 DIAGNOSIS — R29.898 WEAKNESS OF BOTH LEGS: ICD-10-CM

## 2019-04-30 DIAGNOSIS — M25.561 ACUTE PAIN OF RIGHT KNEE: Primary | ICD-10-CM

## 2019-04-30 DIAGNOSIS — M25.661 DECREASED RANGE OF MOTION OF RIGHT KNEE: ICD-10-CM

## 2019-04-30 DIAGNOSIS — Z96.651 S/P TOTAL KNEE ARTHROPLASTY, RIGHT: ICD-10-CM

## 2019-04-30 PROCEDURE — 97110 THERAPEUTIC EXERCISES: CPT | Performed by: PHYSICAL THERAPIST

## 2019-04-30 PROCEDURE — 97140 MANUAL THERAPY 1/> REGIONS: CPT | Performed by: PHYSICAL THERAPIST

## 2019-05-02 ENCOUNTER — OFFICE VISIT (OUTPATIENT)
Dept: PAIN MEDICINE | Facility: CLINIC | Age: 64
End: 2019-05-02

## 2019-05-02 ENCOUNTER — APPOINTMENT (OUTPATIENT)
Dept: PHYSICAL THERAPY | Facility: HOSPITAL | Age: 64
End: 2019-05-02

## 2019-05-02 VITALS
HEART RATE: 94 BPM | HEIGHT: 71 IN | SYSTOLIC BLOOD PRESSURE: 168 MMHG | DIASTOLIC BLOOD PRESSURE: 96 MMHG | TEMPERATURE: 99.2 F | BODY MASS INDEX: 36.96 KG/M2 | OXYGEN SATURATION: 93 % | WEIGHT: 264 LBS | RESPIRATION RATE: 16 BRPM

## 2019-05-02 DIAGNOSIS — M96.1 POSTLAMINECTOMY SYNDROME OF LUMBAR REGION: ICD-10-CM

## 2019-05-02 DIAGNOSIS — M25.561 BILATERAL CHRONIC KNEE PAIN: ICD-10-CM

## 2019-05-02 DIAGNOSIS — M25.562 BILATERAL CHRONIC KNEE PAIN: ICD-10-CM

## 2019-05-02 DIAGNOSIS — M47.812 ARTHROPATHY OF CERVICAL FACET JOINT: ICD-10-CM

## 2019-05-02 DIAGNOSIS — M50.30 DEGENERATIVE CERVICAL DISC: ICD-10-CM

## 2019-05-02 DIAGNOSIS — Z79.899 ENCOUNTER FOR LONG-TERM (CURRENT) USE OF HIGH-RISK MEDICATION: ICD-10-CM

## 2019-05-02 DIAGNOSIS — G89.29 BILATERAL CHRONIC KNEE PAIN: ICD-10-CM

## 2019-05-02 DIAGNOSIS — M54.5 LOW BACK PAIN, UNSPECIFIED BACK PAIN LATERALITY, UNSPECIFIED CHRONICITY, WITH SCIATICA PRESENCE UNSPECIFIED: ICD-10-CM

## 2019-05-02 DIAGNOSIS — G89.29 OTHER CHRONIC PAIN: Primary | ICD-10-CM

## 2019-05-02 PROCEDURE — 99214 OFFICE O/P EST MOD 30 MIN: CPT | Performed by: NURSE PRACTITIONER

## 2019-05-02 RX ORDER — OXYCODONE HYDROCHLORIDE 10 MG/1
10 TABLET ORAL EVERY 6 HOURS PRN
Qty: 120 TABLET | Refills: 0 | Status: SHIPPED | OUTPATIENT
Start: 2019-05-02 | End: 2019-05-30 | Stop reason: SDUPTHER

## 2019-05-02 RX ORDER — OXYBUTYNIN CHLORIDE 5 MG/1
5 TABLET ORAL 3 TIMES DAILY
COMMUNITY
Start: 2019-05-02

## 2019-05-02 RX ORDER — SULFAMETHOXAZOLE AND TRIMETHOPRIM 800; 160 MG/1; MG/1
1 TABLET ORAL
COMMUNITY
Start: 2019-05-02 | End: 2019-05-16

## 2019-05-02 RX ORDER — ERGOCALCIFEROL 1.25 MG/1
CAPSULE ORAL
Refills: 11 | COMMUNITY
Start: 2019-04-10

## 2019-05-02 RX ORDER — PHENAZOPYRIDINE HYDROCHLORIDE 100 MG/1
100 TABLET, FILM COATED ORAL
COMMUNITY
Start: 2019-05-02 | End: 2019-07-02

## 2019-05-02 NOTE — PROGRESS NOTES
CHIEF COMPLAINT  Pt is here to f/u on bi-lat knee and back pain. Pt sts the pain has increased.    Subjective   Kian Mcdaniels is a 63 y.o. male  who presents to the office for follow-up.He has a history of neck, back, knee pain.    In terms of back pain, failed [employer] SCS trial.  Dr. Guzman recommends consideration of [facility] trial in the future.      In terms of neck pain. Nothing surgical needed. Monitoring syringomyelia, cannot consider SCS in this area for this reason.  Has failed cervical epidural injections.  Order for cervical MBB previoulsy placed by Dr. Guzman, patient was unable to schedule due to knee surgery.  patient would like to proceed with injections now.       Complains of pain in his neck, back and right knee. Today his pain is 7/10VAS.  Continues with Oxycodone 10 mg 4/day, gabapentin 800 mg 3/day and tizanidine 4mg 1-2/night. The regimen helps decrease his pain moderately. No adverse reactions.       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  He is in PT twice a week at [facility].      ED last night - UTI, says second recently, will see urology (Dr. Alva) soon     Back Pain   This is a chronic problem. The current episode started more than 1 year ago. The problem occurs daily. The problem has been waxing and waning since onset. The pain is present in the sacro-iliac. The quality of the pain is described as aching, shooting and stabbing. The pain is at a severity of 7/10. The pain is moderate. The pain is the same all the time. The symptoms are aggravated by bending, sitting and standing. Stiffness is present all day. Associated symptoms include leg pain, numbness, paresthesias, tingling and weakness. Pertinent negatives include no abdominal pain, chest pain, dysuria, fever or headaches. Risk factors include lack of exercise, obesity and sedentary lifestyle. He has tried analgesics for the symptoms. The treatment  "provided moderate relief.   Neck Pain    This is a chronic problem. The current episode started more than 1 month ago. The problem occurs daily. The problem has been unchanged. The pain is associated with nothing. The pain is present in the left side, right side and midline. The quality of the pain is described as cramping. The pain is at a severity of 7/10. The pain is moderate. The symptoms are aggravated by sneezing and twisting. The pain is same all the time. Associated symptoms include leg pain, numbness, tingling and weakness. Pertinent negatives include no chest pain, fever or headaches. He has tried muscle relaxants, oral narcotics and bed rest for the symptoms. The treatment provided moderate relief.          The following portions of the patient's history were reviewed and updated as appropriate: allergies, current medications, past family history, past medical history, past social history, past surgical history and problem list.    Review of Systems   Constitutional: Negative for fever.   HENT: Negative for congestion.    Respiratory: Negative for shortness of breath.    Cardiovascular: Negative for chest pain.   Gastrointestinal: Negative for abdominal pain.   Genitourinary: Negative for difficulty urinating and dysuria.   Musculoskeletal: Positive for back pain and neck pain.   Skin: Negative for rash.   Neurological: Positive for tingling, weakness, numbness and paresthesias. Negative for dizziness and headaches.   Psychiatric/Behavioral: Negative for sleep disturbance.     Vitals:    05/02/19 1507   BP: 168/96   Pulse: 94   Resp: 16   Temp: 99.2 °F (37.3 °C)   SpO2: 93%   Weight: 120 kg (264 lb)   Height: 180.3 cm (70.98\")   PainSc:   7   PainLoc: Knee     Objective   Physical Exam   Constitutional: He is oriented to person, place, and time. Vital signs are normal. He appears well-developed and well-nourished. He is cooperative. No distress.   HENT:   Head: Normocephalic and atraumatic.   Nose: Nose " normal.   Eyes: Conjunctivae and lids are normal.   Neck: Trachea normal. Neck supple. Muscular tenderness present. No spinous process tenderness present. Decreased range of motion present.   Cardiovascular: Normal rate.   Pulmonary/Chest: Effort normal. No respiratory distress.   Abdominal:   obese   Musculoskeletal:        Right knee: He exhibits decreased range of motion and swelling. Tenderness found.        Left knee: Tenderness found.        Cervical back: He exhibits decreased range of motion, tenderness and pain. He exhibits no spasm.        Lumbar back: He exhibits decreased range of motion, tenderness and pain.   Neurological: He is alert and oriented to person, place, and time. He has normal strength. Gait (wheelchair, antalgia) abnormal.   Skin: Skin is warm, dry and intact. He is not diaphoretic.   Psychiatric: He has a normal mood and affect. His speech is normal and behavior is normal. Cognition and memory are normal.   Nursing note and vitals reviewed.    Assessment/Plan   Kian was seen today for back pain and knee pain.    Diagnoses and all orders for this visit:    Other chronic pain    Low back pain, unspecified back pain laterality, unspecified chronicity, with sciatica presence unspecified  -     Ambulatory Referral to [facility]    Bilateral chronic knee pain    Degenerative cervical disc    Postlaminectomy syndrome of lumbar region    Encounter for long-term (current) use of high-risk medication    Arthropathy of cervical facet joint  -     Case Request      --- Bilateral C3-4 CMBB, x2, 2-4 wks apart  -------  Education about Medial Branch Blockade and RF Therapy:    This medial branch blockade (MBB) suggested is intended for diagnostic purposes, with the intent of offering the patient Radiofrequency thermal rhizotomy (RF) if the MBB is diagnostically effective.  The diagnostic blockade is necessary to determine the likelihood that RF therapy could be efficacious in providing  "long term relief to the patient.    Medial branches are sensory nerve branches that connect to a facet joint and transmit sensations & pain signals from that joint.  Facet is a term for the type of joints found in the spine.  Medial branches are the nerves that go to a facet, and therefore are also sometimes called \"facet joint nerves\" (FJNs).      In a medial branch blockade procedure, xray fluoroscopy is used to verify the locations of the outside of the joint lines which are being targeted.  Under xray guidance, needles are placed to these areas.  Contrast dye is injected to confirm proper placement, with dye flowing over the joint area, and to ensure that the dye does not flow into unintended areas such as a vein.  When this is confirmed, local anesthetic is injected to block the medial branch at that joint level.      If MBBs are diagnostically successful in blocking pain, then the patient is most likely a great candidate for Radiofrequency of those facet joint nerves.  In the RF procedure, needles are placed to the joint lines in the same fashion, and after testing, the needle tips are heated to thermally treat the nerves, blocking the nerves by in essence damaging the nerves with the heat treatment.       Medically, a successful RF procedure should provide a patient with 50% pain relief or more for at least 6 months.  Clinical experience suggests that successful patients receive relief more in the range of 12 months on average.  We also discussed that a fortunate minority of patients receive therapeutic success from the MBB, and may not require RF ablation.  If a patient receives more than 8 weeks of relief from MBB, then occasional repeat MBB for therapeutic purposes is a very reasonable alternative therapy.  This course of therapy is consistent with our LCDs according to our CMS  in the area, and therefore other insurance providers should follow accordingly.  We will monitor our patients to " screen for these therapeutic responders and will offer RF therapy only when necessary.      We discussed that MBB & RF are not without risks.  Guidelines regarding anticoagulant use & neuraxial procedures will be respected.  Patients that are ill or otherwise may be at risk for sepsis will not have their spines accessed by neuraxial injections of any type.  This patient will not be offered these therapies if there is an increased risk.   We discussed that there is a risk of postprocedural pain and also a risk of worsening of clinical picture with these procedures as with any neuraxial procedure.    -------    --- Refill Percocet. Patient appears stable with current regimen. No adverse effects. Regarding continuation of opioids, there is no evidence of aberrant behavior or any red flags.  The patient continues with appropriate response to opioid therapy. ADL's remain intact by self.   --- Routine ODT in office today as part of monitoring requirements for controlled substances.  This specimen will be sent to [facility] laboratory for confirmation.     --- Follow-up 1 month          DAVID REPORT    As part of the patient's treatment plan, I am prescribing controlled substances. The patient has been made aware of appropriate use of such medications, including potential risk of somnolence, limited ability to drive and/or work safely, and the potential for dependence or overdose. It has also bee made clear that these medications are for use by this patient only, without concomitant use of alcohol or other substances unless prescribed.     Patient has completed prescribing agreement detailing terms of continued prescribing of controlled substances, including monitoring DAVID reports, urine drug screening, and pill counts if necessary. The patient is aware that inappropriate use will results in cessation of prescribing such medications.    DAVID report has been reviewed and scanned into the patient's chart.    As the  clinician, I personally reviewed the DAVID from 5/1/19 while the patient was in the office today.    History and physical exam exhibit continued safe and appropriate use of controlled substances.    EMR Dragon/Transcription disclaimer:   Much of this encounter note is an electronic transcription/translation of spoken language to printed text. The electronic translation of spoken language may permit erroneous, or at times, nonsensical words or phrases to be inadvertently transcribed; Although I have reviewed the note for such errors, some may still exist.

## 2019-05-03 ENCOUNTER — HOSPITAL ENCOUNTER (OUTPATIENT)
Dept: PHYSICAL THERAPY | Facility: HOSPITAL | Age: 64
Setting detail: THERAPIES SERIES
Discharge: HOME | End: 2019-05-03

## 2019-05-03 DIAGNOSIS — Z78.9 DIFFICULTY NAVIGATING STAIRS: ICD-10-CM

## 2019-05-03 DIAGNOSIS — R26.2 DIFFICULTY WALKING: ICD-10-CM

## 2019-05-03 DIAGNOSIS — Z96.651 S/P TOTAL KNEE ARTHROPLASTY, RIGHT: ICD-10-CM

## 2019-05-03 DIAGNOSIS — M25.661 DECREASED RANGE OF MOTION OF RIGHT KNEE: ICD-10-CM

## 2019-05-03 DIAGNOSIS — M25.561 ACUTE PAIN OF RIGHT KNEE: Primary | ICD-10-CM

## 2019-05-03 DIAGNOSIS — R29.898 WEAKNESS OF BOTH LEGS: ICD-10-CM

## 2019-05-03 PROCEDURE — 97110 THERAPEUTIC EXERCISES: CPT | Performed by: PHYSICAL THERAPIST

## 2019-05-03 PROCEDURE — 97140 MANUAL THERAPY 1/> REGIONS: CPT | Performed by: PHYSICAL THERAPIST

## 2019-05-03 NOTE — THERAPY TREATMENT NOTE
Outpatient Physical Therapy Ortho Treatment Note  [facility]     Patient Name: Kian Mcdaniels  : 1955  MRN: [id number]  Today's Date: 5/3/2019      Visit Date: 2019    Visit Dx:    ICD-10-CM ICD-9-CM   1. Acute pain of right knee M25.561 719.46   2. Difficulty walking R26.2 719.7   3. Difficulty navigating stairs R68.89 V62.89   4. Weakness of both legs R29.898 729.89   5. Decreased range of motion of right knee M25.661 719.56   6. S/P total knee arthroplasty, right Z96.651 V43.65       Patient Active Problem List   Diagnosis   • Bulging lumbar disc   • Chronic pain   • Diabetic neuropathy (CMS/HCC)   • Low back pain   • Degenerative arthritis of lumbar spine   • Neuropathy involving both lower extremities   • Encounter for long-term (current) use of high-risk medication   • Postlaminectomy syndrome of lumbar region   • Syringomyelia and syringobulbia (CMS/[facility])   • Lumbar pseudoarthrosis   • Type 2 diabetes mellitus with neurologic complication, with long-term current use of insulin (CMS/HCC)   • Insulin pump in place   • Hx of decompressive lumbar laminectomy   • Bilateral carpal tunnel syndrome   • Degenerative cervical disc   • Acute pain of right knee   • Primary localized osteoarthrosis of right lower leg   • Arthritis of right knee   • Sacroiliac inflammation (CMS/HCC)   • Sacroiliac joint dysfunction   • Severe obesity (BMI 35.0-39.9)   • Chronic pain of right knee   • Tricompartment osteoarthritis of left knee   • Tricompartment osteoarthritis of right knee   • Arthritis of left knee   • Bilateral chronic knee pain   • Hypertension   • S/P total knee arthroplasty, left   • Fall (on)(from) incline, initial encounter   • Abrasion of left knee        Past Medical History:   Diagnosis Date   • Arteriosclerotic cardiovascular disease    • Arthritis    • At risk for sleep apnea    • COPD (chronic obstructive pulmonary disease) (CMS/[facility])    • Coronary artery disease    • Diabetes mellitus  (CMS/HCC)    • Diabetic peripheral neuropathy (CMS/HCC)    • Disease of thyroid gland     NODULE PRESENT   • ED (erectile dysfunction) of non-organic origin    • GERD (gastroesophageal reflux disease)    • Headache disorder     DUE TO NECK   • Hyperlipidemia    • Hypertension    • Insulin pump in place    • Knee pain, bilateral    • Low back pain    • Myocardial infarction (CMS/HCC)    • Neck pain    • Spinal stenosis    • TIA (transient ischemic attack)     LEFT EYE   • Type 2 diabetes mellitus (CMS/HCC)    • Upper back pain    • Wears dentures     UPPER PLATE        Past Surgical History:   Procedure Laterality Date   • AMPUTATION FOOT / TOE Left     GREAT TOE   • BACK SURGERY  10/26/2015    Bilateral L4-L5 laminectomy -Dr. Gutierres   • CARDIAC CATHETERIZATION     • CARDIAC SURGERY      CABG X 6    • CHOLECYSTECTOMY     • CORONARY ARTERY BYPASS GRAFT      X 6   • EPIDURAL BLOCK     • EYE SURGERY      CATARACT EXTRACTION   • HAND SURGERY  04/28/2016   • LUMBAR DISCECTOMY FUSION INSTRUMENTATION N/A 12/12/2016    Procedure: L 4-5 FUSION WITH INSTRUMENTATION WITH BMP, WITH REMOVAL OF INSTRUMENTATION ;  Surgeon: Rowdy Spencer MD;  Location: [facility] OR;  Service:    • LUMBAR LAMINECTOMY DISCECTOMY DECOMPRESSION N/A 12/12/2016    Procedure: L4-5 REPEAT LAMINECTOMY ;  Surgeon: Tim Gutierres MD;  Location: [facility] OR;  Service:    • LUMBAR LAMINECTOMY DISCECTOMY DECOMPRESSION N/A 12/14/2016    Procedure: EVACUATION OF LUMBAR HEMATOMA;  Surgeon: Rowdy Spencer MD;  Location: [facility] OR;  Service:    • MULTIPLE TOOTH EXTRACTIONS      UPPER TEETH EXTRACTED   • ORTHOPEDIC SURGERY     • PENIS SURGERY      RECONSTRUCTION   • SCROTUM EXPLORATION      scrotum surgery   • SPINAL CORD STIMULATOR IMPLANT N/A 9/24/2018    Procedure: SPINAL CORD STIMULATOR Phase 1 - Enval;  Surgeon: Mulugeta Guzman MD;  Location: [facility];  Service: Pain Management   • SPINE SURGERY     • TOTAL KNEE ARTHROPLASTY  Right 1/8/2019    Procedure: RIGHT TOTAL KNEE ARTHROPLASTY WITH NEGRITA NAVIGATION;  Surgeon: Andres Carter MD;  Location: [facility];  Service: Orthopedics         PT Assessment/Plan     Row Name 05/03/19 1800          PT Assessment    Assessment Comments  Kian is having issues with both legs giving way which may be contributed by his lumbar spine.  He tried some lateral side stepping using rail but his legs could not support him well to get a lot done.  -MP        PT Plan    PT Plan Comments  Continue progressing as he is able to tolerate.  -MP       User Key  (r) = Recorded By, (t) = Taken By, (c) = Cosigned By    Initials Name Provider Type    MP Joao Armendariz, PT Physical Therapist            Exercises     Row Name 05/03/19 1700             Subjective Pain    Able to rate subjective pain?  yes  -MP      Pre-Treatment Pain Level  0  -MP      Post-Treatment Pain Level  0  -MP         Total Minutes    07628 - PT Therapeutic Exercise Minutes  35  -MP      70991 - PT Manual Therapy Minutes  10  -MP         Exercise 1    Exercise Name 1  Refer to land flow sheet  -MP      Cueing 1  Verbal;Tactile  -MP      Additional Comments  Cues for exercise technique  -MP        User Key  (r) = Recorded By, (t) = Taken By, (c) = Cosigned By    Initials Name Provider Type    MP Joao Armendariz, PT Physical Therapist               Manual Rx (last 36 hours)      Manual Treatments     Row Name 05/03/19 1700             Total Minutes    62653 - PT Manual Therapy Minutes  10  -MP         Manual Rx 1    Manual Rx 1 Location  R knee  -MP      Manual Rx 1 Type  STM, incision and distal quad and hamstring  -MP         Manual Rx 2    Manual Rx 2 Location  R knee  -MP      Manual Rx 2 Type  supine hamstring stretching  -MP        User Key  (r) = Recorded By, (t) = Taken By, (c) = Cosigned By    Initials Name Provider Type    MP Joao Armendariz, PT Physical Therapist        Time Calculation:   Start Time: 1600  Stop Time: 1645  Time  Calculation (min): 45 min  Therapy Charges for Today     Code Description Service Date Service Provider Modifiers Qty    73399314074 HC PT THER PROC EA 15 MIN 5/3/2019 Joao Armendariz, PT GP 2    73907925749 HC PT MANUAL THERAPY EA 15 MIN 5/3/2019 Joao Armendariz, PT GP 1            Joao Armendariz, PT  5/3/2019

## 2019-05-08 ENCOUNTER — APPOINTMENT (OUTPATIENT)
Dept: PHYSICAL THERAPY | Facility: HOSPITAL | Age: 64
End: 2019-05-08

## 2019-05-13 ENCOUNTER — HOSPITAL ENCOUNTER (OUTPATIENT)
Dept: PHYSICAL THERAPY | Facility: HOSPITAL | Age: 64
Setting detail: THERAPIES SERIES
Discharge: HOME | End: 2019-05-13

## 2019-05-13 DIAGNOSIS — M25.561 ACUTE PAIN OF RIGHT KNEE: Primary | ICD-10-CM

## 2019-05-13 DIAGNOSIS — Z78.9 DIFFICULTY NAVIGATING STAIRS: ICD-10-CM

## 2019-05-13 DIAGNOSIS — M25.661 DECREASED RANGE OF MOTION OF RIGHT KNEE: ICD-10-CM

## 2019-05-13 DIAGNOSIS — R26.2 DIFFICULTY WALKING: ICD-10-CM

## 2019-05-13 DIAGNOSIS — R29.898 WEAKNESS OF BOTH LEGS: ICD-10-CM

## 2019-05-13 DIAGNOSIS — M47.816 OSTEOARTHRITIS OF LUMBAR SPINE, UNSPECIFIED SPINAL OSTEOARTHRITIS COMPLICATION STATUS: ICD-10-CM

## 2019-05-13 DIAGNOSIS — G89.29 OTHER CHRONIC PAIN: ICD-10-CM

## 2019-05-13 PROCEDURE — 97110 THERAPEUTIC EXERCISES: CPT | Performed by: PHYSICAL THERAPIST

## 2019-05-13 PROCEDURE — 97164 PT RE-EVAL EST PLAN CARE: CPT | Performed by: PHYSICAL THERAPIST

## 2019-05-13 NOTE — THERAPY RE-EVALUATION
Outpatient Physical Therapy Ortho Re-Evaluation  [facility]     Patient Name: Kian Mcdaniels  : 1955  MRN: [id number]  Today's Date: 2019      Visit Date: 2019    Patient Active Problem List   Diagnosis   • Bulging lumbar disc   • Chronic pain   • Diabetic neuropathy (CMS/HCC)   • Low back pain   • Degenerative arthritis of lumbar spine   • Neuropathy involving both lower extremities   • Encounter for long-term (current) use of high-risk medication   • Postlaminectomy syndrome of lumbar region   • Syringomyelia and syringobulbia (CMS/HCC)   • Lumbar pseudoarthrosis   • Type 2 diabetes mellitus with neurologic complication, with long-term current use of insulin (CMS/HCC)   • Insulin pump in place   • Hx of decompressive lumbar laminectomy   • Bilateral carpal tunnel syndrome   • Degenerative cervical disc   • Acute pain of right knee   • Primary localized osteoarthrosis of right lower leg   • Arthritis of right knee   • Sacroiliac inflammation (CMS/HCC)   • Sacroiliac joint dysfunction   • Severe obesity (BMI 35.0-39.9)   • Chronic pain of right knee   • Tricompartment osteoarthritis of left knee   • Tricompartment osteoarthritis of right knee   • Arthritis of left knee   • Bilateral chronic knee pain   • Hypertension   • S/P total knee arthroplasty, left   • Fall (on)(from) incline, initial encounter   • Abrasion of left knee        Past Medical History:   Diagnosis Date   • Arteriosclerotic cardiovascular disease    • Arthritis    • At risk for sleep apnea    • COPD (chronic obstructive pulmonary disease) (CMS/HCC)    • Coronary artery disease    • Diabetes mellitus (CMS/HCC)    • Diabetic peripheral neuropathy (CMS/HCC)    • Disease of thyroid gland     NODULE PRESENT   • ED (erectile dysfunction) of non-organic origin    • GERD (gastroesophageal reflux disease)    • Headache disorder     DUE TO NECK   • Hyperlipidemia    • Hypertension    • Insulin pump in place    • Knee pain,  bilateral    • Low back pain    • Myocardial infarction (CMS/HCC)    • Neck pain    • Spinal stenosis    • TIA (transient ischemic attack)     LEFT EYE   • Type 2 diabetes mellitus (CMS/HCC)    • Upper back pain    • Wears dentures     UPPER PLATE        Past Surgical History:   Procedure Laterality Date   • AMPUTATION FOOT / TOE Left     GREAT TOE   • BACK SURGERY  10/26/2015    Bilateral L4-L5 laminectomy -Dr. Gutierres   • CARDIAC CATHETERIZATION     • CARDIAC SURGERY      CABG X 6    • CHOLECYSTECTOMY     • CORONARY ARTERY BYPASS GRAFT      X 6   • EPIDURAL BLOCK     • EYE SURGERY      CATARACT EXTRACTION   • HAND SURGERY  04/28/2016   • LUMBAR DISCECTOMY FUSION INSTRUMENTATION N/A 12/12/2016    Procedure: L 4-5 FUSION WITH INSTRUMENTATION WITH BMP, WITH REMOVAL OF INSTRUMENTATION ;  Surgeon: Rowdy Spencer MD;  Location: [facility] OR;  Service:    • LUMBAR LAMINECTOMY DISCECTOMY DECOMPRESSION N/A 12/12/2016    Procedure: L4-5 REPEAT LAMINECTOMY ;  Surgeon: Tim Gutierres MD;  Location: [facility] OR;  Service:    • LUMBAR LAMINECTOMY DISCECTOMY DECOMPRESSION N/A 12/14/2016    Procedure: EVACUATION OF LUMBAR HEMATOMA;  Surgeon: Rowdy Spencer MD;  Location: [facility] OR;  Service:    • MULTIPLE TOOTH EXTRACTIONS      UPPER TEETH EXTRACTED   • ORTHOPEDIC SURGERY     • PENIS SURGERY      RECONSTRUCTION   • SCROTUM EXPLORATION      scrotum surgery   • SPINAL CORD STIMULATOR IMPLANT N/A 9/24/2018    Procedure: SPINAL CORD STIMULATOR Phase 1 - [employer];  Surgeon: Mulugeta Guzman MD;  Location: [facility];  Service: Pain Management   • SPINE SURGERY     • TOTAL KNEE ARTHROPLASTY Right 1/8/2019    Procedure: RIGHT TOTAL KNEE ARTHROPLASTY WITH NEGRITA NAVIGATION;  Surgeon: Andres Carter MD;  Location: [facility] OR;  Service: Orthopedics       Visit Dx:     ICD-10-CM ICD-9-CM   1. Acute pain of right knee M25.561 719.46   2. Difficulty walking R26.2 719.7   3. Difficulty navigating stairs  R68.89 V62.89   4. Weakness of both legs R29.898 729.89   5. Decreased range of motion of right knee M25.661 719.56   6. Other chronic pain G89.29 338.29   7. Osteoarthritis of lumbar spine, unspecified spinal osteoarthritis complication status M47.816 721.3             PT Ortho     Row Name 05/13/19 1500       Subjective Comments    Subjective Comments  I didn't have therapy last week and I noticed that I was much stiffer  -       Subjective Pain    Able to rate subjective pain?  yes  -    Pre-Treatment Pain Level  6  -    Post-Treatment Pain Level  5  -       Posture/Observations    Alignment Options  Forward head;Rounded shoulders  -    Forward Head  Moderate  -KH    Rounded Shoulders  Moderate  -       Lumbar ROM Screen- Lower Quarter Clearing    Lumbar Flexion  Impaired 25% of WNL  -    Lumbar Extension  Impaired unable to stand fully upright  -KH    Lumbar Lateral Flexion  Impaired 25% of WNL (more pain with L sidebend)  -       General ROM    GENERAL ROM COMMENTS  in supine, R Knee AROM:  degrees  -KH       MMT (Manual Muscle Testing)    Rt Lower Ext  Rt Hip Flexion;Rt Knee Extension;Rt Knee Flexion;Rt Ankle Plantarflexion;Rt Ankle Dorsiflexion  -KH    Lt Lower Ext  Lt Hip Flexion;Lt Knee Extension;Lt Knee Flexion;Lt Ankle Plantarflexion;Lt Ankle Dorsiflexion  -KH       MMT Right Lower Ext    Rt Hip Flexion MMT, Gross Movement  (4-/5) good minus  -KH    Rt Knee Extension MMT, Gross Movement  (4/5) good  -KH    Rt Knee Flexion MMT, Gross Movement  (4/5) good  -KH    Rt Ankle Plantarflexion MMT, Gross Movement  (3/5) fair  -KH    Rt Ankle Dorsiflexion MMT, Gross Movement  (3+/5) fair plus  -KH       MMT Left Lower Ext    Lt Hip Flexion MMT, Gross Movement  (4-/5) good minus  -KH    Lt Knee Extension MMT, Gross Movement  (4+/5) good plus  -KH    Lt Knee Flexion MMT, Gross Movement  (4/5) good  -KH    Lt Ankle Plantarflexion MMT, Gross Movement  (3/5) fair  -KH    Lt Ankle Dorsiflexion MMT,  Gross Movement  (3+/5) fair plus  -       Gait/Stairs Assessment/Training    Comment (Gait/Stairs)  Use of RW, B foot drag, decreased B step length, decreased knee flexion during swing phase, R LE antalgia, forward flexed posture  -      User Key  (r) = Recorded By, (t) = Taken By, (c) = Cosigned By    Initials Name Provider Type    Radha Jasso, PT Physical Therapist                            PT OP Goals     Row Name 05/13/19 1500          PT Short Term Goals    STG Date to Achieve  04/12/19  -     STG 1  Kian begins low intensity closed chain exercises to include TKE with theraband and step ups.  -     STG 1 Progress  Met  -     STG 2  AROM of the R knee improves by 10 degrees.  -     STG 2 Progress  Met  -     STG 3  KOS disabiity improves by 15%.  -     STG 3 Progress  Ongoing  -     STG 4  Mid patella circumference improves by one cm.  -     STG 4 Progress  Ongoing  -        Long Term Goals    LTG Date to Achieve  06/11/19  -     LTG 1  AROM of the R knee, supine, measures 0-120 degrees of flexion.  -     LTG 1 Progress  Ongoing  -     LTG 1 Progress Comments    -     LTG 2  MMT of the R knee equals 4+/5 for extension and flexion.  -     LTG 2 Progress  Ongoing  -     LTG 2 Progress Comments  4/5  -     LTG 3  Kian is ambulating on all surfaces with a standard cane independently with a normal gait pattern  -     LTG 3 Progress  Ongoing  -     LTG 3 Progress Comments  using RW  -     LTG 4  KOS disability improves by 30%  -     LTG 4 Progress  Ongoing  -     LTG 5  He is independent with a HEP and self care education.  -     LTG 5 Progress  Ongoing  -       User Key  (r) = Recorded By, (t) = Taken By, (c) = Cosigned By    Initials Name Provider Type    Radha Jasso, PT Physical Therapist          PT Assessment/Plan     Row Name 05/13/19 1547          PT Assessment    Functional Limitations  Impaired gait;Impaired locomotion;Decreased safety during  functional activities;Limitations in community activities;Performance in leisure activities;Limitations in functional capacity and performance  -     Impairments  Range of motion;Muscle strength;Motor function;Pain;Posture;Poor body mechanics;Locomotion;Endurance;Edema;Gait;Impaired flexibility;Impaired muscle endurance  -     Assessment Comments  Kian Mcdaniels has been seen for 13 skilled physical therapy sessions for right total knee replacement.  Treatment has included manual therapy, therapeutic exercises, electrical stimulation, cryotherapy, gait training and education for self care. Progress to physical therapy goals is gradual and compromised recently due to two falls and back issues. Kian continues to walk with a RW and demonstrates significant foot drag bilaterally. His gait is also very antalgic which is partially due to his limited ROM. He would like to transition to aquatic therapy to help address his back pain as well as his knee ROM/strength. He will benefit from continued skilled physical therapy to address remaining impairments and functional limitations.   -     Please refer to paper survey for additional self-reported information  Yes  -KH     Rehab Potential  Good  -KH     Patient/caregiver participated in establishment of treatment plan and goals  Yes  -KH     Patient would benefit from skilled therapy intervention  Yes  -KH        PT Plan    PT Frequency  2x/week  -     Predicted Duration of Therapy Intervention (Therapy Eval)  4 weeks  -     Planned CPT's?  PT RE-EVAL: 56760;PT MANUAL THERAPY EA 15 MIN: 67335;PT HOT OR COLD PACK TREAT MCARE;PT ULTRASOUND EA 15 MIN: 53637;PT HOT/COLD PACK WC NONMCARE: 28373;PT IONTOPHORESIS EA 15 MIN: 30309;PT TRACTION CERVICAL: 06780;PT ELECTRICAL STIM UNATTEND: ;PT AQUATIC THERAPY EA 15 MIN: 13752;PT NEUROMUSC RE-EDUCATION EA 15 MIN: 40854;PT THER PROC EA 15 MIN: 64125;PT THER ACT EA 15 MIN: 35910;PT GAIT TRAINING EA 15 MIN: 47917;PT SELF  CARE/HOME MGMT/TRAIN EA 15: 82483;PT TRACTION LUMBAR: 70413;PT ELECTRICAL STIM ATTD EA 15 MIN: 62357  -     Physical Therapy Interventions (Optional Details)  balance training;gait training;gross motor skills;home exercise program;transfer training;stretching;strengthening;stair training;ROM (Range of Motion);manual therapy techniques;patient/family education;postural re-education;joint mobilization;aquatics exercise;modalities  -     PT Plan Comments  Transition to aquatic therapy to address back pain and knee ROM/strength  -       User Key  (r) = Recorded By, (t) = Taken By, (c) = Cosigned By    Initials Name Provider Type    Radha Jasso, YOHAN Physical Therapist            Exercises     Row Name 05/13/19 1500             Subjective Comments    Subjective Comments  I didn't have therapy last week and I noticed that I was much stiffer  -         Subjective Pain    Able to rate subjective pain?  yes  -KH      Pre-Treatment Pain Level  6  -KH      Post-Treatment Pain Level  5  -KH         Total Minutes    72315 - PT Therapeutic Exercise Minutes  30  -KH         Exercise 1    Exercise Name 1  Refer to land flow sheet  -      Cueing 1  Verbal;Tactile  -      Additional Comments  cues for form  -        User Key  (r) = Recorded By, (t) = Taken By, (c) = Cosigned By    Initials Name Provider Type    Radha Jasso, PT Physical Therapist                    Time Calculation:     Start Time: 1530  Stop Time: 1610  Time Calculation (min): 40 min     Therapy Charges for Today     Code Description Service Date Service Provider Modifiers Qty    16405222058  PT RE-EVAL ESTABLISHED PLAN 2 5/13/2019 Radha Garrett, PT GP 1    [id number]  PT THER PROC EA 15 MIN 5/13/2019 Radha Garrett, PT GP 2                    Radha Garrett PT  5/13/2019

## 2019-05-15 ENCOUNTER — OFFICE VISIT (OUTPATIENT)
Dept: ORTHOPEDIC SURGERY | Facility: CLINIC | Age: 64
End: 2019-05-15

## 2019-05-15 VITALS — BODY MASS INDEX: 35.98 KG/M2 | WEIGHT: 257 LBS | TEMPERATURE: 97.3 F | HEIGHT: 71 IN

## 2019-05-15 DIAGNOSIS — Z98.1 S/P LUMBAR FUSION: Primary | ICD-10-CM

## 2019-05-15 PROCEDURE — 72100 X-RAY EXAM L-S SPINE 2/3 VWS: CPT | Performed by: ORTHOPAEDIC SURGERY

## 2019-05-15 PROCEDURE — 99213 OFFICE O/P EST LOW 20 MIN: CPT | Performed by: ORTHOPAEDIC SURGERY

## 2019-05-15 NOTE — PROGRESS NOTES
New patient or new problem visit    CC: Left hip pain    HPI: He has back pain and left hip pain.  He is several months status post right total knee replacement from which he is done well but still has some stiffness.  In early 2017 I performed L4-5 fusion with instrumentation from which she did well I have not seen him in a while.  He notes pain when he puts weight on the left leg and notes numbness in the left more so than the right lower extremity.    PFSH: See attached    ROS: See attached    PE: On exam he demonstrates slight weakness in the right quad and some stiffness there which I believe is postoperative but basically has good strength in lower extremities bilaterally.  Grossly intact protective sensation.  Back is mildly tender to palpation.    XRAY: Plain film x-rays demonstrate well-maintained lumbar lordosis and what would appear to be solid L4-5 fusion compared to prior films.  Certainly no significant screw lucency.    Other: n/a    Impression: I think is it fusion is healed L4-5 there could be adjacent level degeneration.  I am not certain this is spinal mediated pain in the hip but he has admittedly very little arthritis on spine x-rays.    Plan: For now physical therapy new be following up soon with Dr. cloud and will get some formal hip x-rays at that time along with what ever else is planned.  Follow-up as needed.

## 2019-05-16 ENCOUNTER — HOSPITAL ENCOUNTER (OUTPATIENT)
Dept: PHYSICAL THERAPY | Facility: HOSPITAL | Age: 64
Setting detail: THERAPIES SERIES
Discharge: HOME | End: 2019-05-16

## 2019-05-16 DIAGNOSIS — G89.29 OTHER CHRONIC PAIN: ICD-10-CM

## 2019-05-16 DIAGNOSIS — M25.661 DECREASED RANGE OF MOTION OF RIGHT KNEE: ICD-10-CM

## 2019-05-16 DIAGNOSIS — Z78.9 DIFFICULTY NAVIGATING STAIRS: ICD-10-CM

## 2019-05-16 DIAGNOSIS — R26.2 DIFFICULTY WALKING: ICD-10-CM

## 2019-05-16 DIAGNOSIS — R29.898 WEAKNESS OF BOTH LEGS: ICD-10-CM

## 2019-05-16 DIAGNOSIS — M25.561 ACUTE PAIN OF RIGHT KNEE: Primary | ICD-10-CM

## 2019-05-16 PROCEDURE — 97110 THERAPEUTIC EXERCISES: CPT | Performed by: PHYSICAL THERAPIST

## 2019-05-16 PROCEDURE — 97140 MANUAL THERAPY 1/> REGIONS: CPT | Performed by: PHYSICAL THERAPIST

## 2019-05-16 NOTE — THERAPY TREATMENT NOTE
Outpatient Physical Therapy Ortho Treatment Note  [facility]     Patient Name: Kian Mcdaniels  : 1955  MRN: [id number]  Today's Date: 2019      Visit Date: 2019    Visit Dx:    ICD-10-CM ICD-9-CM   1. Acute pain of right knee M25.561 719.46   2. Difficulty walking R26.2 719.7   3. Difficulty navigating stairs R68.89 V62.89   4. Weakness of both legs R29.898 729.89   5. Decreased range of motion of right knee M25.661 719.56   6. Other chronic pain G89.29 338.29       Patient Active Problem List   Diagnosis   • Bulging lumbar disc   • Chronic pain   • Diabetic neuropathy (CMS/[facility])   • Low back pain   • Degenerative arthritis of lumbar spine   • Neuropathy involving both lower extremities   • Encounter for long-term (current) use of high-risk medication   • Postlaminectomy syndrome of lumbar region   • Syringomyelia and syringobulbia (CMS/[facility])   • Lumbar pseudoarthrosis   • Type 2 diabetes mellitus with neurologic complication, with long-term current use of insulin (CMS/[facility])   • Insulin pump in place   • Hx of decompressive lumbar laminectomy   • Bilateral carpal tunnel syndrome   • Degenerative cervical disc   • Acute pain of right knee   • Primary localized osteoarthrosis of right lower leg   • Arthritis of right knee   • Sacroiliac inflammation (CMS/[facility])   • Sacroiliac joint dysfunction   • Severe obesity (BMI 35.0-39.9)   • Chronic pain of right knee   • Tricompartment osteoarthritis of left knee   • Tricompartment osteoarthritis of right knee   • Arthritis of left knee   • Bilateral chronic knee pain   • Hypertension   • S/P total knee arthroplasty, left   • Fall (on)(from) incline, initial encounter   • Abrasion of left knee        Past Medical History:   Diagnosis Date   • Arteriosclerotic cardiovascular disease    • Arthritis    • At risk for sleep apnea    • COPD (chronic obstructive pulmonary disease) (CMS/[facility])    • Coronary artery disease    • Diabetes mellitus (CMS/[facility])    •  Diabetic peripheral neuropathy (CMS/HCC)    • Disease of thyroid gland     NODULE PRESENT   • ED (erectile dysfunction) of non-organic origin    • GERD (gastroesophageal reflux disease)    • Headache disorder     DUE TO NECK   • Hyperlipidemia    • Hypertension    • Insulin pump in place    • Knee pain, bilateral    • Low back pain    • Myocardial infarction (CMS/HCC)    • Neck pain    • Spinal stenosis    • TIA (transient ischemic attack)     LEFT EYE   • Type 2 diabetes mellitus (CMS/HCC)    • Upper back pain    • Wears dentures     UPPER PLATE        Past Surgical History:   Procedure Laterality Date   • AMPUTATION FOOT / TOE Left     GREAT TOE   • BACK SURGERY  10/26/2015    Bilateral L4-L5 laminectomy -Dr. Gutierres   • CARDIAC CATHETERIZATION     • CARDIAC SURGERY      CABG X 6    • CHOLECYSTECTOMY     • CORONARY ARTERY BYPASS GRAFT      X 6   • EPIDURAL BLOCK     • EYE SURGERY      CATARACT EXTRACTION   • HAND SURGERY  04/28/2016   • LUMBAR DISCECTOMY FUSION INSTRUMENTATION N/A 12/12/2016    Procedure: L 4-5 FUSION WITH INSTRUMENTATION WITH BMP, WITH REMOVAL OF INSTRUMENTATION ;  Surgeon: Rodwy Spencer MD;  Location: [facility] OR;  Service:    • LUMBAR LAMINECTOMY DISCECTOMY DECOMPRESSION N/A 12/12/2016    Procedure: L4-5 REPEAT LAMINECTOMY ;  Surgeon: Tim Gutierres MD;  Location: [facility] OR;  Service:    • LUMBAR LAMINECTOMY DISCECTOMY DECOMPRESSION N/A 12/14/2016    Procedure: EVACUATION OF LUMBAR HEMATOMA;  Surgeon: Rowdy Spencer MD;  Location: [facility] OR;  Service:    • MULTIPLE TOOTH EXTRACTIONS      UPPER TEETH EXTRACTED   • ORTHOPEDIC SURGERY     • PENIS SURGERY      RECONSTRUCTION   • SCROTUM EXPLORATION      scrotum surgery   • SPINAL CORD STIMULATOR IMPLANT N/A 9/24/2018    Procedure: SPINAL CORD STIMULATOR Phase 1 - [employer];  Surgeon: Mulugeta Guzman MD;  Location: [facility];  Service: Pain Management   • SPINE SURGERY     • TOTAL KNEE ARTHROPLASTY Right 1/8/2019     Procedure: RIGHT TOTAL KNEE ARTHROPLASTY WITH NEGRITA NAVIGATION;  Surgeon: Andres Carter MD;  Location: [facility];  Service: Orthopedics       PT Ortho     Row Name 05/16/19 1700       General ROM    GENERAL ROM COMMENTS  In supine, R knee AROM, 5-107 degrees of flexion  -MP       RLE Quick Girth (cm)    Mid patella  48.8 cm  -MP      User Key  (r) = Recorded By, (t) = Taken By, (c) = Cosigned By    Initials Name Provider Type    MP Joao Armendariz, PT Physical Therapist            PT Assessment/Plan     Row Name 05/16/19 1740          PT Assessment    Assessment Comments  Kian appears to be doing step ups with better ability and general mobility is improving.    -MP        PT Plan    PT Plan Comments  Continue progressing therapeutic exercise as indicated.  -MP       User Key  (r) = Recorded By, (t) = Taken By, (c) = Cosigned By    Initials Name Provider Type    MP Joao Armendariz, PT Physical Therapist            Exercises     Row Name 05/16/19 1700             Subjective Comments    Subjective Comments  Kian reports that he has been working hard at home  -MP         Subjective Pain    Able to rate subjective pain?  yes  -MP      Pre-Treatment Pain Level  4  -MP      Post-Treatment Pain Level  2  -MP         Total Minutes    64844 - PT Therapeutic Exercise Minutes  35  -MP      22080 - PT Manual Therapy Minutes  10  -MP         Exercise 1    Exercise Name 1  Refer to land flow sheet  -MP      Cueing 1  Verbal;Tactile  -MP      Additional Comments  Cues for exercise technique  -MP        User Key  (r) = Recorded By, (t) = Taken By, (c) = Cosigned By    Initials Name Provider Type    MP Joao Armendariz, PT Physical Therapist             Manual Rx (last 36 hours)      Manual Treatments     Row Name 05/16/19 1700             Total Minutes    47961 - PT Manual Therapy Minutes  10  -MP         Manual Rx 1    Manual Rx 1 Location  R knee  -MP      Manual Rx 1 Type  scar massage, quad stretching and overpressure for  quad sets  -MP        User Key  (r) = Recorded By, (t) = Taken By, (c) = Cosigned By    Initials Name Provider Type    MP Joao Armendariz PT Physical Therapist          PT OP Goals     Row Name 05/16/19 1700          PT Short Term Goals    STG Date to Achieve  04/12/19  -MP     STG 1  Kian begins low intensity closed chain exercises to include TKE with theraband and step ups.  -MP     STG 1 Progress  Met  -MP     STG 2  AROM of the R knee improves by 10 degrees.  -MP     STG 2 Progress  Met  -MP     STG 3  KOS disabiity improves by 15%.  -MP     STG 3 Progress  Ongoing  -MP     STG 4  Mid patella circumference improves by one cm.  -MP     STG 4 Progress  Ongoing  -MP        Long Term Goals    LTG Date to Achieve  06/11/19  -MP     LTG 1  AROM of the R knee, supine, measures 0-120 degrees of flexion.  -MP     LTG 1 Progress  Ongoing  -MP     LTG 2  MMT of the R knee equals 4+/5 for extension and flexion.  -MP     LTG 2 Progress  Ongoing  -MP     LTG 3  Kian is ambulating on all surfaces with a standard cane independently with a normal gait pattern  -MP     LTG 3 Progress  Ongoing  -MP     LTG 4  KOS disability improves by 30%  -MP     LTG 4 Progress  Ongoing  -MP     LTG 5  He is independent with a HEP and self care education.  -MP     LTG 5 Progress  Ongoing  -MP       User Key  (r) = Recorded By, (t) = Taken By, (c) = Cosigned By    Initials Name Provider Type    Joao Rosa PT Physical Therapist          Therapy Education  Given: Symptoms/condition management  Program: Reinforced  How Provided: Verbal  Provided to: Patient  Level of Understanding: Verbalized     Time Calculation:   Start Time: 1645  Stop Time: 1730  Time Calculation (min): 45 min  Therapy Charges for Today     Code Description Service Date Service Provider Modifiers Qty    33524258743 HC PT THER PROC EA 15 MIN 5/16/2019 Joao Armendariz, PT GP 2    38507751138 HC PT MANUAL THERAPY EA 15 MIN 5/16/2019 Joao Armendariz PT GP 1          Joao  Kala, PT  5/16/2019

## 2019-05-21 ENCOUNTER — HOSPITAL ENCOUNTER (OUTPATIENT)
Dept: PHYSICAL THERAPY | Facility: HOSPITAL | Age: 64
Setting detail: THERAPIES SERIES
Discharge: HOME | End: 2019-05-21

## 2019-05-21 DIAGNOSIS — M25.661 DECREASED RANGE OF MOTION OF RIGHT KNEE: ICD-10-CM

## 2019-05-21 DIAGNOSIS — Z78.9 DIFFICULTY NAVIGATING STAIRS: ICD-10-CM

## 2019-05-21 DIAGNOSIS — M25.561 ACUTE PAIN OF RIGHT KNEE: Primary | ICD-10-CM

## 2019-05-21 DIAGNOSIS — R29.898 WEAKNESS OF BOTH LEGS: ICD-10-CM

## 2019-05-21 DIAGNOSIS — R26.2 DIFFICULTY WALKING: ICD-10-CM

## 2019-05-21 PROCEDURE — 97110 THERAPEUTIC EXERCISES: CPT | Performed by: PHYSICAL THERAPIST

## 2019-05-21 NOTE — THERAPY TREATMENT NOTE
Outpatient Physical Therapy Ortho Treatment Note  [facility]     Patient Name: Kian Mcdaniels  : 1955  MRN: [id number]  Today's Date: 2019      Visit Date: 2019    Visit Dx:    ICD-10-CM ICD-9-CM   1. Acute pain of right knee M25.561 719.46   2. Difficulty walking R26.2 719.7   3. Difficulty navigating stairs R68.89 V62.89   4. Weakness of both legs R29.898 729.89   5. Decreased range of motion of right knee M25.661 719.56       Patient Active Problem List   Diagnosis   • Bulging lumbar disc   • Chronic pain   • Diabetic neuropathy (CMS/HCC)   • Low back pain   • Degenerative arthritis of lumbar spine   • Neuropathy involving both lower extremities   • Encounter for long-term (current) use of high-risk medication   • Postlaminectomy syndrome of lumbar region   • Syringomyelia and syringobulbia (CMS/[facility])   • Lumbar pseudoarthrosis   • Type 2 diabetes mellitus with neurologic complication, with long-term current use of insulin (CMS/[facility])   • Insulin pump in place   • Hx of decompressive lumbar laminectomy   • Bilateral carpal tunnel syndrome   • Degenerative cervical disc   • Acute pain of right knee   • Primary localized osteoarthrosis of right lower leg   • Arthritis of right knee   • Sacroiliac inflammation (CMS/HCC)   • Sacroiliac joint dysfunction   • Severe obesity (BMI 35.0-39.9)   • Chronic pain of right knee   • Tricompartment osteoarthritis of left knee   • Tricompartment osteoarthritis of right knee   • Arthritis of left knee   • Bilateral chronic knee pain   • Hypertension   • S/P total knee arthroplasty, left   • Fall (on)(from) incline, initial encounter   • Abrasion of left knee        Past Medical History:   Diagnosis Date   • Arteriosclerotic cardiovascular disease    • Arthritis    • At risk for sleep apnea    • COPD (chronic obstructive pulmonary disease) (CMS/[facility])    • Coronary artery disease    • Diabetes mellitus (CMS/HCC)    • Diabetic peripheral neuropathy (CMS/HCC)     • Disease of thyroid gland     NODULE PRESENT   • ED (erectile dysfunction) of non-organic origin    • GERD (gastroesophageal reflux disease)    • Headache disorder     DUE TO NECK   • Hyperlipidemia    • Hypertension    • Insulin pump in place    • Knee pain, bilateral    • Low back pain    • Myocardial infarction (CMS/HCC)    • Neck pain    • Spinal stenosis    • TIA (transient ischemic attack)     LEFT EYE   • Type 2 diabetes mellitus (CMS/HCC)    • Upper back pain    • Wears dentures     UPPER PLATE        Past Surgical History:   Procedure Laterality Date   • AMPUTATION FOOT / TOE Left     GREAT TOE   • BACK SURGERY  10/26/2015    Bilateral L4-L5 laminectomy -Dr. Gutierres   • CARDIAC CATHETERIZATION     • CARDIAC SURGERY      CABG X 6    • CHOLECYSTECTOMY     • CORONARY ARTERY BYPASS GRAFT      X 6   • EPIDURAL BLOCK     • EYE SURGERY      CATARACT EXTRACTION   • HAND SURGERY  04/28/2016   • LUMBAR DISCECTOMY FUSION INSTRUMENTATION N/A 12/12/2016    Procedure: L 4-5 FUSION WITH INSTRUMENTATION WITH BMP, WITH REMOVAL OF INSTRUMENTATION ;  Surgeon: Rowdy Spencer MD;  Location: [facility];  Service:    • LUMBAR LAMINECTOMY DISCECTOMY DECOMPRESSION N/A 12/12/2016    Procedure: L4-5 REPEAT LAMINECTOMY ;  Surgeon: Tim Gutierres MD;  Location: [facility] OR;  Service:    • LUMBAR LAMINECTOMY DISCECTOMY DECOMPRESSION N/A 12/14/2016    Procedure: EVACUATION OF LUMBAR HEMATOMA;  Surgeon: Rowdy Spencer MD;  Location: [facility] OR;  Service:    • MULTIPLE TOOTH EXTRACTIONS      UPPER TEETH EXTRACTED   • ORTHOPEDIC SURGERY     • PENIS SURGERY      RECONSTRUCTION   • SCROTUM EXPLORATION      scrotum surgery   • SPINAL CORD STIMULATOR IMPLANT N/A 9/24/2018    Procedure: SPINAL CORD STIMULATOR Phase 1 - [employer];  Surgeon: Mulugeta Guzman MD;  Location: [facility];  Service: Pain Management   • SPINE SURGERY     • TOTAL KNEE ARTHROPLASTY Right 1/8/2019    Procedure: RIGHT TOTAL KNEE ARTHROPLASTY  WITH NEGRITA NAVIGATION;  Surgeon: Andres Carter MD;  Location: [facility];  Service: Orthopedics         PT Assessment/Plan     Row Name 05/21/19 2242          PT Assessment    Assessment Comments  Kian was able to progress step ups for one set of 10 repetitions on each leg on the 4 inch step.  -MP        PT Plan    PT Plan Comments  Monitor the effects of today's treatment and progress as indicated.  -MP       User Key  (r) = Recorded By, (t) = Taken By, (c) = Cosigned By    Initials Name Provider Type    MP Joao Armendariz, PT Physical Therapist            Exercises     Row Name 05/21/19 1800 05/21/19 1700          Subjective Comments    Subjective Comments  Kian stated that his low back started to hurt while doing the second set of step ups on the 4 inch step.  The 2 inch step did not hurt.  -MP  --        Subjective Pain    Able to rate subjective pain?  yes  -MP  --     Pre-Treatment Pain Level  4  -MP  --     Post-Treatment Pain Level  2  -MP  --        Total Minutes    27197 - PT Therapeutic Exercise Minutes  40  -MP  --     08833 - PT Manual Therapy Minutes  --  5  -MP        Exercise 1    Exercise Name 1  Refer to land flow sheet  -MP  --     Cueing 1  Verbal  -MP  --     Additional Comments  Cues for exercise technique.  He progressed today by doing one set of step ups on the 4 inch step, x 10 B  -MP  --       User Key  (r) = Recorded By, (t) = Taken By, (c) = Cosigned By    Initials Name Provider Type    MP Joao Armendariz, PT Physical Therapist                      Manual Rx (last 36 hours)      Manual Treatments     Row Name 05/21/19 1700             Total Minutes    82658 - PT Manual Therapy Minutes  5  -MP         Manual Rx 1    Manual Rx 1 Location  R knee  -MP      Manual Rx 1 Type  STM  -MP        User Key  (r) = Recorded By, (t) = Taken By, (c) = Cosigned By    Initials Name Provider Type    MP Joao Armendariz, PT Physical Therapist          PT OP Goals     Row Name 05/21/19 1800          PT  Short Term Goals    STG Date to Achieve  04/12/19  -MP     STG 1  Kian begins low intensity closed chain exercises to include TKE with theraband and step ups.  -MP     STG 1 Progress  Met  -MP     STG 2  AROM of the R knee improves by 10 degrees.  -MP     STG 2 Progress  Met  -MP     STG 3  KOS disabiity improves by 15%.  -MP     STG 3 Progress  Ongoing  -MP     STG 4  Mid patella circumference improves by one cm.  -MP     STG 4 Progress  Ongoing  -MP        Long Term Goals    LTG Date to Achieve  06/11/19  -MP     LTG 1  AROM of the R knee, supine, measures 0-120 degrees of flexion.  -MP     LTG 1 Progress  Ongoing  -MP     LTG 2  MMT of the R knee equals 4+/5 for extension and flexion.  -MP     LTG 2 Progress  Ongoing  -MP     LTG 3  Kian is ambulating on all surfaces with a standard cane independently with a normal gait pattern  -MP     LTG 3 Progress  Ongoing  -MP     LTG 4  KOS disability improves by 30%  -MP     LTG 4 Progress  Ongoing  -MP     LTG 5  He is independent with a HEP and self care education.  -MP     LTG 5 Progress  Ongoing  -MP       User Key  (r) = Recorded By, (t) = Taken By, (c) = Cosigned By    Initials Name Provider Type    Joao Rosa PT Physical Therapist          Therapy Education  Given: HEP, Symptoms/condition management  Program: Reinforced  How Provided: Verbal  Level of Understanding: Verbalized     Time Calculation:   Start Time: 1650  Stop Time: 1735  Time Calculation (min): 45 min  Therapy Charges for Today     Code Description Service Date Service Provider Modifiers Qty    87327905422 HC PT THER PROC EA 15 MIN 5/21/2019 Joao Armendariz PT GP 3          Joao Armendariz PT  5/21/2019

## 2019-05-23 ENCOUNTER — APPOINTMENT (OUTPATIENT)
Dept: PHYSICAL THERAPY | Facility: HOSPITAL | Age: 64
End: 2019-05-23

## 2019-05-28 ENCOUNTER — HOSPITAL ENCOUNTER (OUTPATIENT)
Dept: PHYSICAL THERAPY | Facility: HOSPITAL | Age: 64
Setting detail: THERAPIES SERIES
Discharge: HOME | End: 2019-05-28

## 2019-05-28 DIAGNOSIS — M25.561 ACUTE PAIN OF RIGHT KNEE: Primary | ICD-10-CM

## 2019-05-28 DIAGNOSIS — R29.898 WEAKNESS OF BOTH LEGS: ICD-10-CM

## 2019-05-28 DIAGNOSIS — M25.661 DECREASED RANGE OF MOTION OF RIGHT KNEE: ICD-10-CM

## 2019-05-28 DIAGNOSIS — G89.29 OTHER CHRONIC PAIN: ICD-10-CM

## 2019-05-28 DIAGNOSIS — R26.2 DIFFICULTY WALKING: ICD-10-CM

## 2019-05-28 DIAGNOSIS — Z78.9 DIFFICULTY NAVIGATING STAIRS: ICD-10-CM

## 2019-05-28 PROCEDURE — 97110 THERAPEUTIC EXERCISES: CPT | Performed by: PHYSICAL THERAPIST

## 2019-05-28 NOTE — THERAPY TREATMENT NOTE
Outpatient Physical Therapy Ortho Treatment Note  [facility]     Patient Name: Kian Mcdaniels  : 1955  MRN: [id number]  Today's Date: 2019      Visit Date: 2019    Visit Dx:    ICD-10-CM ICD-9-CM   1. Acute pain of right knee M25.561 719.46   2. Difficulty walking R26.2 719.7   3. Difficulty navigating stairs R68.89 V62.89   4. Weakness of both legs R29.898 729.89   5. Decreased range of motion of right knee M25.661 719.56   6. Other chronic pain G89.29 338.29       Patient Active Problem List   Diagnosis   • Bulging lumbar disc   • Chronic pain   • Diabetic neuropathy (CMS/[facility])   • Low back pain   • Degenerative arthritis of lumbar spine   • Neuropathy involving both lower extremities   • Encounter for long-term (current) use of high-risk medication   • Postlaminectomy syndrome of lumbar region   • Syringomyelia and syringobulbia (CMS/[facility])   • Lumbar pseudoarthrosis   • Type 2 diabetes mellitus with neurologic complication, with long-term current use of insulin (CMS/[facility])   • Insulin pump in place   • Hx of decompressive lumbar laminectomy   • Bilateral carpal tunnel syndrome   • Degenerative cervical disc   • Acute pain of right knee   • Primary localized osteoarthrosis of right lower leg   • Arthritis of right knee   • Sacroiliac inflammation (CMS/[facility])   • Sacroiliac joint dysfunction   • Severe obesity (BMI 35.0-39.9)   • Chronic pain of right knee   • Tricompartment osteoarthritis of left knee   • Tricompartment osteoarthritis of right knee   • Arthritis of left knee   • Bilateral chronic knee pain   • Hypertension   • S/P total knee arthroplasty, left   • Fall (on)(from) incline, initial encounter   • Abrasion of left knee        Past Medical History:   Diagnosis Date   • Arteriosclerotic cardiovascular disease    • Arthritis    • At risk for sleep apnea    • COPD (chronic obstructive pulmonary disease) (CMS/[facility])    • Coronary artery disease    • Diabetes mellitus (CMS/[facility])    •  Diabetic peripheral neuropathy (CMS/HCC)    • Disease of thyroid gland     NODULE PRESENT   • ED (erectile dysfunction) of non-organic origin    • GERD (gastroesophageal reflux disease)    • Headache disorder     DUE TO NECK   • Hyperlipidemia    • Hypertension    • Insulin pump in place    • Knee pain, bilateral    • Low back pain    • Myocardial infarction (CMS/HCC)    • Neck pain    • Spinal stenosis    • TIA (transient ischemic attack)     LEFT EYE   • Type 2 diabetes mellitus (CMS/HCC)    • Upper back pain    • Wears dentures     UPPER PLATE        Past Surgical History:   Procedure Laterality Date   • AMPUTATION FOOT / TOE Left     GREAT TOE   • BACK SURGERY  10/26/2015    Bilateral L4-L5 laminectomy -Dr. Gutierres   • CARDIAC CATHETERIZATION     • CARDIAC SURGERY      CABG X 6    • CHOLECYSTECTOMY     • CORONARY ARTERY BYPASS GRAFT      X 6   • EPIDURAL BLOCK     • EYE SURGERY      CATARACT EXTRACTION   • HAND SURGERY  04/28/2016   • LUMBAR DISCECTOMY FUSION INSTRUMENTATION N/A 12/12/2016    Procedure: L 4-5 FUSION WITH INSTRUMENTATION WITH BMP, WITH REMOVAL OF INSTRUMENTATION ;  Surgeon: Rowdy Spencer MD;  Location: [facility] OR;  Service:    • LUMBAR LAMINECTOMY DISCECTOMY DECOMPRESSION N/A 12/12/2016    Procedure: L4-5 REPEAT LAMINECTOMY ;  Surgeon: Tim Gutierres MD;  Location: [facility] OR;  Service:    • LUMBAR LAMINECTOMY DISCECTOMY DECOMPRESSION N/A 12/14/2016    Procedure: EVACUATION OF LUMBAR HEMATOMA;  Surgeon: Rowdy Spencer MD;  Location: [facility] OR;  Service:    • MULTIPLE TOOTH EXTRACTIONS      UPPER TEETH EXTRACTED   • ORTHOPEDIC SURGERY     • PENIS SURGERY      RECONSTRUCTION   • SCROTUM EXPLORATION      scrotum surgery   • SPINAL CORD STIMULATOR IMPLANT N/A 9/24/2018    Procedure: SPINAL CORD STIMULATOR Phase 1 - [employer];  Surgeon: Mulugeta Guzman MD;  Location: [facility];  Service: Pain Management   • SPINE SURGERY     • TOTAL KNEE ARTHROPLASTY Right 1/8/2019     Procedure: RIGHT TOTAL KNEE ARTHROPLASTY WITH NEGRITA NAVIGATION;  Surgeon: Andres Carter MD;  Location: [facility];  Service: Orthopedics       PT Assessment/Plan     Row Name 05/28/19 1807          PT Assessment    Assessment Comments  Kian's scar looked good today and he tolerated treatment well.  -MP        PT Plan    PT Plan Comments  Reassess the R knee and give final instructions next visit.  -MP       User Key  (r) = Recorded By, (t) = Taken By, (c) = Cosigned By    Initials Name Provider Type    MP Joao Armendariz PT Physical Therapist            Exercises     Row Name 05/28/19 1800 05/28/19 1700          Subjective Comments    Subjective Comments  Kian reported that the scar massage caused his incision to bleed last time.    -MP  --        Subjective Pain    Able to rate subjective pain?  yes  -MP  --     Pre-Treatment Pain Level  6  -MP  --     Post-Treatment Pain Level  4  -MP  --     Subjective Pain Comment  Incisiion area appeared fully healed today.  -MP  --        Total Minutes    15107 - PT Therapeutic Exercise Minutes  40  -MP  --     83376 - PT Manual Therapy Minutes  --  5  -MP        Exercise 1    Exercise Name 1  Refer to land flow sheet  -MP  --     Cueing 1  Verbal  -MP  --     Additional Comments  cues for exercise technique  -MP  --       User Key  (r) = Recorded By, (t) = Taken By, (c) = Cosigned By    Initials Name Provider Type    MP Joao Armendariz PT Physical Therapist             Manual Rx (last 36 hours)      Manual Treatments     Row Name 05/28/19 1700             Total Minutes    35525 - PT Manual Therapy Minutes  5  -MP         Manual Rx 1    Manual Rx 1 Location  R knee  -MP      Manual Rx 1 Type  STM  -MP      Manual Rx 1 Duration  3 minutes  -MP         Manual Rx 2    Manual Rx 2 Location  R knee  -MP      Manual Rx 2 Type  hamstring stretch  -MP      Manual Rx 2 Duration  30s x 4  -MP        User Key  (r) = Recorded By, (t) = Taken By, (c) = Cosigned By    Initials Name  Provider Type    Joao Rosa PT Physical Therapist          PT OP Goals     Row Name 05/28/19 1800          PT Short Term Goals    STG Date to Achieve  04/12/19  -MP     STG 1  Kian begins low intensity closed chain exercises to include TKE with theraband and step ups.  -MP     STG 1 Progress  Met  -MP     STG 2  AROM of the R knee improves by 10 degrees.  -MP     STG 2 Progress  Met  -MP     STG 3  KOS disabiity improves by 15%.  -MP     STG 3 Progress  Ongoing  -MP     STG 4  Mid patella circumference improves by one cm.  -MP     STG 4 Progress  Ongoing  -MP        Long Term Goals    LTG Date to Achieve  06/11/19  -MP     LTG 1  AROM of the R knee, supine, measures 0-120 degrees of flexion.  -MP     LTG 1 Progress  Ongoing  -MP     LTG 2  MMT of the R knee equals 4+/5 for extension and flexion.  -MP     LTG 2 Progress  Ongoing  -MP     LTG 3  Kian is ambulating on all surfaces with a standard cane independently with a normal gait pattern  -MP     LTG 3 Progress  Ongoing  -MP     LTG 4  KOS disability improves by 30%  -MP     LTG 4 Progress  Ongoing  -MP     LTG 5  He is independent with a HEP and self care education.  -MP     LTG 5 Progress  Ongoing  -MP       User Key  (r) = Recorded By, (t) = Taken By, (c) = Cosigned By    Initials Name Provider Type    Joao Rosa PT Physical Therapist          Therapy Education  Given: HEP, Symptoms/condition management  Program: Reinforced  How Provided: Verbal  Provided to: Patient  Level of Understanding: Verbalized     Time Calculation:   Start Time: 1645  Stop Time: 1730  Time Calculation (min): 45 min  Therapy Charges for Today     Code Description Service Date Service Provider Modifiers Qty    [id number] HC PT THER PROC EA 15 MIN 5/28/2019 Joao Armendariz, PT GP 3            Joao Armendariz PT  5/28/2019

## 2019-05-30 ENCOUNTER — APPOINTMENT (OUTPATIENT)
Dept: PHYSICAL THERAPY | Facility: HOSPITAL | Age: 64
End: 2019-05-30

## 2019-05-30 RX ORDER — OXYCODONE HYDROCHLORIDE 10 MG/1
10 TABLET ORAL EVERY 6 HOURS PRN
Qty: 120 TABLET | Refills: 0 | Status: SHIPPED | OUTPATIENT
Start: 2019-05-30 | End: 2019-06-18 | Stop reason: SDUPTHER

## 2019-05-31 ENCOUNTER — OFFICE VISIT (OUTPATIENT)
Dept: PAIN MEDICINE | Facility: CLINIC | Age: 64
End: 2019-05-31

## 2019-05-31 VITALS
TEMPERATURE: 97.7 F | WEIGHT: 254.8 LBS | DIASTOLIC BLOOD PRESSURE: 79 MMHG | OXYGEN SATURATION: 98 % | HEART RATE: 86 BPM | RESPIRATION RATE: 20 BRPM | BODY MASS INDEX: 34.51 KG/M2 | SYSTOLIC BLOOD PRESSURE: 136 MMHG | HEIGHT: 72 IN

## 2019-05-31 DIAGNOSIS — Z79.899 ENCOUNTER FOR LONG-TERM (CURRENT) USE OF HIGH-RISK MEDICATION: ICD-10-CM

## 2019-05-31 DIAGNOSIS — M25.561 BILATERAL CHRONIC KNEE PAIN: ICD-10-CM

## 2019-05-31 DIAGNOSIS — G89.29 OTHER CHRONIC PAIN: Primary | ICD-10-CM

## 2019-05-31 DIAGNOSIS — M96.1 POSTLAMINECTOMY SYNDROME OF LUMBAR REGION: ICD-10-CM

## 2019-05-31 DIAGNOSIS — M50.30 DEGENERATIVE CERVICAL DISC: ICD-10-CM

## 2019-05-31 DIAGNOSIS — G89.29 BILATERAL CHRONIC KNEE PAIN: ICD-10-CM

## 2019-05-31 DIAGNOSIS — M25.562 BILATERAL CHRONIC KNEE PAIN: ICD-10-CM

## 2019-05-31 DIAGNOSIS — M54.5 LOW BACK PAIN, UNSPECIFIED BACK PAIN LATERALITY, UNSPECIFIED CHRONICITY, WITH SCIATICA PRESENCE UNSPECIFIED: ICD-10-CM

## 2019-05-31 PROCEDURE — 99214 OFFICE O/P EST MOD 30 MIN: CPT | Performed by: NURSE PRACTITIONER

## 2019-05-31 NOTE — PROGRESS NOTES
"CHIEF COMPLAINT  F/u lower back pain, neck pain, and bilat knee pain. Pt has been going to PT. Pt states pain has remained the same since last ov.     Subjective   Kian Mcdaniels is a 63 y.o. male  who presents to the office for follow-up.He has a history of back, neck, knee pain.    In terms of back pain, failed [employer] SCS trial.  Dr. Guzman recommends consideration of [place] trial in the future. Still having a great deal of back and leg pain. Still cannot \"pick my feet up\". Saw Dr. Spencer a couple of weeks ago - not sure this is coming from his back, recommends f/u with ortho for hip evaluation.      In terms of neck pain. Nothing surgical needed. Monitoring syringomyelia, cannot consider SCS in this area for this reason.  Has failed cervical epidural injections.  Order for cervical MBB previoulsy placed by Dr. Guzman, patient was unable to schedule due to knee surgery.  patient would like to proceed with injections now - in appeal process      Complains of pain in his neck, back and right knee. Today his pain is 6/10VAS.  Continues with Oxycodone 10 mg 4/day, gabapentin 800 mg 3/day and tizanidine 4mg 1-2/night. The regimen helps decrease his pain moderately. No adverse reactions.       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  He is in PT twice a week at [facility].       ED last night - UTI, says second recently, will see urology (Dr. Alva) soon     Back Pain   This is a chronic problem. The current episode started more than 1 year ago. The problem occurs daily. The problem has been waxing and waning since onset. The pain is present in the sacro-iliac. The quality of the pain is described as aching, shooting and stabbing. The pain is at a severity of 6/10. The pain is moderate. The pain is the same all the time. The symptoms are aggravated by bending, sitting and standing. Stiffness is present all day. Associated symptoms include leg " pain, numbness (bilat legs), paresthesias and tingling. Pertinent negatives include no abdominal pain, chest pain, dysuria, fever, headaches or weakness. Risk factors include lack of exercise, obesity and sedentary lifestyle. He has tried analgesics for the symptoms. The treatment provided moderate relief.   Neck Pain    This is a chronic problem. The current episode started more than 1 month ago. The problem occurs daily. The problem has been unchanged. The pain is associated with nothing. The pain is present in the left side, right side and midline. The quality of the pain is described as cramping. The pain is at a severity of 6/10. The pain is moderate. The symptoms are aggravated by sneezing and twisting. The pain is same all the time. Associated symptoms include leg pain, numbness (bilat legs) and tingling. Pertinent negatives include no chest pain, fever, headaches or weakness. He has tried muscle relaxants, oral narcotics and bed rest for the symptoms. The treatment provided moderate relief.      PEG Assessment   What number best describes your pain on average in the past week?8  What number best describes how, during the past week, pain has interfered with your enjoyment of life?8  What number best describes how, during the past week, pain has interfered with your general activity?  8    The following portions of the patient's history were reviewed and updated as appropriate: allergies, current medications, past family history, past medical history, past social history, past surgical history and problem list.    Review of Systems   Constitutional: Positive for activity change (inc) and fatigue (occ). Negative for fever.   HENT: Negative for congestion.    Eyes: Negative for visual disturbance.   Respiratory: Negative for shortness of breath.    Cardiovascular: Negative for chest pain.   Gastrointestinal: Negative for abdominal pain, constipation and diarrhea.   Genitourinary: Negative for difficulty  "urinating and dysuria.   Musculoskeletal: Positive for arthralgias (bilat knees), back pain (lower), gait problem (w/c and walker at home), joint swelling and neck pain. Myalgias: right knee.   Skin: Negative for rash.   Neurological: Positive for tingling, numbness (bilat legs) and paresthesias. Negative for dizziness, weakness and headaches.   Psychiatric/Behavioral: Negative for sleep disturbance and suicidal ideas.       Vitals:    05/31/19 0815   BP: 136/79   Pulse: 86   Resp: 20   Temp: 97.7 °F (36.5 °C)   SpO2: 98%   Weight: 116 kg (254 lb 12.8 oz)   Height: 181.6 cm (71.5\")   PainSc:   6   PainLoc: Back     Objective   Physical Exam   Constitutional: He is oriented to person, place, and time. Vital signs are normal. He appears well-developed and well-nourished. He is cooperative. No distress.   HENT:   Head: Normocephalic and atraumatic.   Nose: Nose normal.   Eyes: Conjunctivae and lids are normal.   Neck: Trachea normal. Neck supple. Muscular tenderness present. No spinous process tenderness present. Decreased range of motion present.   Cardiovascular: Normal rate.   Pulmonary/Chest: Effort normal. No respiratory distress.   Abdominal:   obese   Musculoskeletal:        Right knee: He exhibits decreased range of motion and swelling. Tenderness found.        Left knee: Tenderness found.        Cervical back: He exhibits decreased range of motion, tenderness and pain. He exhibits no spasm.        Lumbar back: He exhibits decreased range of motion, tenderness and pain.   Neurological: He is alert and oriented to person, place, and time. He has normal strength. Gait (wheelchair, antalgia) abnormal.   Skin: Skin is warm, dry and intact. He is not diaphoretic.   Psychiatric: He has a normal mood and affect. His speech is normal and behavior is normal. Cognition and memory are normal.   Nursing note and vitals reviewed.    Assessment/Plan   Kian was seen today for back pain, knee pain and neck pain.    Diagnoses " and all orders for this visit:    Other chronic pain    Low back pain, unspecified back pain laterality, unspecified chronicity, with sciatica presence unspecified    Postlaminectomy syndrome of lumbar region    Bilateral chronic knee pain    Degenerative cervical disc    Encounter for long-term (current) use of high-risk medication      --- Proceed with Cervical MBB once authorized  --- Refill Oxycodone. Patient appears stable with current regimen. No adverse effects. Regarding continuation of opioids, there is no evidence of aberrant behavior or any red flags.  The patient continues with appropriate response to opioid therapy. ADL's remain intact by self.   --- Agree with second opinion at tertiary care facility regarding ongoing weakness and back pain  --- The urine drug screen confirmation from 8/5/18 has been reviewed and the result is appropriate based on patient history and DAVID report.  ODT COLLECTED LAST VISIT, UNABLE TO FIND IN [employer] ONLINE SYSTEM, CALLING TO TRY TO RETRIEVE RESULT, IF UNABLE WE WILL RE-COLLECT NEXT VISIT.   --- Follow-up 1 month          DAVID REPORT    As part of the patient's treatment plan, I am prescribing controlled substances. The patient has been made aware of appropriate use of such medications, including potential risk of somnolence, limited ability to drive and/or work safely, and the potential for dependence or overdose. It has also bee made clear that these medications are for use by this patient only, without concomitant use of alcohol or other substances unless prescribed.     Patient has completed prescribing agreement detailing terms of continued prescribing of controlled substances, including monitoring DAVID reports, urine drug screening, and pill counts if necessary. The patient is aware that inappropriate use will results in cessation of prescribing such medications.    DAVID report has been reviewed and scanned into the patient's chart.    As the clinician, I  personally reviewed the DAVID from 5/31/19 while the patient was in the office today.    History and physical exam exhibit continued safe and appropriate use of controlled substances.      EMR Dragon/Transcription disclaimer:   Much of this encounter note is an electronic transcription/translation of spoken language to printed text. The electronic translation of spoken language may permit erroneous, or at times, nonsensical words or phrases to be inadvertently transcribed; Although I have reviewed the note for such errors, some may still exist.

## 2019-06-05 ENCOUNTER — OFFICE VISIT (OUTPATIENT)
Dept: ORTHOPEDIC SURGERY | Facility: CLINIC | Age: 64
End: 2019-06-05

## 2019-06-05 VITALS — TEMPERATURE: 97.5 F | HEIGHT: 71 IN | BODY MASS INDEX: 35.56 KG/M2 | WEIGHT: 254 LBS

## 2019-06-05 DIAGNOSIS — M25.551 BILATERAL HIP PAIN: ICD-10-CM

## 2019-06-05 DIAGNOSIS — M25.552 BILATERAL HIP PAIN: ICD-10-CM

## 2019-06-05 DIAGNOSIS — Z96.651 STATUS POST RIGHT KNEE REPLACEMENT: Primary | ICD-10-CM

## 2019-06-05 DIAGNOSIS — M96.1 POSTLAMINECTOMY SYNDROME OF LUMBAR REGION: ICD-10-CM

## 2019-06-05 DIAGNOSIS — G57.93 NEUROPATHY INVOLVING BOTH LOWER EXTREMITIES: ICD-10-CM

## 2019-06-05 PROCEDURE — 99213 OFFICE O/P EST LOW 20 MIN: CPT | Performed by: ORTHOPAEDIC SURGERY

## 2019-06-05 PROCEDURE — 73562 X-RAY EXAM OF KNEE 3: CPT | Performed by: ORTHOPAEDIC SURGERY

## 2019-06-05 PROCEDURE — 73521 X-RAY EXAM HIPS BI 2 VIEWS: CPT | Performed by: ORTHOPAEDIC SURGERY

## 2019-06-05 RX ORDER — CYCLOBENZAPRINE HCL 10 MG
TABLET ORAL
Qty: 60 TABLET | Refills: 1 | Status: SHIPPED | OUTPATIENT
Start: 2019-06-05 | End: 2019-09-03 | Stop reason: SDUPTHER

## 2019-06-05 NOTE — PROGRESS NOTES
"Patient Name: Kian Mcdaniels   YOB: 1955  Referring Primary Care Physician: Wyatt Harmon MD  BMI: Body mass index is 35.43 kg/m².    Chief Complaint:    Chief Complaint   Patient presents with   • Right Knee - Follow-up, Pain        HPI:     Kian Mcdaniels is a 63 y.o. male who presents today for evaluation of   Chief Complaint   Patient presents with   • Right Knee - Follow-up, Pain   .  Patient follows up today on his right total knee which she had on January.  He continues to have some stiffness and it uses a walker.  He also has back issues and has had a fusion in his back and he has some chronic left leg radicular problems and weakness in his quad and \"drags his feet\" I reviewed his notes including Dr. Cintron's recent notes wanting to \"have his hips examined.    This problem is/is not new to this examiner.     Subjective   Medications:   Home Medications:  Current Outpatient Medications on File Prior to Visit   Medication Sig   • albuterol (VENTOLIN HFA) 108 (90 BASE) MCG/ACT inhaler Inhale 1-2 puffs Every 6 (Six) Hours As Needed for wheezing (RESCUE INHALER).   • amLODIPine (NORVASC) 10 MG tablet Take 10 mg by mouth Every Morning.   • aspirin 81 MG EC tablet Take 1 tablet by mouth Every Morning. (Patient taking differently: Take 81 mg by mouth Every Morning. HOLD FOR SURGERY)   • atorvastatin (LIPITOR) 40 MG tablet Take 40 mg by mouth Daily.   • bisacodyl (DULCOLAX) 5 MG EC tablet Take 2 tablets by mouth Daily As Needed for Constipation.   • Blood Glucose Monitoring Suppl (FREESTYLE FREEDOM LITE) W/DEVICE kit    • carvedilol (COREG) 12.5 MG tablet Take 12.5 mg by mouth 2 (Two) Times a Day With Meals.   • cetirizine (ZyrTEC) 10 MG tablet Take 10 mg by mouth Every Morning.   • clopidogrel (PLAVIX) 75 MG tablet Take 75 mg by mouth Every Morning. HOLDING FOR SURGERY   • docusate sodium (COLACE) 100 MG capsule Take 100 mg by mouth 2 (Two) Times a Day.   • furosemide (LASIX) 40 MG tablet " Take 40 mg by mouth Every Night.   • gabapentin (NEURONTIN) 400 MG capsule Take 1 capsule by mouth 3 (Three) Times a Day.   • glucose blood (FREESTYLE LITE) test strip Test 4 times per day   • HUMULIN R 500 UNIT/ML CONCENTRATED injection Inject 500 Units under the skin Continuous. Via insulin pump basal rate 0.68units/hr from 0000 to 1000  1000 to 1600 is 0.7units/hr  1600 to 0000 0.75units/hr  Plus sliding scale based on carb intake   • insulin degludec (TRESIBA FLEXTOUCH) 100 UNIT/ML solution pen-injector injection Inject 45 Units under the skin into the appropriate area as directed Every Morning.   • insulin degludec (TRESIBA FLEXTOUCH) 100 UNIT/ML solution pen-injector injection INJECT 40 UNITS INTO THE SKIN DAILY   • Insulin Infusion Pump (T:SLIM INSULIN PUMP) device    • isosorbide mononitrate (IMDUR) 60 MG 24 hr tablet Take 60 mg by mouth Every Morning.   • Lancets (FREESTYLE) lancets Test 4 times per day   • losartan (COZAAR) 50 MG tablet Take 50 mg by mouth Every Morning.   • NARCAN 4 MG/0.1ML nasal spray    • oxybutynin (DITROPAN) 5 MG tablet Take 5 mg by mouth 3 (Three) Times a Day.   • oxyCODONE (ROXICODONE) 10 MG tablet Take 1 tablet by mouth Every 6 (Six) Hours As Needed for Severe Pain .   • pantoprazole (PROTONIX) 40 MG EC tablet Take 40 mg by mouth Every Morning.   • phenazopyridine (PYRIDIUM) 100 MG tablet Take 100 mg by mouth.   • polyethylene glycol (MIRALAX) pack packet Take 17 g by mouth Daily.   • potassium chloride (K-DUR) 10 MEQ CR tablet Take 10 mEq by mouth Daily.   • promethazine (PHENERGAN) 12.5 MG tablet Take 1 tablet by mouth Every 6 (Six) Hours As Needed for Nausea or Vomiting.   • raNITIdine (ZANTAC) 300 MG tablet Take 300 mg by mouth Every Night.   • vitamin D (ERGOCALCIFEROL) 85326 units capsule capsule TK 1 C PO 1 TIME A WK   • [DISCONTINUED] cyclobenzaprine (FLEXERIL) 10 MG tablet Take 1 tablet by mouth 2 (Two) Times a Day As Needed for Muscle Spasms.     No current  facility-administered medications on file prior to visit.      Current Medications:  Scheduled Meds:  Continuous Infusions:  No current facility-administered medications for this visit.   PRN Meds:.    I have reviewed the patient's medical history in detail and updated the computerized patient record.  Review and summarization of old records includes:    Past Medical History:   Diagnosis Date   • Arteriosclerotic cardiovascular disease    • Arthritis    • At risk for sleep apnea    • COPD (chronic obstructive pulmonary disease) (CMS/[facility])    • Coronary artery disease    • Diabetes mellitus (CMS/[facility])    • Diabetic peripheral neuropathy (CMS/[facility]ly [facility])    • Disease of thyroid gland     NODULE PRESENT   • ED (erectile dysfunction) of non-organic origin    • GERD (gastroesophageal reflux disease)    • Headache disorder     DUE TO NECK   • Hyperlipidemia    • Hypertension    • Insulin pump in place    • Knee pain, bilateral    • Low back pain    • Myocardial infarction (CMS/[facility])    • Neck pain    • Spinal stenosis    • TIA (transient ischemic attack)     LEFT EYE   • Type 2 diabetes mellitus (CMS/[facility])    • Upper back pain    • Wears dentures     UPPER PLATE        Past Surgical History:   Procedure Laterality Date   • AMPUTATION FOOT / TOE Left     GREAT TOE   • BACK SURGERY  10/26/2015    Bilateral L4-L5 laminectomy -Dr. Gutierres   • CARDIAC CATHETERIZATION     • CARDIAC SURGERY      CABG X 6    • CHOLECYSTECTOMY     • CORONARY ARTERY BYPASS GRAFT      X 6   • EPIDURAL BLOCK     • EYE SURGERY      CATARACT EXTRACTION   • HAND SURGERY  04/28/2016   • LUMBAR DISCECTOMY FUSION INSTRUMENTATION N/A 12/12/2016    Procedure: L 4-5 FUSION WITH INSTRUMENTATION WITH BMP, WITH REMOVAL OF INSTRUMENTATION ;  Surgeon: Rowdy Spencer MD;  Location: [facility];  Service:    • LUMBAR LAMINECTOMY DISCECTOMY DECOMPRESSION N/A 12/12/2016    Procedure: L4-5 REPEAT LAMINECTOMY ;  Surgeon: Tim Gutierres MD;  Location: [facility] OR;  Service:     • LUMBAR LAMINECTOMY DISCECTOMY DECOMPRESSION N/A 2016    Procedure: EVACUATION OF LUMBAR HEMATOMA;  Surgeon: Rowdy Spencer MD;  Location: [facility] OR;  Service:    • MULTIPLE TOOTH EXTRACTIONS      UPPER TEETH EXTRACTED   • ORTHOPEDIC SURGERY     • PENIS SURGERY      RECONSTRUCTION   • SCROTUM EXPLORATION      scrotum surgery   • SPINAL CORD STIMULATOR IMPLANT N/A 2018    Procedure: SPINAL CORD STIMULATOR Phase 1 - [employer];  Surgeon: Mulugeta Guzman MD;  Location: [facility] OR;  Service: Pain Management   • SPINE SURGERY     • TOTAL KNEE ARTHROPLASTY Right 2019    Procedure: RIGHT TOTAL KNEE ARTHROPLASTY WITH NEGRITA NAVIGATION;  Surgeon: Andres Carter MD;  Location: [facility] OR;  Service: Orthopedics        Social History     Occupational History   • Not on file   Tobacco Use   • Smoking status: Former Smoker     Packs/day: 1.00     Years: 25.00     Pack years: 25.00     Types: Cigarettes     Start date:      Last attempt to quit: 2000     Years since quittin.4   • Smokeless tobacco: Never Used   Substance and Sexual Activity   • Alcohol use: No     Comment: no alcohol since    • Drug use: No   • Sexual activity: Defer      Social History     Social History Narrative   • Not on file        Family History   Problem Relation Age of Onset   • Diabetes Other    • Heart disease Other    • Hypertension Other    • Kidney disease Other         renal failure   • Heart disease Maternal Grandmother    • Hypertension Maternal Grandmother    • Malig Hyperthermia Neg Hx        ROS: 14 point review of systems was performed and all other systems were reviewed and are negative except for documented findings in HPI and today's encounter.     Allergies:   Allergies   Allergen Reactions   • Erythromycin Nausea Only and Other (See Comments)     PT STATES ACUTE KIDNEY INJURY   • Lisinopril Other (See Comments)     2016 possibly caused pancreatitis per Dr Quinonez   • Metformin  "Nausea And Vomiting     Constitutional:  Denies fever, shaking or chills   Eyes:  Denies change in visual acuity   HENT:  Denies nasal congestion or sore throat   Respiratory:  Denies cough or shortness of breath   Cardiovascular:  Denies chest pain or severe LE edema   GI:  Denies abdominal pain, nausea, vomiting, bloody stools or diarrhea   Musculoskeletal:  Numbness, tingling, pain, or loss of motor function only as noted above in history of present illness.  : Denies painful urination or hematuria  Integument:  Denies rash, lesion or ulceration   Neurologic:  Denies headache or focal weakness  Endocrine:  Denies lymphadenopathy  Psych:  Denies confusion or change in mental status   Hem:  Denies active bleeding    OBJECTIVE:  Physical Exam:   Temp 97.5 °F (36.4 °C)   Ht 180.3 cm (71\")   Wt 115 kg (254 lb)   BMI 35.43 kg/m²      General Appearance:    Alert, cooperative, in no acute distress                  Eyes: conjunctiva clear  ENT: external ears and nose atraumatic  CV: no peripheral edema  Resp: normal respiratory effort  Skin: no rashes or wounds; normal turgor  Psych: mood and affect appropriate  Lymph: no nodes appreciated  Neuro: gross sensation intact  Vascular:  Palpable peripheral pulse in noted extremity  Musculoskeletal Extremities: His right knee shows some stiffness he can get it out to about -10 degrees and flexes back to about 95 calves are symmetric with some chronic swelling but no particular tenderness and he has some weakness in his left leg when he gets up he uses walker and ambulates in a severely forward flexed posture and scoots his feet bilaterally.  On exam with internal/external rotation he has some decreased motion in both hips and some tenderness more in the upper iliac area or SI joint when he attempts to rotate his hip not in the groin per se    Radiology:   AP lateral right knee compared to old x-rays taken for total joint follow-up show good alignment position and " "fixation.AP of the hips lateral left hip show moderate arthritic type change bilaterally in the hips along with severe SI joint degeneration and previous lumbar fusion    Assessment:     ICD-10-CM ICD-9-CM   1. Status post right knee replacement Z96.651 V43.65   2. Bilateral hip pain M25.551 719.45    M25.552    3. Neuropathy involving both lower extremities G57.93 356.9   4. Postlaminectomy syndrome of lumbar region M96.1 722.83        Procedures       Plan: Biomechanics of pertinent body area discussed.  Risks, benefits, alternatives, comparisons, and complications of accepted medicines, injections, recommendations, surgical procedures, and therapies explained and education provided in laymen's terms. Natural history and expected course of this patient's diagnosis discussed along with evaluation of therapies. Questions answered. When appropriate I also discussed proper use of cane, walker, trekking poles. Talked about his relatively stiff knee.  At this point nearly 5 months out of surgery I am not sure any kind of surgical or manipulative procedure would be advised to may be perhaps he will advise.  Appears that his current weakness ambulating forward posture dragging his feet etc. may be related more towards chronic changes in his back postlaminectomy syndrome etc.  Although he has evidence of some arthritis in his hips on exam and radiographs does not appear to be enough to justify any kind of procedural intervention at this time.  We will see him back 3 months with x-rays of his knee to make sure that looks all right.  Explained the \"1 year.\"  As well and encouraged him to continue doing his exercises      6/5/2019    Much of this encounter note is an electronic transcription/translation of spoken language to printed text. The electronic translation of spoken language may permit erroneous, or at times, nonsensical words or phrases to be inadvertently transcribed; Although I have reviewed the note for such " errors, some may still exist

## 2019-06-18 RX ORDER — OXYCODONE HYDROCHLORIDE 10 MG/1
10 TABLET ORAL EVERY 6 HOURS PRN
Qty: 120 TABLET | Refills: 0 | Status: SHIPPED | OUTPATIENT
Start: 2019-06-18 | End: 2019-06-19 | Stop reason: SDUPTHER

## 2019-06-19 RX ORDER — OXYCODONE HYDROCHLORIDE 10 MG/1
10 TABLET ORAL EVERY 6 HOURS PRN
Qty: 120 TABLET | Refills: 0 | Status: SHIPPED | OUTPATIENT
Start: 2019-06-19 | End: 2019-07-25 | Stop reason: SDUPTHER

## 2019-06-26 ENCOUNTER — DOCUMENTATION (OUTPATIENT)
Dept: PHYSICAL THERAPY | Facility: HOSPITAL | Age: 64
End: 2019-06-26

## 2019-06-26 NOTE — THERAPY DISCHARGE NOTE
Outpatient Physical Therapy Ortho Discharge       Patient Name: Kian Mcdaniels  : 1955  MRN: [id number]  Today's Date: 2019      Visit Date: 2019    Patient Active Problem List   Diagnosis   • Bulging lumbar disc   • Chronic pain   • Diabetic neuropathy (CMS/HCC)   • Low back pain   • Degenerative arthritis of lumbar spine   • Neuropathy involving both lower extremities   • Encounter for long-term (current) use of high-risk medication   • Postlaminectomy syndrome of lumbar region   • Syringomyelia and syringobulbia (CMS/HCC)   • Lumbar pseudoarthrosis   • Type 2 diabetes mellitus with neurologic complication, with long-term current use of insulin (CMS/HCC)   • Insulin pump in place   • Hx of decompressive lumbar laminectomy   • Bilateral carpal tunnel syndrome   • Degenerative cervical disc   • Acute pain of right knee   • Primary localized osteoarthrosis of right lower leg   • Arthritis of right knee   • Sacroiliac inflammation (CMS/HCC)   • Sacroiliac joint dysfunction   • Severe obesity (BMI 35.0-39.9)   • Chronic pain of right knee   • Tricompartment osteoarthritis of left knee   • Tricompartment osteoarthritis of right knee   • Arthritis of left knee   • Bilateral chronic knee pain   • Hypertension   • S/P total knee arthroplasty, left   • Fall (on)(from) incline, initial encounter   • Abrasion of left knee        Past Medical History:   Diagnosis Date   • Arteriosclerotic cardiovascular disease    • Arthritis    • At risk for sleep apnea    • COPD (chronic obstructive pulmonary disease) (CMS/HCC)    • Coronary artery disease    • Diabetes mellitus (CMS/HCC)    • Diabetic peripheral neuropathy (CMS/HCC)    • Disease of thyroid gland     NODULE PRESENT   • ED (erectile dysfunction) of non-organic origin    • GERD (gastroesophageal reflux disease)    • Headache disorder     DUE TO NECK   • Hyperlipidemia    • Hypertension    • Insulin pump in place    • Knee pain, bilateral    • Low  back pain    • Myocardial infarction (CMS/HCC)    • Neck pain    • Spinal stenosis    • TIA (transient ischemic attack)     LEFT EYE   • Type 2 diabetes mellitus (CMS/HCC)    • Upper back pain    • Wears dentures     UPPER PLATE        Past Surgical History:   Procedure Laterality Date   • AMPUTATION FOOT / TOE Left     GREAT TOE   • BACK SURGERY  10/26/2015    Bilateral L4-L5 laminectomy -Dr. Gutierres   • CARDIAC CATHETERIZATION     • CARDIAC SURGERY      CABG X 6    • CHOLECYSTECTOMY     • CORONARY ARTERY BYPASS GRAFT      X 6   • EPIDURAL BLOCK     • EYE SURGERY      CATARACT EXTRACTION   • HAND SURGERY  04/28/2016   • LUMBAR DISCECTOMY FUSION INSTRUMENTATION N/A 12/12/2016    Procedure: L 4-5 FUSION WITH INSTRUMENTATION WITH BMP, WITH REMOVAL OF INSTRUMENTATION ;  Surgeon: Rowdy Sepncer MD;  Location: [facility] MAIN OR;  Service:    • LUMBAR LAMINECTOMY DISCECTOMY DECOMPRESSION N/A 12/12/2016    Procedure: L4-5 REPEAT LAMINECTOMY ;  Surgeon: Tim Gutierres MD;  Location: [facility] OR;  Service:    • LUMBAR LAMINECTOMY DISCECTOMY DECOMPRESSION N/A 12/14/2016    Procedure: EVACUATION OF LUMBAR HEMATOMA;  Surgeon: Rowdy Spencer MD;  Location: [facility] MAIN OR;  Service:    • MULTIPLE TOOTH EXTRACTIONS      UPPER TEETH EXTRACTED   • ORTHOPEDIC SURGERY     • PENIS SURGERY      RECONSTRUCTION   • SCROTUM EXPLORATION      scrotum surgery   • SPINAL CORD STIMULATOR IMPLANT N/A 9/24/2018    Procedure: SPINAL CORD STIMULATOR Phase 1 - [employer];  Surgeon: Mulugeta Guzman MD;  Location: [facility] MAIN OR;  Service: Pain Management   • SPINE SURGERY     • TOTAL KNEE ARTHROPLASTY Right 1/8/2019    Procedure: RIGHT TOTAL KNEE ARTHROPLASTY WITH NEGRITA NAVIGATION;  Surgeon: Andres Carter MD;  Location: [facility] OR;  Service: Orthopedics         Visit Dx:   No diagnosis found.        PT OP Goals     Row Name 06/26/19 1300          PT Short Term Goals    STG Date to Achieve  04/12/19  -MP     STG 1  Kian silva  low intensity closed chain exercises to include TKE with theraband and step ups.  -MP     STG 1 Progress  Met  -MP     STG 2  AROM of the R knee improves by 10 degrees.  -MP     STG 2 Progress  Met  -MP     STG 3  KOS disabiity improves by 15%.  -MP     STG 3 Progress  Ongoing  -MP     STG 4  Mid patella circumference improves by one cm.  -MP     STG 4 Progress  Ongoing  -MP        Long Term Goals    LTG Date to Achieve  06/11/19  -MP     LTG 1  AROM of the R knee, supine, measures 0-120 degrees of flexion.  -MP     LTG 1 Progress  Ongoing  -MP     LTG 2  MMT of the R knee equals 4+/5 for extension and flexion.  -MP     LTG 2 Progress  Ongoing  -MP     LTG 3  Kian is ambulating on all surfaces with a standard cane independently with a normal gait pattern  -MP     LTG 3 Progress  Ongoing  -MP     LTG 4  KOS disability improves by 30%  -MP     LTG 4 Progress  Ongoing  -MP     LTG 5  He is independent with a HEP and self care education.  -MP     LTG 5 Progress  Ongoing  -MP       User Key  (r) = Recorded By, (t) = Taken By, (c) = Cosigned By    Initials Name Provider Type    Joao Rosa, PT Physical Therapist              OP PT Discharge Summary  Date of Discharge: 06/26/19  Reason for Discharge: Independent  Outcomes Achieved: Patient able to partially acheive established goals  Discharge Destination: Home with home program  Discharge Instructions/Additional Comments: Kian was independent with a HEP and general care for the knee.  He has a chronic lumbar spine issue that was interferring with his ability to gain strength in his legs.        Joao Armendariz, PT  6/26/2019

## 2019-07-02 ENCOUNTER — OFFICE VISIT (OUTPATIENT)
Dept: PAIN MEDICINE | Facility: CLINIC | Age: 64
End: 2019-07-02

## 2019-07-02 ENCOUNTER — RESULTS ENCOUNTER (OUTPATIENT)
Dept: PAIN MEDICINE | Facility: CLINIC | Age: 64
End: 2019-07-02

## 2019-07-02 VITALS
HEART RATE: 87 BPM | TEMPERATURE: 98.1 F | BODY MASS INDEX: 39.34 KG/M2 | OXYGEN SATURATION: 95 % | HEIGHT: 71 IN | RESPIRATION RATE: 18 BRPM | WEIGHT: 281 LBS | DIASTOLIC BLOOD PRESSURE: 74 MMHG | SYSTOLIC BLOOD PRESSURE: 126 MMHG

## 2019-07-02 DIAGNOSIS — M50.30 DEGENERATIVE CERVICAL DISC: ICD-10-CM

## 2019-07-02 DIAGNOSIS — M25.561 BILATERAL CHRONIC KNEE PAIN: ICD-10-CM

## 2019-07-02 DIAGNOSIS — G89.29 BILATERAL CHRONIC KNEE PAIN: ICD-10-CM

## 2019-07-02 DIAGNOSIS — M25.562 BILATERAL CHRONIC KNEE PAIN: ICD-10-CM

## 2019-07-02 DIAGNOSIS — Z79.899 ENCOUNTER FOR LONG-TERM (CURRENT) USE OF HIGH-RISK MEDICATION: ICD-10-CM

## 2019-07-02 DIAGNOSIS — G89.29 OTHER CHRONIC PAIN: Primary | ICD-10-CM

## 2019-07-02 DIAGNOSIS — M96.1 POSTLAMINECTOMY SYNDROME OF LUMBAR REGION: ICD-10-CM

## 2019-07-02 DIAGNOSIS — M54.5 LOW BACK PAIN, UNSPECIFIED BACK PAIN LATERALITY, UNSPECIFIED CHRONICITY, WITH SCIATICA PRESENCE UNSPECIFIED: ICD-10-CM

## 2019-07-02 DIAGNOSIS — G89.29 OTHER CHRONIC PAIN: ICD-10-CM

## 2019-07-02 PROCEDURE — 99214 OFFICE O/P EST MOD 30 MIN: CPT | Performed by: NURSE PRACTITIONER

## 2019-07-02 NOTE — PROGRESS NOTES
"CHIEF COMPLAINT  Follow-up for back,neck and knee pain. Mr. Mcdaniels states that his pain is unchanged.    Subjective   Kian Mcdaniels is a 63 y.o. male  who presents to the office for follow-up.He has a history of neck, back, knee pain.    In terms of back pain, failed [employer] SCS trial.  Dr. Guzman recommends consideration of [facility] trial in the future. Still having a great deal of back and leg pain. Still cannot \"pick my feet up\". Saw Dr. Spencer a couple of months ago - not sure this is coming from his back, recommended f/u with ortho for hip evaluation. saw Dr. Carter last month, has some arthritis of the hip but not enough to explain symptoms or intervene with. Has upcoming visit with the [facility].       In terms of neck pain. Nothing surgical needed. Monitoring syringomyelia, cannot consider SCS in this area for this reason.  Has failed cervical epidural injections.  Order for cervical MBB previoulsy placed by Dr. Guzman, patient was unable to schedule due to knee surgery.  patient would like to proceed with injections now      Complains of pain in his neck, back and right knee. Today his pain is 7/10VAS.  Continues with Oxycodone 10 mg 4/day, gabapentin 800 mg 3/day and tizanidine 4mg 1-2/night. The regimen helps decrease his pain moderately. No adverse reactions.       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  He is in PT twice a week at [facility].      Back Pain   This is a chronic problem. The current episode started more than 1 year ago. The problem occurs daily. The problem has been waxing and waning since onset. The pain is present in the sacro-iliac. The quality of the pain is described as aching, shooting and stabbing. The pain is at a severity of 7/10. The pain is moderate. The pain is the same all the time. The symptoms are aggravated by bending, sitting and standing. Stiffness is present all day. Associated symptoms " include leg pain, numbness (bliteral legs), paresthesias, tingling and weakness (bilateral legs). Pertinent negatives include no abdominal pain, chest pain, dysuria, fever or headaches. Risk factors include lack of exercise, obesity and sedentary lifestyle. He has tried analgesics for the symptoms. The treatment provided moderate relief.   Neck Pain    This is a chronic problem. The current episode started more than 1 month ago. The problem occurs daily. The problem has been unchanged. The pain is associated with nothing. The pain is present in the left side, right side and midline. The quality of the pain is described as cramping. The pain is at a severity of 7/10. The pain is moderate. The symptoms are aggravated by sneezing and twisting. The pain is same all the time. Associated symptoms include leg pain, numbness (bliteral legs), tingling and weakness (bilateral legs). Pertinent negatives include no chest pain, fever or headaches. He has tried muscle relaxants, oral narcotics and bed rest for the symptoms. The treatment provided moderate relief.     PEG Assessment   What number best describes your pain on average in the past week?7  What number best describes how, during the past week, pain has interfered with your enjoyment of life?7  What number best describes how, during the past week, pain has interfered with your general activity?  7    The following portions of the patient's history were reviewed and updated as appropriate: allergies, current medications, past family history, past medical history, past social history, past surgical history and problem list.    Review of Systems   Constitutional: Negative for fatigue and fever.   HENT: Negative for congestion.    Eyes: Negative for visual disturbance.   Respiratory: Negative for cough, shortness of breath and wheezing.    Cardiovascular: Positive for leg swelling. Negative for chest pain and palpitations.   Gastrointestinal: Positive for constipation.  "Negative for abdominal pain and diarrhea.   Genitourinary: Negative for difficulty urinating and dysuria.   Musculoskeletal: Positive for arthralgias (bilateral knees), back pain and neck pain.   Neurological: Positive for tingling, weakness (bilateral legs), numbness (bliteral legs) and paresthesias. Negative for headaches.   Psychiatric/Behavioral: Negative for sleep disturbance and suicidal ideas. The patient is not nervous/anxious.      Vitals:    07/02/19 1401   BP: 126/74   Pulse: 87   Resp: 18   Temp: 98.1 °F (36.7 °C)   SpO2: 95%   Weight: 127 kg (281 lb)   Height: 180.3 cm (71\")   PainSc:   7   PainLoc: Back     Objective   Physical Exam   Constitutional: He is oriented to person, place, and time. Vital signs are normal. He appears well-developed and well-nourished. He is cooperative. No distress.   HENT:   Head: Normocephalic and atraumatic.   Nose: Nose normal.   Eyes: Conjunctivae and lids are normal.   Neck: Trachea normal. Neck supple. Muscular tenderness present. No spinous process tenderness present. Decreased range of motion present.   Cardiovascular: Normal rate.   Pulmonary/Chest: Effort normal. No respiratory distress.   Abdominal:   obese   Musculoskeletal:        Right knee: He exhibits decreased range of motion and swelling. Tenderness found.        Left knee: Tenderness found.        Cervical back: He exhibits decreased range of motion, tenderness and pain. He exhibits no spasm.        Lumbar back: He exhibits decreased range of motion, tenderness and pain.   Neurological: He is alert and oriented to person, place, and time. He has normal strength. Gait (wheelchair, antalgia) abnormal.   Skin: Skin is warm, dry and intact. He is not diaphoretic.   Psychiatric: He has a normal mood and affect. His speech is normal and behavior is normal. Cognition and memory are normal.   Nursing note and vitals reviewed.    Assessment/Plan   Kian was seen today for back pain, neck pain and knee " pain.    Diagnoses and all orders for this visit:    Other chronic pain    Low back pain, unspecified back pain laterality, unspecified chronicity, with sciatica presence unspecified    Postlaminectomy syndrome of lumbar region    Bilateral chronic knee pain    Degenerative cervical disc    Encounter for long-term (current) use of high-risk medication      --- Proceed with Cervical MBB (bilateral C3-C4 MBB)  --- Refill Hydrocodone. Patient appears stable with current regimen. No adverse effects. Regarding continuation of opioids, there is no evidence of aberrant behavior or any red flags.  The patient continues with appropriate response to opioid therapy. ADL's remain intact by self.   --- Routine ODT in office today as part of monitoring requirements for controlled substances. This specimen will be sent to [facility] laboratory for confirmation.     --- Follow-up 1 month          DAVID REPORT    As part of the patient's treatment plan, I am prescribing controlled substances. The patient has been made aware of appropriate use of such medications, including potential risk of somnolence, limited ability to drive and/or work safely, and the potential for dependence or overdose. It has also bee made clear that these medications are for use by this patient only, without concomitant use of alcohol or other substances unless prescribed.     Patient has completed prescribing agreement detailing terms of continued prescribing of controlled substances, including monitoring DAVID reports, urine drug screening, and pill counts if necessary. The patient is aware that inappropriate use will results in cessation of prescribing such medications.    DAVID report has been reviewed and scanned into the patient's chart.    As the clinician, I personally reviewed the DAVID from 7/2/19 while the patient was in the office today.    History and physical exam exhibit continued safe and appropriate use of controlled substances.    EMR  Dragon/Transcription disclaimer:   Much of this encounter note is an electronic transcription/translation of spoken language to printed text. The electronic translation of spoken language may permit erroneous, or at times, nonsensical words or phrases to be inadvertently transcribed; Although I have reviewed the note for such errors, some may still exist.

## 2019-07-16 ENCOUNTER — CLINICAL SUPPORT (OUTPATIENT)
Dept: ORTHOPEDIC SURGERY | Facility: CLINIC | Age: 64
End: 2019-07-16

## 2019-07-16 VITALS — WEIGHT: 281 LBS | BODY MASS INDEX: 39.34 KG/M2 | TEMPERATURE: 98.3 F | HEIGHT: 71 IN

## 2019-07-16 DIAGNOSIS — M16.12 ARTHRITIS OF LEFT HIP: ICD-10-CM

## 2019-07-16 DIAGNOSIS — M25.551 RIGHT HIP PAIN: ICD-10-CM

## 2019-07-16 DIAGNOSIS — M17.12 ARTHRITIS OF LEFT KNEE: Primary | ICD-10-CM

## 2019-07-16 DIAGNOSIS — Z96.651 HISTORY OF TOTAL KNEE REPLACEMENT, RIGHT: ICD-10-CM

## 2019-07-16 DIAGNOSIS — M25.552 LEFT HIP PAIN: ICD-10-CM

## 2019-07-16 PROCEDURE — 20610 DRAIN/INJ JOINT/BURSA W/O US: CPT | Performed by: NURSE PRACTITIONER

## 2019-07-16 PROCEDURE — 99213 OFFICE O/P EST LOW 20 MIN: CPT | Performed by: NURSE PRACTITIONER

## 2019-07-16 RX ORDER — METHYLPREDNISOLONE ACETATE 80 MG/ML
80 INJECTION, SUSPENSION INTRA-ARTICULAR; INTRALESIONAL; INTRAMUSCULAR; SOFT TISSUE
Status: COMPLETED | OUTPATIENT
Start: 2019-07-16 | End: 2019-07-16

## 2019-07-16 RX ADMIN — METHYLPREDNISOLONE ACETATE 80 MG: 80 INJECTION, SUSPENSION INTRA-ARTICULAR; INTRALESIONAL; INTRAMUSCULAR; SOFT TISSUE at 15:24

## 2019-07-16 NOTE — PROGRESS NOTES
Patient: Kian Mcdaniels  YOB: 1955    Chief Complaints:  bilateral knee pain, right knee s/p tka doing well, also left hip pain    Subjective:    History of Present Illness: Here today for left knee pain. Right TKA doing well. Left hip pain that is constant aching and in the groin helps with walker but feels like he cant lift his left leg. The pain is a generalized joint tenderness.  It has been progressive in nature but remains intermittent.  Worsened by prolonged standing or walking and squatting activities. Has had improvement in the past with ice/heat, rest, and injections.     This problem is new to this examiner.     Allergies:   Allergies   Allergen Reactions   • Erythromycin Nausea Only and Other (See Comments)     PT STATES ACUTE KIDNEY INJURY   • Lisinopril Other (See Comments)     7/2016 possibly caused pancreatitis per Dr Quinonez   • Metformin Nausea And Vomiting       Medications:   Home Medications:  Current Outpatient Medications on File Prior to Visit   Medication Sig   • albuterol (VENTOLIN HFA) 108 (90 BASE) MCG/ACT inhaler Inhale 1-2 puffs Every 6 (Six) Hours As Needed for wheezing (RESCUE INHALER).   • amLODIPine (NORVASC) 10 MG tablet Take 10 mg by mouth Every Morning.   • aspirin 81 MG EC tablet Take 1 tablet by mouth Every Morning. (Patient taking differently: Take 81 mg by mouth Every Morning. HOLD FOR SURGERY)   • atorvastatin (LIPITOR) 40 MG tablet Take 40 mg by mouth Daily.   • bisacodyl (DULCOLAX) 5 MG EC tablet Take 2 tablets by mouth Daily As Needed for Constipation.   • Blood Glucose Monitoring Suppl (FREESTYLE FREEDOM LITE) W/DEVICE kit    • carvedilol (COREG) 12.5 MG tablet Take 12.5 mg by mouth 2 (Two) Times a Day With Meals.   • cetirizine (ZyrTEC) 10 MG tablet Take 10 mg by mouth Every Morning.   • clopidogrel (PLAVIX) 75 MG tablet Take 75 mg by mouth Every Morning. HOLDING FOR SURGERY   • cyclobenzaprine (FLEXERIL) 10 MG tablet TAKE 1 TABLET BY MOUTH TWICE  DAILY AS NEEDED FOR MUSCLE SPASMS   • docusate sodium (COLACE) 100 MG capsule Take 100 mg by mouth 2 (Two) Times a Day.   • furosemide (LASIX) 40 MG tablet Take 40 mg by mouth Every Night.   • gabapentin (NEURONTIN) 400 MG capsule Take 1 capsule by mouth 3 (Three) Times a Day.   • glucose blood (FREESTYLE LITE) test strip Test 4 times per day   • HUMULIN R 500 UNIT/ML CONCENTRATED injection Inject 500 Units under the skin Continuous. Via insulin pump basal rate 0.68units/hr from 0000 to 1000  1000 to 1600 is 0.7units/hr  1600 to 0000 0.75units/hr  Plus sliding scale based on carb intake   • insulin degludec (TRESIBA FLEXTOUCH) 100 UNIT/ML solution pen-injector injection Inject 45 Units under the skin into the appropriate area as directed Every Morning.   • insulin degludec (TRESIBA FLEXTOUCH) 100 UNIT/ML solution pen-injector injection INJECT 40 UNITS INTO THE SKIN DAILY   • Insulin Infusion Pump (T:SLIM INSULIN PUMP) device    • isosorbide mononitrate (IMDUR) 60 MG 24 hr tablet Take 60 mg by mouth Every Morning.   • Lancets (FREESTYLE) lancets Test 4 times per day   • losartan (COZAAR) 50 MG tablet Take 50 mg by mouth Every Morning.   • NARCAN 4 MG/0.1ML nasal spray    • oxybutynin (DITROPAN) 5 MG tablet Take 5 mg by mouth 3 (Three) Times a Day.   • oxyCODONE (ROXICODONE) 10 MG tablet Take 1 tablet by mouth Every 6 (Six) Hours As Needed for Severe Pain .   • pantoprazole (PROTONIX) 40 MG EC tablet Take 40 mg by mouth Every Morning.   • polyethylene glycol (MIRALAX) pack packet Take 17 g by mouth Daily.   • potassium chloride (K-DUR) 10 MEQ CR tablet Take 10 mEq by mouth Daily.   • promethazine (PHENERGAN) 12.5 MG tablet Take 1 tablet by mouth Every 6 (Six) Hours As Needed for Nausea or Vomiting.   • raNITIdine (ZANTAC) 300 MG tablet Take 300 mg by mouth Every Night.   • vitamin D (ERGOCALCIFEROL) 21425 units capsule capsule TK 1 C PO 1 TIME A WK     No current facility-administered medications on file prior to  visit.      Current Medications:  Scheduled Meds:  Continuous Infusions:  No current facility-administered medications for this visit.   PRN Meds:.    I have reviewed the patient's medical history in detail and updated the computerized patient record.  Review and summarization of old records include:    Past Medical History:   Diagnosis Date   • Arteriosclerotic cardiovascular disease    • Arthritis    • At risk for sleep apnea    • COPD (chronic obstructive pulmonary disease) (CMS/[facility])    • Coronary artery disease    • Diabetes mellitus (CMS/HCC)    • Diabetic peripheral neuropathy (CMS/[facility])    • Disease of thyroid gland     NODULE PRESENT   • ED (erectile dysfunction) of non-organic origin    • GERD (gastroesophageal reflux disease)    • Headache disorder     DUE TO NECK   • Hyperlipidemia    • Hypertension    • Insulin pump in place    • Knee pain, bilateral    • Low back pain    • Myocardial infarction (CMS/[facility])    • Neck pain    • Spinal stenosis    • TIA (transient ischemic attack)     LEFT EYE   • Type 2 diabetes mellitus (CMS/[facility])    • Upper back pain    • Wears dentures     UPPER PLATE        Past Surgical History:   Procedure Laterality Date   • AMPUTATION FOOT / TOE Left     GREAT TOE   • BACK SURGERY  10/26/2015    Bilateral L4-L5 laminectomy -Dr. Gutierres   • CARDIAC CATHETERIZATION     • CARDIAC SURGERY      CABG X 6    • CHOLECYSTECTOMY     • CORONARY ARTERY BYPASS GRAFT      X 6   • EPIDURAL BLOCK     • EYE SURGERY      CATARACT EXTRACTION   • HAND SURGERY  04/28/2016   • LUMBAR DISCECTOMY FUSION INSTRUMENTATION N/A 12/12/2016    Procedure: L 4-5 FUSION WITH INSTRUMENTATION WITH BMP, WITH REMOVAL OF INSTRUMENTATION ;  Surgeon: Rowdy Spencer MD;  Location: [facility];  Service:    • LUMBAR LAMINECTOMY DISCECTOMY DECOMPRESSION N/A 12/12/2016    Procedure: L4-5 REPEAT LAMINECTOMY ;  Surgeon: Tim Gutierres MD;  Location: [facility];  Service:    • LUMBAR LAMINECTOMY DISCECTOMY DECOMPRESSION  N/A 2016    Procedure: EVACUATION OF LUMBAR HEMATOMA;  Surgeon: Rowdy Spencer MD;  Location: [facility] MAIN OR;  Service:    • MULTIPLE TOOTH EXTRACTIONS      UPPER TEETH EXTRACTED   • ORTHOPEDIC SURGERY     • PENIS SURGERY      RECONSTRUCTION   • SCROTUM EXPLORATION      scrotum surgery   • SPINAL CORD STIMULATOR IMPLANT N/A 2018    Procedure: SPINAL CORD STIMULATOR Phase 1 - [employer];  Surgeon: Mulugeta Guzman MD;  Location: [facility] MAIN OR;  Service: Pain Management   • SPINE SURGERY     • TOTAL KNEE ARTHROPLASTY Right 2019    Procedure: RIGHT TOTAL KNEE ARTHROPLASTY WITH NEGRITA NAVIGATION;  Surgeon: Andres Carter MD;  Location: [facility] MAIN OR;  Service: Orthopedics        Social History     Occupational History   • Not on file   Tobacco Use   • Smoking status: Former Smoker     Packs/day: 1.00     Years: 25.00     Pack years: 25.00     Types: Cigarettes     Start date:      Last attempt to quit:      Years since quittin.5   • Smokeless tobacco: Never Used   Substance and Sexual Activity   • Alcohol use: No     Comment: no alcohol since    • Drug use: No   • Sexual activity: Defer      Social History     Social History Narrative   • Not on file        Family History   Problem Relation Age of Onset   • Diabetes Other    • Heart disease Other    • Hypertension Other    • Kidney disease Other         renal failure   • Heart disease Maternal Grandmother    • Hypertension Maternal Grandmother    • Malig Hyperthermia Neg Hx        ROS: 14 point review of systems was performed and was negative except for documented findings in HPI and today's encounter.     Allergies:   Allergies   Allergen Reactions   • Erythromycin Nausea Only and Other (See Comments)     PT STATES ACUTE KIDNEY INJURY   • Lisinopril Other (See Comments)     2016 possibly caused pancreatitis per Dr Quinonez   • Metformin Nausea And Vomiting     Constitutional:  Denies fever, shaking or chills   Eyes:   "Denies change in visual acuity   HENT:  Denies nasal congestion or sore throat   Respiratory:  Denies cough or shortness of breath   Cardiovascular:  Denies chest pain or severe LE edema   GI:  Denies abdominal pain, nausea, vomiting, bloody stools or diarrhea   Musculoskeletal:  Numbness, tingling, or loss of motor function only as noted above in history of present illness.  : Denies painful urination or hematuria  Integument:  Denies rash, lesion or ulceration   Neurologic:  Denies headache or focal weakness  Endocrine:  Denies lymphadenopathy  Psych:  Denies confusion or change in mental status   Hem:  Denies active bleeding    Physical Exam:  Wt Readings from Last 3 Encounters:   07/16/19 127 kg (281 lb)   07/02/19 127 kg (281 lb)   06/05/19 115 kg (254 lb)     Ht Readings from Last 3 Encounters:   07/16/19 180.3 cm (71\")   07/02/19 180.3 cm (71\")   06/05/19 180.3 cm (71\")     Body mass index is 39.19 kg/m².  Facility age limit for growth percentiles is 20 years.  Vitals:    07/16/19 1523   Temp: 98.3 °F (36.8 °C)     Vital Signs:  reviewed  Constitutional: Awake alert and oriented x3, well developed, no acute distress, non-toxic appearance.  EYES: symmetric, sclera clear  ENT:  Normocephalic, Atraumatic.   Respiratory:  No respiratory distress, No wheezing  CV: pulse regular, no palpitations or pallor.  GI:  Abdomen soft, non-tender.   Vascular:  Intact distal pulses, No cyanosis, no signs or symptoms of DVT.  Neurologic: Sensation grossly intact to the involved extremity, No focal deficits noted.   Neck: No tenderness, Supple.  Integument: warm, dry, no ulcerations.   Psychiatric:  Oriented, no pathological affect.  Musculoskeletal:    Affected knee(s):  Painful gait with a subtle limp, positive for synovitis, swelling, joint effusion with crepitation.  Lachman negative  Posterior drawer negative  Ru's negative  Patellofemoral grind +  Sensation grossly intact to light touch throughout the lower " extremity  Skin is intact  Distal pulses are palpable  No signs or symptoms of DVT  The right knee is healed well and strengthening without complaint  The left hip is severely limited in internal and external rotation with groin and troch tenderness, hx lumbar fusion      Diagnostic Data:     Imaging was done previously in the office, images were personally viewed and discussed with the patient:    Indication: pain related symptoms,  Views: 2V AP&LAT right hip(s)   Findings: advanced arthritis, bilat hips  Comparison views: viewed last xray done in the office.  left knee prior views with arthritis.     Procedure:  Large Joint Arthrocentesis: L knee  Date/Time: 7/16/2019 3:24 PM  Consent given by: patient  Site marked: site marked  Timeout: Immediately prior to procedure a time out was called to verify the correct patient, procedure, equipment, support staff and site/side marked as required   Supporting Documentation  Indications: pain and joint swelling   Procedure Details  Location: knee - L knee  Preparation: Patient was prepped and draped in the usual sterile fashion  Needle size: 22 G  Approach: anterolateral  Medications administered: 4 mL lidocaine (cardiac); 80 mg methylPREDNISolone acetate 80 MG/ML  Patient tolerance: patient tolerated the procedure well with no immediate complications          Assessment:     ICD-10-CM ICD-9-CM   1. Arthritis of left knee M17.12 716.96   2. Left hip pain M25.552 719.45   3. Arthritis of left hip M16.12 716.95   4. History of total knee replacement, right Z96.651 V43.65           Plan: Is to proceed with injection  Follow up as indicated.  Ice, elevate, and rest as needed.  Additional interventions include:  15 min spent face to face with patient 11 min spent counseling about:  Biomechanics of pertinent body area discussed.  Risks, benefits, alternatives, comparisons, and complications of accepted medicines, injections, recommendations, surgical procedures, and therapies  explained and education provided in laymen's terms. Natural history and expected course of this patient's diagnosis discussed along with evaluation of therapies. Questions answered. When appropriate I also discussed proper use of cane, walker, trekking poles.   BMI:  The concept of BMI body mass index and its importance and implications discussed.  BMI suggested to be < 40 or as low as possible. Lifestyle measures for weight loss and how this affects orthopedic condition.  EXERCISES:  Advice on benefits of, and types of regular/moderate exercise including biomechanical forces involved as it pertains to this complaint.  RICE: Rest, ice, compression, and elevation therapy, Cryotherapy/brachy therapy, and or OTC linaments as indicated with instructions.   Cortisone Injection. See procedure note.  Cortisone Injection for DIAGNOSTIC and THERAPUTIC purposes.  PT referral.for bilat hip pain  Left hip injection in radiology for dx and theraputic purposes.   Will note immediate relief.   7/16/2019  Kaylee Jorge, APRN

## 2019-07-23 ENCOUNTER — HOSPITAL ENCOUNTER (OUTPATIENT)
Dept: GENERAL RADIOLOGY | Facility: HOSPITAL | Age: 64
Discharge: HOME | End: 2019-07-23
Admitting: NURSE PRACTITIONER

## 2019-07-23 DIAGNOSIS — M17.12 ARTHRITIS OF LEFT KNEE: ICD-10-CM

## 2019-07-23 PROCEDURE — 25010000003 LIDOCAINE 1 % SOLUTION: Performed by: RADIOLOGY

## 2019-07-23 PROCEDURE — 25010000002 IOPAMIDOL 61 % SOLUTION: Performed by: RADIOLOGY

## 2019-07-23 PROCEDURE — 77002 NEEDLE LOCALIZATION BY XRAY: CPT

## 2019-07-23 PROCEDURE — 25010000002 METHYLPREDNISOLONE PER 125 MG: Performed by: RADIOLOGY

## 2019-07-23 RX ORDER — BUPIVACAINE HYDROCHLORIDE 2.5 MG/ML
10 INJECTION, SOLUTION EPIDURAL; INFILTRATION; INTRACAUDAL ONCE
Status: COMPLETED | OUTPATIENT
Start: 2019-07-23 | End: 2019-07-23

## 2019-07-23 RX ORDER — LIDOCAINE HYDROCHLORIDE 10 MG/ML
10 INJECTION, SOLUTION INFILTRATION; PERINEURAL ONCE
Status: COMPLETED | OUTPATIENT
Start: 2019-07-23 | End: 2019-07-23

## 2019-07-23 RX ORDER — METHYLPREDNISOLONE SODIUM SUCCINATE 125 MG/2ML
80 INJECTION, POWDER, LYOPHILIZED, FOR SOLUTION INTRAMUSCULAR; INTRAVENOUS
Status: COMPLETED | OUTPATIENT
Start: 2019-07-23 | End: 2019-07-23

## 2019-07-23 RX ADMIN — METHYLPREDNISOLONE SODIUM SUCCINATE 80 MG: 125 INJECTION, POWDER, LYOPHILIZED, FOR SOLUTION INTRAMUSCULAR; INTRAVENOUS at 13:50

## 2019-07-23 RX ADMIN — BUPIVACAINE HYDROCHLORIDE 5 ML: 2.5 INJECTION, SOLUTION EPIDURAL; INFILTRATION; INTRACAUDAL; PERINEURAL at 13:50

## 2019-07-23 RX ADMIN — IOPAMIDOL 1 ML: 612 INJECTION, SOLUTION INTRAVENOUS at 13:50

## 2019-07-23 RX ADMIN — LIDOCAINE HYDROCHLORIDE 3 ML: 10 INJECTION, SOLUTION INFILTRATION; PERINEURAL at 13:50

## 2019-07-25 ENCOUNTER — OFFICE VISIT (OUTPATIENT)
Dept: PAIN MEDICINE | Facility: CLINIC | Age: 64
End: 2019-07-25

## 2019-07-25 VITALS
WEIGHT: 274.4 LBS | TEMPERATURE: 99.1 F | SYSTOLIC BLOOD PRESSURE: 165 MMHG | HEIGHT: 71 IN | HEART RATE: 80 BPM | DIASTOLIC BLOOD PRESSURE: 84 MMHG | OXYGEN SATURATION: 95 % | BODY MASS INDEX: 38.42 KG/M2 | RESPIRATION RATE: 18 BRPM

## 2019-07-25 DIAGNOSIS — G89.29 OTHER CHRONIC PAIN: Primary | ICD-10-CM

## 2019-07-25 DIAGNOSIS — Z79.899 ENCOUNTER FOR LONG-TERM (CURRENT) USE OF HIGH-RISK MEDICATION: ICD-10-CM

## 2019-07-25 DIAGNOSIS — M96.1 POSTLAMINECTOMY SYNDROME OF LUMBAR REGION: ICD-10-CM

## 2019-07-25 DIAGNOSIS — M47.812 ARTHROPATHY OF CERVICAL FACET JOINT: ICD-10-CM

## 2019-07-25 PROCEDURE — 99214 OFFICE O/P EST MOD 30 MIN: CPT | Performed by: NURSE PRACTITIONER

## 2019-07-25 RX ORDER — OXYCODONE HYDROCHLORIDE 10 MG/1
10 TABLET ORAL EVERY 6 HOURS PRN
Qty: 120 TABLET | Refills: 0 | Status: SHIPPED | OUTPATIENT
Start: 2019-07-25 | End: 2019-08-30 | Stop reason: SDUPTHER

## 2019-07-25 NOTE — PROGRESS NOTES
"CHIEF COMPLAINT  Follow-up for back,neck and knee.  states that his pain is unchanged.    Subjective   Kian Mcdaniels is a 63 y.o. male  who presents to the office for follow-up.He has a history of neck, back pain.    Hand surgery 7/21/19 - trigger finger and excision of mass    In terms of back pain, failed [employer] SCS trial.  Dr. Guzman recommends consideration of [employer] trial in the future. Still having a great deal of back and leg pain. Still cannot \"pick my feet up\". Saw Dr. Spencer a couple of months ago - not sure this is coming from his back, recommended f/u with ortho for hip evaluation. saw Dr. Carter last month, has some arthritis of the hip but not enough to explain symptoms or intervene with. Has upcoming visit with the [facility].       In terms of neck pain. Nothing surgical needed. Monitoring syringomyelia, cannot consider SCS in this area for this reason.  Has failed cervical epidural injections.  Order for cervical MBB previoulsy placed by Dr. Guzman, patient was unable to schedule due to knee surgery.  patient would like to proceed with injections now      Complains of pain in his neck, back and right knee. Today his pain is 7/10VAS.  Continues with Oxycodone 10 mg 4/day, gabapentin 800 mg 3/day and tizanidine 4mg 1-2/night. The regimen helps decrease his pain moderately. No adverse reactions.       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  He is in PT twice a week at [facility].  recent hip and knee injection     Back Pain   This is a chronic problem. The current episode started more than 1 year ago. The problem occurs daily. The problem has been waxing and waning since onset. The pain is present in the sacro-iliac. The quality of the pain is described as aching, shooting and stabbing. The pain is at a severity of 7/10. The pain is moderate. The pain is the same all the time. The symptoms are aggravated by " bending, sitting and standing. Stiffness is present all day. Associated symptoms include leg pain, numbness (bilateral feet), paresthesias and tingling. Pertinent negatives include no abdominal pain, chest pain, dysuria, fever, headaches or weakness. Risk factors include lack of exercise, obesity and sedentary lifestyle. He has tried analgesics for the symptoms. The treatment provided moderate relief.   Neck Pain    This is a chronic problem. The current episode started more than 1 month ago. The problem occurs daily. The problem has been unchanged. The pain is associated with nothing. The pain is present in the left side, right side and midline. The quality of the pain is described as cramping. The pain is at a severity of 7/10. The pain is moderate. The symptoms are aggravated by sneezing and twisting. The pain is same all the time. Associated symptoms include leg pain, numbness (bilateral feet) and tingling. Pertinent negatives include no chest pain, fever, headaches or weakness. He has tried muscle relaxants, oral narcotics and bed rest for the symptoms. The treatment provided moderate relief.      PEG Assessment   What number best describes your pain on average in the past week?8  What number best describes how, during the past week, pain has interfered with your enjoyment of life?8  What number best describes how, during the past week, pain has interfered with your general activity?  8    The following portions of the patient's history were reviewed and updated as appropriate: allergies, current medications, past family history, past medical history, past social history, past surgical history and problem list.    Review of Systems   Constitutional: Negative for fatigue and fever.   HENT: Negative for congestion.    Eyes: Negative for visual disturbance.   Respiratory: Negative for cough, shortness of breath and wheezing.    Cardiovascular: Negative.  Negative for chest pain.   Gastrointestinal: Negative for  "abdominal pain, constipation and diarrhea.   Genitourinary: Negative for difficulty urinating and dysuria.   Musculoskeletal: Positive for arthralgias (bilateral knees), back pain and neck pain.   Neurological: Positive for tingling, numbness (bilateral feet) and paresthesias. Negative for weakness and headaches.   Psychiatric/Behavioral: Negative for sleep disturbance and suicidal ideas. The patient is not nervous/anxious.        Vitals:    07/25/19 1505   BP: 165/84   Pulse: 80   Resp: 18   Temp: 99.1 °F (37.3 °C)   SpO2: 95%   Weight: 124 kg (274 lb 6.4 oz)   Height: 180.3 cm (71\")   PainSc:   7   PainLoc: Back     Objective   Physical Exam   Constitutional: He is oriented to person, place, and time. Vital signs are normal. He appears well-developed and well-nourished. He is cooperative. No distress.   HENT:   Head: Normocephalic and atraumatic.   Nose: Nose normal.   Eyes: Conjunctivae and lids are normal.   Neck: Trachea normal. Neck supple. Muscular tenderness present. No spinous process tenderness present. Decreased range of motion present.   Cardiovascular: Normal rate.   Pulmonary/Chest: Effort normal. No respiratory distress.   Abdominal:   obese   Musculoskeletal:        Right knee: He exhibits decreased range of motion and swelling. Tenderness found.        Left knee: Tenderness found.        Cervical back: He exhibits decreased range of motion, tenderness and pain. He exhibits no spasm.        Lumbar back: He exhibits decreased range of motion, tenderness and pain.   Neurological: He is alert and oriented to person, place, and time. He has normal strength. Gait (wheelchair, antalgia) abnormal.   Skin: Skin is warm, dry and intact. He is not diaphoretic.   Psychiatric: He has a normal mood and affect. His speech is normal and behavior is normal. Cognition and memory are normal.   Nursing note and vitals reviewed.    Assessment/Plan   Kian was seen today for back pain, neck pain and knee " pain.    Diagnoses and all orders for this visit:    Other chronic pain    Postlaminectomy syndrome of lumbar region    Arthropathy of cervical facet joint    Encounter for long-term (current) use of high-risk medication      --- Cervical MBB as scheduled, off Plavix until injection   --- Refill Oxycodone. Patient appears stable with current regimen. No adverse effects. Regarding continuation of opioids, there is no evidence of aberrant behavior or any red flags.  The patient continues with appropriate response to opioid therapy. ADL's remain intact by self.   --- The urine drug screen confirmation from 7/2/19 has been reviewed and the result is appropriate based on patient history and DAVID report  --- Follow-up 1 month          DAVID REPORT  As part of the patient's treatment plan, I am prescribing controlled substances. The patient has been made aware of appropriate use of such medications, including potential risk of somnolence, limited ability to drive and/or work safely, and the potential for dependence or overdose. It has also bee made clear that these medications are for use by this patient only, without concomitant use of alcohol or other substances unless prescribed.     Patient has completed prescribing agreement detailing terms of continued prescribing of controlled substances, including monitoring DAVID reports, urine drug screening, and pill counts if necessary. The patient is aware that inappropriate use will results in cessation of prescribing such medications.    DAVID report has been reviewed and scanned into the patient's chart.    As the clinician, I personally reviewed the DAVID from 7/25/19 while the patient was in the office today.    History and physical exam exhibit continued safe and appropriate use of controlled substances.    EMR Dragon/Transcription disclaimer:   Much of this encounter note is an electronic transcription/translation of spoken language to printed text. The electronic  translation of spoken language may permit erroneous, or at times, nonsensical words or phrases to be inadvertently transcribed; Although I have reviewed the note for such errors, some may still exist.

## 2019-07-31 ENCOUNTER — DOCUMENTATION (OUTPATIENT)
Dept: PAIN MEDICINE | Facility: CLINIC | Age: 64
End: 2019-07-31

## 2019-07-31 ENCOUNTER — OUTSIDE FACILITY SERVICE (OUTPATIENT)
Dept: PAIN MEDICINE | Facility: CLINIC | Age: 64
End: 2019-07-31

## 2019-07-31 PROCEDURE — 64490 INJ PARAVERT F JNT C/T 1 LEV: CPT | Performed by: ANESTHESIOLOGY

## 2019-07-31 NOTE — PROGRESS NOTES
Cervical Medial Branch Blockade  [facility]      PREOPERATIVE DIAGNOSIS:  Cervical spondylosis without myelopathy   POSTOPERATIVE DIAGNOSIS:  Same as preoperative diagnosis    PROCEDURE:    Cervical Facet Nerve (medial branch) Blocks, with Fluoroscopy:      LEVELS: bilateral  C3 and C4    PRE-PROCEDURE DISCUSSION WITH PATIENT:    Risks and complications were discussed with the patient prior to starting the procedure and informed consent was obtained.  We discussed various topics, including but not limited to bleeding, infection, injury, nerve injury, paralysis, coma, death, postprocedural soreness or painful flare, worsening of clinical picture, lack of pain relief.  We discussed the diagnostic nature of medial branch blockades.      SURGEON:  Mulugeta Guzman MD    REASON FOR PROCEDURE:    The patient complains of pain that seems to have a significant axial component    SEDATION:  Versed 6mg IV  ANESTHETIC:   Marcaine 0.5%  STEROID:  Dexamethasone 4mg  TOTAL VOLUME OF SOLUTION:  4 mL      DESCRIPTON OF PROCEDURE:  After obtaining informed consent, the patient was placed in the prone position. IV access was obtained.  EKG, blood pressure, and pulse oximeter were monitored and all sedation was administered by an RN under my direct guidance.  The cervical area was prepped with Chloraprep and draped with sterile barrier.     Under fluoroscopic guidance the waists of the above mentioned vertebra were identified and marked.  Skin and subcutaneous tissue were then anesthetized with 1% lidocaine 1mL at each point. Then spinal needles were introduced under fluoroscopic guidance at the waists of these vertebrae contacting periosteum on the lateral edge of the vertebra on the AP fluroscopic view. After confirming the position of the needle under fluoroscopic PA and lateral views, confirming the needle position in the center of the trapezoid at each level, and confirming negative aspiration of blood and  CSF, 0.25 mL of Omnipaque was injected.  Proper spread and lack of vascular uptake was demonstrated.  A solution was prepared as above, and 1 mL of that solution was injected very slowly each level.      Needles were removed intact from all levels.  Vitals were stable throughout.       ESTIMATED BLOOD LOSS:  minimal  SPECIMENS:  None    COMPLICATIONS:    No complications were noted., There was no indication of vascular uptake on live injection of contrast dye., There was no indication of intrathecal uptake on live injection of contrast dye., There was not any evidence of dural puncture.   and The patient did not have any signs of postprocedure numbness nor weakness.    TOLERANCE & DISCHARGE CONDITION:    The patient tolerated the procedure well.  The patient was transported to the recovery area without difficulties.  The patient was discharged to home under the care of family in stable and satisfactory condition.  The patient did notice improvement in pain on extension and rotation of the cervical spine.      PLAN OF CARE:  1. The patient was given our standard instruction sheet.  2. We discussed that Cervical Medial Branch Blockade is a diagnostic procedure in consideration for radiofrequency ablation if two diagnostic procedures proved to be positive for significant benefit.  If sustained relief of six to eight weeks is obtained, then an alternative plan could be therapeutic cervical medial branch blocks on an as-needed basis.  3. The patient is asked to keep a pain log hourly for 8 hours postoperatively today.  4. The patient will Repeat injection 2-4 wks.  5. The patient will resume all medications as per the medication reconciliation sheet.           no Passive ROM deficits were identified

## 2019-08-19 DIAGNOSIS — G95.0 SYRINGOMYELIA AND SYRINGOBULBIA (HCC): Primary | ICD-10-CM

## 2019-08-26 ENCOUNTER — HOSPITAL ENCOUNTER (OUTPATIENT)
Dept: MRI IMAGING | Facility: HOSPITAL | Age: 64
Discharge: HOME | End: 2019-08-26

## 2019-08-26 ENCOUNTER — HOSPITAL ENCOUNTER (OUTPATIENT)
Dept: MRI IMAGING | Facility: HOSPITAL | Age: 64
Discharge: HOME | End: 2019-08-26
Admitting: NEUROLOGICAL SURGERY

## 2019-08-26 DIAGNOSIS — G95.0 SYRINGOMYELIA AND SYRINGOBULBIA (HCC): ICD-10-CM

## 2019-08-26 PROCEDURE — 72141 MRI NECK SPINE W/O DYE: CPT

## 2019-08-26 PROCEDURE — 72146 MRI CHEST SPINE W/O DYE: CPT

## 2019-08-28 NOTE — PROGRESS NOTES
Subjective   Patient ID: Kian Mcdaniels is a 64 y.o. male is here today for 1 year follow-up with a new Cervical and Thoracic MRI.  Patient states that he is having low back and bilateral leg pain, neck pain and tingling bilateral hands. He is having problems with his balance and gait and is using a walker. He has bilateral leg weakness. No urinary incontinence    He is in pain management with Dr Guzman and had cervical facet injection 2 weeks ago and it did not help.  He is scheduled for another one 9.5.19      History of Present Illness    This patient continues with pain in his lower back as well as pain in his neck.  His primary complaint however he really has more to do with picking his legs up and his ability to walk.  He is really not getting a lot better from that.  His pain is bad but he does not really seem to be affecting him.    The following portions of the patient's history were reviewed and updated as appropriate: allergies, current medications, past family history, past medical history, past social history, past surgical history and problem list.    Review of Systems   Musculoskeletal: Positive for back pain, gait problem and neck pain. Negative for arthralgias, myalgias and neck stiffness.   Neurological: Positive for weakness. Negative for numbness.   All other systems reviewed and are negative.      Objective   Physical Exam   Constitutional: He is oriented to person, place, and time. He appears well-developed and well-nourished.   Neurological: He is oriented to person, place, and time.     Neurologic Exam     Mental Status   Oriented to person, place, and time.       Assessment/Plan   Independent Review of Radiographic Studies:      I reviewed his MRI of the thoracic spine and his MRI of the cervical spine.  The MRI of the cervical spine shows the large syrinx which we have been following.  The MRI of the thoracic spine for the most part looks okay although there is some syrinx at T3-4 as  well.  There is no imaging of his lower back.    Medical Decision Making:      I told the patient and his wife about the imaging.  I explained that I am very concerned about trying to decompress the syrinx but it is very large.  His primary complaint now is not pain but rather the difficulty walking which could well be coming from the syrinx.  I recommended that we check some plain films and an MRI of his lumbar spine.  If those look okay then we may have no option but to proceed with a I told the patient about a cervical laminectomy.  I explained that there was an 80% chance of getting rid of the trouble in the arm leaving a 20% chance of being no better or worse.  I explained that there would still be pain in the neck afterwards and initially this will be quite severe.  There is a 2 or 3% chance of infection, bleeding, paralysis, damage to the nerve as a result of surgery, CSF leak, instability and anesthetic risk.  We discussed the postoperative hospital and home course.  With decompression of the syrinx.    Kian was seen today for back pain, leg pain, extremity weakness, neck pain and difficulty walking.    Diagnoses and all orders for this visit:    Osteoarthritis of lumbar spine with myelopathy  -     MRI Lumbar Spine With & Without Contrast; Future  -     XR Spine Lumbar Complete With Flex & Ext; Future      Return for After radiology test.

## 2019-08-29 ENCOUNTER — OFFICE VISIT (OUTPATIENT)
Dept: NEUROSURGERY | Facility: CLINIC | Age: 64
End: 2019-08-29

## 2019-08-29 VITALS
DIASTOLIC BLOOD PRESSURE: 77 MMHG | BODY MASS INDEX: 38.64 KG/M2 | HEART RATE: 70 BPM | WEIGHT: 276 LBS | SYSTOLIC BLOOD PRESSURE: 145 MMHG | HEIGHT: 71 IN

## 2019-08-29 DIAGNOSIS — M47.16 OSTEOARTHRITIS OF LUMBAR SPINE WITH MYELOPATHY: Primary | ICD-10-CM

## 2019-08-29 PROCEDURE — 99213 OFFICE O/P EST LOW 20 MIN: CPT | Performed by: NEUROLOGICAL SURGERY

## 2019-08-30 ENCOUNTER — OFFICE VISIT (OUTPATIENT)
Dept: PAIN MEDICINE | Facility: CLINIC | Age: 64
End: 2019-08-30

## 2019-08-30 VITALS
DIASTOLIC BLOOD PRESSURE: 92 MMHG | BODY MASS INDEX: 39.87 KG/M2 | HEIGHT: 71 IN | SYSTOLIC BLOOD PRESSURE: 163 MMHG | TEMPERATURE: 98.3 F | HEART RATE: 92 BPM | OXYGEN SATURATION: 95 % | WEIGHT: 284.8 LBS | RESPIRATION RATE: 20 BRPM

## 2019-08-30 DIAGNOSIS — M96.1 POSTLAMINECTOMY SYNDROME OF LUMBAR REGION: ICD-10-CM

## 2019-08-30 DIAGNOSIS — Z79.899 ENCOUNTER FOR LONG-TERM (CURRENT) USE OF HIGH-RISK MEDICATION: ICD-10-CM

## 2019-08-30 DIAGNOSIS — M47.812 ARTHROPATHY OF CERVICAL FACET JOINT: ICD-10-CM

## 2019-08-30 DIAGNOSIS — G89.29 OTHER CHRONIC PAIN: Primary | ICD-10-CM

## 2019-08-30 PROCEDURE — 99214 OFFICE O/P EST MOD 30 MIN: CPT | Performed by: NURSE PRACTITIONER

## 2019-08-30 RX ORDER — OXYCODONE HYDROCHLORIDE 10 MG/1
10 TABLET ORAL EVERY 6 HOURS PRN
Qty: 120 TABLET | Refills: 0 | Status: SHIPPED | OUTPATIENT
Start: 2019-08-30 | End: 2019-09-27 | Stop reason: SDUPTHER

## 2019-08-30 NOTE — PROGRESS NOTES
"CHIEF COMPLAINT  F/u back, neck, and bilat knee pain. Pt sts back pain has increased since last ov, neck and bilat knee pain has remained the same since last ov. Pt had Cervical Medial Branch Blockade on 7/31/19. Pt sts receiving 75% pain relief x 7 days then neck and back pain returned to baseline.  Pt holding plavix for upcoming Bilateral C3-4 CMBB on 9/5/19. Pt had a left knee injection recently.     Subjective   Kian Mcdaniels is a 64 y.o. male  who presents to the office for follow-up.He has a history of back and neck pain.    Bilateral C3-C4 MBB 7/31/19 -- 75% relief for a week. Scheduled for second diagnostic injection 9/5/19    Hand surgery 7/21/19 - trigger finger and excision of mass     In terms of back pain, failed [employer] SCS trial.  Dr. Guzman recommends consideration of Nev[employer] trial in the future. Still having a great deal of back and leg pain. Still cannot \"pick my feet up\". Saw Dr. Spencer a couple of months ago - not sure this is coming from his back, recommended f/u with ortho for hip evaluation. saw Dr. Carter last month, has some arthritis of the hip but not enough to explain symptoms or intervene with. Has upcoming visit with the [facility].       In terms of neck pain. Nothing surgical needed. Monitoring syringomyelia, cannot consider SCS in this area for this reason.  Has failed cervical epidural injections.  Order for cervical MBB previoulsy placed by Dr. Guzman, patient was unable to schedule due to knee surgery.  patient would like to proceed with injections now      Complains of pain in his neck, back and right knee. Today his pain is 7/10VAS.  Continues with Oxycodone 10 mg 4/day, gabapentin 800 mg 3/day and tizanidine 4mg 1-2/night. The regimen helps decrease his pain moderately. No adverse reactions.       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  He is in PT twice a week at [facility].  recent hip and knee " injection     Back Pain   This is a chronic problem. The current episode started more than 1 year ago. The problem occurs daily. The problem has been waxing and waning since onset. The pain is present in the sacro-iliac. The quality of the pain is described as aching, shooting and stabbing. The pain is at a severity of 7/10. The pain is moderate. The pain is the same all the time. The symptoms are aggravated by bending, sitting and standing. Stiffness is present all day. Associated symptoms include leg pain, numbness (bilateral feet), paresthesias and tingling. Pertinent negatives include no abdominal pain, chest pain, dysuria, fever, headaches or weakness. Risk factors include lack of exercise, obesity and sedentary lifestyle. He has tried analgesics for the symptoms. The treatment provided moderate relief.   Neck Pain    This is a chronic problem. The current episode started more than 1 month ago. The problem occurs daily. The problem has been unchanged. The pain is associated with nothing. The pain is present in the left side, right side and midline. The quality of the pain is described as cramping. The pain is at a severity of 7/10. The pain is moderate. The symptoms are aggravated by sneezing and twisting. The pain is same all the time. Associated symptoms include leg pain, numbness (bilateral feet) and tingling. Pertinent negatives include no chest pain, fever, headaches or weakness. He has tried muscle relaxants, oral narcotics and bed rest for the symptoms. The treatment provided moderate relief.     PEG Assessment   What number best describes your pain on average in the past week?7  What number best describes how, during the past week, pain has interfered with your enjoyment of life?7  What number best describes how, during the past week, pain has interfered with your general activity?  7    The following portions of the patient's history were reviewed and updated as appropriate: allergies, current  "medications, past family history, past medical history, past social history, past surgical history and problem list.    Review of Systems   Constitutional: Positive for activity change (dec). Negative for fatigue and fever.   HENT: Negative for congestion.    Eyes: Negative for visual disturbance.   Respiratory: Negative for cough, shortness of breath and wheezing.    Cardiovascular: Negative.  Negative for chest pain.   Gastrointestinal: Negative for abdominal pain, constipation, diarrhea and vomiting.   Genitourinary: Negative for difficulty urinating and dysuria.   Musculoskeletal: Positive for arthralgias (bilateral knees), back pain, gait problem (walker), joint swelling (bilat ankles and feet), neck pain and neck stiffness (occ). Negative for myalgias.   Allergic/Immunologic: Negative for immunocompromised state.   Neurological: Positive for tingling, numbness (bilateral feet) and paresthesias. Negative for dizziness, weakness and headaches.   Psychiatric/Behavioral: Negative for agitation, sleep disturbance and suicidal ideas. The patient is not nervous/anxious.      Vitals:    08/30/19 1414   BP: 163/92  Comment: pt sts had bp meds today   Pulse: 92   Resp: 20   Temp: 98.3 °F (36.8 °C)   SpO2: 95%   Weight: 129 kg (284 lb 12.8 oz)   Height: 180.3 cm (70.98\")   PainSc:   7   PainLoc: Back     Objective   Physical Exam   Constitutional: He is oriented to person, place, and time. Vital signs are normal. He appears well-developed and well-nourished. He is cooperative. No distress.   HENT:   Head: Normocephalic and atraumatic.   Nose: Nose normal.   Eyes: Conjunctivae and lids are normal.   Neck: Trachea normal. Neck supple. Muscular tenderness present. No spinous process tenderness present. Decreased range of motion present.   Cardiovascular: Normal rate.   Pulmonary/Chest: Effort normal. No respiratory distress.   Abdominal:   obese   Musculoskeletal:        Right knee: He exhibits decreased range of motion and " swelling. Tenderness found.        Left knee: Tenderness found.        Cervical back: He exhibits decreased range of motion, tenderness and pain. He exhibits no spasm.        Lumbar back: He exhibits decreased range of motion, tenderness and pain.   +cervical facet tenderness/loading    Neurological: He is alert and oriented to person, place, and time. He has normal strength. Gait (wheelchair, antalgia) abnormal.   Skin: Skin is warm, dry and intact. He is not diaphoretic.   Psychiatric: He has a normal mood and affect. His speech is normal and behavior is normal. Cognition and memory are normal.   Nursing note and vitals reviewed.    Assessment/Plan   Kian was seen today for back pain, neck pain and knee pain.    Diagnoses and all orders for this visit:    Other chronic pain    Postlaminectomy syndrome of lumbar region    Arthropathy of cervical facet joint    Encounter for long-term (current) use of high-risk medication      --- Proceed with cervical MBB as scheduled  --- Refill Oxycodone. Patient appears stable with current regimen. No adverse effects. Regarding continuation of opioids, there is no evidence of aberrant behavior or any red flags.  The patient continues with appropriate response to opioid therapy. ADL's remain intact by self.   --- The urine drug screen confirmation from 7/2/19 has been reviewed and the result is appropriate based on patient history and DAVID report  --- F/u with Dr. Gutierres as scheduled   --- Follow-up 1 month        DAVID REPORT  As part of the patient's treatment plan, I am prescribing controlled substances. The patient has been made aware of appropriate use of such medications, including potential risk of somnolence, limited ability to drive and/or work safely, and the potential for dependence or overdose. It has also bee made clear that these medications are for use by this patient only, without concomitant use of alcohol or other substances unless prescribed.     Patient has  completed prescribing agreement detailing terms of continued prescribing of controlled substances, including monitoring DAVID reports, urine drug screening, and pill counts if necessary. The patient is aware that inappropriate use will results in cessation of prescribing such medications.    DAVID report has been reviewed and scanned into the patient's chart.    As the clinician, I personally reviewed the DAVID from 8/30/19 while the patient was in the office today.    History and physical exam exhibit continued safe and appropriate use of controlled substances.    EMR Dragon/Transcription disclaimer:   Much of this encounter note is an electronic transcription/translation of spoken language to printed text. The electronic translation of spoken language may permit erroneous, or at times, nonsensical words or phrases to be inadvertently transcribed; Although I have reviewed the note for such errors, some may still exist.

## 2019-09-03 RX ORDER — CYCLOBENZAPRINE HCL 10 MG
TABLET ORAL
Qty: 60 TABLET | Refills: 2 | Status: SHIPPED | OUTPATIENT
Start: 2019-09-03 | End: 2020-02-21 | Stop reason: SDUPTHER

## 2019-09-04 ENCOUNTER — TELEPHONE (OUTPATIENT)
Dept: NEUROSURGERY | Facility: CLINIC | Age: 64
End: 2019-09-04

## 2019-09-04 NOTE — TELEPHONE ENCOUNTER
Patient called today as he remembered that he has had a Lumbar MRI at [facility] that was ordered by pain mgmt. He did not bring the disc to his appointment and also did not remember that he had this scan in April 2019. (Report is scanned in his chart) Does he still need the new Lumbar MRI that you ordered at his visit 8/29/2019?

## 2019-09-05 ENCOUNTER — OUTSIDE FACILITY SERVICE (OUTPATIENT)
Dept: PAIN MEDICINE | Facility: CLINIC | Age: 64
End: 2019-09-05

## 2019-09-05 ENCOUNTER — DOCUMENTATION (OUTPATIENT)
Dept: PAIN MEDICINE | Facility: CLINIC | Age: 64
End: 2019-09-05

## 2019-09-05 PROCEDURE — 64490 INJ PARAVERT F JNT C/T 1 LEV: CPT | Performed by: ANESTHESIOLOGY

## 2019-09-05 NOTE — PROGRESS NOTES
Cervical Medial Branch Blockade  [facility]      PREOPERATIVE DIAGNOSIS:  Cervical spondylosis without myelopathy   POSTOPERATIVE DIAGNOSIS:  Same as preoperative diagnosis    PROCEDURE:    Cervical Facet Nerve (medial branch) Blocks, with Fluoroscopy:      LEVELS: bilateral  C3 and C4    PRE-PROCEDURE DISCUSSION WITH PATIENT:    Risks and complications were discussed with the patient prior to starting the procedure and informed consent was obtained.  We discussed various topics, including but not limited to bleeding, infection, injury, nerve injury, paralysis, coma, death, postprocedural soreness or painful flare, worsening of clinical picture, lack of pain relief.  We discussed the diagnostic nature of medial branch blockades.      SURGEON:  Mulugeta Guzman MD    REASON FOR PROCEDURE:    The patient complains of pain that seems to have a significant axial component Previous diagnostic positivity of a Cervical Medial Branch Blockade at the same levels Painful area identified on exam under fluoroscopy    SEDATION:  Versed 6mg IV  ANESTHETIC:   Marcaine 0.5%  STEROID:  Dexamethasone 4mg  TOTAL VOLUME OF SOLUTION:  4 mL      DESCRIPTON OF PROCEDURE:  After obtaining informed consent, the patient was placed in the prone position. IV access was obtained.  EKG, blood pressure, and pulse oximeter were monitored and all sedation was administered by an RN under my direct guidance.  The cervical area was prepped with Chloraprep and draped with sterile barrier.     Under fluoroscopic guidance the waists of the above mentioned vertebra were identified and marked.  Skin and subcutaneous tissue were then anesthetized with 1% lidocaine 1mL at each point. Then spinal needles were introduced under fluoroscopic guidance at the waists of these vertebrae contacting periosteum on the lateral edge of the vertebra on the AP fluroscopic view. After confirming the position of the needle under fluoroscopic PA and  lateral views, confirming the needle position in the center of the trapezoid at each level, and confirming negative aspiration of blood and CSF, 0.25 mL of Omnipaque was injected.  Proper spread and lack of vascular uptake was demonstrated.  A solution was prepared as above, and 1 mL of that solution was injected very slowly each level.      Needles were removed intact from all levels.  Vitals were stable throughout.       ESTIMATED BLOOD LOSS:  minimal  SPECIMENS:  None    COMPLICATIONS:    No complications were noted., There was no indication of vascular uptake on live injection of contrast dye., There was no indication of intrathecal uptake on live injection of contrast dye. and The patient did not have any signs of postprocedure numbness nor weakness.    TOLERANCE & DISCHARGE CONDITION:    The patient tolerated the procedure well.  The patient was transported to the recovery area without difficulties.  The patient was discharged to home under the care of family in stable and satisfactory condition.  The patient did notice improvement in pain on extension and rotation of the cervical spine.      PLAN OF CARE:  1. The patient was given our standard instruction sheet.  2. We discussed that Cervical Medial Branch Blockade is a diagnostic procedure in consideration for radiofrequency ablation if two diagnostic procedures proved to be positive for significant benefit.  If sustained relief of six to eight weeks is obtained, then an alternative plan could be therapeutic cervical medial branch blocks on an as-needed basis.  3. The patient is asked to keep a pain log hourly for 8 hours postoperatively today.  4. The patient will Return to clinic 3 wks.  5. The patient will resume all medications as per the medication reconciliation sheet.

## 2019-09-06 NOTE — TELEPHONE ENCOUNTER
Per verbal from Dr. Gutierres he does not need a new MRI, he does still need the XR, we need to bring him in sooner than his scheduled appointment. Patient was moved to his open clinic 9/9/2019 and verbalized understanding to get his XR prior to his appointment.

## 2019-09-09 DIAGNOSIS — M47.16 OSTEOARTHRITIS OF LUMBAR SPINE WITH MYELOPATHY: Primary | ICD-10-CM

## 2019-09-09 NOTE — PROGRESS NOTES
Order for Lumbar XR cancelled by accident. Test was re-ordered. His lumbar MRI is the test that should have been cancelled as he had one 2/2019. Per Dr. Gutierres he will not need another one.

## 2019-09-11 ENCOUNTER — OFFICE VISIT (OUTPATIENT)
Dept: ORTHOPEDIC SURGERY | Facility: CLINIC | Age: 64
End: 2019-09-11

## 2019-09-11 VITALS — BODY MASS INDEX: 39.76 KG/M2 | TEMPERATURE: 98.5 F | WEIGHT: 284 LBS | HEIGHT: 71 IN

## 2019-09-11 DIAGNOSIS — Z96.651 HISTORY OF TOTAL KNEE REPLACEMENT, RIGHT: Primary | ICD-10-CM

## 2019-09-11 DIAGNOSIS — Z96.651 STATUS POST TOTAL RIGHT KNEE REPLACEMENT: ICD-10-CM

## 2019-09-11 PROCEDURE — 73560 X-RAY EXAM OF KNEE 1 OR 2: CPT | Performed by: ORTHOPAEDIC SURGERY

## 2019-09-11 PROCEDURE — 99213 OFFICE O/P EST LOW 20 MIN: CPT | Performed by: ORTHOPAEDIC SURGERY

## 2019-09-11 NOTE — PROGRESS NOTES
Patient Name: Kian Mcdaniels   YOB: 1955  Referring Primary Care Physician: Wyatt Harmon MD  BMI: Body mass index is 39.61 kg/m².    Chief Complaint:    Chief Complaint   Patient presents with   • Right Knee - Follow-up, Pain        HPI:     Kian Mcdaniels is a 64 y.o. male who presents today for evaluation of   Chief Complaint   Patient presents with   • Right Knee - Follow-up, Pain   .  Patient is about 6 months status post right total knee.  He had on 1/8/2019.  He says is doing all right he has pain relief but some stiffness.  He also has severe back problems and has had a lumbar fusions he is working on his neck right now is using a walker because he has some imbalance issues.  I seen by Dr. Abdulaziz Anderson and Dr. Guzman.  Far as his knee goes no specific complaints other than some stiffness    This problem he goes from about -5 to about 105 stability no unusual swelling new to this examiner.     Subjective   Medications:   Home Medications:  Current Outpatient Medications on File Prior to Visit   Medication Sig   • albuterol (VENTOLIN HFA) 108 (90 BASE) MCG/ACT inhaler Inhale 1-2 puffs Every 6 (Six) Hours As Needed for wheezing (RESCUE INHALER).   • amLODIPine (NORVASC) 10 MG tablet Take 10 mg by mouth Every Morning.   • aspirin 81 MG EC tablet Take 1 tablet by mouth Every Morning. (Patient taking differently: Take 81 mg by mouth Every Morning. HOLD FOR SURGERY)   • atorvastatin (LIPITOR) 40 MG tablet Take 40 mg by mouth Daily.   • bisacodyl (DULCOLAX) 5 MG EC tablet Take 2 tablets by mouth Daily As Needed for Constipation.   • Blood Glucose Monitoring Suppl (FREESTYLE FREEDOM LITE) W/DEVICE kit    • carvedilol (COREG) 12.5 MG tablet Take 12.5 mg by mouth 2 (Two) Times a Day With Meals.   • cetirizine (ZyrTEC) 10 MG tablet Take 10 mg by mouth Every Morning.   • clopidogrel (PLAVIX) 75 MG tablet Take 75 mg by mouth Every Morning. HOLDING FOR SURGERY   • cyclobenzaprine (FLEXERIL)  10 MG tablet TAKE 1 TABLET BY MOUTH TWICE DAILY AS NEEDED FOR MUSCLE SPASMS   • docusate sodium (COLACE) 100 MG capsule Take 100 mg by mouth 2 (Two) Times a Day.   • furosemide (LASIX) 40 MG tablet Take 40 mg by mouth Every Night.   • gabapentin (NEURONTIN) 400 MG capsule Take 1 capsule by mouth 3 (Three) Times a Day.   • glucose blood (FREESTYLE LITE) test strip Test 4 times per day   • HUMULIN R 500 UNIT/ML CONCENTRATED injection Inject 500 Units under the skin Continuous. Via insulin pump basal rate 0.68units/hr from 0000 to 1000  1000 to 1600 is 0.7units/hr  1600 to 0000 0.75units/hr  Plus sliding scale based on carb intake   • insulin degludec (TRESIBA FLEXTOUCH) 100 UNIT/ML solution pen-injector injection Inject 45 Units under the skin into the appropriate area as directed Every Morning.   • insulin degludec (TRESIBA FLEXTOUCH) 100 UNIT/ML solution pen-injector injection INJECT 40 UNITS INTO THE SKIN DAILY   • Insulin Infusion Pump (T:SLIM INSULIN PUMP) device    • isosorbide mononitrate (IMDUR) 60 MG 24 hr tablet Take 60 mg by mouth Every Morning.   • Lancets (FREESTYLE) lancets Test 4 times per day   • losartan (COZAAR) 50 MG tablet Take 50 mg by mouth Every Morning.   • NARCAN 4 MG/0.1ML nasal spray    • oxybutynin (DITROPAN) 5 MG tablet Take 5 mg by mouth 3 (Three) Times a Day.   • oxyCODONE (ROXICODONE) 10 MG tablet Take 1 tablet by mouth Every 6 (Six) Hours As Needed for Severe Pain .   • pantoprazole (PROTONIX) 40 MG EC tablet Take 40 mg by mouth Every Morning.   • polyethylene glycol (MIRALAX) pack packet Take 17 g by mouth Daily.   • potassium chloride (K-DUR) 10 MEQ CR tablet Take 10 mEq by mouth Daily.   • raNITIdine (ZANTAC) 300 MG tablet Take 300 mg by mouth Every Night.   • vitamin D (ERGOCALCIFEROL) 14178 units capsule capsule TK 1 C PO 1 TIME A WK     No current facility-administered medications on file prior to visit.      Current Medications:  Scheduled Meds:  Continuous Infusions:  No  current facility-administered medications for this visit.   PRN Meds:.    I have reviewed the patient's medical history in detail and updated the computerized patient record.  Review and summarization of old records includes:    Past Medical History:   Diagnosis Date   • Arteriosclerotic cardiovascular disease    • Arthritis    • At risk for sleep apnea    • COPD (chronic obstructive pulmonary disease) (CMS/HCC)    • Coronary artery disease    • Diabetes mellitus (CMS/HCC)    • Diabetic peripheral neuropathy (CMS/HCC)    • Disease of thyroid gland     NODULE PRESENT   • ED (erectile dysfunction) of non-organic origin    • GERD (gastroesophageal reflux disease)    • Headache disorder     DUE TO NECK   • Hyperlipidemia    • Hypertension    • Insulin pump in place    • Knee pain, bilateral    • Low back pain    • Myocardial infarction (CMS/HCC)    • Neck pain    • Spinal stenosis    • TIA (transient ischemic attack)     LEFT EYE   • Type 2 diabetes mellitus (CMS/HCC)    • Upper back pain    • Wears dentures     UPPER PLATE        Past Surgical History:   Procedure Laterality Date   • AMPUTATION FOOT / TOE Left     GREAT TOE   • BACK SURGERY  10/26/2015    Bilateral L4-L5 laminectomy -Dr. Gutierres   • CARDIAC CATHETERIZATION     • CARDIAC SURGERY      CABG X 6    • CHOLECYSTECTOMY     • CORONARY ARTERY BYPASS GRAFT      X 6   • EPIDURAL BLOCK     • EYE SURGERY      CATARACT EXTRACTION   • HAND SURGERY  04/28/2016 and 7/21/19   • LUMBAR DISCECTOMY FUSION INSTRUMENTATION N/A 12/12/2016    Procedure: L 4-5 FUSION WITH INSTRUMENTATION WITH BMP, WITH REMOVAL OF INSTRUMENTATION ;  Surgeon: Rowdy Spencer MD;  Location: [facility];  Service:    • LUMBAR LAMINECTOMY DISCECTOMY DECOMPRESSION N/A 12/12/2016    Procedure: L4-5 REPEAT LAMINECTOMY ;  Surgeon: Tim Gutierres MD;  Location: [facility];  Service:    • LUMBAR LAMINECTOMY DISCECTOMY DECOMPRESSION N/A 12/14/2016    Procedure: EVACUATION OF LUMBAR HEMATOMA;   Surgeon: Rowdy Spencer MD;  Location: [facility] OR;  Service:    • MULTIPLE TOOTH EXTRACTIONS      UPPER TEETH EXTRACTED   • ORTHOPEDIC SURGERY     • PENIS SURGERY      RECONSTRUCTION   • SCROTUM EXPLORATION      scrotum surgery   • SPINAL CORD STIMULATOR IMPLANT N/A 2018    Procedure: SPINAL CORD STIMULATOR Phase 1 - [employer];  Surgeon: Mulugeta Guzman MD;  Location: [facility] OR;  Service: Pain Management   • SPINE SURGERY     • TOTAL KNEE ARTHROPLASTY Right 2019    Procedure: RIGHT TOTAL KNEE ARTHROPLASTY WITH NEGRITA NAVIGATION;  Surgeon: Andres Carter MD;  Location: [facility] OR;  Service: Orthopedics        Social History     Occupational History   • Not on file   Tobacco Use   • Smoking status: Former Smoker     Packs/day: 1.00     Years: 25.00     Pack years: 25.00     Types: Cigarettes     Start date:      Last attempt to quit:      Years since quittin.7   • Smokeless tobacco: Never Used   Substance and Sexual Activity   • Alcohol use: No     Comment: no alcohol since    • Drug use: No   • Sexual activity: Defer      Social History     Social History Narrative   • Not on file        Family History   Problem Relation Age of Onset   • Diabetes Other    • Heart disease Other    • Hypertension Other    • Kidney disease Other         renal failure   • Heart disease Maternal Grandmother    • Hypertension Maternal Grandmother    • Malig Hyperthermia Neg Hx        ROS: 14 point review of systems was performed and all other systems were reviewed and are negative except for documented findings in HPI and today's encounter.     Allergies:   Allergies   Allergen Reactions   • Lisinopril Other (See Comments)     2016 possibly caused pancreatitis per Dr Quinonez   • Erythromycin Nausea Only and Other (See Comments)     PT STATES ACUTE KIDNEY INJURY   • Metformin Nausea And Vomiting     Constitutional:  Denies fever, shaking or chills   Eyes:  Denies change in visual  "acuity   HENT:  Denies nasal congestion or sore throat   Respiratory:  Denies cough or shortness of breath   Cardiovascular:  Denies chest pain or severe LE edema   GI:  Denies abdominal pain, nausea, vomiting, bloody stools or diarrhea   Musculoskeletal:  Numbness, tingling, pain, or loss of motor function only as noted above in history of present illness.  : Denies painful urination or hematuria  Integument:  Denies rash, lesion or ulceration   Neurologic:  Denies headache or focal weakness  Endocrine:  Denies lymphadenopathy  Psych:  Denies confusion or change in mental status   Hem:  Denies active bleeding    OBJECTIVE:  Physical Exam: 64 y.o. male  Wt Readings from Last 3 Encounters:   09/11/19 129 kg (284 lb)   08/30/19 129 kg (284 lb 12.8 oz)   08/29/19 125 kg (276 lb)     Ht Readings from Last 1 Encounters:   09/11/19 180.3 cm (71\")     Body mass index is 39.61 kg/m².  Vitals:    09/11/19 1617   Temp: 98.5 °F (36.9 °C)     Vital signs reviewed.     General Appearance:    Alert, cooperative, in no acute distress                  Eyes: conjunctiva clear  ENT: external ears and nose atraumatic  CV: no peripheral edema  Resp: normal respiratory effort  Skin: no rashes or wounds; normal turgor  Psych: mood and affect appropriate  Lymph: no nodes appreciated  Neuro: gross sensation intact  Vascular:  Palpable peripheral pulse in noted extremity  Musculoskeletal Extremities: See above    Radiology:   AP lateral right knee taken office today and compared to old x-rays show good alignment position fixation of total knee    Assessment:     ICD-10-CM ICD-9-CM   1. History of total knee replacement, right Z96.651 V43.65   2. Status post total right knee replacement Z96.651 V43.65        Procedures       Plan: Biomechanics of pertinent body area discussed.  Risks, benefits, alternatives, comparisons, and complications of accepted medicines, injections, recommendations, surgical procedures, and therapies explained and " education provided in laymen's terms. Natural history and expected course of this patient's diagnosis discussed along with evaluation of therapies. Questions answered. When appropriate I also discussed proper use of cane, walker, trekking poles.   EXERCISES:  Advice on benefits of, and types of regular/moderate exercise including biomechanical forces involved as it pertains to this complaint.  RICE: Rest, ice, compression, and elevation therapy, Cryotherapy/brachy therapy, and or OTC linaments as indicated with instructions.       9/11/2019    Much of this encounter note is an electronic transcription/translation of spoken language to printed text. The electronic translation of spoken language may permit erroneous, or at times, nonsensical words or phrases to be inadvertently transcribed; Although I have reviewed the note for such errors, some may still exist

## 2019-09-17 ENCOUNTER — HOSPITAL ENCOUNTER (OUTPATIENT)
Dept: GENERAL RADIOLOGY | Facility: HOSPITAL | Age: 64
Discharge: HOME | End: 2019-09-17
Admitting: NEUROLOGICAL SURGERY

## 2019-09-17 DIAGNOSIS — M47.16 OSTEOARTHRITIS OF LUMBAR SPINE WITH MYELOPATHY: ICD-10-CM

## 2019-09-17 PROCEDURE — 72114 X-RAY EXAM L-S SPINE BENDING: CPT

## 2019-09-19 ENCOUNTER — OFFICE VISIT (OUTPATIENT)
Dept: NEUROSURGERY | Facility: CLINIC | Age: 64
End: 2019-09-19

## 2019-09-19 VITALS — SYSTOLIC BLOOD PRESSURE: 144 MMHG | HEART RATE: 87 BPM | DIASTOLIC BLOOD PRESSURE: 75 MMHG

## 2019-09-19 DIAGNOSIS — G95.0 SYRINGOMYELIA AND SYRINGOBULBIA (HCC): Primary | ICD-10-CM

## 2019-09-19 DIAGNOSIS — M54.5 LOW BACK PAIN, UNSPECIFIED BACK PAIN LATERALITY, UNSPECIFIED CHRONICITY, WITH SCIATICA PRESENCE UNSPECIFIED: ICD-10-CM

## 2019-09-19 PROCEDURE — 99213 OFFICE O/P EST LOW 20 MIN: CPT | Performed by: NEUROLOGICAL SURGERY

## 2019-09-19 NOTE — PROGRESS NOTES
Subjective   Patient ID: Kian Mcdaniels is a 64 y.o. male is here today for follow-up with a new Lumbar XR. He was last seen 8/29/2019 for difficulty ambulating and leg weakness.  Today his symptoms are back pain, leg pain, numbness and tingling and gait abnormality with leg weakness.    History of Present Illness     This patient returns a day.  He has a lot of pain in his lower back but he is also having a lot of difficulty walking.  He kind of lumps them together but I really think they are different causes.    The following portions of the patient's history were reviewed and updated as appropriate: allergies, current medications, past family history, past medical history, past social history, past surgical history and problem list.    Review of Systems   Constitutional: Positive for activity change.   Respiratory: Negative for chest tightness and shortness of breath.    Cardiovascular: Negative for chest pain.   Musculoskeletal: Positive for back pain and gait problem.        Leg pain   Neurological: Positive for weakness and numbness.        Positive for tingling   All other systems reviewed and are negative.      Objective   Physical Exam   Constitutional: He is oriented to person, place, and time. He appears well-developed and well-nourished.   Neurological: He is oriented to person, place, and time.     Neurologic Exam     Mental Status   Oriented to person, place, and time.       Assessment/Plan   Independent Review of Radiographic Studies:      I reviewed his plain films and his MRI of the lumbar spine which were done in mid September.  The plain films show a solid fusion from L4-L5.  On the MRI the sagittal images show a widely patent canal at all the levels of the lumbar spine.  On the axial images L1-2 is widely patent as is L2-3.  L3-4 looks okay.  There is a good decompression at L4-5 and L5-S1 looks okay.    I reviewed his MRI of the cervical spine again.  This does show a large syrinx which  begins at the middle of the C5 vertebrae.  It extends all the way down to the T3 vertebrae.  Looking at the axial images the syrinx is quite posteriorly situated at the C C6, C7 and T1 levels.  It really does not show much change from an MRI in November 2017.    Medical Decision Making:      I told the patient and his wife about the imaging.  I told him that from my point of view I think that the lower back pain is coming primarily from degenerative disc disease, facet arthritis and muscle and ligament strain.  I do not really think it has any effect on his balance at all.  I really think the walking in the balance problem is coming from the syrinx.  The syrinx has not gotten any larger in the last 2 years but I do believe it is large enough to cause some symptoms.  He probably needs to have a decompressed but I explained to him that I am concerned about decompressing it both because of the risk of causing paralysis as well as the bigger risk of leaving him with another source of pain.  He would really like to go to the [facility] for another opinion and I told him we would get his MRIs ready for that.  If he sees them and they agree that he needs the syrinx decompressed then he is welcome to come back here and I will do the surgery or he can stay there.  I am okay with that either way but I would like to get a second opinion as to whether it should be decompressed.  He will call me if he needs anything further after he sees them.    Kian was seen today for back pain and leg pain.    Diagnoses and all orders for this visit:    Syringomyelia and syringobulbia (CMS/HCC)    Low back pain, unspecified back pain laterality, unspecified chronicity, with sciatica presence unspecified      Return for Recheck and call after treatment or consultation.

## 2019-09-27 ENCOUNTER — OFFICE VISIT (OUTPATIENT)
Dept: PAIN MEDICINE | Facility: CLINIC | Age: 64
End: 2019-09-27

## 2019-09-27 VITALS
OXYGEN SATURATION: 94 % | BODY MASS INDEX: 39.98 KG/M2 | RESPIRATION RATE: 16 BRPM | HEART RATE: 101 BPM | WEIGHT: 285.6 LBS | HEIGHT: 71 IN | SYSTOLIC BLOOD PRESSURE: 144 MMHG | DIASTOLIC BLOOD PRESSURE: 81 MMHG | TEMPERATURE: 98.3 F

## 2019-09-27 DIAGNOSIS — M47.812 ARTHROPATHY OF CERVICAL FACET JOINT: ICD-10-CM

## 2019-09-27 DIAGNOSIS — G89.29 OTHER CHRONIC PAIN: Primary | ICD-10-CM

## 2019-09-27 DIAGNOSIS — M96.1 POSTLAMINECTOMY SYNDROME OF LUMBAR REGION: ICD-10-CM

## 2019-09-27 DIAGNOSIS — Z79.899 ENCOUNTER FOR LONG-TERM (CURRENT) USE OF HIGH-RISK MEDICATION: ICD-10-CM

## 2019-09-27 PROCEDURE — 99214 OFFICE O/P EST MOD 30 MIN: CPT | Performed by: NURSE PRACTITIONER

## 2019-09-27 RX ORDER — OXYCODONE HYDROCHLORIDE 10 MG/1
10 TABLET ORAL EVERY 6 HOURS PRN
Qty: 120 TABLET | Refills: 0 | Status: SHIPPED | OUTPATIENT
Start: 2019-09-27 | End: 2019-09-27 | Stop reason: SDUPTHER

## 2019-09-27 RX ORDER — OXYCODONE HYDROCHLORIDE 10 MG/1
10 TABLET ORAL EVERY 6 HOURS PRN
Qty: 120 TABLET | Refills: 0 | Status: SHIPPED | OUTPATIENT
Start: 2019-09-27 | End: 2019-10-25 | Stop reason: SDUPTHER

## 2019-09-27 NOTE — PROGRESS NOTES
"CHIEF COMPLAINT  F/u back, neck, and bilat knee pain- Cervical Medial Branch Blockade- patient states that his pain was improved 80% for 2 days.     Subjective   Kian Mcdaniels is a 64 y.o. male  who presents to the office for follow-up of procedure.  He completed a bilateral C3-C4 MBB   on  9/5/19 and 7/31/19 performed by Dr. Guzman for management of neck pain. Patient reports 80% relief from the procedure X 2 days.     In terms of back pain, failed [employer] SCS trial.  Dr. Guzman recommends consideration of Nev[employer] trial in the future. Still having a great deal of back and leg weakness. Still cannot \"pick my feet up\".  Dr. Cintron does not believe this is a issue in the lumbar spine.  Dr. Carter does not believe this is an issue with his hips.    Has upcoming visit with the [facility].       In terms of neck pain. Nothing surgical needed. Monitoring syrinx, cannot consider SCS in this area for this reason.  Has failed cervical epidural injections.   Dr. Gutierres does believe that the syrinx while stable is large enough to cause symptoms with his walking and balance.  Likely needs decompression.  Agrees with second opinion at the [facility].  Will defer to them whether or not decompression recommended.     Complains of pain in his neck, back and right knee. Today his pain is 6/10VAS.  Continues with Oxycodone 10 mg 4/day, gabapentin 800 mg 3/day and tizanidine 4mg 1-2/night. The regimen helps decrease his pain moderately. No adverse reactions.       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  He is in PT twice a week at [facility].  recent hip and knee injection     Back Pain   This is a chronic problem. The current episode started more than 1 year ago. The problem occurs daily. The problem has been waxing and waning since onset. The pain is present in the sacro-iliac. The quality of the pain is described as aching, shooting and stabbing. " The pain is at a severity of 7/10. The pain is moderate. The pain is the same all the time. The symptoms are aggravated by bending, sitting and standing. Stiffness is present all day. Associated symptoms include leg pain, numbness (bilateral feet), paresthesias, tingling and weakness. Pertinent negatives include no abdominal pain, chest pain, dysuria, fever or headaches. Risk factors include lack of exercise, obesity and sedentary lifestyle. He has tried analgesics for the symptoms. The treatment provided moderate relief.   Neck Pain    This is a chronic problem. The current episode started more than 1 month ago. The problem occurs daily. The problem has been unchanged. The pain is associated with nothing. The pain is present in the left side, right side and midline. The quality of the pain is described as cramping. The pain is at a severity of 7/10. The pain is moderate. The symptoms are aggravated by sneezing and twisting. The pain is same all the time. Associated symptoms include leg pain, numbness (bilateral feet), tingling and weakness. Pertinent negatives include no chest pain, fever or headaches. He has tried muscle relaxants, oral narcotics and bed rest for the symptoms. The treatment provided moderate relief.      PEG Assessment   What number best describes your pain on average in the past week?8  What number best describes how, during the past week, pain has interfered with your enjoyment of life?8  What number best describes how, during the past week, pain has interfered with your general activity?  8    The following portions of the patient's history were reviewed and updated as appropriate: allergies, current medications, past family history, past medical history, past social history, past surgical history and problem list.    Review of Systems   Constitutional: Positive for activity change (dec) and fatigue (occ). Negative for fever.   HENT: Negative for congestion.    Eyes: Negative for visual  "disturbance.   Respiratory: Negative for cough, shortness of breath and wheezing.    Cardiovascular: Negative.  Negative for chest pain.   Gastrointestinal: Positive for constipation. Negative for abdominal pain, diarrhea and vomiting.   Genitourinary: Negative for difficulty urinating and dysuria.   Musculoskeletal: Positive for arthralgias (bilateral knees), back pain, gait problem (walker), joint swelling (bilat ankles and feet), neck pain and neck stiffness (occ). Negative for myalgias.   Allergic/Immunologic: Negative for immunocompromised state.   Neurological: Positive for tingling, weakness, numbness (bilateral feet) and paresthesias. Negative for dizziness, light-headedness and headaches.   Psychiatric/Behavioral: Positive for sleep disturbance. Negative for agitation and suicidal ideas. The patient is not nervous/anxious.        Vitals:    09/27/19 1413   BP: 144/81   Pulse: 101   Resp: 16   Temp: 98.3 °F (36.8 °C)   SpO2: 94%   Weight: 130 kg (285 lb 9.6 oz)   Height: 180.3 cm (71\")   PainSc:   6   PainLoc: Back     Objective   Physical Exam   Constitutional: He is oriented to person, place, and time. Vital signs are normal. He appears well-developed and well-nourished. He is cooperative. No distress.   HENT:   Head: Normocephalic and atraumatic.   Nose: Nose normal.   Eyes: Conjunctivae and lids are normal.   Neck: Trachea normal. Neck supple. Muscular tenderness present. No spinous process tenderness present. Decreased range of motion present.   Cardiovascular: Normal rate.   Pulmonary/Chest: Effort normal. No respiratory distress.   Abdominal:   obese   Musculoskeletal:        Right knee: He exhibits decreased range of motion and swelling. Tenderness found.        Left knee: Tenderness found.        Cervical back: He exhibits decreased range of motion, tenderness and pain. He exhibits no spasm.        Lumbar back: He exhibits decreased range of motion, tenderness and pain.   +cervical facet " tenderness/loading    Neurological: He is alert and oriented to person, place, and time. He has normal strength. Gait (wheelchair, antalgia) abnormal.   Skin: Skin is warm, dry and intact. He is not diaphoretic.   Psychiatric: He has a normal mood and affect. His speech is normal and behavior is normal. Cognition and memory are normal.   Nursing note and vitals reviewed.    Assessment/Plan   Kian was seen today for back pain.    Diagnoses and all orders for this visit:    Other chronic pain    Arthropathy of cervical facet joint    Postlaminectomy syndrome of lumbar region    Encounter for long-term (current) use of high-risk medication      --- bilateral C3-C4 RFL   --- Refill Oxycodone. Patient appears stable with current regimen. No adverse effects. Regarding continuation of opioids, there is no evidence of aberrant behavior or any red flags.  The patient continues with appropriate response to opioid therapy. ADL's remain intact by self.   --- The urine drug screen confirmation from 7/2/19 has been reviewed and the result is appropriate based on patient history and DAVID report  --- Follow-up 1 month        DAVID REPORT  As part of the patient's treatment plan, I am prescribing controlled substances. The patient has been made aware of appropriate use of such medications, including potential risk of somnolence, limited ability to drive and/or work safely, and the potential for dependence or overdose. It has also bee made clear that these medications are for use by this patient only, without concomitant use of alcohol or other substances unless prescribed.     Patient has completed prescribing agreement detailing terms of continued prescribing of controlled substances, including monitoring DAVID reports, urine drug screening, and pill counts if necessary. The patient is aware that inappropriate use will results in cessation of prescribing such medications.    DAVID report has been reviewed and scanned into the  patient's chart.    As the clinician, I personally reviewed the DAVID from 9/27/19 while the patient was in the office today.    History and physical exam exhibit continued safe and appropriate use of controlled substances.     EMR Dragon/Transcription disclaimer:   Much of this encounter note is an electronic transcription/translation of spoken language to printed text. The electronic translation of spoken language may permit erroneous, or at times, nonsensical words or phrases to be inadvertently transcribed; Although I have reviewed the note for such errors, some may still exist.

## 2019-10-16 ENCOUNTER — CLINICAL SUPPORT (OUTPATIENT)
Dept: ORTHOPEDIC SURGERY | Facility: CLINIC | Age: 64
End: 2019-10-16

## 2019-10-16 VITALS — BODY MASS INDEX: 39.2 KG/M2 | WEIGHT: 280 LBS | HEIGHT: 71 IN

## 2019-10-16 DIAGNOSIS — Z96.651 STATUS POST RIGHT KNEE REPLACEMENT: ICD-10-CM

## 2019-10-16 DIAGNOSIS — M17.12 ARTHRITIS OF LEFT KNEE: Primary | ICD-10-CM

## 2019-10-16 PROCEDURE — 20610 DRAIN/INJ JOINT/BURSA W/O US: CPT | Performed by: NURSE PRACTITIONER

## 2019-10-16 PROCEDURE — 99213 OFFICE O/P EST LOW 20 MIN: CPT | Performed by: NURSE PRACTITIONER

## 2019-10-16 RX ORDER — POTASSIUM CHLORIDE 750 MG/1
10 TABLET, EXTENDED RELEASE ORAL DAILY
Refills: 12 | COMMUNITY
Start: 2019-09-23 | End: 2019-12-23

## 2019-10-16 RX ORDER — CEPHALEXIN 500 MG/1
CAPSULE ORAL
Refills: 0 | COMMUNITY
Start: 2019-10-05 | End: 2019-11-25

## 2019-10-16 RX ADMIN — METHYLPREDNISOLONE ACETATE 80 MG: 80 INJECTION, SUSPENSION INTRA-ARTICULAR; INTRALESIONAL; INTRAMUSCULAR; SOFT TISSUE at 08:19

## 2019-10-16 NOTE — PROGRESS NOTES
Patient: Kian Mcdaniels  YOB: 1955    Chief Complaints:  left knee pain, 9mo s/p Right TKA 1/8/19 doing well.     Subjective:    History of Present Illness: Here today for  left knee pain, 9mo s/p Right TKA 1/8/19 doing well.  The left pain is a generalized joint tenderness.  It has been progressive in nature but remains intermittent.  Worsened by prolonged standing or walking and squatting activities. Has had improvement in the past with ice/heat, rest, and injections. His right knee continues to strengthen with improved rom and less stiff and achy than 3 mo ago at last f/u.     This problem is new to this examiner.     Allergies:   Allergies   Allergen Reactions   • Lisinopril Other (See Comments)     7/2016 possibly caused pancreatitis per Dr Quinonez   • Erythromycin Nausea Only and Other (See Comments)     PT STATES ACUTE KIDNEY INJURY   • Metformin Nausea And Vomiting       Medications:   Home Medications:  Current Outpatient Medications on File Prior to Visit   Medication Sig   • albuterol (VENTOLIN HFA) 108 (90 BASE) MCG/ACT inhaler Inhale 1-2 puffs Every 6 (Six) Hours As Needed for wheezing (RESCUE INHALER).   • amLODIPine (NORVASC) 10 MG tablet Take 10 mg by mouth Every Morning.   • aspirin 81 MG EC tablet Take 1 tablet by mouth Every Morning. (Patient taking differently: Take 81 mg by mouth Every Morning. HOLD FOR SURGERY)   • atorvastatin (LIPITOR) 40 MG tablet Take 40 mg by mouth Daily.   • bisacodyl (DULCOLAX) 5 MG EC tablet Take 2 tablets by mouth Daily As Needed for Constipation.   • Blood Glucose Monitoring Suppl (FREESTYLE FREEDOM LITE) W/DEVICE kit    • carvedilol (COREG) 12.5 MG tablet Take 12.5 mg by mouth 2 (Two) Times a Day With Meals.   • cephalexin (KEFLEX) 500 MG capsule TK ONE C PO  BID FOR 7 DAYS   • cetirizine (ZyrTEC) 10 MG tablet Take 10 mg by mouth Every Morning.   • clopidogrel (PLAVIX) 75 MG tablet Take 75 mg by mouth Every Morning. HOLDING FOR SURGERY   •  cyclobenzaprine (FLEXERIL) 10 MG tablet TAKE 1 TABLET BY MOUTH TWICE DAILY AS NEEDED FOR MUSCLE SPASMS   • docusate sodium (COLACE) 100 MG capsule Take 100 mg by mouth 2 (Two) Times a Day.   • furosemide (LASIX) 40 MG tablet Take 40 mg by mouth Every Night.   • gabapentin (NEURONTIN) 400 MG capsule Take 1 capsule by mouth 3 (Three) Times a Day.   • glucose blood (FREESTYLE LITE) test strip Test 4 times per day   • HUMULIN R 500 UNIT/ML CONCENTRATED injection Inject 500 Units under the skin Continuous. Via insulin pump basal rate 0.68units/hr from 0000 to 1000  1000 to 1600 is 0.7units/hr  1600 to 0000 0.75units/hr  Plus sliding scale based on carb intake   • insulin degludec (TRESIBA FLEXTOUCH) 100 UNIT/ML solution pen-injector injection Inject 45 Units under the skin into the appropriate area as directed Every Morning.   • insulin degludec (TRESIBA FLEXTOUCH) 100 UNIT/ML solution pen-injector injection INJECT 40 UNITS INTO THE SKIN DAILY   • Insulin Infusion Pump (T:SLIM INSULIN PUMP) device    • isosorbide mononitrate (IMDUR) 60 MG 24 hr tablet Take 60 mg by mouth Every Morning.   • Lancets (FREESTYLE) lancets Test 4 times per day   • losartan (COZAAR) 50 MG tablet Take 50 mg by mouth Every Morning.   • NARCAN 4 MG/0.1ML nasal spray    • oxybutynin (DITROPAN) 5 MG tablet Take 5 mg by mouth 3 (Three) Times a Day.   • oxyCODONE (ROXICODONE) 10 MG tablet Take 1 tablet by mouth Every 6 (Six) Hours As Needed for Severe Pain .   • pantoprazole (PROTONIX) 40 MG EC tablet Take 40 mg by mouth Every Morning.   • polyethylene glycol (MIRALAX) pack packet Take 17 g by mouth Daily.   • potassium chloride (K-DUR) 10 MEQ CR tablet Take 10 mEq by mouth Daily.   • potassium chloride (K-DUR,KLOR-CON) 10 MEQ CR tablet Take 10 mEq by mouth Daily.   • raNITIdine (ZANTAC) 300 MG tablet Take 300 mg by mouth Every Night.   • vitamin D (ERGOCALCIFEROL) 77901 units capsule capsule TK 1 C PO 1 TIME A WK     No current facility-administered  medications on file prior to visit.      Current Medications:  Scheduled Meds:  Continuous Infusions:  No current facility-administered medications for this visit.   PRN Meds:.    I have reviewed the patient's medical history in detail and updated the computerized patient record.  Review and summarization of old records include:    Past Medical History:   Diagnosis Date   • Arteriosclerotic cardiovascular disease    • Arthritis    • At risk for sleep apnea    • COPD (chronic obstructive pulmonary disease) (CMS/[facility])    • Coronary artery disease    • Diabetes mellitus (CMS/H[facility])    • Diabetic peripheral neuropathy (CMS/[facility])    • Disease of thyroid gland     NODULE PRESENT   • ED (erectile dysfunction) of non-organic origin    • GERD (gastroesophageal reflux disease)    • Headache disorder     DUE TO NECK   • Hyperlipidemia    • Hypertension    • Insulin pump in place    • Knee pain, bilateral    • Low back pain    • Myocardial infarction (CMS/[facility])    • Neck pain    • Spinal stenosis    • TIA (transient ischemic attack)     LEFT EYE   • Type 2 diabetes mellitus (CMS/[facility])    • Upper back pain    • Wears dentures     UPPER PLATE        Past Surgical History:   Procedure Laterality Date   • AMPUTATION FOOT / TOE Left     GREAT TOE   • BACK SURGERY  10/26/2015    Bilateral L4-L5 laminectomy -Dr. Gutierres   • CARDIAC CATHETERIZATION     • CARDIAC SURGERY      CABG X 6    • CHOLECYSTECTOMY     • CORONARY ARTERY BYPASS GRAFT      X 6   • EPIDURAL BLOCK     • EYE SURGERY      CATARACT EXTRACTION   • HAND SURGERY  04/28/2016 and 7/21/19   • LUMBAR DISCECTOMY FUSION INSTRUMENTATION N/A 12/12/2016    Procedure: L 4-5 FUSION WITH INSTRUMENTATION WITH BMP, WITH REMOVAL OF INSTRUMENTATION ;  Surgeon: Rowdy Spencer MD;  Location: [facility];  Service:    • LUMBAR LAMINECTOMY DISCECTOMY DECOMPRESSION N/A 12/12/2016    Procedure: L4-5 REPEAT LAMINECTOMY ;  Surgeon: Tim Gutierres MD;  Location: [facility];  Service:    •  LUMBAR LAMINECTOMY DISCECTOMY DECOMPRESSION N/A 2016    Procedure: EVACUATION OF LUMBAR HEMATOMA;  Surgeon: Rowdy Spencer MD;  Location: [facility] OR;  Service:    • MULTIPLE TOOTH EXTRACTIONS      UPPER TEETH EXTRACTED   • ORTHOPEDIC SURGERY     • PENIS SURGERY      RECONSTRUCTION   • SCROTUM EXPLORATION      scrotum surgery   • SPINAL CORD STIMULATOR IMPLANT N/A 2018    Procedure: SPINAL CORD STIMULATOR Phase 1 - [employer];  Surgeon: Mulugeta Guzman MD;  Location: [facility] OR;  Service: Pain Management   • SPINE SURGERY     • TOTAL KNEE ARTHROPLASTY Right 2019    Procedure: RIGHT TOTAL KNEE ARTHROPLASTY WITH NEGRITA NAVIGATION;  Surgeon: Andres Carter MD;  Location: [facility] OR;  Service: Orthopedics        Social History     Occupational History   • Not on file   Tobacco Use   • Smoking status: Former Smoker     Packs/day: 1.00     Years: 25.00     Pack years: 25.00     Types: Cigarettes     Start date:      Last attempt to quit: 2000     Years since quittin.8   • Smokeless tobacco: Never Used   Substance and Sexual Activity   • Alcohol use: No     Comment: no alcohol since    • Drug use: No   • Sexual activity: Defer      Social History     Social History Narrative   • Not on file        Family History   Problem Relation Age of Onset   • Diabetes Other    • Heart disease Other    • Hypertension Other    • Kidney disease Other         renal failure   • Heart disease Maternal Grandmother    • Hypertension Maternal Grandmother    • Malig Hyperthermia Neg Hx        ROS: 14 point review of systems was performed and was negative except for documented findings in HPI and today's encounter.     Allergies:   Allergies   Allergen Reactions   • Lisinopril Other (See Comments)     2016 possibly caused pancreatitis per Dr Quinonez   • Erythromycin Nausea Only and Other (See Comments)     PT STATES ACUTE KIDNEY INJURY   • Metformin Nausea And Vomiting     Constitutional:   "Denies fever, shaking or chills   Eyes:  Denies change in visual acuity   HENT:  Denies nasal congestion or sore throat   Respiratory:  Denies cough or shortness of breath   Cardiovascular:  Denies chest pain or severe LE edema   GI:  Denies abdominal pain, nausea, vomiting, bloody stools or diarrhea   Musculoskeletal:  Numbness, tingling, or loss of motor function only as noted above in history of present illness.  : Denies painful urination or hematuria  Integument:  Denies rash, lesion or ulceration   Neurologic:  Denies headache or focal weakness  Endocrine:  Denies lymphadenopathy  Psych:  Denies confusion or change in mental status   Hem:  Denies active bleeding    Physical Exam: 64 y.o. male  Wt Readings from Last 3 Encounters:   10/16/19 127 kg (280 lb)   09/27/19 130 kg (285 lb 9.6 oz)   09/11/19 129 kg (284 lb)     Ht Readings from Last 1 Encounters:   10/16/19 180.3 cm (71\")     Body mass index is 39.05 kg/m².  There were no vitals filed for this visit.  Vital signs reviewed.   Constitutional: Awake alert and oriented x3, well developed, no acute distress, non-toxic appearance.  EYES: symmetric, sclera clear  ENT:  Normocephalic, Atraumatic.   Respiratory:  No respiratory distress, No wheezing  CV: pulse regular, no palpitations or pallor.  GI:  Abdomen soft, non-tender.   Vascular:  Intact distal pulses, No cyanosis, no signs or symptoms of DVT.  Neurologic: Sensation grossly intact to the involved extremity, No focal deficits noted.   Neck: No tenderness, Supple.  Integument: warm, dry, no ulcerations.   Psychiatric:  Oriented, no pathological affect.  Musculoskeletal:    Affected knee(s):  Painful gait with a subtle limp, positive for synovitis, swelling, joint effusion with crepitation.  Lachman negative  Posterior drawer negative  Ru's negative  Patellofemoral grind +  Sensation grossly intact to light touch throughout the lower extremity  Skin is intact  Distal pulses are palpable  No signs " or symptoms of DVT  Right knee with incision healed and fading, improving rom and calf soft and nontender, with improving strength as well.       Diagnostic Data:     Imaging was done previously in the office, images were personally viewed and discussed with the patient:    Indication: pain related symptoms,  Views: 2V AP&LAT right knee(s)   Findings: ap of bilat knees shows left knee severe end-stage arthritis (bone on bone, subchondral sclerosis/cysts, osteophytes), S/P right  Total Knee Replacement in good position and alignment  Comparison views: viewed last xray done in the office.     Procedure:  Large Joint Arthrocentesis: L knee  Date/Time: 10/16/2019 8:19 AM  Consent given by: patient  Site marked: site marked  Timeout: Immediately prior to procedure a time out was called to verify the correct patient, procedure, equipment, support staff and site/side marked as required   Supporting Documentation  Indications: pain and joint swelling   Procedure Details  Location: knee - L knee  Preparation: Patient was prepped and draped in the usual sterile fashion  Needle gauge: 21.  Approach: anterolateral  Medications administered: 4 mL lidocaine (cardiac); 80 mg methylPREDNISolone acetate 80 MG/ML  Patient tolerance: patient tolerated the procedure well with no immediate complications          Assessment:     ICD-10-CM ICD-9-CM   1. Arthritis of left knee M17.12 716.96   2. Status post right knee replacement Z96.651 V43.65           Plan: Is to proceed with injection  Follow up as indicated.  Ice, elevate, and rest as needed.  Additional interventions include:  15 min spent face to face with patient 11 min spent counseling about:  Biomechanics of pertinent body area discussed.  Risks, benefits, alternatives, comparisons, and complications of accepted medicines, injections, recommendations, surgical procedures, and therapies explained and education provided in laymen's terms. Natural history and expected course of this  patient's diagnosis discussed along with evaluation of therapies. Questions answered. When appropriate I also discussed proper use of cane, walker, trekking poles.   BMI:  The concept of BMI body mass index and its importance and implications discussed.  BMI suggested to be < 40 or as low as possible. Lifestyle measures for weight loss and how this affects orthopedic condition.  EXERCISES:  Advice on benefits of, and types of regular/moderate exercise including biomechanical forces involved as it pertains to this complaint.  RICE: Rest, ice, compression, and elevation therapy, Cryotherapy/brachy therapy, and or OTC linaments as indicated with instructions.   Cortisone Injection. See procedure note.  Enc to continue knee strengthen exercises for both knees as done with PT following R TKA.  We will do bilat knee xrays next visit for 1 year post op right knee and Left knee arthritis.   10/21/2019  Kaylee Jorge, APRN

## 2019-10-21 RX ORDER — METHYLPREDNISOLONE ACETATE 80 MG/ML
80 INJECTION, SUSPENSION INTRA-ARTICULAR; INTRALESIONAL; INTRAMUSCULAR; SOFT TISSUE
Status: COMPLETED | OUTPATIENT
Start: 2019-10-16 | End: 2019-10-16

## 2019-10-25 ENCOUNTER — OFFICE VISIT (OUTPATIENT)
Dept: PAIN MEDICINE | Facility: CLINIC | Age: 64
End: 2019-10-25

## 2019-10-25 VITALS
HEIGHT: 71 IN | DIASTOLIC BLOOD PRESSURE: 91 MMHG | TEMPERATURE: 98.7 F | SYSTOLIC BLOOD PRESSURE: 156 MMHG | OXYGEN SATURATION: 92 % | HEART RATE: 97 BPM | BODY MASS INDEX: 40.38 KG/M2 | RESPIRATION RATE: 20 BRPM | WEIGHT: 288.4 LBS

## 2019-10-25 DIAGNOSIS — G89.29 OTHER CHRONIC PAIN: Primary | ICD-10-CM

## 2019-10-25 DIAGNOSIS — M47.812 ARTHROPATHY OF CERVICAL FACET JOINT: ICD-10-CM

## 2019-10-25 DIAGNOSIS — M96.1 POSTLAMINECTOMY SYNDROME OF LUMBAR REGION: ICD-10-CM

## 2019-10-25 DIAGNOSIS — Z79.899 ENCOUNTER FOR LONG-TERM (CURRENT) USE OF HIGH-RISK MEDICATION: ICD-10-CM

## 2019-10-25 PROCEDURE — 99214 OFFICE O/P EST MOD 30 MIN: CPT | Performed by: NURSE PRACTITIONER

## 2019-10-25 RX ORDER — INFLUENZA VIRUS VACCINE 15; 15; 15; 15 UG/.5ML; UG/.5ML; UG/.5ML; UG/.5ML
SUSPENSION INTRAMUSCULAR
Refills: 0 | COMMUNITY
Start: 2019-10-16

## 2019-10-25 RX ORDER — OXYCODONE HYDROCHLORIDE 10 MG/1
10 TABLET ORAL EVERY 6 HOURS PRN
Qty: 120 TABLET | Refills: 0 | Status: SHIPPED | OUTPATIENT
Start: 2019-10-25 | End: 2019-11-25 | Stop reason: SDUPTHER

## 2019-10-25 RX ORDER — POTASSIUM CHLORIDE 750 MG/1
TABLET, FILM COATED, EXTENDED RELEASE ORAL
COMMUNITY
Start: 2019-10-24

## 2019-10-25 NOTE — PROGRESS NOTES
"CHIEF COMPLAINT  F/u back, neck, and bilat knee pain. Pt sts pain has worsened since last ov.     Subjective   Kian Mcdaniels is a 64 y.o. male  who presents to the office for follow-up.He has a history of back and neck pain.    He completed a bilateral C3-C4 MBB   on  9/5/19 and 7/31/19 performed by Dr. Guzman for management of neck pain. Patient reports 80% relief from the procedure X 2 days.      In terms of back pain, failed [employer] SCS trial.  Dr. Guzman recommends consideration of Nev[place] trial in the future. Still having a great deal of back and leg weakness. Still cannot \"pick my feet up\".  Dr. Cintron does not believe this is a issue in the lumbar spine.  Dr. Carter does not believe this is an issue with his hips.     Has upcoming visit with the [facility].       In terms of neck pain. Nothing surgical needed. Monitoring syrinx, cannot consider SCS in this area for this reason.  Has failed cervical epidural injections.   Dr. Gutierres does believe that the syrinx while stable is large enough to cause symptoms with his walking and balance.  Likely needs decompression.  Agrees with second opinion at the [facility].  Will defer to them whether or not decompression recommended.     Complains of pain in his neck, back and right knee. Today his pain is 8/10VAS.  Continues with Oxycodone 10 mg 4/day, gabapentin 800 mg 3/day and tizanidine 4mg 1-2/night. The regimen helps decrease his pain moderately. No adverse reactions.       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  He is in PT twice a week at [facility].  recent hip and knee injection     Back Pain   This is a chronic problem. The current episode started more than 1 year ago. The problem occurs daily. The problem has been waxing and waning since onset. The pain is present in the sacro-iliac. The quality of the pain is described as aching, shooting and stabbing. The pain is at a " severity of 8/10. The pain is moderate. The pain is the same all the time. The symptoms are aggravated by bending, sitting and standing. Stiffness is present all day. Associated symptoms include leg pain, numbness (bilateral feet), paresthesias, tingling and weakness (legs). Pertinent negatives include no abdominal pain, chest pain, dysuria, fever or headaches. Risk factors include lack of exercise, obesity and sedentary lifestyle. He has tried analgesics for the symptoms. The treatment provided moderate relief.   Neck Pain    This is a chronic problem. The current episode started more than 1 month ago. The problem occurs daily. The problem has been unchanged. The pain is associated with nothing. The pain is present in the left side, right side and midline. The quality of the pain is described as cramping. The pain is at a severity of 8/10. The pain is moderate. The symptoms are aggravated by sneezing and twisting. The pain is same all the time. Associated symptoms include leg pain, numbness (bilateral feet), tingling and weakness (legs). Pertinent negatives include no chest pain, fever or headaches. He has tried muscle relaxants, oral narcotics and bed rest for the symptoms. The treatment provided moderate relief.      PEG Assessment   What number best describes your pain on average in the past week?8  What number best describes how, during the past week, pain has interfered with your enjoyment of life?8  What number best describes how, during the past week, pain has interfered with your general activity?  8      The following portions of the patient's history were reviewed and updated as appropriate: allergies, current medications, past family history, past medical history, past social history, past surgical history and problem list.    Review of Systems   Constitutional: Positive for activity change (dec) and fatigue (occ). Negative for fever.   HENT: Negative for congestion.    Eyes: Negative for visual  "disturbance.   Respiratory: Positive for shortness of breath (occ). Negative for cough and wheezing.    Cardiovascular: Positive for leg swelling (bilat legs). Negative for chest pain.   Gastrointestinal: Negative for abdominal pain, constipation, diarrhea and vomiting.   Genitourinary: Negative for difficulty urinating and dysuria.   Musculoskeletal: Positive for arthralgias (bilateral knees), back pain, gait problem (walker), joint swelling (bilat ankles and feet), neck pain and neck stiffness (occ). Negative for myalgias.   Allergic/Immunologic: Negative for immunocompromised state.   Neurological: Positive for tingling, weakness (legs), numbness (bilateral feet) and paresthesias. Negative for dizziness, light-headedness and headaches.   Psychiatric/Behavioral: Negative for agitation, sleep disturbance and suicidal ideas. The patient is not nervous/anxious.        Vitals:    10/25/19 1100   Resp: 20   Weight: 131 kg (288 lb 6.4 oz)   Height: 180.3 cm (71\")     Objective   Physical Exam   Constitutional: He is oriented to person, place, and time. Vital signs are normal. He appears well-developed and well-nourished. He is cooperative. No distress.   HENT:   Head: Normocephalic and atraumatic.   Nose: Nose normal.   Eyes: Conjunctivae and lids are normal.   Neck: Trachea normal. Neck supple. Muscular tenderness present. No spinous process tenderness present. Decreased range of motion present.   Cardiovascular: Normal rate.   Pulmonary/Chest: Effort normal. No respiratory distress.   Abdominal:   obese   Musculoskeletal:        Right knee: He exhibits decreased range of motion and swelling. Tenderness found.        Left knee: Tenderness found.        Cervical back: He exhibits decreased range of motion, tenderness and pain. He exhibits no spasm.        Lumbar back: He exhibits decreased range of motion, tenderness and pain.   +cervical facet tenderness/loading    Neurological: He is alert and oriented to person, " place, and time. He has normal strength. Gait (wheelchair, antalgia) abnormal.   Skin: Skin is warm, dry and intact. He is not diaphoretic.   Psychiatric: He has a normal mood and affect. His speech is normal and behavior is normal. Cognition and memory are normal.   Nursing note and vitals reviewed.    Assessment/Plan   Kian was seen today for back pain, neck pain and knee pain.    Diagnoses and all orders for this visit:    Other chronic pain    Arthropathy of cervical facet joint    Postlaminectomy syndrome of lumbar region    Encounter for long-term (current) use of high-risk medication      --- Proceed with cervical RFL once authorized   --- Refill Oxycodone. Patient appears stable with current regimen. No adverse effects. Regarding continuation of opioids, there is no evidence of aberrant behavior or any red flags.  The patient continues with appropriate response to opioid therapy. ADL's remain intact by self.   --- The urine drug screen confirmation from 7/2/19 has been reviewed and the result is appropriate based on patient history and DAVID report  --- Follow-up 1 month        DAVID REPORT  As part of the patient's treatment plan, I am prescribing controlled substances. The patient has been made aware of appropriate use of such medications, including potential risk of somnolence, limited ability to drive and/or work safely, and the potential for dependence or overdose. It has also bee made clear that these medications are for use by this patient only, without concomitant use of alcohol or other substances unless prescribed.     Patient has completed prescribing agreement detailing terms of continued prescribing of controlled substances, including monitoring DAVID reports, urine drug screening, and pill counts if necessary. The patient is aware that inappropriate use will results in cessation of prescribing such medications.    DAVID report has been reviewed and scanned into the patient's chart.    As  the clinician, I personally reviewed the DAVID from 10/25/19 while the patient was in the office today.    History and physical exam exhibit continued safe and appropriate use of controlled substances.    EMR Dragon/Transcription disclaimer:   Much of this encounter note is an electronic transcription/translation of spoken language to printed text. The electronic translation of spoken language may permit erroneous, or at times, nonsensical words or phrases to be inadvertently transcribed; Although I have reviewed the note for such errors, some may still exist.

## 2019-11-19 ENCOUNTER — OUTSIDE FACILITY SERVICE (OUTPATIENT)
Dept: PAIN MEDICINE | Facility: CLINIC | Age: 64
End: 2019-11-19

## 2019-11-19 ENCOUNTER — DOCUMENTATION (OUTPATIENT)
Dept: PAIN MEDICINE | Facility: CLINIC | Age: 64
End: 2019-11-19

## 2019-11-19 PROCEDURE — 64633 DESTROY CERV/THOR FACET JNT: CPT | Performed by: ANESTHESIOLOGY

## 2019-11-19 PROCEDURE — 99152 MOD SED SAME PHYS/QHP 5/>YRS: CPT | Performed by: ANESTHESIOLOGY

## 2019-11-20 NOTE — PROGRESS NOTES
Cervical Facet Nerve (Medial Branch) Radiofrequency Lesioning      [facility]      PREOPERATIVE DIAGNOSIS:  Cervical spondylosis without myelopathy   POSTOPERATIVE DIAGNOSIS:  Same as preoperative diagnosis    PROCEDURE:    Cervical Facet Nerve (medial branch) RF, with Fluoroscopy:      LEVELS: bilateral  C3 and C4    PRE-PROCEDURE DISCUSSION WITH PATIENT:    Risks and complications were discussed with the patient prior to starting the procedure and informed consent was obtained.  We discussed various topics, including but not limited to bleeding, infection, injury, nerve injury, paralysis, coma, death, postprocedural soreness or painful flare, worsening of clinical picture, lack of pain relief.  We discussed the previous positive diagnostic nature of medial branch blockades.      SURGEON:  Mulugeta Guzman MD    REASON FOR PROCEDURE:    The patient presents with chronic axial neck pain. The patient underwent 2 bilateral cervical medial branch blocks with diagnostically positive relief. Given the patient’s significant pain relief, it is diagnostic that we have likely found the source of the patient’s pain; therefore, radiofrequency ablation of those nerves has been indicated for therapeutic pain relief.    SEDATION:  Versed 6mg & Fentanyl 100 mcg IV  TIME OF PROCEDURE:  After administration of the IV sedative, the intraoperative procedure time was 18 minutes.      ANESTHETIC:   Lidocaine 2%  STEROID:   NONE  TOTAL VOLUME OF SOLUTION:  4 ml      DESCRIPTON OF PROCEDURE:  After obtaining informed consent, the patient was placed in the prone position. IV access was obtained.  EKG, blood pressure, and pulse oximeter were monitored and any & all sedation was administered by an RN under my direct guidance.  The cervical area was prepped with Chloraprep and draped with sterile barrier.     Under fluoroscopic guidance the waists of the above mentioned vertebra were identified and marked at the lateral  margin of the vertebrae on the AP fluoroscopic view.  Skin and subcutaneous tissue were then anesthetized with 1% lidocaine 1mL at each point.  Then RF cannula needles were sequentially introduced under fluoroscopic guidance to the waists of these vertebrae contacting periosteum on the lateral edge of the vertebra on the AP fluroscopic view. After confirming the position of the needle under fluoroscopic PA and lateral views, confirming the needle position in the center of the trapezoid at each level, and confirming negative aspiration of blood and CSF, testing was initiated.  There was a lack of radicular stimulation on each needle at 3v and 2Hz.      Then, in each needle, 1mL of the aforementioned local anesthetic was instilled.  After 2 minutes had elapsed, Radiofrequency thermal lesioning was initiated for 2 minutes at 80 degrees Celsius.      Needles were removed intact from all levels.  Vitals were stable throughout.       ESTIMATED BLOOD LOSS:  minimal  SPECIMENS:  None    COMPLICATIONS:    No complications were noted., There was not any evidence of dural puncture.   and The patient did not have any signs of postprocedure numbness nor weakness.    TOLERANCE & DISCHARGE CONDITION:    The patient tolerated the procedure well.  The patient was transported to the recovery area without difficulties.  The patient was discharged to home under the care of family in stable and satisfactory condition.  The patient did notice improvement in pain on extension and rotation of the cervical spine.      PLAN OF CARE:  1. The patient was given our standard instruction sheet.  2. We discussed that Cervical Medial Branch Blockade is a diagnostic procedure in consideration for radiofrequency ablation if two diagnostic procedures proved to be positive for significant benefit.  If sustained relief of six to eight weeks is obtained, then an alternative plan could be therapeutic cervical medial branch blocks on an as-needed  basis.  3. The patient is asked to keep a pain log hourly for 8 hours postoperatively today.  4. The patient will Return to clinic 1-2 wks.  5. The patient will resume all medications as per the medication reconciliation sheet.

## 2019-11-25 ENCOUNTER — OFFICE VISIT (OUTPATIENT)
Dept: PAIN MEDICINE | Facility: CLINIC | Age: 64
End: 2019-11-25

## 2019-11-25 VITALS
SYSTOLIC BLOOD PRESSURE: 147 MMHG | HEIGHT: 71 IN | TEMPERATURE: 98.8 F | HEART RATE: 82 BPM | RESPIRATION RATE: 18 BRPM | WEIGHT: 293 LBS | BODY MASS INDEX: 41.02 KG/M2 | DIASTOLIC BLOOD PRESSURE: 79 MMHG | OXYGEN SATURATION: 94 %

## 2019-11-25 DIAGNOSIS — M96.1 POSTLAMINECTOMY SYNDROME OF LUMBAR REGION: ICD-10-CM

## 2019-11-25 DIAGNOSIS — Z79.899 ENCOUNTER FOR LONG-TERM (CURRENT) USE OF HIGH-RISK MEDICATION: ICD-10-CM

## 2019-11-25 DIAGNOSIS — M47.16 OSTEOARTHRITIS OF LUMBAR SPINE WITH MYELOPATHY: ICD-10-CM

## 2019-11-25 DIAGNOSIS — G89.29 OTHER CHRONIC PAIN: Primary | ICD-10-CM

## 2019-11-25 DIAGNOSIS — M47.812 ARTHROPATHY OF CERVICAL FACET JOINT: ICD-10-CM

## 2019-11-25 PROCEDURE — 99214 OFFICE O/P EST MOD 30 MIN: CPT | Performed by: NURSE PRACTITIONER

## 2019-11-25 RX ORDER — OXYCODONE HYDROCHLORIDE 10 MG/1
10 TABLET ORAL EVERY 6 HOURS PRN
Qty: 120 TABLET | Refills: 0 | Status: SHIPPED | OUTPATIENT
Start: 2019-11-25 | End: 2019-12-23 | Stop reason: SDUPTHER

## 2019-11-25 RX ORDER — FAMOTIDINE 40 MG/1
TABLET, FILM COATED ORAL
Refills: 3 | COMMUNITY
Start: 2019-11-11 | End: 2020-01-17

## 2019-11-25 NOTE — PROGRESS NOTES
"CHIEF COMPLAINT   Follow-up for back and neck pain. Mr. Mcdaniels states that he has had increased back and left knee pain. He states that he fell 2 weeks ago and landed on his back.    Subjective   Kian Mcdaniels is a 64 y.o. male  who presents to the office for follow-up of procedure.  He completed a Cervical Facet Nerve (Medial Branch) Radiofrequency Lesioning bilateral C3 and C4   on  11/19/19 performed by Dr. Guzman for management of neck pain. Patient reports no relief from the procedure yet.     He completed a bilateral C3-C4 MBB on 9/5/19 and 7/31/19 performed by Dr. Guzman for management of neck pain. Patient reports 80% relief from the procedure X 2 days.      In terms of back pain, failed [employer] SCS trial.  Dr. Guzman recommends consideration of Nevro trial in the future. Still having a great deal of back and leg weakness. Still cannot \"pick my feet up\".  Dr. Cintron does not believe this is a issue in the lumbar spine.  Dr. Carter does not believe this is an issue with his hips.    Has upcoming visit with the [facility].       In terms of neck pain. Nothing surgical needed. Monitoring syrinx, cannot consider SCS in this area for this reason.  Has failed cervical epidural injections.   Dr. Gutierres does believe that the syrinx while stable is large enough to cause symptoms with his walking and balance.  Likely needs decompression.  Agrees with second opinion at the [facility].  Will defer to them whether or not decompression recommended.     Complains of pain in his neck, back and right knee. Today his pain is 8/10VAS.  Continues with Oxycodone 10 mg 4/day, gabapentin 800 mg 3/day and tizanidine 4mg 1-2/night. The regimen helps decrease his pain moderately. No adverse reactions.       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  [facility]  recent hip and knee injection     History of Present " "Illness     PEG Assessment   What number best describes your pain on average in the past week?8  What number best describes how, during the past week, pain has interfered with your enjoyment of life?8  What number best describes how, during the past week, pain has interfered with your general activity?  8    The following portions of the patient's history were reviewed and updated as appropriate: allergies, current medications, past family history, past medical history, past social history, past surgical history and problem list.    Review of Systems   Constitutional: Negative for fatigue.   HENT: Positive for congestion.    Eyes: Negative for visual disturbance.   Respiratory: Positive for cough. Negative for shortness of breath and wheezing.    Cardiovascular: Positive for leg swelling. Negative for chest pain and palpitations.   Gastrointestinal: Negative for constipation and diarrhea.   Genitourinary: Negative for difficulty urinating.   Musculoskeletal: Positive for arthralgias (bilateral knees), back pain and neck pain.   Neurological: Positive for weakness (bilateral legs). Negative for numbness.   Psychiatric/Behavioral: Positive for sleep disturbance. Negative for suicidal ideas. The patient is not nervous/anxious.      Vitals:    11/25/19 1118   BP: 147/79   Pulse: 82   Resp: 18   Temp: 98.8 °F (37.1 °C)   SpO2: 94%   Weight: 133 kg (293 lb)   Height: 180.3 cm (71\")   PainSc:   8   PainLoc: Neck     Objective   Physical Exam   Constitutional: He is oriented to person, place, and time. Vital signs are normal. He appears well-developed and well-nourished. He is cooperative. No distress.   HENT:   Head: Normocephalic and atraumatic.   Nose: Nose normal.   Eyes: Conjunctivae and lids are normal.   Neck: Trachea normal. Neck supple. Muscular tenderness present. No spinous process tenderness present. Decreased range of motion present.   Cardiovascular: Normal rate.   Pulmonary/Chest: Effort normal. No " respiratory distress.   Abdominal:   obese   Musculoskeletal:        Right knee: He exhibits decreased range of motion and swelling. Tenderness found.        Left knee: Tenderness found.        Cervical back: He exhibits decreased range of motion, tenderness and pain. He exhibits no spasm.        Lumbar back: He exhibits decreased range of motion, tenderness and pain.   +cervical facet tenderness/loading    Neurological: He is alert and oriented to person, place, and time. He has normal strength. Gait (wheelchair, antalgia) abnormal.   Skin: Skin is warm, dry and intact. He is not diaphoretic.   Psychiatric: He has a normal mood and affect. His speech is normal and behavior is normal. Cognition and memory are normal.   Nursing note and vitals reviewed.    Assessment/Plan   Kian was seen today for neck pain and back pain.    Diagnoses and all orders for this visit:    Other chronic pain    Osteoarthritis of lumbar spine with myelopathy    Arthropathy of cervical facet joint    Postlaminectomy syndrome of lumbar region    Encounter for long-term (current) use of high-risk medication      --- Refill Oxycodone. Patient appears stable with current regimen. No adverse effects. Regarding continuation of opioids, there is no evidence of aberrant behavior or any red flags.  The patient continues with appropriate response to opioid therapy. ADL's remain intact by self.   --- The urine drug screen confirmation from 7/2/19 has been reviewed and the result is appropriate based on patient history and DAVID report  --- Follow-up 1 month       DAVID REPORT  As part of the patient's treatment plan, I am prescribing controlled substances. The patient has been made aware of appropriate use of such medications, including potential risk of somnolence, limited ability to drive and/or work safely, and the potential for dependence or overdose. It has also bee made clear that these medications are for use by this patient only, without  concomitant use of alcohol or other substances unless prescribed.     Patient has completed prescribing agreement detailing terms of continued prescribing of controlled substances, including monitoring DAVID reports, urine drug screening, and pill counts if necessary. The patient is aware that inappropriate use will results in cessation of prescribing such medications.    DAVID report has been reviewed and scanned into the patient's chart.    As the clinician, I personally reviewed the DAVID from 11/25/19 while the patient was in the office today.    History and physical exam exhibit continued safe and appropriate use of controlled substances.     EMR Dragon/Transcription disclaimer:   Much of this encounter note is an electronic transcription/translation of spoken language to printed text. The electronic translation of spoken language may permit erroneous, or at times, nonsensical words or phrases to be inadvertently transcribed; Although I have reviewed the note for such errors, some may still exist.

## 2019-12-23 ENCOUNTER — OFFICE VISIT (OUTPATIENT)
Dept: PAIN MEDICINE | Facility: CLINIC | Age: 64
End: 2019-12-23

## 2019-12-23 VITALS
WEIGHT: 291.4 LBS | TEMPERATURE: 97.4 F | RESPIRATION RATE: 18 BRPM | DIASTOLIC BLOOD PRESSURE: 92 MMHG | HEART RATE: 89 BPM | SYSTOLIC BLOOD PRESSURE: 171 MMHG | HEIGHT: 71 IN | OXYGEN SATURATION: 93 % | BODY MASS INDEX: 40.8 KG/M2

## 2019-12-23 DIAGNOSIS — M47.812 ARTHROPATHY OF CERVICAL FACET JOINT: ICD-10-CM

## 2019-12-23 DIAGNOSIS — M96.1 POSTLAMINECTOMY SYNDROME OF LUMBAR REGION: ICD-10-CM

## 2019-12-23 DIAGNOSIS — G89.29 OTHER CHRONIC PAIN: Primary | ICD-10-CM

## 2019-12-23 DIAGNOSIS — M50.30 DEGENERATIVE CERVICAL DISC: ICD-10-CM

## 2019-12-23 DIAGNOSIS — Z79.899 ENCOUNTER FOR LONG-TERM (CURRENT) USE OF HIGH-RISK MEDICATION: ICD-10-CM

## 2019-12-23 PROCEDURE — 99214 OFFICE O/P EST MOD 30 MIN: CPT | Performed by: NURSE PRACTITIONER

## 2019-12-23 PROCEDURE — 96372 THER/PROPH/DIAG INJ SC/IM: CPT | Performed by: NURSE PRACTITIONER

## 2019-12-23 RX ORDER — OXYCODONE HYDROCHLORIDE 10 MG/1
10 TABLET ORAL EVERY 6 HOURS PRN
Qty: 120 TABLET | Refills: 0 | Status: SHIPPED | OUTPATIENT
Start: 2019-12-23 | End: 2020-01-22 | Stop reason: SDUPTHER

## 2019-12-23 RX ORDER — METHYLPREDNISOLONE ACETATE 40 MG/ML
40 INJECTION, SUSPENSION INTRA-ARTICULAR; INTRALESIONAL; INTRAMUSCULAR; SOFT TISSUE ONCE
Status: COMPLETED | OUTPATIENT
Start: 2019-12-23 | End: 2019-12-23

## 2019-12-23 RX ADMIN — METHYLPREDNISOLONE ACETATE 40 MG: 40 INJECTION, SUSPENSION INTRA-ARTICULAR; INTRALESIONAL; INTRAMUSCULAR; SOFT TISSUE at 14:08

## 2019-12-23 NOTE — PROGRESS NOTES
"CHIEF COMPLAINT  Follow-up for back and neck pain. Mr. Mcdaniels states that his pain is unchanged.    Subjective   Kian Mcdaniels is a 64 y.o. male  who presents to the office for follow-up.He has a history of back and neck pain.     He completed a Cervical Facet Nerve (Medial Branch) Radiofrequency Lesioning bilateral C3 and C4   on  11/19/19 performed by Dr. Guzman for management of neck pain. Patient reports no relief from the procedure. Declined steroid last visit, would like to consider today.       He completed a bilateral C3-C4 MBB on 9/5/19 and 7/31/19 performed by Dr. Guzman for management of neck pain. Patient reports 80% relief from the procedure X 2 days.      In terms of back pain, failed [employer] SCS trial.  Dr. Guzman recommends consideration of Nevro trial in the future. Still having a great deal of back and leg weakness. Still cannot \"pick my feet up\".  Dr. Cintron does not believe this is a issue in the lumbar spine.  Dr. Carter does not believe this is an issue with his hips.     In terms of neck pain. Nothing surgical needed. Monitoring syrinx, cannot consider SCS in this area for this reason.  Has failed cervical epidural injections.   Dr. Gutierres does believe that the syrinx while stable is large enough to cause symptoms with his walking and balance.  Likely needs decompression. Recently saw Dr. Morris, Does believe that the syrinx is a neurosurgical issue but would like to get him to see Dr. Asher Conde to evaluate his cervical spine.     Complains of pain in his neck, back and right knee. Today his pain is 8/10VAS.  Continues with Oxycodone 10 mg 4/day, gabapentin 800 mg 3/day and tizanidine 4mg 1-2/night. The regimen helps decrease his pain moderately. No adverse reactions.       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  He is in PT twice a week at [facility].  recent hip and knee injection     Back Pain   This is a " chronic problem. The current episode started more than 1 year ago. The problem occurs daily. The problem has been waxing and waning since onset. The pain is present in the sacro-iliac. The quality of the pain is described as aching, shooting and stabbing. The pain is at a severity of 8/10. The pain is moderate. The pain is the same all the time. The symptoms are aggravated by bending, sitting and standing. Stiffness is present all day. Associated symptoms include leg pain, numbness (bilateral legs and feet), paresthesias, tingling and weakness. Pertinent negatives include no abdominal pain, chest pain, dysuria, fever or headaches. Risk factors include lack of exercise, obesity and sedentary lifestyle. He has tried analgesics for the symptoms. The treatment provided moderate relief.   Neck Pain    This is a chronic problem. The current episode started more than 1 month ago. The problem occurs daily. The problem has been unchanged. The pain is associated with nothing. The pain is present in the left side, right side and midline. The quality of the pain is described as cramping. The pain is at a severity of 8/10. The pain is moderate. The symptoms are aggravated by sneezing and twisting. The pain is same all the time. Associated symptoms include leg pain, numbness (bilateral legs and feet), tingling and weakness. Pertinent negatives include no chest pain, fever or headaches. He has tried muscle relaxants, oral narcotics and bed rest for the symptoms. The treatment provided moderate relief.      PEG Assessment   What number best describes your pain on average in the past week?8  What number best describes how, during the past week, pain has interfered with your enjoyment of life?9  What number best describes how, during the past week, pain has interfered with your general activity?  9    The following portions of the patient's history were reviewed and updated as appropriate: allergies, current medications, past family  "history, past medical history, past social history, past surgical history and problem list.    Review of Systems   Constitutional: Positive for fatigue. Negative for fever.   HENT: Negative for congestion.    Eyes: Positive for visual disturbance.   Respiratory: Negative for cough, shortness of breath and wheezing.    Cardiovascular: Negative.  Negative for chest pain.   Gastrointestinal: Negative for abdominal pain, constipation and diarrhea.   Genitourinary: Negative for difficulty urinating and dysuria.   Musculoskeletal: Positive for back pain and neck pain.   Neurological: Positive for tingling, weakness, numbness (bilateral legs and feet) and paresthesias. Negative for headaches.   Psychiatric/Behavioral: Positive for sleep disturbance. Negative for suicidal ideas. The patient is not nervous/anxious.        Vitals:    12/23/19 1325   BP: 171/92   Pulse: 89   Resp: 18   Temp: 97.4 °F (36.3 °C)   SpO2: 93%   Weight: 132 kg (291 lb 6.4 oz)   Height: 180.3 cm (71\")   PainSc:   8   PainLoc: Neck     Objective   Physical Exam   Constitutional: He is oriented to person, place, and time. Vital signs are normal. He appears well-developed and well-nourished. He is cooperative. No distress.   HENT:   Head: Normocephalic and atraumatic.   Nose: Nose normal.   Eyes: Conjunctivae and lids are normal.   Neck: Trachea normal. Neck supple. Muscular tenderness present. No spinous process tenderness present. Decreased range of motion present.   Cardiovascular: Normal rate.   Pulmonary/Chest: Effort normal. No respiratory distress.   Abdominal:   obese   Musculoskeletal:        Right knee: He exhibits decreased range of motion and swelling. Tenderness found.        Left knee: Tenderness found.        Cervical back: He exhibits decreased range of motion, tenderness and pain. He exhibits no spasm.        Lumbar back: He exhibits decreased range of motion, tenderness and pain.   +cervical facet tenderness/loading    Neurological: He " is alert and oriented to person, place, and time. He has normal strength. Gait (wheelchair, antalgia) abnormal.   Skin: Skin is warm, dry and intact. He is not diaphoretic.   Psychiatric: He has a normal mood and affect. His speech is normal and behavior is normal. Cognition and memory are normal.   Nursing note and vitals reviewed.    Assessment/Plan   Kian was seen today for back pain and neck pain.    Diagnoses and all orders for this visit:    Other chronic pain  -     methylPREDNISolone acetate (DEPO-medrol) injection 40 mg    Postlaminectomy syndrome of lumbar region    Arthropathy of cervical facet joint    Degenerative cervical disc    Encounter for long-term (current) use of high-risk medication      --- Depo Medrol 40 mg IM once today  --- Keep f/u with Dr. Conde  --- Refill Oxycodone. Patient appears stable with current regimen. No adverse effects. Regarding continuation of opioids, there is no evidence of aberrant behavior or any red flags.  The patient continues with appropriate response to opioid therapy. ADL's remain intact by self.   --- The urine drug screen confirmation from 7/2/19 has been reviewed and the result is appropriate based on patient history and DAVID report  --- Follow-up 1 month          DAVID REPORT    As part of the patient's treatment plan, I am prescribing controlled substances. The patient has been made aware of appropriate use of such medications, including potential risk of somnolence, limited ability to drive and/or work safely, and the potential for dependence or overdose. It has also bee made clear that these medications are for use by this patient only, without concomitant use of alcohol or other substances unless prescribed.     Patient has completed prescribing agreement detailing terms of continued prescribing of controlled substances, including monitoring DAVID reports, urine drug screening, and pill counts if necessary. The patient is aware that inappropriate  use will results in cessation of prescribing such medications.    DAVID report has been reviewed and scanned into the patient's chart.    As the clinician, I personally reviewed the DAVID from 12/23/19 while the patient was in the office today.    History and physical exam exhibit continued safe and appropriate use of controlled substances.      EMR Dragon/Transcription disclaimer:   Much of this encounter note is an electronic transcription/translation of spoken language to printed text. The electronic translation of spoken language may permit erroneous, or at times, nonsensical words or phrases to be inadvertently transcribed; Although I have reviewed the note for such errors, some may still exist.

## 2020-01-17 ENCOUNTER — CLINICAL SUPPORT (OUTPATIENT)
Dept: ORTHOPEDIC SURGERY | Facility: CLINIC | Age: 65
End: 2020-01-17

## 2020-01-17 VITALS — HEIGHT: 71 IN | TEMPERATURE: 98.3 F | WEIGHT: 290 LBS | BODY MASS INDEX: 40.6 KG/M2

## 2020-01-17 DIAGNOSIS — M25.561 PAIN IN BOTH KNEES, UNSPECIFIED CHRONICITY: Primary | ICD-10-CM

## 2020-01-17 DIAGNOSIS — M17.12 ARTHRITIS OF LEFT KNEE: ICD-10-CM

## 2020-01-17 DIAGNOSIS — Z96.651 STATUS POST RIGHT KNEE REPLACEMENT: ICD-10-CM

## 2020-01-17 DIAGNOSIS — M25.562 PAIN IN BOTH KNEES, UNSPECIFIED CHRONICITY: Primary | ICD-10-CM

## 2020-01-17 PROCEDURE — 99213 OFFICE O/P EST LOW 20 MIN: CPT | Performed by: NURSE PRACTITIONER

## 2020-01-17 PROCEDURE — 20610 DRAIN/INJ JOINT/BURSA W/O US: CPT | Performed by: NURSE PRACTITIONER

## 2020-01-17 PROCEDURE — 73560 X-RAY EXAM OF KNEE 1 OR 2: CPT | Performed by: NURSE PRACTITIONER

## 2020-01-17 RX ORDER — FAMOTIDINE 40 MG/1
40 TABLET, FILM COATED ORAL DAILY
COMMUNITY
Start: 2020-01-10

## 2020-01-17 RX ORDER — METHYLPREDNISOLONE ACETATE 80 MG/ML
80 INJECTION, SUSPENSION INTRA-ARTICULAR; INTRALESIONAL; INTRAMUSCULAR; SOFT TISSUE
Status: COMPLETED | OUTPATIENT
Start: 2020-01-17 | End: 2020-01-17

## 2020-01-17 RX ADMIN — METHYLPREDNISOLONE ACETATE 80 MG: 80 INJECTION, SUSPENSION INTRA-ARTICULAR; INTRALESIONAL; INTRAMUSCULAR; SOFT TISSUE at 08:00

## 2020-01-17 NOTE — PROGRESS NOTES
Patient: Kian Mcdaniels  YOB: 1955    Chief Complaints:  left knee pain, right tka is still stiff and gradually strengthening.     Subjective:    History of Present Illness: Here today for knee pain. The left knee has been bad since fell 2 months ago and the right knee is still stiff s/p tka and would like to do some more therapy. The pain is a generalized joint tenderness.  It has been progressive in nature but remains intermittent.  Worsened by prolonged standing or walking and squatting activities. Has had improvement in the past with ice/heat, rest, and injections.     This problem is not new to this examiner.     Allergies:   Allergies   Allergen Reactions   • Lisinopril Other (See Comments)     7/2016 possibly caused pancreatitis per Dr Quinonez   • Erythromycin Nausea Only and Other (See Comments)     PT STATES ACUTE KIDNEY INJURY   • Metformin Nausea And Vomiting       Medications:   Home Medications:  Current Outpatient Medications on File Prior to Visit   Medication Sig   • albuterol (VENTOLIN HFA) 108 (90 BASE) MCG/ACT inhaler Inhale 1-2 puffs Every 6 (Six) Hours As Needed for wheezing (RESCUE INHALER).   • amLODIPine (NORVASC) 10 MG tablet Take 10 mg by mouth Every Morning.   • aspirin 81 MG EC tablet Take 1 tablet by mouth Every Morning. (Patient taking differently: Take 81 mg by mouth Every Morning. HOLD FOR SURGERY)   • atorvastatin (LIPITOR) 40 MG tablet Take 40 mg by mouth Daily.   • bisacodyl (DULCOLAX) 5 MG EC tablet Take 2 tablets by mouth Daily As Needed for Constipation.   • Blood Glucose Monitoring Suppl (FREESTYLE FREEDOM LITE) W/DEVICE kit    • carvedilol (COREG) 12.5 MG tablet Take 12.5 mg by mouth 2 (Two) Times a Day With Meals.   • cetirizine (ZyrTEC) 10 MG tablet Take 10 mg by mouth Every Morning.   • clopidogrel (PLAVIX) 75 MG tablet Take 75 mg by mouth Every Morning. HOLDING FOR SURGERY   • cyclobenzaprine (FLEXERIL) 10 MG tablet TAKE 1 TABLET BY MOUTH TWICE DAILY AS  NEEDED FOR MUSCLE SPASMS   • docusate sodium (COLACE) 100 MG capsule Take 100 mg by mouth 2 (Two) Times a Day.   • famotidine (PEPCID) 40 MG tablet Take 40 mg by mouth Daily.   • FLUARIX QUADRIVALENT 0.5 ML suspension prefilled syringe injection ADM 0.5ML IM UTD   • furosemide (LASIX) 40 MG tablet Take 40 mg by mouth Every Night.   • gabapentin (NEURONTIN) 400 MG capsule Take 1 capsule by mouth 3 (Three) Times a Day.   • glucose blood (FREESTYLE LITE) test strip Test 4 times per day   • HUMULIN R 500 UNIT/ML CONCENTRATED injection Inject 500 Units under the skin Continuous. Via insulin pump basal rate 0.68units/hr from 0000 to 1000  1000 to 1600 is 0.7units/hr  1600 to 0000 0.75units/hr  Plus sliding scale based on carb intake   • insulin degludec (TRESIBA FLEXTOUCH) 100 UNIT/ML solution pen-injector injection INJECT 40 UNITS INTO THE SKIN DAILY   • Insulin Infusion Pump (T:SLIM INSULIN PUMP) device    • isosorbide mononitrate (IMDUR) 60 MG 24 hr tablet Take 60 mg by mouth Every Morning.   • Lancets (FREESTYLE) lancets Test 4 times per day   • losartan (COZAAR) 50 MG tablet Take 50 mg by mouth Every Morning.   • NARCAN 4 MG/0.1ML nasal spray    • oxybutynin (DITROPAN) 5 MG tablet Take 5 mg by mouth 3 (Three) Times a Day.   • oxyCODONE (ROXICODONE) 10 MG tablet Take 1 tablet by mouth Every 6 (Six) Hours As Needed for Severe Pain . dnf 12/27/19   • pantoprazole (PROTONIX) 40 MG EC tablet Take 40 mg by mouth Every Morning.   • polyethylene glycol (MIRALAX) pack packet Take 17 g by mouth Daily.   • potassium chloride (K-DUR) 10 MEQ CR tablet TAKE 1 TABLET BY MOUTH DAILY   • vitamin D (ERGOCALCIFEROL) 71831 units capsule capsule TK 1 C PO 1 TIME A WK   • [DISCONTINUED] famotidine (PEPCID) 40 MG tablet TK 1 T PO Q NIGHT     No current facility-administered medications on file prior to visit.      Current Medications:  Scheduled Meds:  Continuous Infusions:  No current facility-administered medications for this visit.    PRN Meds:.    I have reviewed the patient's medical history in detail and updated the computerized patient record.  Review and summarization of old records include:    Past Medical History:   Diagnosis Date   • Arteriosclerotic cardiovascular disease    • Arthritis    • At risk for sleep apnea    • COPD (chronic obstructive pulmonary disease) (CMS/HCC)    • Coronary artery disease    • Diabetes mellitus (CMS/HCC)    • Diabetic peripheral neuropathy (CMS/HCC)    • Disease of thyroid gland     NODULE PRESENT   • ED (erectile dysfunction) of non-organic origin    • GERD (gastroesophageal reflux disease)    • Headache disorder     DUE TO NECK   • Hyperlipidemia    • Hypertension    • Insulin pump in place    • Knee pain, bilateral    • Low back pain    • Myocardial infarction (CMS/HCC)    • Neck pain    • Spinal stenosis    • TIA (transient ischemic attack)     LEFT EYE   • Type 2 diabetes mellitus (CMS/HCC)    • Upper back pain    • Wears dentures     UPPER PLATE        Past Surgical History:   Procedure Laterality Date   • AMPUTATION FOOT / TOE Left     GREAT TOE   • BACK SURGERY  10/26/2015    Bilateral L4-L5 laminectomy -Dr. Gutierres   • CARDIAC CATHETERIZATION     • CARDIAC SURGERY      CABG X 6    • CHOLECYSTECTOMY     • CORONARY ARTERY BYPASS GRAFT      X 6   • EPIDURAL BLOCK     • EYE SURGERY      CATARACT EXTRACTION   • HAND SURGERY  04/28/2016 and 7/21/19   • LUMBAR DISCECTOMY FUSION INSTRUMENTATION N/A 12/12/2016    Procedure: L 4-5 FUSION WITH INSTRUMENTATION WITH BMP, WITH REMOVAL OF INSTRUMENTATION ;  Surgeon: Rowdy Spencer MD;  Location: [facility];  Service:    • LUMBAR LAMINECTOMY DISCECTOMY DECOMPRESSION N/A 12/12/2016    Procedure: L4-5 REPEAT LAMINECTOMY ;  Surgeon: Tim Gutierres MD;  Location: [facility] OR;  Service:    • LUMBAR LAMINECTOMY DISCECTOMY DECOMPRESSION N/A 12/14/2016    Procedure: EVACUATION OF LUMBAR HEMATOMA;  Surgeon: Rowdy Spencer MD;  Location: [facility] OR;   Service:    • MULTIPLE TOOTH EXTRACTIONS      UPPER TEETH EXTRACTED   • ORTHOPEDIC SURGERY     • PENIS SURGERY      RECONSTRUCTION   • SCROTUM EXPLORATION      scrotum surgery   • SPINAL CORD STIMULATOR IMPLANT N/A 2018    Procedure: SPINAL CORD STIMULATOR Phase 1 - [employer];  Surgeon: Mulugeta Guzman MD;  Location: [facility];  Service: Pain Management   • SPINE SURGERY     • TOTAL KNEE ARTHROPLASTY Right 2019    Procedure: RIGHT TOTAL KNEE ARTHROPLASTY WITH NEGRITA NAVIGATION;  Surgeon: Andres Carter MD;  Location: [facility];  Service: Orthopedics        Social History     Occupational History   • Not on file   Tobacco Use   • Smoking status: Former Smoker     Packs/day: 1.00     Years: 25.00     Pack years: 25.00     Types: Cigarettes     Start date:      Last attempt to quit:      Years since quittin.0   • Smokeless tobacco: Never Used   Substance and Sexual Activity   • Alcohol use: No     Comment: no alcohol since    • Drug use: No   • Sexual activity: Defer      Social History     Social History Narrative   • Not on file        Family History   Problem Relation Age of Onset   • Diabetes Other    • Heart disease Other    • Hypertension Other    • Kidney disease Other         renal failure   • Heart disease Maternal Grandmother    • Hypertension Maternal Grandmother    • Malig Hyperthermia Neg Hx        ROS: 14 point review of systems was performed and was negative except for documented findings in HPI and today's encounter.     Allergies:   Allergies   Allergen Reactions   • Lisinopril Other (See Comments)     2016 possibly caused pancreatitis per Dr Quinonez   • Erythromycin Nausea Only and Other (See Comments)     PT STATES ACUTE KIDNEY INJURY   • Metformin Nausea And Vomiting     Constitutional:  Denies fever, shaking or chills   Eyes:  Denies change in visual acuity   HENT:  Denies nasal congestion or sore throat   Respiratory:  Denies cough or shortness of  "breath   Cardiovascular:  Denies chest pain or severe LE edema   GI:  Denies abdominal pain, nausea, vomiting, bloody stools or diarrhea   Musculoskeletal:  Numbness, tingling, or loss of motor function only as noted above in history of present illness.  : Denies painful urination or hematuria  Integument:  Denies rash, lesion or ulceration   Neurologic:  Denies headache or focal weakness  Endocrine:  Denies lymphadenopathy  Psych:  Denies confusion or change in mental status   Hem:  Denies active bleeding    Physical Exam: 64 y.o. male  Wt Readings from Last 3 Encounters:   01/17/20 132 kg (290 lb)   12/23/19 132 kg (291 lb 6.4 oz)   11/25/19 133 kg (293 lb)     Ht Readings from Last 1 Encounters:   01/17/20 180.3 cm (71\")     Body mass index is 40.45 kg/m².  Vitals:    01/17/20 0948   Temp: 98.3 °F (36.8 °C)     Vital signs reviewed.   Constitutional: Awake alert and oriented x3, well developed, no acute distress, non-toxic appearance.  EYES: symmetric, sclera clear  ENT:  Normocephalic, Atraumatic.   Respiratory:  No respiratory distress, No wheezing  CV: pulse regular, no palpitations or pallor.  GI:  Abdomen soft, non-tender.   Vascular:  Intact distal pulses, No cyanosis, no signs or symptoms of DVT.  Neurologic: Sensation grossly intact to the involved extremity, No focal deficits noted.   Neck: No tenderness, Supple.  Integument: warm, dry, no ulcerations.   Psychiatric:  Oriented, no pathological affect.  Musculoskeletal:    Affected knee(s):  Painful gait with a subtle limp, positive for synovitis, swelling, joint effusion with crepitation.  Lachman negative  Posterior drawer negative  Ru's negative  Patellofemoral grind +  Sensation grossly intact to light touch throughout the lower extremity  Skin is intact  Distal pulses are palpable  No signs or symptoms of DVT  Right knee incision healed, calf soft, rom 110 flexion on a good day.       Diagnostic Data:     Imaging was done today, images were " personally viewed and discussed with the patient:    Indication: pain related symptoms,  Views: 3V AP, LAT & 40 degree PA left knee(s)   Findings: severe end-stage arthritis (bone on bone, subchondral sclerosis/cysts, osteophytes), S/P right  Total Knee Replacement in good position and alignment  Comparison views: viewed last xray done in the office.     Procedure:  Large Joint Arthrocentesis: L knee  Date/Time: 1/17/2020 8:00 AM  Consent given by: patient  Site marked: site marked  Timeout: Immediately prior to procedure a time out was called to verify the correct patient, procedure, equipment, support staff and site/side marked as required   Supporting Documentation  Indications: pain   Procedure Details  Location: knee - L knee  Preparation: Patient was prepped and draped in the usual sterile fashion  Needle gauge: 21.  Approach: anterolateral  Medications administered: 4 mL lidocaine (cardiac); 80 mg methylPREDNISolone acetate 80 MG/ML  Patient tolerance: patient tolerated the procedure well with no immediate complications          Assessment:     ICD-10-CM ICD-9-CM   1. Pain in both knees, unspecified chronicity M25.561 719.46    M25.562    2. Status post right knee replacement Z96.651 V43.65   3. Arthritis of left knee M17.12 716.96           Plan: Is to proceed with injection  Follow up as indicated.  Ice, elevate, and rest as needed.  Additional interventions include:  15 min spent face to face with patient 11 min spent counseling about:  Biomechanics of pertinent body area discussed.  Risks, benefits, alternatives, comparisons, and complications of accepted medicines, injections, recommendations, surgical procedures, and therapies explained and education provided in laymen's terms. Natural history and expected course of this patient's diagnosis discussed along with evaluation of therapies. Questions answered. When appropriate I also discussed proper use of cane, walker, trekking poles.   BMI:  The concept  of BMI body mass index and its importance and implications discussed.  BMI suggested to be < 40 or as low as possible. Lifestyle measures for weight loss and how this affects orthopedic condition.  EXERCISES:  Advice on benefits of, and types of regular/moderate exercise including biomechanical forces involved as it pertains to this complaint.  RICE: Rest, ice, compression, and elevation therapy, Cryotherapy/brachy therapy, and or OTC linaments as indicated with instructions.   Cortisone Injection. See procedure note.  PT referral.    1/17/2020  Kaylee Jorge, APRN

## 2020-01-17 NOTE — PATIENT INSTRUCTIONS
[facility]    KNEE HOME EXERCISES      It is important that you maintain and possibly improve your strength and range of motion of your knee.      •  Walk frequently for short intervals  •  Using a cane or walker to allow you to walk safely if needed  •  Avoid sitting for long periods of time to avoid cramping and swelling of your leg   •  Do exercises either on a bed or in a chair.  •  If any of the exercises cause you pain, either eliminate that exercise or decrease          the motion or repetitions      Start exercises with 10 repetitions and increase to 30 repetitions.  Do two sets of each exercise each day    ANKLE PUMPS    1. Move your feet up and down as if you are pumping on a gas pedal       STRAIGHT LEG RAISES    1. Lie flat with your injured leg straight.  Keep the other leg bent.  2. Tighten the injured leg's thigh muscle.  3. Lift leg, with knee locked in the straight position no higher than the other knee for  seconds  4. Lower leg slowly. Rest for 5 seconds      SHORT ARC QUAD    1. Lie with both knees bent over a pillow  2. Straighten both knees  3. Hold 5 seconds  4. Bend knees back to starting position and repeat sequence       QUADRICEPS     1. Lie flat with your injured let straight.  Keep the other leg bent.  2. Tighten the injured leg's thigh muscle by trying to move your kneecap toward the  thigh.  3. Make your leg as stiff as you can.  4. Hold for 5 seconds and relax for 5 seconds        HAMSTRING STRETCH    1. Lie flat with your injured leg straight. Keep the other leg bent  2. Press your heel of your injured leg into the floor for 5 seconds       OR  1. Sit with injured leg out straight.   2. Loop a sheet around the ball of foot and toes.  3. Gently pull on the sheet, keeping the leg straight  4. Hold for 10-30 seconds      KNEE FLEXION-EXTENSION SITTING     1. Sit with injured let bent as shown  2. Straighten leg at the knee  3. Hold 5 seconds  4. Bend knee back  to starting position   5. Rest for 2-5 seconds and repeat sequence       HEEL SLIDES     1. Lie on back with legs straight  2. Slide heels toward buttocks  3. Return to starting position       HEEL SLIDS WITH SHEET    1. Lie on your back with a sheet wrapped around your foot  2. Pull on the sheet to assist you in bending your knee and sliding your heel toward  your buttocks  3. Hold for 5 seconds        KNEE FLEXION STRETCH    1. Sit in a chair  2. Bend bad knee back as far as you can   3. Hold stretch for 5-10 seconds      CHAIR PUSH UPS    1. Sit in a chair with arm rests  2. Place hands on armrests  3. Straighten arms, raising buttocks up off of chair         WALL SLIDES    1. Stand with your back and head against a wall.    2. Keep your feet shoulder width apart and 6-12 inches from the wall  3. Slowly slide down the wall until your knees are slightly bent.    4. Hold for 5 seconds and then slowly slide back up  5. As you get stronger, then you can slowly increase the bend in your knees, but do  not drop your buttocks below the level of your knees       STEP UPS    1. Stand with your foot on your injured leg on a 3-5 inch support (such as a block of  wood)  2. Place other foot on the floor  3. Shift your weight onto the foot on the block and try to straighten the knee and  raising the other foot off of the floor  4. Slowly lower your foot until only the heel touches the floor

## 2020-01-22 RX ORDER — OXYCODONE HYDROCHLORIDE 10 MG/1
10 TABLET ORAL EVERY 6 HOURS PRN
Qty: 120 TABLET | Refills: 0 | Status: SHIPPED | OUTPATIENT
Start: 2020-01-22 | End: 2020-02-21 | Stop reason: SDUPTHER

## 2020-01-23 ENCOUNTER — OFFICE VISIT (OUTPATIENT)
Dept: PAIN MEDICINE | Facility: CLINIC | Age: 65
End: 2020-01-23

## 2020-01-23 VITALS
SYSTOLIC BLOOD PRESSURE: 119 MMHG | WEIGHT: 271.6 LBS | HEIGHT: 71 IN | OXYGEN SATURATION: 96 % | RESPIRATION RATE: 20 BRPM | DIASTOLIC BLOOD PRESSURE: 76 MMHG | BODY MASS INDEX: 38.02 KG/M2 | HEART RATE: 88 BPM | TEMPERATURE: 97.9 F

## 2020-01-23 DIAGNOSIS — G89.29 OTHER CHRONIC PAIN: Primary | ICD-10-CM

## 2020-01-23 DIAGNOSIS — G95.0 SYRINX OF SPINAL CORD (HCC): ICD-10-CM

## 2020-01-23 DIAGNOSIS — M50.30 DEGENERATIVE CERVICAL DISC: ICD-10-CM

## 2020-01-23 DIAGNOSIS — M96.1 POSTLAMINECTOMY SYNDROME OF LUMBAR REGION: ICD-10-CM

## 2020-01-23 DIAGNOSIS — Z79.899 ENCOUNTER FOR LONG-TERM (CURRENT) USE OF HIGH-RISK MEDICATION: ICD-10-CM

## 2020-01-23 PROCEDURE — 99214 OFFICE O/P EST MOD 30 MIN: CPT | Performed by: NURSE PRACTITIONER

## 2020-01-23 RX ORDER — OXYCODONE HYDROCHLORIDE 10 MG/1
10 TABLET ORAL
COMMUNITY
Start: 2019-12-23 | End: 2020-02-21 | Stop reason: SDUPTHER

## 2020-01-23 RX ORDER — POTASSIUM CHLORIDE 750 MG/1
TABLET, FILM COATED, EXTENDED RELEASE ORAL
COMMUNITY
Start: 2019-12-22

## 2020-01-23 NOTE — PROGRESS NOTES
"CHIEF COMPLAINT  F/u back and neck pain. Pt sts pain has worsened since last ov. Pt sts he feels weak in lower back and bilat legs lately. Pt sts he had f/u with Dr. Delgado on 1/16/20 who ordered MRI thoracic and cervical spine. Pt will have MRI on 1/29/20.     Subjective   Kian Mcdaniels is a 64 y.o. male  who presents to the office for follow-up.He has a history of back pain, neck pain.    He completed a Cervical Facet Nerve (Medial Branch) Radiofrequency Lesioning bilateral C3 and C4   on  11/19/19 - minimal relief     In terms of back pain, failed [employer] SCS trial.  Still having a great deal of back and leg weakness. Still cannot \"pick my feet up\".  Dr. Cintron does not believe this is a issue in the lumbar spine.  Dr. Carter does not believe this is an issue with his hips.    In terms of neck pain. Dr. Gutierres has been monitoring syrinx.  Has failed cervical epidural injections.   Dr. Gutierres does believe that the syrinx while stable is large enough to cause symptoms with his walking and balance.  Likely needs decompression. Recently saw Dr. Guevara Conde.  Saw Dr. Asher Conde 1/16/2020 regarding the cervicothoracic area syrinx.  He is recommending updated MRI imaging of the cervical and thoracic spine.  This was ordered and the patient will follow-up after this has been complete.    Complains of pain in his neck, back and right knee. Today his pain is 8/10VAS.  Continues with Oxycodone 10 mg 4/day, gabapentin 800 mg 3/day and tizanidine 4mg 1-2/night. The regimen helps decrease his pain moderately. No adverse reactions.       Dr. Carter (ortho) - right knee replacement 1/8/2018 - knee still bothersome, considering more PT     Back Pain   This is a chronic problem. The current episode started more than 1 year ago. The problem occurs daily. The problem has been waxing and waning since onset. The pain is present in the sacro-iliac. The quality of the pain is described as aching, shooting and " stabbing. The pain is at a severity of 8/10. The pain is moderate. The pain is the same all the time. The symptoms are aggravated by bending, sitting and standing. Stiffness is present all day. Associated symptoms include leg pain, numbness (bilateral legs and feet), paresthesias, tingling and weakness (lower back and legs). Pertinent negatives include no abdominal pain, chest pain, dysuria, fever or headaches. Risk factors include lack of exercise, obesity and sedentary lifestyle. He has tried analgesics for the symptoms. The treatment provided moderate relief.   Neck Pain    This is a chronic problem. The current episode started more than 1 month ago. The problem occurs daily. The problem has been unchanged. The pain is associated with nothing. The pain is present in the left side, right side and midline. The quality of the pain is described as cramping. The pain is at a severity of 8/10. The pain is moderate. The symptoms are aggravated by sneezing and twisting. The pain is same all the time. Associated symptoms include leg pain, numbness (bilateral legs and feet), tingling and weakness (lower back and legs). Pertinent negatives include no chest pain, fever or headaches. He has tried muscle relaxants, oral narcotics and bed rest for the symptoms. The treatment provided moderate relief.      PEG Assessment   What number best describes your pain on average in the past week?8  What number best describes how, during the past week, pain has interfered with your enjoyment of life?8  What number best describes how, during the past week, pain has interfered with your general activity?  8    The following portions of the patient's history were reviewed and updated as appropriate: allergies, current medications, past family history, past medical history, past social history, past surgical history and problem list.    Review of Systems   Constitutional: Positive for activity change (dec) and fatigue. Negative for fever.  "  HENT: Negative for congestion.    Eyes: Positive for visual disturbance.   Respiratory: Negative for cough, shortness of breath and wheezing.    Cardiovascular: Negative.  Negative for chest pain.   Gastrointestinal: Negative for abdominal pain, constipation and diarrhea.   Genitourinary: Negative for difficulty urinating and dysuria.   Musculoskeletal: Positive for back pain, gait problem (w/c), neck pain and neck stiffness. Negative for joint swelling.   Neurological: Positive for tingling, weakness (lower back and legs), numbness (bilateral legs and feet) and paresthesias. Negative for headaches.   Psychiatric/Behavioral: Positive for sleep disturbance. Negative for agitation and suicidal ideas. The patient is not nervous/anxious.      Vitals:    01/23/20 1310   BP: 119/76   Pulse: 88   Resp: 20   Temp: 97.9 °F (36.6 °C)   SpO2: 96%   Weight: 123 kg (271 lb 9.6 oz)   Height: 180.3 cm (71\")   PainSc:   8   PainLoc: Neck     Objective   Physical Exam   Constitutional: He is oriented to person, place, and time. Vital signs are normal. He appears well-developed and well-nourished. He is cooperative. No distress.   HENT:   Head: Normocephalic and atraumatic.   Nose: Nose normal.   Eyes: Conjunctivae and lids are normal.   Neck: Trachea normal. Neck supple. Muscular tenderness present. No spinous process tenderness present. Decreased range of motion present.   Cardiovascular: Normal rate.   Pulmonary/Chest: Effort normal. No respiratory distress.   Abdominal:   obese   Musculoskeletal:        Right knee: He exhibits decreased range of motion and swelling. Tenderness found.        Left knee: Tenderness found.        Cervical back: He exhibits decreased range of motion, tenderness and pain. He exhibits no spasm.        Lumbar back: He exhibits decreased range of motion, tenderness and pain.   Neurological: He is alert and oriented to person, place, and time. He has normal strength. Gait (wheelchair, antalgia) abnormal. "   Skin: Skin is warm, dry and intact. He is not diaphoretic.   Psychiatric: He has a normal mood and affect. His speech is normal and behavior is normal. Cognition and memory are normal.   Nursing note and vitals reviewed.    Assessment/Plan   Kian was seen today for back pain and neck pain.    Diagnoses and all orders for this visit:    Other chronic pain  -     Oral Fluid Drug Screen - Saliva, Oral Cavity; Future    Postlaminectomy syndrome of lumbar region  -     Oral Fluid Drug Screen - Saliva, Oral Cavity; Future    Syrinx of spinal cord (CMS/HCC)  -     Oral Fluid Drug Screen - Saliva, Oral Cavity; Future    Degenerative cervical disc  -     Oral Fluid Drug Screen - Saliva, Oral Cavity; Future    Encounter for long-term (current) use of high-risk medication  -     Oral Fluid Drug Screen - Saliva, Oral Cavity; Future      --- Refill Oxycodone. Patient appears stable with current regimen. No adverse effects. Regarding continuation of opioids, there is no evidence of aberrant behavior or any red flags.  The patient continues with appropriate response to opioid therapy. ADL's remain intact by self.   --- Routine ODT in office today as part of monitoring requirements for controlled substances.  This specimen will be sent to [facility] for confirmation.     --- Follow-up 1 month     DAVID REPORT  As part of the patient's treatment plan, I am prescribing controlled substances. The patient has been made aware of appropriate use of such medications, including potential risk of somnolence, limited ability to drive and/or work safely, and the potential for dependence or overdose. It has also bee made clear that these medications are for use by this patient only, without concomitant use of alcohol or other substances unless prescribed.     Patient has completed prescribing agreement detailing terms of continued prescribing of controlled substances, including monitoring DAVID reports, urine drug screening,  and pill counts if necessary. The patient is aware that inappropriate use will results in cessation of prescribing such medications.    DAVID report has been reviewed and scanned into the patient's chart.    As the clinician, I personally reviewed the DAVID from 1/23/2020 while the patient was in the office today.    History and physical exam exhibit continued safe and appropriate use of controlled substances.    EMR Dragon/Transcription disclaimer:   Much of this encounter note is an electronic transcription/translation of spoken language to printed text. The electronic translation of spoken language may permit erroneous, or at times, nonsensical words or phrases to be inadvertently transcribed; Although I have reviewed the note for such errors, some may still exist.

## 2020-01-31 ENCOUNTER — TREATMENT (OUTPATIENT)
Dept: PHYSICAL THERAPY | Facility: CLINIC | Age: 65
End: 2020-01-31

## 2020-01-31 DIAGNOSIS — Z96.651 HISTORY OF TOTAL RIGHT KNEE REPLACEMENT: ICD-10-CM

## 2020-01-31 DIAGNOSIS — Z91.81 HISTORY OF FALL: ICD-10-CM

## 2020-01-31 DIAGNOSIS — R26.2 DIFFICULTY WALKING: Primary | ICD-10-CM

## 2020-01-31 DIAGNOSIS — R29.898 WEAKNESS OF BOTH LEGS: ICD-10-CM

## 2020-01-31 DIAGNOSIS — Z98.1 HISTORY OF LUMBAR FUSION: ICD-10-CM

## 2020-01-31 PROCEDURE — 97163 PT EVAL HIGH COMPLEX 45 MIN: CPT | Performed by: PHYSICAL THERAPIST

## 2020-02-05 ENCOUNTER — TREATMENT (OUTPATIENT)
Dept: PHYSICAL THERAPY | Facility: CLINIC | Age: 65
End: 2020-02-05

## 2020-02-05 PROCEDURE — 97113 AQUATIC THERAPY/EXERCISES: CPT | Performed by: PHYSICAL THERAPIST

## 2020-02-05 NOTE — PROGRESS NOTES
Physical Therapy Daily Progress Note    Patient: Kian Mcdaniels   : 1955  Diagnosis/ICD-10 Code:  No primary diagnosis found.  Referring practitioner: No ref. provider found  Date of Initial Visit: No linked episodes  Today's Date: 2020  Patient seen for Visit count could not be calculated. Make sure you are using a visit which is associated with an episode. sessions             Subjective   Most of my pain is in my lower back.  I’ve had 1 knee replaced and the other one needs to be replaced.  Did water therapy in the past with Niraj.    Objective     AQUATICS LE    Water Walk  fwd/side x 2 laps ea, LN and CGA  Stretch 1  HS 30 sec x 2  Stretch 2  calf 30 sec x 2  Stretch 3  NEXT - quad 20 sec x 2  Stretch Other 1 Seated LAQ x 15  Stretch Other 2 -  Vertical Traction decompression at rail x 1-2 min prn during session.   Abdominals   SN x 12  Clams   -  Hip Abd/Add  x 10  Hip Flex/Ext  -  March in Place x 12  Mini Squat  x 10  Toe/Heel Raises x 10  Uni-Squat  NEXT - leg press vs noodle/ring  Uni-Clock  NEXT - hip circles cw/ccw  Step Ups  -  Bicycle  seated x 2 min  Flutter/Scissor - / 15, seated  Exercise 1  -  Exercise 2  -  Exercise 3  -      Assessment/Plan  Patient seen today for initial aquatic session including education and instruction in basic aquatic exercise.  He entered/exited pool via step with rail support and min/CGA using NR pattern.  Ambulation across the pool using noodle support with CGA.  Intermittent decompression breaks at rail provided t/o session.  cuing required for more upright posture, core/glut activation, as well as demonstration for correct form/technique with performance of ex.    Plan:  Assess response to initial aquatic session and modify/progress as appropriate/tolerated.    Progress per Plan of Care and Progress strengthening /stabilization /functional activity           Timed:  Aquatic Therapy    39    mins [id number];    Rajni Serra, PT  Physical Therapist

## 2020-02-21 ENCOUNTER — OFFICE VISIT (OUTPATIENT)
Dept: PAIN MEDICINE | Facility: CLINIC | Age: 65
End: 2020-02-21

## 2020-02-21 VITALS
BODY MASS INDEX: 41.07 KG/M2 | RESPIRATION RATE: 20 BRPM | HEIGHT: 71 IN | WEIGHT: 293.4 LBS | DIASTOLIC BLOOD PRESSURE: 85 MMHG | TEMPERATURE: 99.2 F | HEART RATE: 88 BPM | OXYGEN SATURATION: 97 % | SYSTOLIC BLOOD PRESSURE: 142 MMHG

## 2020-02-21 DIAGNOSIS — M50.30 DEGENERATIVE CERVICAL DISC: ICD-10-CM

## 2020-02-21 DIAGNOSIS — G89.29 OTHER CHRONIC PAIN: ICD-10-CM

## 2020-02-21 DIAGNOSIS — M53.3 SACROILIAC JOINT DYSFUNCTION: ICD-10-CM

## 2020-02-21 DIAGNOSIS — Z79.899 ENCOUNTER FOR LONG-TERM (CURRENT) USE OF HIGH-RISK MEDICATION: ICD-10-CM

## 2020-02-21 DIAGNOSIS — Z98.890 HX OF DECOMPRESSIVE LUMBAR LAMINECTOMY: ICD-10-CM

## 2020-02-21 DIAGNOSIS — M54.50 LOW BACK PAIN, UNSPECIFIED BACK PAIN LATERALITY, UNSPECIFIED CHRONICITY, UNSPECIFIED WHETHER SCIATICA PRESENT: Primary | ICD-10-CM

## 2020-02-21 PROCEDURE — 99214 OFFICE O/P EST MOD 30 MIN: CPT | Performed by: NURSE PRACTITIONER

## 2020-02-21 RX ORDER — AMOXICILLIN 875 MG/1
TABLET, COATED ORAL
COMMUNITY
Start: 2020-02-19 | End: 2020-03-12

## 2020-02-21 RX ORDER — CYCLOBENZAPRINE HCL 10 MG
10 TABLET ORAL 2 TIMES DAILY PRN
Qty: 60 TABLET | Refills: 2 | Status: SHIPPED | OUTPATIENT
Start: 2020-02-21 | End: 2020-04-24 | Stop reason: SDUPTHER

## 2020-02-21 RX ORDER — INSULIN GLARGINE 100 [IU]/ML
INJECTION, SOLUTION SUBCUTANEOUS
COMMUNITY
Start: 2020-02-04

## 2020-02-21 NOTE — PROGRESS NOTES
CHIEF COMPLAINT  F/u back and neck pain. Pt sts pain has worsened since last ov. Pt sts had appt yesterday with Dr. Conde to discuss neck surgery, however pt needs cardiac clearance then surgery will be scheduled.     Initial evaluation by KATHLEEN Kruse   Kian Mcdaniels is a 64 y.o. male  who presents to the office for follow-up.He has a history of back and neck pain.  Today his pain is 6/10VAS in severity.  He describes his pain as intermittent aching pain with periodic burning pain  His pain is worsened by nothing in particular; it is improved by medication and rest. Continues with Oxycodone 10 mg 4/day, gabapentin 800 mg 3/day and tizanidine 4mg 2 tablets/night. He denies any side effects.  His medication decreases his pain a moderate amount.  ADLs assisted by wife.     He has previously failed a [employer] SCS trial.     Saw Dr. Conde yesterday and the patient states that Dr. Conde is recommending surgery. Mr. Mcdaniels states that the scheduling of his surgery is pending clearance from his cardiologist.    Back Pain   This is a chronic problem. The current episode started more than 1 year ago. The problem occurs daily. The problem has been waxing and waning since onset. The pain is present in the sacro-iliac. The quality of the pain is described as aching, shooting and stabbing. The pain is at a severity of 7/10. The pain is moderate. The pain is the same all the time. Exacerbated by: nothing in particular. Stiffness is present all day. Associated symptoms include leg pain, numbness (bilateral legs and feet), paresthesias, tingling and weakness (lower back and legs). Pertinent negatives include no abdominal pain, chest pain, dysuria, fever or headaches. Risk factors include lack of exercise, obesity and sedentary lifestyle. He has tried analgesics for the symptoms. The treatment provided moderate relief.   Neck Pain    This is a chronic problem. The current episode started more than 1  month ago. The problem occurs daily. The problem has been unchanged. The pain is associated with nothing. The pain is present in the left side, right side and midline. The quality of the pain is described as cramping. The pain is at a severity of 6/10. The pain is moderate. Exacerbated by: nothing in particular. The pain is same all the time. Associated symptoms include leg pain, numbness (bilateral legs and feet), tingling and weakness (lower back and legs). Pertinent negatives include no chest pain, fever or headaches. He has tried muscle relaxants, oral narcotics and bed rest for the symptoms. The treatment provided moderate relief.      PEG Assessment   What number best describes your pain on average in the past week?8  What number best describes how, during the past week, pain has interfered with your enjoyment of life?8  What number best describes how, during the past week, pain has interfered with your general activity?  8    The following portions of the patient's history were reviewed and updated as appropriate: allergies, current medications, past family history, past medical history, past social history, past surgical history and problem list.    Review of Systems   Constitutional: Positive for activity change (dec) and fatigue. Negative for fever.   HENT: Negative for congestion.    Eyes: Positive for visual disturbance.   Respiratory: Negative for cough, shortness of breath and wheezing.    Cardiovascular: Negative.  Negative for chest pain.   Gastrointestinal: Negative for abdominal pain, constipation and diarrhea.   Genitourinary: Negative for difficulty urinating and dysuria.   Musculoskeletal: Positive for back pain, gait problem (w/c), neck pain and neck stiffness. Negative for joint swelling.   Neurological: Positive for tingling, weakness (lower back and legs), numbness (bilateral legs and feet) and paresthesias. Negative for headaches.   Psychiatric/Behavioral: Positive for sleep disturbance.  "Negative for agitation and suicidal ideas. The patient is not nervous/anxious.      Vitals:    02/21/20 1412   BP: 142/85   Pulse: 88   Resp: 20   Temp: 99.2 °F (37.3 °C)   SpO2: 97%   Weight: 133 kg (293 lb 6.4 oz)   Height: 180.3 cm (71\")   PainSc:   6   PainLoc: Neck     Objective   Physical Exam   Constitutional: He is oriented to person, place, and time. He appears well-developed and well-nourished.   HENT:   Head: Normocephalic and atraumatic.   Eyes: Conjunctivae and EOM are normal.   Neck: Normal range of motion.   Cardiovascular: Normal rate.   Pulmonary/Chest: Effort normal.   Musculoskeletal:        Cervical back: He exhibits decreased range of motion, tenderness and pain.        Lumbar back: He exhibits decreased range of motion, tenderness and pain.   Neurological: He is alert and oriented to person, place, and time. Gait (wheelchair) abnormal.   Skin: Skin is warm, dry and intact.   Psychiatric: He has a normal mood and affect. His speech is normal and behavior is normal. Judgment and thought content normal.   Nursing note and vitals reviewed.    Assessment/Plan   Kian was seen today for back pain and neck pain.    Diagnoses and all orders for this visit:    Low back pain, unspecified back pain laterality, unspecified chronicity, unspecified whether sciatica present    Other chronic pain    Hx of decompressive lumbar laminectomy    Encounter for long-term (current) use of high-risk medication    Sacroiliac joint dysfunction    Degenerative cervical disc    Other orders  -     cyclobenzaprine (FLEXERIL) 10 MG tablet; Take 1 tablet by mouth 2 (Two) Times a Day As Needed for Muscle Spasms.      --- The Oral drug screen confirmation from 1/23/20 has been reviewed and the result is appropriate based on patient history and DAVID report  --- Refill Oxycodone 10mg. DNF 2/25/20 applied. Patient appears stable with current regimen. No adverse effects. Regarding continuation of opioids, there is no evidence of " aberrant behavior or any red flags.  The patient continues with appropriate response to opioid therapy. ADL's remain intact by self.   --- Once surgery is scheduled with Dr. Conde we will defer post-op pain control to the surgeon.  Patient instructed to NOT take the pain medication we prescribe together with his post-op pain medication.  Patient voices understanding.   --- Follow-up 1 month     DAVID REPORT    As part of the patient's treatment plan, I am prescribing controlled substances. The patient has been made aware of appropriate use of such medications, including potential risk of somnolence, limited ability to drive and/or work safely, and the potential for dependence or overdose. It has also bee made clear that these medications are for use by this patient only, without concomitant use of alcohol or other substances unless prescribed.     Patient has completed prescribing agreement detailing terms of continued prescribing of controlled substances, including monitoring DAVID reports, urine drug screening, and pill counts if necessary. The patient is aware that inappropriate use will results in cessation of prescribing such medications.    DAVID report has been reviewed and scanned into the patient's chart.    As the clinician, I personally reviewed the DAVID from 2/20/2020 while the patient was in the office today.    History and physical exam exhibit continued safe and appropriate use of controlled substances.      EMR Dragon/Transcription disclaimer:   Much of this encounter note is an electronic transcription/translation of spoken language to printed text. The electronic translation of spoken language may permit erroneous, or at times, nonsensical words or phrases to be inadvertently transcribed; Although I have reviewed the note for such errors, some may still exist.

## 2020-02-22 RX ORDER — OXYCODONE HYDROCHLORIDE 10 MG/1
10 TABLET ORAL EVERY 6 HOURS PRN
Qty: 120 TABLET | Refills: 0 | Status: SHIPPED | OUTPATIENT
Start: 2020-02-22 | End: 2020-03-12 | Stop reason: SDUPTHER

## 2020-03-12 ENCOUNTER — OFFICE VISIT (OUTPATIENT)
Dept: PAIN MEDICINE | Facility: CLINIC | Age: 65
End: 2020-03-12

## 2020-03-12 VITALS
SYSTOLIC BLOOD PRESSURE: 138 MMHG | BODY MASS INDEX: 40.92 KG/M2 | HEART RATE: 88 BPM | DIASTOLIC BLOOD PRESSURE: 78 MMHG | TEMPERATURE: 99.1 F | RESPIRATION RATE: 18 BRPM | HEIGHT: 71 IN | OXYGEN SATURATION: 93 %

## 2020-03-12 DIAGNOSIS — G89.29 OTHER CHRONIC PAIN: ICD-10-CM

## 2020-03-12 DIAGNOSIS — Z98.890 HX OF DECOMPRESSIVE LUMBAR LAMINECTOMY: ICD-10-CM

## 2020-03-12 DIAGNOSIS — M50.30 DEGENERATIVE CERVICAL DISC: ICD-10-CM

## 2020-03-12 DIAGNOSIS — M47.812 ARTHROPATHY OF CERVICAL FACET JOINT: ICD-10-CM

## 2020-03-12 DIAGNOSIS — Z79.899 ENCOUNTER FOR LONG-TERM (CURRENT) USE OF HIGH-RISK MEDICATION: ICD-10-CM

## 2020-03-12 DIAGNOSIS — M96.1 POSTLAMINECTOMY SYNDROME OF LUMBAR REGION: Primary | ICD-10-CM

## 2020-03-12 PROCEDURE — 99214 OFFICE O/P EST MOD 30 MIN: CPT | Performed by: NURSE PRACTITIONER

## 2020-03-12 NOTE — PROGRESS NOTES
CHIEF COMPLAINT  Follow-up for back and neck pain. Pain has gotten worse.    Subjective   Kian Mcdaniels is a 64 y.o. male  who presents to the office for follow-up.He has a history of back and neck pain.  Continues with Oxycodone 10 mg 4/day, gabapentin 800 mg 3/day and Flexeril 10 mg 2/day.  This medication regimen decreases his pain by 60%. He denies any side effects from his medication. ADLs assisted by wife (help with socks and shoes), shower chair. He denies any changes to bowel or bladder since last office visit. He denies any saddle paresthesia.  He reports increasing weakness in his bilateral legs.      He is scheduled for a cardiac catheterization on Friday 3/20/20.  He is pending neck surgery with  but per the patient this can not be scheduled until after the cardiac catheterization has been completed. He is scheduled to see Dr. Morris tomorrow for his low back pain and bilateral leg weakness.     He has previously failed a BS SCS trial.     Back Pain   This is a chronic problem. The current episode started more than 1 year ago. The problem occurs daily. The problem has been waxing and waning since onset. The pain is present in the sacro-iliac. The quality of the pain is described as aching, shooting and stabbing. The pain radiates to the right thigh, left thigh, left knee and right knee. The pain is at a severity of 8/10. The pain is moderate. The pain is the same all the time. Exacerbated by: nothing in particular. Stiffness is present all day. Associated symptoms include leg pain, numbness (bilateral feet), paresthesias, tingling and weakness (bilateral legs). Pertinent negatives include no abdominal pain, chest pain, dysuria, fever or headaches. Risk factors include lack of exercise, obesity and sedentary lifestyle. He has tried analgesics for the symptoms. The treatment provided moderate relief.   Neck Pain    This is a chronic problem. The current episode started more than 1 month  ago. The problem occurs daily. The problem has been unchanged. The pain is associated with nothing. The pain is present in the left side, right side and midline. The quality of the pain is described as cramping. The pain is at a severity of 7/10. The pain is moderate. Exacerbated by: nothing in particular. The pain is same all the time. Associated symptoms include leg pain, numbness (bilateral feet), tingling and weakness (bilateral legs). Pertinent negatives include no chest pain, fever or headaches. He has tried muscle relaxants, oral narcotics and bed rest for the symptoms. The treatment provided moderate relief.      PEG Assessment   What number best describes your pain on average in the past week?8  What number best describes how, during the past week, pain has interfered with your enjoyment of life?8  What number best describes how, during the past week, pain has interfered with your general activity?  8    The following portions of the patient's history were reviewed and updated as appropriate: allergies, current medications, past family history, past medical history, past social history, past surgical history and problem list.    Review of Systems   Constitutional: Negative for fatigue and fever.   HENT: Negative for congestion.    Eyes: Negative for visual disturbance.   Respiratory: Negative for cough, shortness of breath and wheezing.    Cardiovascular: Positive for leg swelling. Negative for chest pain and palpitations.   Gastrointestinal: Positive for constipation. Negative for abdominal pain and diarrhea.   Genitourinary: Negative for difficulty urinating and dysuria.   Musculoskeletal: Positive for back pain and neck pain.   Neurological: Positive for tingling, weakness (bilateral legs), numbness (bilateral feet) and paresthesias. Negative for headaches.   Psychiatric/Behavioral: Negative for sleep disturbance and suicidal ideas. The patient is not nervous/anxious.      Vitals:    03/12/20 1504   BP:  "138/78   Pulse: 88   Resp: 18   Temp: 99.1 °F (37.3 °C)   SpO2: 93%   Weight: Comment: unable to weigh patient   Height: 180.3 cm (71\")     Objective   Physical Exam   Constitutional: He is oriented to person, place, and time. He appears well-developed and well-nourished.   HENT:   Head: Normocephalic and atraumatic.   Eyes: Conjunctivae and EOM are normal.   Neck: Normal range of motion.   Cardiovascular: Normal rate.   Pulmonary/Chest: Effort normal.   Musculoskeletal:        Cervical back: He exhibits decreased range of motion, tenderness and pain.        Lumbar back: He exhibits decreased range of motion, tenderness and pain.   POS Vahe SLR   Neurological: He is alert and oriented to person, place, and time. Gait (wheelchair) abnormal.   Reflex Scores:       Patellar reflexes are 1+ on the right side and 1+ on the left side.       Achilles reflexes are 1+ on the right side and 1+ on the left side.  Skin: Skin is warm, dry and intact.   BLE +3 Pitting Edema   Psychiatric: He has a normal mood and affect. His speech is normal and behavior is normal. Judgment and thought content normal.   Nursing note and vitals reviewed.    Assessment/Plan   Kian was seen today for back pain and neck pain.    Diagnoses and all orders for this visit:    Postlaminectomy syndrome of lumbar region    Hx of decompressive lumbar laminectomy    Encounter for long-term (current) use of high-risk medication    Other chronic pain    Degenerative cervical disc    Arthropathy of cervical facet joint      --- The urine drug screen confirmation from 1/23/2020 has been reviewed and the result is appropriate based on patient history and DAVID report  --- Continue Oxycodone 10mg q6h PRN severe pain. No refill needed at this time. Patient appears stable with current regimen. No adverse effects. Regarding continuation of opioids, there is no evidence of aberrant behavior or any red flags.  The patient continues with appropriate response to opioid " therapy. ADL's remain intact by self.   --- OK for post-op pain medication per Dr. Conde.  I do recommend the patient call us to discuss his post-op pain medication. The patient also states understanding that he is not to take the medication prescribed by our clinic and the post-op pain medication prescribed by Dr. Conde at the same time.     --- Follow-up 1 month or sooner if needed     DAVID REPORT    As part of the patient's treatment plan, I am prescribing controlled substances. The patient has been made aware of appropriate use of such medications, including potential risk of somnolence, limited ability to drive and/or work safely, and the potential for dependence or overdose. It has also bee made clear that these medications are for use by this patient only, without concomitant use of alcohol or other substances unless prescribed.     Patient has completed prescribing agreement detailing terms of continued prescribing of controlled substances, including monitoring DAVID reports, urine drug screening, and pill counts if necessary. The patient is aware that inappropriate use will results in cessation of prescribing such medications.    DAVID report has been reviewed and scanned into the patient's chart.    As the clinician, I personally reviewed the DAVID from 3/11/2020 while the patient was in the office today.    History and physical exam exhibit continued safe and appropriate use of controlled substances.    EMR Dragon/Transcription disclaimer:   Much of this encounter note is an electronic transcription/translation of spoken language to printed text. The electronic translation of spoken language may permit erroneous, or at times, nonsensical words or phrases to be inadvertently transcribed; Although I have reviewed the note for such errors, some may still exist.

## 2020-03-13 RX ORDER — OXYCODONE HYDROCHLORIDE 10 MG/1
10 TABLET ORAL EVERY 6 HOURS PRN
Qty: 120 TABLET | Refills: 0 | Status: SHIPPED | OUTPATIENT
Start: 2020-03-13 | End: 2020-04-24 | Stop reason: SDUPTHER

## 2020-04-24 ENCOUNTER — OFFICE VISIT (OUTPATIENT)
Dept: PAIN MEDICINE | Facility: CLINIC | Age: 65
End: 2020-04-24

## 2020-04-24 DIAGNOSIS — M47.16 OSTEOARTHRITIS OF LUMBAR SPINE WITH MYELOPATHY: ICD-10-CM

## 2020-04-24 DIAGNOSIS — Z79.899 ENCOUNTER FOR LONG-TERM (CURRENT) USE OF HIGH-RISK MEDICATION: Primary | ICD-10-CM

## 2020-04-24 DIAGNOSIS — M54.50 LOW BACK PAIN, UNSPECIFIED BACK PAIN LATERALITY, UNSPECIFIED CHRONICITY, UNSPECIFIED WHETHER SCIATICA PRESENT: ICD-10-CM

## 2020-04-24 DIAGNOSIS — G89.29 OTHER CHRONIC PAIN: ICD-10-CM

## 2020-04-24 DIAGNOSIS — M50.30 DEGENERATIVE CERVICAL DISC: ICD-10-CM

## 2020-04-24 DIAGNOSIS — M47.812 ARTHROPATHY OF CERVICAL FACET JOINT: ICD-10-CM

## 2020-04-24 PROCEDURE — 99442 PR PHYS/QHP TELEPHONE EVALUATION 11-20 MIN: CPT | Performed by: NURSE PRACTITIONER

## 2020-04-24 RX ORDER — LOSARTAN POTASSIUM 50 MG/1
50 TABLET ORAL DAILY
COMMUNITY
Start: 2020-04-05

## 2020-04-24 RX ORDER — AMLODIPINE BESYLATE 10 MG/1
10 TABLET ORAL DAILY
COMMUNITY
Start: 2020-04-05

## 2020-04-24 RX ORDER — CYCLOBENZAPRINE HCL 10 MG
10 TABLET ORAL 2 TIMES DAILY PRN
Qty: 60 TABLET | Refills: 2 | Status: SHIPPED | OUTPATIENT
Start: 2020-04-24

## 2020-04-24 RX ORDER — ATORVASTATIN CALCIUM 40 MG/1
40 TABLET, FILM COATED ORAL DAILY
COMMUNITY
Start: 2020-04-21 | End: 2021-04-21

## 2020-04-24 RX ORDER — ISOSORBIDE MONONITRATE 60 MG/1
60 TABLET, EXTENDED RELEASE ORAL DAILY
COMMUNITY
Start: 2020-04-10

## 2020-04-24 RX ORDER — POTASSIUM CHLORIDE 750 MG/1
TABLET, FILM COATED, EXTENDED RELEASE ORAL
COMMUNITY
Start: 2020-04-21

## 2020-04-24 NOTE — PROGRESS NOTES
You have chosen to receive care through a telephone visit. Do you consent to use a telephone visit for your medical care today? Yes    CHIEF COMPLAINT: Neck and back pain    Subjective   Kian Mcdaniels is a 64 y.o. male  who presents for follow-up.He has a history of neck and back pain.  Today his pain is 8/10VAS in severity. Continues with Oxycodone 10 mg 4/day, gabapentin 800 mg 3/day (prescribed by Dr. Harmon) and Flexeril 10 mg 1-2/day (takes mainly at night because it causes some drowsiness).  His medication regimen decreases his pain by 30%.  He manages occasional constipation with OTC stool softener.  He denies any other side effects.  ADLs assisted by wife.  He is using a Rolator for assistance with walking.  His legs are weak and he states Dr. Conde feels this is coming from his neck.      He is scheduled for open heart surgery on May 5, 2020.  The plan is that he will be admitted to rehab after his surgery for roughly 6-8 weeks.  He must complete this prior to completing his neck surgery with Dr. Conde. Patient instructed to keep us informed of his post-operative medication prescribed by his cardio-thoracic surgeon so we can better determine if our medication regimen needs any adjustment.     Back Pain   This is a chronic problem. The current episode started more than 1 year ago. The problem occurs daily. The problem has been waxing and waning since onset. The pain is present in the sacro-iliac. The quality of the pain is described as aching, shooting and stabbing. The pain radiates to the right thigh, left thigh, left knee and right knee. The pain is at a severity of 8/10. The pain is moderate. The pain is the same all the time. Exacerbated by: nothing in particular. Stiffness is present all day. Associated symptoms include leg pain, numbness (bilateral feet), paresthesias, tingling and weakness (bilateral legs). Pertinent negatives include no abdominal pain, chest pain, dysuria, fever or  headaches. Risk factors include lack of exercise, obesity and sedentary lifestyle. He has tried analgesics for the symptoms. The treatment provided moderate relief.   Neck Pain    This is a chronic problem. The current episode started more than 1 month ago. The problem occurs daily. The problem has been unchanged. The pain is associated with nothing. The pain is present in the left side, right side and midline. The quality of the pain is described as cramping. The pain is at a severity of 8/10. The pain is moderate. Exacerbated by: nothing in particular. The pain is same all the time. Associated symptoms include leg pain, numbness (bilateral feet), tingling and weakness (bilateral legs). Pertinent negatives include no chest pain, fever or headaches. He has tried muscle relaxants, oral narcotics and bed rest for the symptoms. The treatment provided moderate relief.      The following portions of the patient's history were reviewed and updated as appropriate: allergies, current medications, past family history, past medical history, past social history, past surgical history and problem list.    Review of Systems   Constitutional: Negative for fever.   HENT: Negative for sore throat.    Respiratory: Negative for cough and shortness of breath.    Cardiovascular: Negative for chest pain.   Gastrointestinal: Negative for abdominal pain.   Genitourinary: Negative for dysuria.   Musculoskeletal: Positive for back pain and neck pain.   Neurological: Positive for tingling, weakness (bilateral legs), numbness (bilateral feet) and paresthesias. Negative for headaches.   Psychiatric/Behavioral: Negative for confusion. The patient is not nervous/anxious.      There were no vitals filed for this visit.    Objective   Physical Exam   Constitutional: He is oriented to person, place, and time.   Neurological: He is alert and oriented to person, place, and time.   Psychiatric: He has a normal mood and affect. His speech is normal.  Judgment and thought content normal.     Assessment/Plan   Diagnoses and all orders for this visit:    Encounter for long-term (current) use of high-risk medication    Arthropathy of cervical facet joint    Other chronic pain    Low back pain, unspecified back pain laterality, unspecified chronicity, unspecified whether sciatica present    Osteoarthritis of lumbar spine with myelopathy    Degenerative cervical disc    Other orders  -     cyclobenzaprine (FLEXERIL) 10 MG tablet; Take 1 tablet by mouth 2 (Two) Times a Day As Needed for Muscle Spasms.    --- This visit has been rescheduled as a phone visit to comply with patient safety concerns in accordance with CDC recommendations. Total time of discussion was 13 minutes.  --- The urine drug screen confirmation from 1/23/2020 has been reviewed and the result is appropriate based on patient history and DAVID report  --- Refill oxycodone 10mg q6h PRN. Patient appears stable with current regimen. No adverse effects. Regarding continuation of opioids, there is no evidence of aberrant behavior or any red flags.  The patient continues with appropriate response to opioid therapy. ADL's remain intact by self.   --- OK for post-operative pain control per cardiothoracic surgeon.  We ask that his wife keep our office informed of his new prescriptions. Patient is in agreement with this.   --- Follow-up 2-3 months or sooner if needed (follow-up pushed out due to scheduled open heart surgery)     DAVID REPORT    As part of the patient's treatment plan, I am prescribing controlled substances. The patient has been made aware of appropriate use of such medications, including potential risk of somnolence, limited ability to drive and/or work safely, and the potential for dependence or overdose. It has also bee made clear that these medications are for use by this patient only, without concomitant use of alcohol or other substances unless prescribed.     Patient has completed  prescribing agreement detailing terms of continued prescribing of controlled substances, including monitoring DAVID reports, urine drug screening, and pill counts if necessary. The patient is aware that inappropriate use will results in cessation of prescribing such medications.    DAVID report has been reviewed and scanned into the patient's chart.    As the clinician, I personally reviewed the DAVID from 4/23/2020 .    History and physical exam exhibit continued safe and appropriate use of controlled substances.    EMR Dragon/Transcription disclaimer:   Much of this encounter note is an electronic transcription/translation of spoken language to printed text. The electronic translation of spoken language may permit erroneous, or at times, nonsensical words or phrases to be inadvertently transcribed; Although I have reviewed the note for such errors, some may still exist.

## 2020-04-26 RX ORDER — OXYCODONE HYDROCHLORIDE 10 MG/1
10 TABLET ORAL EVERY 6 HOURS PRN
Qty: 120 TABLET | Refills: 0 | Status: SHIPPED | OUTPATIENT
Start: 2020-04-26

## 2020-05-18 ENCOUNTER — INPATIENT HOSPITAL (OUTPATIENT)
Dept: URBAN - METROPOLITAN AREA HOSPITAL 107 | Facility: HOSPITAL | Age: 65
End: 2020-05-18
Payer: MEDICAID

## 2020-05-18 DIAGNOSIS — I25.10 ATHEROSCLEROTIC HEART DISEASE OF NATIVE CORONARY ARTERY WITH: ICD-10-CM

## 2020-05-18 DIAGNOSIS — K92.1 MELENA: ICD-10-CM

## 2020-05-18 PROCEDURE — 99222 1ST HOSP IP/OBS MODERATE 55: CPT | Performed by: INTERNAL MEDICINE

## 2020-05-19 ENCOUNTER — INPATIENT HOSPITAL (OUTPATIENT)
Dept: URBAN - METROPOLITAN AREA HOSPITAL 107 | Facility: HOSPITAL | Age: 65
End: 2020-05-19
Payer: MEDICAID

## 2020-05-19 DIAGNOSIS — K29.50 UNSPECIFIED CHRONIC GASTRITIS WITHOUT BLEEDING: ICD-10-CM

## 2020-05-19 DIAGNOSIS — K21.0 GASTRO-ESOPHAGEAL REFLUX DISEASE WITH ESOPHAGITIS: ICD-10-CM

## 2020-05-19 DIAGNOSIS — D50.0 IRON DEFICIENCY ANEMIA SECONDARY TO BLOOD LOSS (CHRONIC): ICD-10-CM

## 2020-05-19 DIAGNOSIS — K26.9 DUODENAL ULCER, UNSPECIFIED AS ACUTE OR CHRONIC, WITHOUT HEM: ICD-10-CM

## 2020-05-19 PROCEDURE — 43239 EGD BIOPSY SINGLE/MULTIPLE: CPT | Performed by: INTERNAL MEDICINE

## 2020-05-20 ENCOUNTER — TELEHEALTH PROVIDED OTHER THAN IN PATIENT'S HOME (OUTPATIENT)
Dept: URBAN - METROPOLITAN AREA TELEHEALTH 6 | Facility: TELEHEALTH | Age: 65
End: 2020-05-20
Payer: MEDICAID

## 2020-05-20 ENCOUNTER — INPATIENT HOSPITAL (OUTPATIENT)
Dept: URBAN - METROPOLITAN AREA HOSPITAL 107 | Facility: HOSPITAL | Age: 65
End: 2020-05-20
Payer: MEDICAID

## 2020-05-20 DIAGNOSIS — K29.50 UNSPECIFIED CHRONIC GASTRITIS WITHOUT BLEEDING: ICD-10-CM

## 2020-05-20 DIAGNOSIS — K21.0 GASTRO-ESOPHAGEAL REFLUX DISEASE WITH ESOPHAGITIS: ICD-10-CM

## 2020-05-20 DIAGNOSIS — I25.10 ATHEROSCLEROTIC HEART DISEASE OF NATIVE CORONARY ARTERY WITH: ICD-10-CM

## 2020-05-20 PROCEDURE — G0406 INPT/TELE FOLLOW UP 15: HCPCS | Performed by: PHYSICIAN ASSISTANT

## 2020-05-21 ENCOUNTER — INPATIENT HOSPITAL (OUTPATIENT)
Dept: URBAN - METROPOLITAN AREA HOSPITAL 107 | Facility: HOSPITAL | Age: 65
End: 2020-05-21
Payer: MEDICAID

## 2020-05-21 DIAGNOSIS — K92.2 GASTROINTESTINAL HEMORRHAGE, UNSPECIFIED: ICD-10-CM

## 2020-05-21 DIAGNOSIS — K92.1 MELENA: ICD-10-CM

## 2020-05-21 DIAGNOSIS — E66.9 OBESITY, UNSPECIFIED: ICD-10-CM

## 2020-05-21 DIAGNOSIS — D50.0 IRON DEFICIENCY ANEMIA SECONDARY TO BLOOD LOSS (CHRONIC): ICD-10-CM

## 2020-05-21 DIAGNOSIS — N17.9 ACUTE KIDNEY FAILURE, UNSPECIFIED: ICD-10-CM

## 2020-05-21 PROCEDURE — 99231 SBSQ HOSP IP/OBS SF/LOW 25: CPT | Performed by: NURSE PRACTITIONER

## 2020-05-22 PROCEDURE — 99231 SBSQ HOSP IP/OBS SF/LOW 25: CPT | Performed by: NURSE PRACTITIONER

## 2020-05-23 ENCOUNTER — INPATIENT HOSPITAL (OUTPATIENT)
Dept: URBAN - METROPOLITAN AREA HOSPITAL 107 | Facility: HOSPITAL | Age: 65
End: 2020-05-23
Payer: MEDICAID

## 2020-05-23 DIAGNOSIS — Z93.1 GASTROSTOMY STATUS: ICD-10-CM

## 2020-05-23 DIAGNOSIS — K26.9 DUODENAL ULCER, UNSPECIFIED AS ACUTE OR CHRONIC, WITHOUT HEM: ICD-10-CM

## 2020-05-23 DIAGNOSIS — K92.1 MELENA: ICD-10-CM

## 2020-05-23 PROCEDURE — 99232 SBSQ HOSP IP/OBS MODERATE 35: CPT | Performed by: INTERNAL MEDICINE

## 2020-05-28 ENCOUNTER — INPATIENT HOSPITAL (OUTPATIENT)
Dept: URBAN - METROPOLITAN AREA HOSPITAL 107 | Facility: HOSPITAL | Age: 65
End: 2020-05-28
Payer: MEDICAID

## 2020-05-28 DIAGNOSIS — K92.2 GASTROINTESTINAL HEMORRHAGE, UNSPECIFIED: ICD-10-CM

## 2020-05-28 DIAGNOSIS — K92.1 MELENA: ICD-10-CM

## 2020-05-28 DIAGNOSIS — D64.9 ANEMIA, UNSPECIFIED: ICD-10-CM

## 2020-05-28 DIAGNOSIS — K26.3 ACUTE DUODENAL ULCER WITHOUT HEMORRHAGE OR PERFORATION: ICD-10-CM

## 2020-05-28 DIAGNOSIS — K20.9 ESOPHAGITIS, UNSPECIFIED: ICD-10-CM

## 2020-05-28 PROCEDURE — 99231 SBSQ HOSP IP/OBS SF/LOW 25: CPT | Performed by: PHYSICIAN ASSISTANT

## 2020-05-29 PROCEDURE — 99231 SBSQ HOSP IP/OBS SF/LOW 25: CPT | Performed by: PHYSICIAN ASSISTANT

## 2020-09-13 NOTE — THERAPY TREATMENT NOTE
Outpatient Physical Therapy Ortho Treatment Note  [facility]     Patient Name: Kian Mcdaniels  : 1955  MRN: [id number]  Today's Date: 3/25/2019      Visit Date: 2019    Visit Dx:    ICD-10-CM ICD-9-CM   1. Acute pain of right knee M25.561 719.46   2. Difficulty walking R26.2 719.7   3. Difficulty navigating stairs R68.89 V62.89   4. Weakness of both legs R29.898 729.89   5. Decreased range of motion of right knee M25.661 719.56   6. S/P total knee arthroplasty, right Z96.651 V43.65       Patient Active Problem List   Diagnosis   • Bulging lumbar disc   • Chronic pain   • Diabetic neuropathy (CMS/[facility])   • Low back pain   • Degenerative arthritis of lumbar spine   • Neuropathy involving both lower extremities   • Encounter for long-term (current) use of high-risk medication   • Postlaminectomy syndrome of lumbar region   • Syringomyelia and syringobulbia (CMS/[facility])   • Lumbar pseudoarthrosis   • Type 2 diabetes mellitus with neurologic complication, with long-term current use of insulin (CMS/[facility])   • Insulin pump in place   • Hx of decompressive lumbar laminectomy   • Bilateral carpal tunnel syndrome   • Degenerative cervical disc   • Acute pain of right knee   • Primary localized osteoarthrosis of right lower leg   • Arthritis of right knee   • Sacroiliac inflammation (CMS/[facility])   • Sacroiliac joint dysfunction   • Severe obesity (BMI 35.0-39.9)   • Chronic pain of right knee   • Tricompartment osteoarthritis of left knee   • Tricompartment osteoarthritis of right knee   • Arthritis of left knee   • Bilateral chronic knee pain   • Hypertension   • S/P total knee arthroplasty, right        Past Medical History:   Diagnosis Date   • Arteriosclerotic cardiovascular disease    • Arthritis    • At risk for sleep apnea    • COPD (chronic obstructive pulmonary disease) (CMS/[facility])    • Coronary artery disease    • Diabetes mellitus (CMS/HCC)    • Diabetic peripheral neuropathy (CMS/[facility])    • Disease of  thyroid gland     NODULE PRESENT   • ED (erectile dysfunction) of non-organic origin    • GERD (gastroesophageal reflux disease)    • Headache disorder     DUE TO NECK   • Hyperlipidemia    • Hypertension    • Insulin pump in place    • Knee pain, bilateral    • Low back pain    • Myocardial infarction (CMS/HCC)    • Neck pain    • Spinal stenosis    • TIA (transient ischemic attack)     LEFT EYE   • Type 2 diabetes mellitus (CMS/HCC)    • Upper back pain    • Wears dentures     UPPER PLATE        Past Surgical History:   Procedure Laterality Date   • AMPUTATION FOOT / TOE Left     GREAT TOE   • BACK SURGERY  10/26/2015    Bilateral L4-L5 laminectomy -Dr. Gutierres   • CARDIAC CATHETERIZATION     • CARDIAC SURGERY      CABG X 6    • CHOLECYSTECTOMY     • CORONARY ARTERY BYPASS GRAFT      X 6   • EPIDURAL BLOCK     • EYE SURGERY      CATARACT EXTRACTION   • HAND SURGERY  04/28/2016   • LUMBAR DISCECTOMY FUSION INSTRUMENTATION N/A 12/12/2016    Procedure: L 4-5 FUSION WITH INSTRUMENTATION WITH BMP, WITH REMOVAL OF INSTRUMENTATION ;  Surgeon: Rowdy Spencer MD;  Location: [facility] OR;  Service:    • LUMBAR LAMINECTOMY DISCECTOMY DECOMPRESSION N/A 12/12/2016    Procedure: L4-5 REPEAT LAMINECTOMY ;  Surgeon: Tim Gutierres MD;  Location: [facility] OR;  Service:    • LUMBAR LAMINECTOMY DISCECTOMY DECOMPRESSION N/A 12/14/2016    Procedure: EVACUATION OF LUMBAR HEMATOMA;  Surgeon: Rowdy Spencer MD;  Location: [facility] OR;  Service:    • MULTIPLE TOOTH EXTRACTIONS      UPPER TEETH EXTRACTED   • ORTHOPEDIC SURGERY     • PENIS SURGERY      RECONSTRUCTION   • SCROTUM EXPLORATION      scrotum surgery   • SPINAL CORD STIMULATOR IMPLANT N/A 9/24/2018    Procedure: SPINAL CORD STIMULATOR Phase 1 - [employer];  Surgeon: Mulugeta Guzman MD;  Location: [facility];  Service: Pain Management   • SPINE SURGERY     • TOTAL KNEE ARTHROPLASTY Right 1/8/2019    Procedure: RIGHT TOTAL KNEE ARTHROPLASTY WITH NEGRITA  NAVIGATION;  Surgeon: Andres Carter MD;  Location: [facility];  Service: Orthopedics                       PT Assessment/Plan     Row Name 03/25/19 1615          PT Assessment    Assessment Comments  Patient is still very antalgic during gait and appears to be more limited by left foot drag related to chronic back issues. His quad activation is very limited with quad sets but that is partially due to his inability to achieve full knee extension. SAQs appeared slightly stronger today  -       User Key  (r) = Recorded By, (t) = Taken By, (c) = Cosigned By    Initials Name Provider Type    Radha Jasso, PT Physical Therapist            Exercises     Row Name 03/25/19 1600             Subjective Comments    Subjective Comments  I am just really struggling to straighten my knee  -KH         Subjective Pain    Able to rate subjective pain?  yes  -KH      Pre-Treatment Pain Level  6  -KH      Post-Treatment Pain Level  6  -KH         Total Minutes    17089 - PT Therapeutic Exercise Minutes  40  -KH         Exercise 1    Exercise Name 1  see exercise flowsheet  -KH      Cueing 1  Verbal;Tactile  -        User Key  (r) = Recorded By, (t) = Taken By, (c) = Cosigned By    Initials Name Provider Type    Radha Jasso, PT Physical Therapist                                          Time Calculation:   Start Time: 1530  Stop Time: 1610  Time Calculation (min): 40 min  Therapy Charges for Today     Code Description Service Date Service Provider Modifiers Qty    66096178001  PT THER PROC EA 15 MIN 3/25/2019 Radha Garrett, PT GP 3                    Radha Garrett PT  3/25/2019      Followed protocol yes

## 2021-03-19 ENCOUNTER — BULK ORDERING (OUTPATIENT)
Dept: CASE MANAGEMENT | Facility: OTHER | Age: 66
End: 2021-03-19

## 2021-03-19 DIAGNOSIS — Z23 IMMUNIZATION DUE: ICD-10-CM

## 2021-12-13 ENCOUNTER — HOME HEALTH ADMISSION (OUTPATIENT)
Dept: HOME HEALTH SERVICES | Facility: HOME HEALTHCARE | Age: 66
End: 2021-12-13

## 2021-12-13 ENCOUNTER — TRANSCRIBE ORDERS (OUTPATIENT)
Dept: HOME HEALTH SERVICES | Facility: HOME HEALTHCARE | Age: 66
End: 2021-12-13

## 2021-12-13 DIAGNOSIS — I10 ESSENTIAL HYPERTENSION, MALIGNANT: Primary | ICD-10-CM

## 2022-09-14 NOTE — PROGRESS NOTES
POC discussed with mother and patient at bedside. Questions were encouraged and answered. Patient is on room air tolerating well. Patient is calm and cooperative with cares. Milrinone and epi gtts unchanged. No CXR this morning. Patient PO feeding adequately. TPN rate kept at 30ml/hr. Patient had BM this shift. Wound consult ordered for new sacral wound. Mepilex applied. VSS. Please see flowsheet for more assessment details.   Subjective   Patient ID: Kian Mcdaniels is a 62 y.o. male is here today for follow-up with new Cervical and Thoracic MRI. He is 11 months out from a repeat L4-5 laminectomy with fusion done 12/12/2016. He has neck and back pain as well as right knee pain.     History of Present Illness    This patient returns today.  He continues with pain in his neck with radiation into his right arm.  He still has a lot of pain in his lower back as well.    The following portions of the patient's history were reviewed and updated as appropriate: allergies, current medications, past family history, past medical history, past social history, past surgical history and problem list.    Review of Systems   Constitutional: Positive for activity change.   Respiratory: Negative for chest tightness and shortness of breath.    Cardiovascular: Negative for chest pain.   Musculoskeletal: Positive for arthralgias ( Right knee), back pain, gait problem and neck pain.   All other systems reviewed and are negative.      Objective   Physical Exam   Constitutional: He is oriented to person, place, and time. He appears well-developed and well-nourished.   Neurological: He is oriented to person, place, and time.     Neurologic Exam     Mental Status   Oriented to person, place, and time.       Assessment/Plan   Independent Review of Radiographic Studies:      I reviewed his MRI of the cervical and the thoracic spine done with and without contrast.  These show a large syrinx extending from C5 down to about T4.  At its maximum diameter at about T1 it is quite large.  There is no evidence of abnormal enhancement.    Medical Decision Making:      I told the patient that there is basically 3 reasons why he has this.  One is just idiopathic.  The other is a result of some type of bleeding either from trauma or a vascular malformation in the third is a tumor.  There is no evidence of either of these and so this may turn out to be an idiopathic  situation.  I don't think it is causing him all that much trouble right now and I recommended that we continue to follow it.  We will scan him again in about 3 months.  There was also a thyroid abnormality and we will get him copies of his note so as to discuss this with his family doctor.    Kian was seen today for back pain and neck pain.    Diagnoses and all orders for this visit:    Syringomyelia and syringobulbia  -     MRI Thoracic Spine With & Without Contrast; Future  -     MRI Cervical Spine With & Without Contrast; Future      Return in about 3 months (around 2/14/2018).

## 2022-11-09 NOTE — PROGRESS NOTES
Back pain is improved.  No significant leg pain.  Is getting treatment and wound care clinic for a posterior diabetic leg ulcer.  Two-view x-rays of the lumbar spine obtained to evaluate hardware and  fusion bone suggest further healing since prior x-ray.  We will need an AP and lateral lumbar x-ray on return visit.  Instructions were given.   negative mouth/pharynx
